# Patient Record
Sex: FEMALE | Race: BLACK OR AFRICAN AMERICAN | NOT HISPANIC OR LATINO | Employment: PART TIME | URBAN - METROPOLITAN AREA
[De-identification: names, ages, dates, MRNs, and addresses within clinical notes are randomized per-mention and may not be internally consistent; named-entity substitution may affect disease eponyms.]

---

## 2017-05-24 ENCOUNTER — LAB VISIT (OUTPATIENT)
Dept: LAB | Facility: HOSPITAL | Age: 22
End: 2017-05-24
Attending: INTERNAL MEDICINE
Payer: COMMERCIAL

## 2017-05-24 ENCOUNTER — OFFICE VISIT (OUTPATIENT)
Dept: HEMATOLOGY/ONCOLOGY | Facility: CLINIC | Age: 22
End: 2017-05-24
Payer: COMMERCIAL

## 2017-05-24 VITALS
DIASTOLIC BLOOD PRESSURE: 55 MMHG | HEART RATE: 93 BPM | TEMPERATURE: 99 F | WEIGHT: 108.94 LBS | SYSTOLIC BLOOD PRESSURE: 114 MMHG | RESPIRATION RATE: 16 BRPM | HEIGHT: 62 IN | BODY MASS INDEX: 20.05 KG/M2 | OXYGEN SATURATION: 98 %

## 2017-05-24 DIAGNOSIS — D57.1 HB-SS DISEASE WITHOUT CRISIS: ICD-10-CM

## 2017-05-24 DIAGNOSIS — D57.1 HB-SS DISEASE WITHOUT CRISIS: Primary | ICD-10-CM

## 2017-05-24 LAB
ALBUMIN SERPL BCP-MCNC: 3.9 G/DL
ALP SERPL-CCNC: 131 U/L
ALT SERPL W/O P-5'-P-CCNC: 32 U/L
ANION GAP SERPL CALC-SCNC: 6 MMOL/L
ANISOCYTOSIS BLD QL SMEAR: SLIGHT
AST SERPL-CCNC: 32 U/L
BASOPHILS # BLD AUTO: 0.1 K/UL
BASOPHILS NFR BLD: 1 %
BILIRUB SERPL-MCNC: 3.3 MG/DL
BUN SERPL-MCNC: 10 MG/DL
CALCIUM SERPL-MCNC: 8.6 MG/DL
CHLORIDE SERPL-SCNC: 109 MMOL/L
CO2 SERPL-SCNC: 24 MMOL/L
CREAT SERPL-MCNC: 0.7 MG/DL
DIFFERENTIAL METHOD: ABNORMAL
EOSINOPHIL # BLD AUTO: 0.4 K/UL
EOSINOPHIL NFR BLD: 4 %
ERYTHROCYTE [DISTWIDTH] IN BLOOD BY AUTOMATED COUNT: 18.5 %
EST. GFR  (AFRICAN AMERICAN): >60 ML/MIN/1.73 M^2
EST. GFR  (NON AFRICAN AMERICAN): >60 ML/MIN/1.73 M^2
GIANT PLATELETS BLD QL SMEAR: PRESENT
GLUCOSE SERPL-MCNC: 87 MG/DL
HCT VFR BLD AUTO: 22.8 %
HGB BLD-MCNC: 8 G/DL
HOWELL-JOLLY BOD BLD QL SMEAR: ABNORMAL
HYPOCHROMIA BLD QL SMEAR: ABNORMAL
LYMPHOCYTES # BLD AUTO: 3.4 K/UL
LYMPHOCYTES NFR BLD: 33.5 %
MCH RBC QN AUTO: 30.4 PG
MCHC RBC AUTO-ENTMCNC: 35.1 %
MCV RBC AUTO: 87 FL
MONOCYTES # BLD AUTO: 1 K/UL
MONOCYTES NFR BLD: 9.6 %
NEUTROPHILS # BLD AUTO: 5.2 K/UL
NEUTROPHILS NFR BLD: 51.9 %
PLATELET # BLD AUTO: 423 K/UL
PLATELET BLD QL SMEAR: ABNORMAL
PMV BLD AUTO: 9.9 FL
POIKILOCYTOSIS BLD QL SMEAR: SLIGHT
POLYCHROMASIA BLD QL SMEAR: ABNORMAL
POTASSIUM SERPL-SCNC: 4 MMOL/L
PROT SERPL-MCNC: 7.7 G/DL
RBC # BLD AUTO: 2.63 M/UL
RETICS/RBC NFR AUTO: 20.7 %
SICKLE CELLS BLD QL SMEAR: ABNORMAL
SODIUM SERPL-SCNC: 139 MMOL/L
TARGETS BLD QL SMEAR: ABNORMAL
WBC # BLD AUTO: 9.99 K/UL

## 2017-05-24 PROCEDURE — 83020 HEMOGLOBIN ELECTROPHORESIS: CPT | Mod: 91

## 2017-05-24 PROCEDURE — 99215 OFFICE O/P EST HI 40 MIN: CPT | Mod: S$GLB,,, | Performed by: INTERNAL MEDICINE

## 2017-05-24 PROCEDURE — 85045 AUTOMATED RETICULOCYTE COUNT: CPT

## 2017-05-24 PROCEDURE — 83020 HEMOGLOBIN ELECTROPHORESIS: CPT

## 2017-05-24 PROCEDURE — 80053 COMPREHEN METABOLIC PANEL: CPT

## 2017-05-24 PROCEDURE — 99999 PR PBB SHADOW E&M-EST. PATIENT-LVL III: CPT | Mod: PBBFAC,,, | Performed by: INTERNAL MEDICINE

## 2017-05-24 PROCEDURE — 36415 COLL VENOUS BLD VENIPUNCTURE: CPT

## 2017-05-24 PROCEDURE — 85025 COMPLETE CBC W/AUTO DIFF WBC: CPT

## 2017-05-24 NOTE — Clinical Note
ECHO and referral to optometry on same day in next 2 weeks Return visit  in 6 months with CBC, CMP, hemoglobin S and ferritin

## 2017-05-24 NOTE — LETTER
May 26, 2017    Elizabeth Farnco  [address]  [address]  [place] [address]             [facility]  [facility]  [facility] [address]  Phone: [phone] To Whom it May Concern:    Elizabeth Franco is a patient in the Hematology Department of [facility]. She is seen and treated for Sickle Cell Anemia.  She is applying for paratransit services. Elizabeth cannot utilize regular bus services because she cannot  heat or extreme cold weather for long periods of time. This causes severe pain and weakness due to her blood condition.     If you have any questions or concerns, please don't hesitate to call.    Sincerely,        Elvira Nuñez MD

## 2017-05-26 NOTE — PROGRESS NOTES
Subjective:       Patient ID: Elizabeth Franco is a 21 y.o. female.    Chief Complaint: Follow-up and Sickle Cell Anemia    The patient is a very pleasant 20 year old woman transferring care from [facility] for sickle cell anemia. Her parents have sickle cell trait. She reports a history of very well managed sickle cell disease. 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 She has a horizontal left upper quadrant scar from this surgery. She was treated with transfusion therapy for about 10 years. This was facilitated by a Port-A-Cath that was removed at the completion of therapy. The therapy was limited by iron overload and the patient was not able to tolerate oral chelation therapy. By description she was treated at least once with phlebotomy.    She has rare pain crises that lead to ER evaluation and treatment. These are often pain events of the left upper extremity or lower extremities. They occur about 1-2x yearly. Most often in the winter during finals week at school. She attends [employer] as a [employer] major. She has just started her second semester. Her last blood transfusion was 2 years ago. She has never required an exchange transfusion.     The patient notes that she has had nocturnal enuresis since her splenectomy at 4 years of age. This has been treated with pelvic exercises and oxybutynin. The incidence of enuresis has greatly decreased to resolved over the past year. She has not had increased UTI occurrence. Chaffing and skin irritation was a prior issue.    Elizabeth also has a recent diagnosis of hidradenitis supprativa of the right axilla. Flares have been treated with oral Augmentin and mupirocin 2% cream.     In addition she reports severe episodes of GERD that impact her quality of life. Events are described as immobilizing.incapacitating epigastric pain that radiates to the entire chest. Events last at least 24 hours. This has not been evaluated by  Gastroenterology to date.    TODAY  Elizabeth presents for follow-up evaluation. She appears well today.   She requests medication refills and completion of travel assistance application.  Maintenance ECHO and eye exam need to be updated.        HPI  Review of Systems   Constitutional: Negative for activity change, appetite change, fever and unexpected weight change.   HENT: Negative.    Eyes: Negative.    Respiratory: Negative for cough and shortness of breath.    Cardiovascular: Negative for chest pain and leg swelling.   Gastrointestinal:        Epigastric pain   Endocrine: Negative.    Genitourinary: Positive for enuresis.   Musculoskeletal: Negative.    Skin: Negative for pallor and rash.   Allergic/Immunologic: Negative for environmental allergies, food allergies and immunocompromised state.   Neurological: Negative.    Hematological: Negative for adenopathy. Does not bruise/bleed easily.   Psychiatric/Behavioral: Negative.        Objective:      Physical Exam   Constitutional: She is oriented to person, place, and time. She appears well-developed and well-nourished.   HENT:   Head: Normocephalic and atraumatic.   Right Ear: External ear normal.   Left Ear: External ear normal.   Nose: Nose normal.   Mouth/Throat: Oropharynx is clear and moist.   Eyes: Conjunctivae and EOM are normal. Pupils are equal, round, and reactive to light.   Neck: Normal range of motion. Neck supple. No thyromegaly present.   Cardiovascular: Normal rate, regular rhythm and intact distal pulses.    Murmur heard.  Pulmonary/Chest: Effort normal and breath sounds normal. No respiratory distress.   Abdominal: Soft. Bowel sounds are normal. She exhibits no distension. There is no hepatosplenomegaly.   Musculoskeletal: She exhibits no edema or tenderness.   Lymphadenopathy:     She has no cervical adenopathy.     She has no axillary adenopathy.        Right: No inguinal adenopathy present.        Left: No inguinal adenopathy present.    Neurological: She is alert and oriented to person, place, and time. No cranial nerve deficit.   Skin: Skin is warm and dry. No rash noted. No cyanosis. Nails show no clubbing.   Psychiatric: She has a normal mood and affect. Her behavior is normal.   Nursing note and vitals reviewed.      Assessment:       1. Hb-SS disease without crisis        Plan:       1. Continue Hydrea 1500mg daily and folic acid. Patient a great historian and compliant with maintenance therapy.    2. Continue Jadenu for iron overload    3. Medroxyprogesterone syringe refilled at last appointment- patient holding at present    4. Rx for Percocet 10mg/325mg for severe pain relief    5. Echocardiogram for screening pulmonary HTN and update eye exam    6. Recommend IVF every 2-3 months for improved pain control. Request at [facility] for patient convenience.    Return visit in 6 months with CBC, CMP, Retic count and ferritin

## 2017-05-26 NOTE — ADDENDUM NOTE
Encounter addended by: Elvira Nuñez MD on: 5/26/2017 10:16 AM<BR>    Actions taken: Letter status changed

## 2017-05-30 LAB
HGB FRACT BLD ELPH PH6.0-IMP: NORMAL
HGB FRACT BLD ELPH-IMP: NORMAL
HGB S MFR BLD ELPH: 65 %

## 2018-01-09 DIAGNOSIS — D57.1 HB-SS DISEASE WITHOUT CRISIS: ICD-10-CM

## 2018-01-09 RX ORDER — HYDROXYUREA 500 MG/1
500 CAPSULE ORAL 3 TIMES DAILY
Qty: 90 CAPSULE | Refills: 5 | Status: SHIPPED | OUTPATIENT
Start: 2018-01-09 | End: 2018-05-21 | Stop reason: SDUPTHER

## 2018-01-09 RX ORDER — FOLIC ACID 1 MG/1
1 TABLET ORAL DAILY
Qty: 30 TABLET | Refills: 5 | Status: SHIPPED | OUTPATIENT
Start: 2018-01-09 | End: 2018-05-21 | Stop reason: SDUPTHER

## 2018-05-21 ENCOUNTER — PATIENT MESSAGE (OUTPATIENT)
Dept: HEMATOLOGY/ONCOLOGY | Facility: CLINIC | Age: 23
End: 2018-05-21

## 2018-05-21 DIAGNOSIS — D57.00 SICKLE CELL ANEMIA WITH PAIN: Primary | ICD-10-CM

## 2018-05-21 DIAGNOSIS — D57.00 SICKLE CELL ANEMIA WITH PAIN: ICD-10-CM

## 2018-05-21 DIAGNOSIS — D57.1 HB-SS DISEASE WITHOUT CRISIS: ICD-10-CM

## 2018-05-21 RX ORDER — HYDROXYUREA 500 MG/1
500 CAPSULE ORAL 3 TIMES DAILY
Qty: 90 CAPSULE | Refills: 5 | Status: SHIPPED | OUTPATIENT
Start: 2018-05-21 | End: 2018-06-05 | Stop reason: SDUPTHER

## 2018-05-21 RX ORDER — OXYCODONE AND ACETAMINOPHEN 10; 325 MG/1; MG/1
1 TABLET ORAL 4 TIMES DAILY PRN
Qty: 30 TABLET | Refills: 0 | Status: SHIPPED | OUTPATIENT
Start: 2018-05-21 | End: 2018-06-05 | Stop reason: SDUPTHER

## 2018-05-21 RX ORDER — FOLIC ACID 1 MG/1
1 TABLET ORAL DAILY
Qty: 30 TABLET | Refills: 5 | Status: SHIPPED | OUTPATIENT
Start: 2018-05-21 | End: 2018-06-05 | Stop reason: SDUPTHER

## 2018-06-05 ENCOUNTER — LAB VISIT (OUTPATIENT)
Dept: LAB | Facility: HOSPITAL | Age: 23
End: 2018-06-05
Attending: INTERNAL MEDICINE
Payer: COMMERCIAL

## 2018-06-05 ENCOUNTER — OFFICE VISIT (OUTPATIENT)
Dept: HEMATOLOGY/ONCOLOGY | Facility: CLINIC | Age: 23
End: 2018-06-05
Payer: COMMERCIAL

## 2018-06-05 VITALS
HEART RATE: 70 BPM | HEIGHT: 62 IN | OXYGEN SATURATION: 99 % | DIASTOLIC BLOOD PRESSURE: 57 MMHG | WEIGHT: 108.44 LBS | BODY MASS INDEX: 19.96 KG/M2 | TEMPERATURE: 98 F | SYSTOLIC BLOOD PRESSURE: 103 MMHG

## 2018-06-05 DIAGNOSIS — D57.1 HB-SS DISEASE WITHOUT CRISIS: ICD-10-CM

## 2018-06-05 DIAGNOSIS — D57.00 SICKLE CELL ANEMIA WITH PAIN: ICD-10-CM

## 2018-06-05 LAB
ALBUMIN SERPL BCP-MCNC: 4.3 G/DL
ALP SERPL-CCNC: 107 U/L
ALT SERPL W/O P-5'-P-CCNC: 20 U/L
ANION GAP SERPL CALC-SCNC: 9 MMOL/L
AST SERPL-CCNC: 30 U/L
BASOPHILS # BLD AUTO: 0.13 K/UL
BASOPHILS NFR BLD: 1.9 %
BILIRUB SERPL-MCNC: 3.2 MG/DL
BUN SERPL-MCNC: 8 MG/DL
CALCIUM SERPL-MCNC: 9.3 MG/DL
CHLORIDE SERPL-SCNC: 109 MMOL/L
CO2 SERPL-SCNC: 22 MMOL/L
CREAT SERPL-MCNC: 0.7 MG/DL
DIFFERENTIAL METHOD: ABNORMAL
EOSINOPHIL # BLD AUTO: 0.4 K/UL
EOSINOPHIL NFR BLD: 5.9 %
ERYTHROCYTE [DISTWIDTH] IN BLOOD BY AUTOMATED COUNT: 18.9 %
EST. GFR  (AFRICAN AMERICAN): >60 ML/MIN/1.73 M^2
EST. GFR  (NON AFRICAN AMERICAN): >60 ML/MIN/1.73 M^2
GLUCOSE SERPL-MCNC: 86 MG/DL
HCT VFR BLD AUTO: 24 %
HGB BLD-MCNC: 8.9 G/DL
IMM GRANULOCYTES # BLD AUTO: 0.06 K/UL
IMM GRANULOCYTES NFR BLD AUTO: 0.9 %
LYMPHOCYTES # BLD AUTO: 1.9 K/UL
LYMPHOCYTES NFR BLD: 27.9 %
MCH RBC QN AUTO: 33 PG
MCHC RBC AUTO-ENTMCNC: 37.1 G/DL
MCV RBC AUTO: 89 FL
MONOCYTES # BLD AUTO: 0.5 K/UL
MONOCYTES NFR BLD: 7 %
NEUTROPHILS # BLD AUTO: 3.9 K/UL
NEUTROPHILS NFR BLD: 56.4 %
NRBC BLD-RTO: 3 /100 WBC
PLATELET # BLD AUTO: 409 K/UL
PMV BLD AUTO: 11.2 FL
POTASSIUM SERPL-SCNC: 4.3 MMOL/L
PROT SERPL-MCNC: 8.2 G/DL
RBC # BLD AUTO: 2.7 M/UL
RETICS/RBC NFR AUTO: 20 %
SODIUM SERPL-SCNC: 140 MMOL/L
WBC # BLD AUTO: 6.96 K/UL

## 2018-06-05 PROCEDURE — 99999 PR PBB SHADOW E&M-EST. PATIENT-LVL III: CPT | Mod: PBBFAC,,, | Performed by: INTERNAL MEDICINE

## 2018-06-05 PROCEDURE — 80053 COMPREHEN METABOLIC PANEL: CPT

## 2018-06-05 PROCEDURE — 85025 COMPLETE CBC W/AUTO DIFF WBC: CPT

## 2018-06-05 PROCEDURE — 36415 COLL VENOUS BLD VENIPUNCTURE: CPT

## 2018-06-05 PROCEDURE — 99215 OFFICE O/P EST HI 40 MIN: CPT | Mod: S$GLB,,, | Performed by: INTERNAL MEDICINE

## 2018-06-05 PROCEDURE — 85045 AUTOMATED RETICULOCYTE COUNT: CPT

## 2018-06-05 PROCEDURE — 83020 HEMOGLOBIN ELECTROPHORESIS: CPT

## 2018-06-05 PROCEDURE — 3008F BODY MASS INDEX DOCD: CPT | Mod: CPTII,S$GLB,, | Performed by: INTERNAL MEDICINE

## 2018-06-05 RX ORDER — OXYCODONE AND ACETAMINOPHEN 10; 325 MG/1; MG/1
1 TABLET ORAL 4 TIMES DAILY PRN
Qty: 30 TABLET | Refills: 0 | Status: SHIPPED | OUTPATIENT
Start: 2018-06-05 | End: 2019-04-16

## 2018-06-05 RX ORDER — HYDROXYUREA 500 MG/1
500 CAPSULE ORAL 3 TIMES DAILY
Qty: 90 CAPSULE | Refills: 5 | Status: SHIPPED | OUTPATIENT
Start: 2018-06-05 | End: 2019-04-16

## 2018-06-05 RX ORDER — FOLIC ACID 1 MG/1
1 TABLET ORAL DAILY
Qty: 30 TABLET | Refills: 5 | Status: SHIPPED | OUTPATIENT
Start: 2018-06-05 | End: 2019-04-16

## 2018-06-05 NOTE — PROGRESS NOTES
Subjective:       Patient ID: Elizabeth Franco is a 22 y.o. female.    Chief Complaint: Hb-SS disease without crisis and Sickle cell anemia with pain    The patient is a very pleasant 20 year old woman [facility]. Her parents have sickle cell trait. She reports a history of very well managed sickle cell disease. 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 She has a horizontal left upper quadrant scar from this surgery. She was treated with transfusion therapy for about 10 years. This was facilitated by a Port-A-Cath that was removed at the completion of therapy. The therapy was limited by iron overload and the patient was not able to tolerate oral chelation therapy. By description she was treated at least once with phlebotomy.    She has rare pain crises that lead to ER evaluation and treatment. These are often pain events of the left upper extremity or lower extremities. They occur about 1-2x yearly. Most often in the winter during finals week at school. She attends [employer] as a [employer] major. She has just started her second semester. Her last blood transfusion was 2 years ago. She has never required an exchange transfusion.     The patient notes that she has had nocturnal enuresis since her splenectomy at 4 years of age. This has been treated with pelvic exercises and oxybutynin. The incidence of enuresis has greatly decreased to resolved over the past year. She has not had increased UTI occurrence. Chaffing and skin irritation was a prior issue.    Elizabeth also has a recent diagnosis of hidradenitis supprativa of the right axilla. Flares have been treated with oral Augmentin and mupirocin 2% cream.     In addition she reports severe episodes of GERD that impact her quality of life. Events are described as immobilizing.incapacitating epigastric pain that radiates to the entire chest. Events last at least 24 hours. This has not  been evaluated by Gastroenterology to date.    TODAY  Elizabeth presents for follow-up evaluation. She appears well today. Reports single hospital admission for pain crisis during December 2017 during hard freeze  She requests medication refills in paper form to have during trip to [place] in July- will be out of country for about 1 month  Maintenance ECHO and eye exam need to be updated. Patient also reports she needs a lipid panel.        HPI  Review of Systems   Constitutional: Negative for activity change, appetite change, fever and unexpected weight change.   HENT: Negative.    Eyes: Negative.    Respiratory: Negative for cough and shortness of breath.    Cardiovascular: Negative for chest pain and leg swelling.   Gastrointestinal:        Epigastric pain   Endocrine: Negative.    Genitourinary: Positive for enuresis.   Musculoskeletal: Negative.    Skin: Negative for pallor and rash.   Allergic/Immunologic: Negative for environmental allergies, food allergies and immunocompromised state.   Neurological: Negative.    Hematological: Negative for adenopathy. Does not bruise/bleed easily.   Psychiatric/Behavioral: Negative.        Objective:      Physical Exam   Constitutional: She is oriented to person, place, and time. She appears well-developed and well-nourished.   HENT:   Head: Normocephalic and atraumatic.   Right Ear: External ear normal.   Left Ear: External ear normal.   Nose: Nose normal.   Mouth/Throat: Oropharynx is clear and moist.   Eyes: Conjunctivae and EOM are normal. Pupils are equal, round, and reactive to light.   Neck: Normal range of motion. Neck supple. No thyromegaly present.   Cardiovascular: Normal rate, regular rhythm and intact distal pulses.    Murmur heard.  Pulmonary/Chest: Effort normal and breath sounds normal. No respiratory distress.   Abdominal: Soft. Bowel sounds are normal. She exhibits no distension. There is no hepatosplenomegaly.   Musculoskeletal: She exhibits no edema or  tenderness.   Lymphadenopathy:     She has no cervical adenopathy.     She has no axillary adenopathy.        Right: No inguinal adenopathy present.        Left: No inguinal adenopathy present.   Neurological: She is alert and oriented to person, place, and time. No cranial nerve deficit.   Skin: Skin is warm and dry. No rash noted. No cyanosis. Nails show no clubbing.   Psychiatric: She has a normal mood and affect. Her behavior is normal.   Nursing note and vitals reviewed.      Assessment:       1. Hb-SS disease without crisis    2. Sickle cell anemia with pain        Plan:       1. Continue Hydrea 1500mg daily and folic acid. Patient a great historian and compliant with maintenance therapy.    2. Medroxyprogesterone- restarted by OBGYN, patient now having prolonged period and will discuss with OBGYN    3. Rx for Percocet 10mg/325mg for severe pain relief    4. Echocardiogram for screening pulmonary HTN and update eye exam- ECHO scheduled  And lipid panel 6/6/18    Return visit in 6 months with CBC, CMP, Retic count and ferritin

## 2018-06-06 ENCOUNTER — PATIENT MESSAGE (OUTPATIENT)
Dept: HEMATOLOGY/ONCOLOGY | Facility: CLINIC | Age: 23
End: 2018-06-06

## 2018-06-06 ENCOUNTER — HOSPITAL ENCOUNTER (OUTPATIENT)
Dept: CARDIOLOGY | Facility: HOSPITAL | Age: 23
Discharge: HOME | End: 2018-06-06
Attending: INTERNAL MEDICINE
Payer: COMMERCIAL

## 2018-06-06 ENCOUNTER — LAB VISIT (OUTPATIENT)
Dept: LAB | Facility: HOSPITAL | Age: 23
End: 2018-06-06
Attending: INTERNAL MEDICINE
Payer: COMMERCIAL

## 2018-06-06 DIAGNOSIS — D57.1 HB-SS DISEASE WITHOUT CRISIS: ICD-10-CM

## 2018-06-06 DIAGNOSIS — D57.00 SICKLE CELL ANEMIA WITH PAIN: ICD-10-CM

## 2018-06-06 LAB
CHOLEST SERPL-MCNC: 121 MG/DL
CHOLEST/HDLC SERPL: 4.2 {RATIO}
HDLC SERPL-MCNC: 29 MG/DL
HDLC SERPL: 24 %
HGB FRACT BLD ELPH-IMP: NORMAL
HGB S MFR BLD ELPH: 86 %
LDLC SERPL CALC-MCNC: 76 MG/DL
NONHDLC SERPL-MCNC: 92 MG/DL
TRIGL SERPL-MCNC: 80 MG/DL

## 2018-06-06 PROCEDURE — 93306 TTE W/DOPPLER COMPLETE: CPT

## 2018-06-06 PROCEDURE — 93306 TTE W/DOPPLER COMPLETE: CPT | Mod: 26,,, | Performed by: INTERNAL MEDICINE

## 2018-06-06 PROCEDURE — 80061 LIPID PANEL: CPT

## 2018-06-06 PROCEDURE — 36415 COLL VENOUS BLD VENIPUNCTURE: CPT

## 2018-06-07 LAB
DIASTOLIC DYSFUNCTION: NO
ESTIMATED PA SYSTOLIC PRESSURE: 22.01
MITRAL VALVE MOBILITY: NORMAL
RETIRED EF AND QEF - SEE NOTES: 60 (ref 55–65)
TRICUSPID VALVE REGURGITATION: NORMAL

## 2018-12-03 ENCOUNTER — PATIENT MESSAGE (OUTPATIENT)
Dept: HEMATOLOGY/ONCOLOGY | Facility: CLINIC | Age: 23
End: 2018-12-03

## 2018-12-10 ENCOUNTER — LAB VISIT (OUTPATIENT)
Dept: LAB | Facility: HOSPITAL | Age: 23
End: 2018-12-10
Attending: INTERNAL MEDICINE
Payer: COMMERCIAL

## 2018-12-10 ENCOUNTER — OFFICE VISIT (OUTPATIENT)
Dept: HEMATOLOGY/ONCOLOGY | Facility: CLINIC | Age: 23
End: 2018-12-10
Payer: COMMERCIAL

## 2018-12-10 VITALS
RESPIRATION RATE: 18 BRPM | DIASTOLIC BLOOD PRESSURE: 73 MMHG | HEART RATE: 77 BPM | OXYGEN SATURATION: 100 % | SYSTOLIC BLOOD PRESSURE: 117 MMHG | HEIGHT: 62 IN | BODY MASS INDEX: 21.86 KG/M2 | TEMPERATURE: 97 F | WEIGHT: 118.81 LBS

## 2018-12-10 DIAGNOSIS — D57.1 HB-SS DISEASE WITHOUT CRISIS: ICD-10-CM

## 2018-12-10 DIAGNOSIS — D57.1 HB-SS DISEASE WITHOUT CRISIS: Primary | ICD-10-CM

## 2018-12-10 DIAGNOSIS — D57.00 SICKLE CELL ANEMIA WITH PAIN: ICD-10-CM

## 2018-12-10 LAB
ALBUMIN SERPL BCP-MCNC: 4.4 G/DL
ALP SERPL-CCNC: 100 U/L
ALT SERPL W/O P-5'-P-CCNC: 27 U/L
ANION GAP SERPL CALC-SCNC: 9 MMOL/L
ANISOCYTOSIS BLD QL SMEAR: SLIGHT
AST SERPL-CCNC: 29 U/L
BASOPHILS # BLD AUTO: 0.1 K/UL
BASOPHILS NFR BLD: 1 %
BILIRUB SERPL-MCNC: 3 MG/DL
BUN SERPL-MCNC: 9 MG/DL
CALCIUM SERPL-MCNC: 9.6 MG/DL
CHLORIDE SERPL-SCNC: 109 MMOL/L
CO2 SERPL-SCNC: 21 MMOL/L
CREAT SERPL-MCNC: 0.6 MG/DL
DIFFERENTIAL METHOD: ABNORMAL
EOSINOPHIL # BLD AUTO: 0.3 K/UL
EOSINOPHIL NFR BLD: 2.5 %
ERYTHROCYTE [DISTWIDTH] IN BLOOD BY AUTOMATED COUNT: 16.4 %
EST. GFR  (AFRICAN AMERICAN): >60 ML/MIN/1.73 M^2
EST. GFR  (NON AFRICAN AMERICAN): >60 ML/MIN/1.73 M^2
FERRITIN SERPL-MCNC: 2699 NG/ML
GLUCOSE SERPL-MCNC: 84 MG/DL
HCT VFR BLD AUTO: 27.5 %
HGB BLD-MCNC: 9.5 G/DL
HYPOCHROMIA BLD QL SMEAR: ABNORMAL
IMM GRANULOCYTES # BLD AUTO: 0.03 K/UL
IMM GRANULOCYTES NFR BLD AUTO: 0.3 %
LYMPHOCYTES # BLD AUTO: 2.3 K/UL
LYMPHOCYTES NFR BLD: 21.7 %
MCH RBC QN AUTO: 31.1 PG
MCHC RBC AUTO-ENTMCNC: 34.5 G/DL
MCV RBC AUTO: 90 FL
MONOCYTES # BLD AUTO: 0.6 K/UL
MONOCYTES NFR BLD: 5.7 %
NEUTROPHILS # BLD AUTO: 7.2 K/UL
NEUTROPHILS NFR BLD: 68.8 %
NRBC BLD-RTO: 1 /100 WBC
OVALOCYTES BLD QL SMEAR: ABNORMAL
PLATELET # BLD AUTO: 399 K/UL
PMV BLD AUTO: 10.9 FL
POIKILOCYTOSIS BLD QL SMEAR: ABNORMAL
POLYCHROMASIA BLD QL SMEAR: ABNORMAL
POTASSIUM SERPL-SCNC: 4.1 MMOL/L
PROT SERPL-MCNC: 8.4 G/DL
RBC # BLD AUTO: 3.05 M/UL
RETICS/RBC NFR AUTO: 16.4 %
SICKLE CELLS BLD QL SMEAR: ABNORMAL
SODIUM SERPL-SCNC: 139 MMOL/L
TARGETS BLD QL SMEAR: ABNORMAL
WBC # BLD AUTO: 10.38 K/UL

## 2018-12-10 PROCEDURE — 99999 PR PBB SHADOW E&M-EST. PATIENT-LVL IV: CPT | Mod: PBBFAC,,, | Performed by: INTERNAL MEDICINE

## 2018-12-10 PROCEDURE — 3008F BODY MASS INDEX DOCD: CPT | Mod: CPTII,S$GLB,, | Performed by: INTERNAL MEDICINE

## 2018-12-10 PROCEDURE — 83020 HEMOGLOBIN ELECTROPHORESIS: CPT

## 2018-12-10 PROCEDURE — 36415 COLL VENOUS BLD VENIPUNCTURE: CPT

## 2018-12-10 PROCEDURE — 85025 COMPLETE CBC W/AUTO DIFF WBC: CPT

## 2018-12-10 PROCEDURE — 80053 COMPREHEN METABOLIC PANEL: CPT

## 2018-12-10 PROCEDURE — 85045 AUTOMATED RETICULOCYTE COUNT: CPT

## 2018-12-10 PROCEDURE — 99215 OFFICE O/P EST HI 40 MIN: CPT | Mod: S$GLB,,, | Performed by: INTERNAL MEDICINE

## 2018-12-10 PROCEDURE — 82728 ASSAY OF FERRITIN: CPT

## 2018-12-12 LAB
HGB FRACT BLD ELPH-IMP: NORMAL
HGB S MFR BLD ELPH: 64 %

## 2018-12-14 NOTE — PROGRESS NOTES
Subjective:       Patient ID: Elizabeth Franco is a 23 y.o. female.    Chief Complaint: Hb-SS disease without crisis and Results    The patient is a very pleasant 20 year old woman transferring care from [facility]. Her parents have sickle cell trait. She reports a history of very well managed sickle cell disease. 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 She has a horizontal left upper quadrant scar from this surgery. She was treated with transfusion therapy for about 10 years. This was facilitated by a Port-A-Cath that was removed at the completion of therapy. The therapy was limited by iron overload and the patient was not able to tolerate oral chelation therapy. By description she was treated at least once with phlebotomy.    She has rare pain crises that lead to ER evaluation and treatment. These are often pain events of the left upper extremity or lower extremities. They occur about 1-2x yearly. Most often in the winter during finals week at school. She attends [employer] as a [employer] major. She has just started her second semester. Her last blood transfusion was 2 years ago. She has never required an exchange transfusion.     The patient notes that she has had nocturnal enuresis since her splenectomy at 4 years of age. This has been treated with pelvic exercises and oxybutynin. The incidence of enuresis has greatly decreased to resolved over the past year. She has not had increased UTI occurrence. Chaffing and skin irritation was a prior issue.    Elizabeth also has a recent diagnosis of hidradenitis supprativa of the right axilla. Flares have been treated with oral Augmentin and mupirocin 2% cream.     In addition she reports severe episodes of GERD that impact her quality of life. Events are described as immobilizing.incapacitating epigastric pain that radiates to the entire chest. Events last at least 24 hours. This has not been evaluated by  Gastroenterology to date.    TODAY  Elizabeth presents for follow-up evaluation.   She requests visits with Dermatology and Psychology  Medications were reviewed.   Plan to keep current plan for hydrea and pain regimen.  Recent admission to outside hospital for chest syndrome.        HPI  Review of Systems   Constitutional: Negative for activity change, appetite change, fever and unexpected weight change.   HENT: Negative.    Eyes: Negative.    Respiratory: Negative for cough and shortness of breath.    Cardiovascular: Negative for chest pain and leg swelling.   Gastrointestinal:        Epigastric pain   Endocrine: Negative.    Genitourinary: Positive for enuresis.   Musculoskeletal: Negative.    Skin: Negative for pallor and rash.   Allergic/Immunologic: Negative for environmental allergies, food allergies and immunocompromised state.   Neurological: Negative.    Hematological: Negative for adenopathy. Does not bruise/bleed easily.   Psychiatric/Behavioral: Negative.        Objective:      Physical Exam   Constitutional: She is oriented to person, place, and time. She appears well-developed and well-nourished.   HENT:   Head: Normocephalic and atraumatic.   Right Ear: External ear normal.   Left Ear: External ear normal.   Nose: Nose normal.   Mouth/Throat: Oropharynx is clear and moist.   Eyes: Conjunctivae and EOM are normal. Pupils are equal, round, and reactive to light.   Neck: Normal range of motion. Neck supple. No thyromegaly present.   Cardiovascular: Normal rate, regular rhythm and intact distal pulses.   Murmur heard.  Pulmonary/Chest: Effort normal and breath sounds normal. No respiratory distress.   Abdominal: Soft. Bowel sounds are normal. She exhibits no distension. There is no hepatosplenomegaly.   Musculoskeletal: She exhibits no edema or tenderness.   Lymphadenopathy:     She has no cervical adenopathy.     She has no axillary adenopathy.        Right: No inguinal adenopathy present.        Left: No  inguinal adenopathy present.   Neurological: She is alert and oriented to person, place, and time. No cranial nerve deficit.   Skin: Skin is warm and dry. No rash noted. No cyanosis. Nails show no clubbing.   Psychiatric: She has a normal mood and affect. Her behavior is normal.   Nursing note and vitals reviewed.      Assessment:       No diagnosis found.    Plan:       1. Continue Hydrea 1500mg daily and folic acid. Patient a great historian and compliant with maintenance therapy.    2. Medroxyprogesterone- restarted by OBGYN, patient now having prolonged period and will discuss with OBGYN    3. Percocet 10mg/325mg for severe pain relief    4. Echocardiogram for screening pulmonary HTN and update eye exam- ECHO scheduled; need repeat in 2020      Return visit in 6 months with CBC, CMP, Retic count and ferritin  Refer to Dermatology and Dr. Nolan

## 2018-12-18 ENCOUNTER — TELEPHONE (OUTPATIENT)
Dept: INFUSION THERAPY | Facility: HOSPITAL | Age: 23
End: 2018-12-18

## 2018-12-20 ENCOUNTER — TELEPHONE (OUTPATIENT)
Dept: INFUSION THERAPY | Facility: HOSPITAL | Age: 23
End: 2018-12-20

## 2019-04-16 ENCOUNTER — OFFICE VISIT (OUTPATIENT)
Dept: URGENT CARE | Facility: CLINIC | Age: 24
End: 2019-04-16
Payer: COMMERCIAL

## 2019-04-16 VITALS
BODY MASS INDEX: 20.24 KG/M2 | SYSTOLIC BLOOD PRESSURE: 112 MMHG | RESPIRATION RATE: 18 BRPM | HEART RATE: 82 BPM | TEMPERATURE: 99 F | WEIGHT: 110 LBS | DIASTOLIC BLOOD PRESSURE: 68 MMHG | HEIGHT: 62 IN | OXYGEN SATURATION: 97 %

## 2019-04-16 DIAGNOSIS — L03.116 CELLULITIS OF LEFT LOWER EXTREMITY: Primary | ICD-10-CM

## 2019-04-16 DIAGNOSIS — D57.1 HB-SS DISEASE WITHOUT CRISIS: ICD-10-CM

## 2019-04-16 PROCEDURE — 3008F PR BODY MASS INDEX (BMI) DOCUMENTED: ICD-10-PCS | Mod: CPTII,S$GLB,, | Performed by: INTERNAL MEDICINE

## 2019-04-16 PROCEDURE — 3008F BODY MASS INDEX DOCD: CPT | Mod: CPTII,S$GLB,, | Performed by: INTERNAL MEDICINE

## 2019-04-16 PROCEDURE — 99213 PR OFFICE/OUTPT VISIT, EST, LEVL III, 20-29 MIN: ICD-10-PCS | Mod: S$GLB,,, | Performed by: INTERNAL MEDICINE

## 2019-04-16 PROCEDURE — 99213 OFFICE O/P EST LOW 20 MIN: CPT | Mod: S$GLB,,, | Performed by: INTERNAL MEDICINE

## 2019-04-16 RX ORDER — AMOXICILLIN AND CLAVULANATE POTASSIUM 875; 125 MG/1; MG/1
1 TABLET, FILM COATED ORAL 2 TIMES DAILY
Qty: 10 TABLET | Refills: 0 | Status: SHIPPED | OUTPATIENT
Start: 2019-04-16 | End: 2019-04-21

## 2019-04-16 RX ORDER — SULFAMETHOXAZOLE AND TRIMETHOPRIM 800; 160 MG/1; MG/1
TABLET ORAL
Refills: 0 | Status: ON HOLD | COMMUNITY
Start: 2019-04-15 | End: 2020-04-05 | Stop reason: HOSPADM

## 2019-04-16 RX ORDER — MUPIROCIN 20 MG/G
OINTMENT TOPICAL
Qty: 22 G | Refills: 1 | Status: ON HOLD | OUTPATIENT
Start: 2019-04-16 | End: 2021-03-03

## 2019-04-16 NOTE — PROGRESS NOTES
"Subjective:       Patient ID: Elizabeth Franco is a 23 y.o. female.    Vitals:  height is 5' 2" (1.575 m) and weight is 49.9 kg (110 lb). Her tympanic temperature is 99.2 °F (37.3 °C). Her blood pressure is 112/68 and her pulse is 82. Her respiration is 18 and oxygen saturation is 97%.     Chief Complaint: Leg Pain (left lower leg)    Patient states she fell 1 week ago.    Leg Pain    Incident onset: 1 week ago. The incident occurred at work. The injury mechanism was a fall. The pain is present in the left ankle. The quality of the pain is described as burning. The pain is at a severity of 0/10. The patient is experiencing no pain. The pain has been intermittent since onset. Associated symptoms include tingling. Pertinent negatives include no muscle weakness or numbness. She reports no foreign bodies present. The symptoms are aggravated by palpation and weight bearing. She has tried acetaminophen, heat and ice (Bactrim DS) for the symptoms. The treatment provided mild relief.       Constitution: Negative for fatigue.   HENT: Negative for facial swelling and facial trauma.    Neck: Negative for neck stiffness.   Cardiovascular: Negative for chest trauma.   Eyes: Negative for eye trauma, double vision and blurred vision.   Gastrointestinal: Negative for abdominal trauma, abdominal pain and rectal bleeding.   Genitourinary: Negative for hematuria, missed menses, genital trauma and pelvic pain.   Musculoskeletal: Positive for pain (left lower leg) and trauma (fall). Negative for joint swelling and abnormal ROM of joint.   Skin: Positive for abrasion and erythema. Negative for color change, wound, laceration and bruising.   Neurological: Negative for dizziness, history of vertigo, light-headedness, coordination disturbances, altered mental status, loss of consciousness and numbness.   Hematologic/Lymphatic: Negative for history of bleeding disorder.   Psychiatric/Behavioral: Negative for altered mental status.     "   Objective:      Physical Exam   Constitutional: She is oriented to person, place, and time. She appears well-developed and well-nourished. She is cooperative.  Non-toxic appearance. She does not appear ill. No distress.   HENT:   Head: Normocephalic and atraumatic.   Right Ear: Hearing, external ear and ear canal normal. Tympanic membrane is not injected and not erythematous.   Left Ear: Hearing, tympanic membrane, external ear and ear canal normal. Tympanic membrane is not injected and not erythematous.   Nose: Nose normal. No mucosal edema, rhinorrhea or nasal deformity. No epistaxis. Right sinus exhibits no maxillary sinus tenderness and no frontal sinus tenderness. Left sinus exhibits no maxillary sinus tenderness and no frontal sinus tenderness.   Mouth/Throat: Uvula is midline, oropharynx is clear and moist and mucous membranes are normal. No trismus in the jaw. Normal dentition. No uvula swelling. No oropharyngeal exudate or posterior oropharyngeal erythema.   Eyes: Conjunctivae and lids are normal. No scleral icterus.   Sclera clear bilat   Neck: Trachea normal, full passive range of motion without pain and phonation normal. Neck supple.   Cardiovascular: Normal rate, regular rhythm, normal heart sounds, intact distal pulses and normal pulses.   Pulmonary/Chest: Effort normal and breath sounds normal. No respiratory distress.   Abdominal: Soft. Normal appearance and bowel sounds are normal. She exhibits no distension. There is no tenderness.   Musculoskeletal: Normal range of motion. She exhibits no edema or deformity.   Neurological: She is alert and oriented to person, place, and time. She exhibits normal muscle tone. Coordination normal.   Skin: Skin is warm and dry. Capillary refill takes less than 2 seconds. Abrasion noted. No bruising, no burn, no ecchymosis, no laceration, no lesion, no petechiae, no purpura and no rash noted. Rash is not macular, not papular, not maculopapular, not nodular, not  pustular and not vesicular. She is not diaphoretic. There is erythema. No cyanosis. No pallor. Nails show no clubbing.        Psychiatric: She has a normal mood and affect. Her speech is normal and behavior is normal. Judgment and thought content normal. Cognition and memory are normal.   Nursing note and vitals reviewed.      Assessment:       1. Cellulitis of left lower extremity    2. Hb-SS disease without crisis        Plan:         Cellulitis of left lower extremity  -     amoxicillin-clavulanate 875-125mg (AUGMENTIN) 875-125 mg per tablet; Take 1 tablet by mouth 2 (two) times daily. for 5 days  Dispense: 10 tablet; Refill: 0  -     mupirocin (BACTROBAN) 2 % ointment; Apply to affected area 3 times daily  Dispense: 22 g; Refill: 1    Hb-SS disease without crisis       take meds fu with regular md  Worry about encapsulated bacteria H. Flu N.Mening and strep

## 2019-04-16 NOTE — PATIENT INSTRUCTIONS
Discharge Instructions for Cellulitis  You have been diagnosed with cellulitis. This is an infection in the deepest layer of the skin. In some cases, the infection also affects the muscle. Cellulitis is caused by bacteria. The bacteria can enter the body through broken skin. This can happen with a cut, scratch, animal bite, or an insect bite that has been scratched. You may have been treated in the hospital with antibiotics and fluids. You will likely be given a prescription for antibiotics to take at home. This sheet will help you take care of yourself at home.  Home care  When you are home:  · Take the prescribed antibiotic medicine you are given as directed until it is gone. Take it even if you feel better. It treats the infection and stops it from returning. Not taking all the medicine can make future infections hard to treat.  · Keep the infected area clean.  · When possible, raise the infected area above the level of your heart. This helps keep swelling down.  · Talk with your healthcare provider if you are in pain. Ask what kind of over-the-counter medicine you can take for pain.  · Apply clean bandages as advised.  · Take your temperature once a day for a week.  · Wash your hands often to prevent spreading the infection.  In the future, wash your hands before and after you touch cuts, scratches, or bandages. This will help prevent infection.   When to call your healthcare provider  Call your healthcare provider immediately if you have any of the following:  · Difficulty or pain when moving the joints above or below the infected area  · Discharge or pus draining from the area  · Fever of 100.4°F (38°C) or higher, or as directed by your healthcare provider  · Pain that gets worse in or around the infected   · Redness that gets worse in or around the infected area, particularly if the area of redness expands to a wider area  · Shaking chills  · Swelling of the infected area  · Vomiting   Date Last Reviewed:  8/1/2016 © 2000-2017 [employer]. [address] [place] [address], [place] [address]. All rights reserved. This information is not intended as a substitute for professional medical care. Always follow your healthcare professional's instructions.    Please return here or go to the Emergency Department for any concerns or worsening of condition.  If you were prescribed antibiotics, please take them to completion.  If you were prescribed a narcotic medication, do not drive or operate heavy equipment or machinery while taking these medications.  Please follow up with your primary care doctor or specialist as needed.    If you  smoke, please stop smoking.      [place] over the counter(pulls infection out of wound)  Mix two packets to a quart of boiled water.  Make sure bowl you mixed it in has a lid and is micro-waveable .  Take 3 clean wash cloths and put one into solution that you mixed and place on affected area(very warm solution but NOT BOILING  HOT) for 10 minutes then discard to dirty laundry repeat with second clean wash cloth (dip into solution) and third wash cloth each for 10 minutes(total soaks on affected area are for 30 minutes).  Then clean area with soap and water put either Neosporin or other antibiotic creams to affected area and bandage with sterile/clean dressing.  Place lid on solution and put in refrigerator then when ready to repeat process take bowl out and place in microwave until solution is steamming .  REPEAT Put warm compresses on affected area 4 to 5 times a day for the first 5 to 6 days.

## 2019-04-22 ENCOUNTER — PATIENT MESSAGE (OUTPATIENT)
Dept: HEMATOLOGY/ONCOLOGY | Facility: CLINIC | Age: 24
End: 2019-04-22

## 2019-04-22 ENCOUNTER — TELEPHONE (OUTPATIENT)
Dept: URGENT CARE | Facility: CLINIC | Age: 24
End: 2019-04-22

## 2019-04-25 ENCOUNTER — LAB VISIT (OUTPATIENT)
Dept: LAB | Facility: HOSPITAL | Age: 24
End: 2019-04-25
Attending: INTERNAL MEDICINE
Payer: COMMERCIAL

## 2019-04-25 DIAGNOSIS — D57.1 HB-SS DISEASE WITHOUT CRISIS: ICD-10-CM

## 2019-04-25 LAB
ALBUMIN SERPL BCP-MCNC: 4.3 G/DL (ref 3.5–5.2)
ALP SERPL-CCNC: 117 U/L (ref 55–135)
ALT SERPL W/O P-5'-P-CCNC: 14 U/L (ref 10–44)
ANION GAP SERPL CALC-SCNC: 11 MMOL/L (ref 8–16)
ANISOCYTOSIS BLD QL SMEAR: ABNORMAL
AST SERPL-CCNC: 19 U/L (ref 10–40)
BASOPHILS # BLD AUTO: ABNORMAL K/UL (ref 0–0.2)
BASOPHILS NFR BLD: 0 % (ref 0–1.9)
BILIRUB SERPL-MCNC: 3.3 MG/DL (ref 0.1–1)
BUN SERPL-MCNC: 8 MG/DL (ref 6–20)
CALCIUM SERPL-MCNC: 8.9 MG/DL (ref 8.7–10.5)
CHLORIDE SERPL-SCNC: 111 MMOL/L (ref 95–110)
CO2 SERPL-SCNC: 20 MMOL/L (ref 23–29)
CREAT SERPL-MCNC: 0.7 MG/DL (ref 0.5–1.4)
DIFFERENTIAL METHOD: ABNORMAL
EOSINOPHIL # BLD AUTO: ABNORMAL K/UL (ref 0–0.5)
EOSINOPHIL NFR BLD: 5 % (ref 0–8)
ERYTHROCYTE [DISTWIDTH] IN BLOOD BY AUTOMATED COUNT: 21 % (ref 11.5–14.5)
EST. GFR  (AFRICAN AMERICAN): >60 ML/MIN/1.73 M^2
EST. GFR  (NON AFRICAN AMERICAN): >60 ML/MIN/1.73 M^2
FERRITIN SERPL-MCNC: 2184 NG/ML (ref 20–300)
GLUCOSE SERPL-MCNC: 87 MG/DL (ref 70–110)
HCT VFR BLD AUTO: 24.4 % (ref 37–48.5)
HGB BLD-MCNC: 8.4 G/DL (ref 12–16)
LYMPHOCYTES # BLD AUTO: ABNORMAL K/UL (ref 1–4.8)
LYMPHOCYTES NFR BLD: 23 % (ref 18–48)
MCH RBC QN AUTO: 28.9 PG (ref 27–31)
MCHC RBC AUTO-ENTMCNC: 34.4 G/DL (ref 32–36)
MCV RBC AUTO: 84 FL (ref 82–98)
MONOCYTES # BLD AUTO: ABNORMAL K/UL (ref 0.3–1)
MONOCYTES NFR BLD: 7 % (ref 4–15)
NEUTROPHILS NFR BLD: 65 % (ref 38–73)
PLATELET # BLD AUTO: 443 K/UL (ref 150–350)
PLATELET BLD QL SMEAR: ABNORMAL
PMV BLD AUTO: 10.6 FL (ref 9.2–12.9)
POIKILOCYTOSIS BLD QL SMEAR: ABNORMAL
POTASSIUM SERPL-SCNC: 4 MMOL/L (ref 3.5–5.1)
PROT SERPL-MCNC: 7.9 G/DL (ref 6–8.4)
RBC # BLD AUTO: 2.91 M/UL (ref 4–5.4)
RETICS/RBC NFR AUTO: 13.5 % (ref 0.5–2.5)
SICKLE CELLS BLD QL SMEAR: ABNORMAL
SODIUM SERPL-SCNC: 142 MMOL/L (ref 136–145)
TARGETS BLD QL SMEAR: ABNORMAL
WBC # BLD AUTO: 8.69 K/UL (ref 3.9–12.7)

## 2019-04-25 PROCEDURE — 85027 COMPLETE CBC AUTOMATED: CPT

## 2019-04-25 PROCEDURE — 36415 COLL VENOUS BLD VENIPUNCTURE: CPT

## 2019-04-25 PROCEDURE — 82728 ASSAY OF FERRITIN: CPT

## 2019-04-25 PROCEDURE — 80053 COMPREHEN METABOLIC PANEL: CPT

## 2019-04-25 PROCEDURE — 85045 AUTOMATED RETICULOCYTE COUNT: CPT

## 2019-04-25 PROCEDURE — 85007 BL SMEAR W/DIFF WBC COUNT: CPT

## 2019-04-26 ENCOUNTER — OFFICE VISIT (OUTPATIENT)
Dept: HEMATOLOGY/ONCOLOGY | Facility: CLINIC | Age: 24
End: 2019-04-26
Payer: COMMERCIAL

## 2019-04-26 VITALS
RESPIRATION RATE: 20 BRPM | HEIGHT: 62 IN | HEART RATE: 76 BPM | SYSTOLIC BLOOD PRESSURE: 121 MMHG | BODY MASS INDEX: 21.38 KG/M2 | OXYGEN SATURATION: 100 % | DIASTOLIC BLOOD PRESSURE: 57 MMHG | TEMPERATURE: 98 F | WEIGHT: 116.19 LBS

## 2019-04-26 DIAGNOSIS — D57.00 SICKLE CELL ANEMIA WITH PAIN: Primary | ICD-10-CM

## 2019-04-26 PROCEDURE — 99215 PR OFFICE/OUTPT VISIT, EST, LEVL V, 40-54 MIN: ICD-10-PCS | Mod: S$GLB,,, | Performed by: INTERNAL MEDICINE

## 2019-04-26 PROCEDURE — 99215 OFFICE O/P EST HI 40 MIN: CPT | Mod: S$GLB,,, | Performed by: INTERNAL MEDICINE

## 2019-04-26 PROCEDURE — 3008F BODY MASS INDEX DOCD: CPT | Mod: CPTII,S$GLB,, | Performed by: INTERNAL MEDICINE

## 2019-04-26 PROCEDURE — 99999 PR PBB SHADOW E&M-EST. PATIENT-LVL III: CPT | Mod: PBBFAC,,, | Performed by: INTERNAL MEDICINE

## 2019-04-26 PROCEDURE — 3008F PR BODY MASS INDEX (BMI) DOCUMENTED: ICD-10-PCS | Mod: CPTII,S$GLB,, | Performed by: INTERNAL MEDICINE

## 2019-04-26 PROCEDURE — 99999 PR PBB SHADOW E&M-EST. PATIENT-LVL III: ICD-10-PCS | Mod: PBBFAC,,, | Performed by: INTERNAL MEDICINE

## 2019-04-26 RX ORDER — TRAMADOL HYDROCHLORIDE 50 MG/1
50 TABLET ORAL EVERY 6 HOURS PRN
Qty: 90 TABLET | Refills: 1 | Status: ON HOLD | OUTPATIENT
Start: 2019-04-26 | End: 2020-04-05 | Stop reason: HOSPADM

## 2019-04-26 NOTE — PROGRESS NOTES
Subjective:       Patient ID: Elizabeth Franco is a 23 y.o. female.    Chief Complaint: No chief complaint on file.    The patient is a very pleasant 20 year old woman transferring care from [facility] for sickle cell anemia. Her parents have sickle cell trait. She reports a history of very well managed sickle cell disease. 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 She has a horizontal left upper quadrant scar from this surgery. She was treated with transfusion therapy for about 10 years. This was facilitated by a Port-A-Cath that was removed at the completion of therapy. The therapy was limited by iron overload and the patient was not able to tolerate oral chelation therapy. By description she was treated at least once with phlebotomy.    She has rare pain crises that lead to ER evaluation and treatment. These are often pain events of the left upper extremity or lower extremities. They occur about 1-2x yearly. Most often in the winter during finals week at school. She attends [employer] as a [employer] major. She has just started her second semester. Her last blood transfusion was 2 years ago. She has never required an exchange transfusion.     The patient notes that she has had nocturnal enuresis since her splenectomy at 4 years of age. This has been treated with pelvic exercises and oxybutynin. The incidence of enuresis has greatly decreased to resolved over the past year. She has not had increased UTI occurrence. Chaffing and skin irritation was a prior issue.    Elizabeth also has a recent diagnosis of hidradenitis supprativa of the right axilla. Flares have been treated with oral Augmentin and mupirocin 2% cream.     In addition she reports severe episodes of GERD that impact her quality of life. Events are described as immobilizing.incapacitating epigastric pain that radiates to the entire chest. Events last at least 24 hours. This has not been evaluated by Gastroenterology  to date.    TODAY  Elizabeth presents for follow-up evaluation to request addition of Tramadol to pain regimen.  Rarely needs narcotic therapy.  Recent tramadol in hospital greatly helped with sickle pain.  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   HENT: Negative.    Eyes: Negative.    Respiratory: Negative for cough and shortness of breath.    Cardiovascular: Negative for chest pain and leg swelling.   Endocrine: Negative.    Genitourinary: Positive for enuresis.   Musculoskeletal: Negative.    Skin: Negative for pallor and rash.   Allergic/Immunologic: Negative for environmental allergies, food allergies and immunocompromised state.   Neurological: Negative.    Hematological: Negative for adenopathy. Does not bruise/bleed easily.   Psychiatric/Behavioral: Negative.        Objective:      Physical Exam   Constitutional: She is oriented to person, place, and time. She appears well-developed and well-nourished.   HENT:   Head: Normocephalic and atraumatic.   Right Ear: External ear normal.   Left Ear: External ear normal.   Nose: Nose normal.   Mouth/Throat: Oropharynx is clear and moist.   Eyes: Pupils are equal, round, and reactive to light. Conjunctivae and EOM are normal.   Neck: Normal range of motion. Neck supple. No thyromegaly present.   Cardiovascular: Normal rate, regular rhythm and intact distal pulses.   Murmur heard.  Pulmonary/Chest: Effort normal and breath sounds normal. No respiratory distress.   Abdominal: Soft. Bowel sounds are normal. She exhibits no distension. There is no hepatosplenomegaly.   Musculoskeletal: She exhibits no edema or tenderness.   Lymphadenopathy:     She has no cervical adenopathy.     She has no axillary adenopathy.        Right: No inguinal adenopathy present.        Left: No inguinal adenopathy present.   Neurological: She is  alert and oriented to person, place, and time. No cranial nerve deficit.   Skin: Skin is warm and dry. No rash noted. No cyanosis. Nails show no clubbing.   Psychiatric: She has a normal mood and affect. Her behavior is normal.   Nursing note and vitals reviewed.      Assessment:       1. Sickle cell anemia with pain        Plan:       1. Continue Hydrea 1500mg daily and folic acid. Patient a great historian and compliant with maintenance therapy.    2. Medroxyprogesterone- restarted by OBGYN, patient now having prolonged period and will discuss with OBGYN    3. Percocet 10mg/325mg for severe pain relief; tramadol for beginning of pain crisis    4. Echocardiogram for screening pulmonary HTN and update eye exam- ECHO scheduled; need repeat in 2020      Return visit in 6 months with CBC, CMP, Retic count and ferritin

## 2019-09-19 ENCOUNTER — PATIENT MESSAGE (OUTPATIENT)
Dept: HEMATOLOGY/ONCOLOGY | Facility: CLINIC | Age: 24
End: 2019-09-19

## 2019-09-19 DIAGNOSIS — D57.00 SICKLE CELL ANEMIA WITH PAIN: Primary | ICD-10-CM

## 2019-09-19 RX ORDER — HYDROXYUREA 500 MG/1
500 CAPSULE ORAL 3 TIMES DAILY
Qty: 90 CAPSULE | Refills: 3 | Status: SHIPPED | OUTPATIENT
Start: 2019-09-19 | End: 2019-10-19

## 2019-11-19 DIAGNOSIS — D57.00 SICKLE CELL ANEMIA WITH PAIN: Primary | ICD-10-CM

## 2019-11-26 ENCOUNTER — OFFICE VISIT (OUTPATIENT)
Dept: URGENT CARE | Facility: CLINIC | Age: 24
End: 2019-11-26
Payer: COMMERCIAL

## 2019-11-26 VITALS
BODY MASS INDEX: 21.35 KG/M2 | HEART RATE: 103 BPM | HEIGHT: 62 IN | DIASTOLIC BLOOD PRESSURE: 63 MMHG | TEMPERATURE: 102 F | SYSTOLIC BLOOD PRESSURE: 113 MMHG | OXYGEN SATURATION: 99 % | WEIGHT: 116 LBS

## 2019-11-26 DIAGNOSIS — N91.2 AMENORRHEA: ICD-10-CM

## 2019-11-26 DIAGNOSIS — J02.8 ACUTE BACTERIAL PHARYNGITIS: Primary | ICD-10-CM

## 2019-11-26 DIAGNOSIS — R50.9 FEVER, UNSPECIFIED FEVER CAUSE: ICD-10-CM

## 2019-11-26 DIAGNOSIS — B96.89 ACUTE BACTERIAL PHARYNGITIS: Primary | ICD-10-CM

## 2019-11-26 LAB
B-HCG UR QL: NEGATIVE
CTP QC/QA: YES
FLUAV AG NPH QL: NEGATIVE
FLUBV AG NPH QL: NEGATIVE
S PYO RRNA THROAT QL PROBE: NEGATIVE

## 2019-11-26 PROCEDURE — 3008F BODY MASS INDEX DOCD: CPT | Mod: CPTII,S$GLB,, | Performed by: NURSE PRACTITIONER

## 2019-11-26 PROCEDURE — 3008F PR BODY MASS INDEX (BMI) DOCUMENTED: ICD-10-PCS | Mod: CPTII,S$GLB,, | Performed by: NURSE PRACTITIONER

## 2019-11-26 PROCEDURE — 87880 POCT RAPID STREP A: ICD-10-PCS | Mod: QW,S$GLB,, | Performed by: NURSE PRACTITIONER

## 2019-11-26 PROCEDURE — 81025 POCT URINE PREGNANCY: ICD-10-PCS | Mod: S$GLB,,, | Performed by: NURSE PRACTITIONER

## 2019-11-26 PROCEDURE — 81025 URINE PREGNANCY TEST: CPT | Mod: S$GLB,,, | Performed by: NURSE PRACTITIONER

## 2019-11-26 PROCEDURE — 87804 INFLUENZA ASSAY W/OPTIC: CPT | Mod: 59,QW,S$GLB, | Performed by: NURSE PRACTITIONER

## 2019-11-26 PROCEDURE — 87880 STREP A ASSAY W/OPTIC: CPT | Mod: QW,S$GLB,, | Performed by: NURSE PRACTITIONER

## 2019-11-26 PROCEDURE — 87804 POCT INFLUENZA A/B: ICD-10-PCS | Mod: QW,S$GLB,, | Performed by: NURSE PRACTITIONER

## 2019-11-26 PROCEDURE — 99214 OFFICE O/P EST MOD 30 MIN: CPT | Mod: S$GLB,,, | Performed by: NURSE PRACTITIONER

## 2019-11-26 PROCEDURE — 99214 PR OFFICE/OUTPT VISIT, EST, LEVL IV, 30-39 MIN: ICD-10-PCS | Mod: S$GLB,,, | Performed by: NURSE PRACTITIONER

## 2019-11-26 RX ORDER — ACETAMINOPHEN 325 MG/1
650 TABLET ORAL
Status: COMPLETED | OUTPATIENT
Start: 2019-11-26 | End: 2019-11-26

## 2019-11-26 RX ORDER — HYDROXYUREA 500 MG/1
500 CAPSULE ORAL DAILY
COMMUNITY
End: 2021-03-16

## 2019-11-26 RX ORDER — AMOXICILLIN AND CLAVULANATE POTASSIUM 875; 125 MG/1; MG/1
1 TABLET, FILM COATED ORAL 2 TIMES DAILY
Qty: 20 TABLET | Refills: 0 | Status: SHIPPED | OUTPATIENT
Start: 2019-11-26 | End: 2019-12-06

## 2019-11-26 RX ADMIN — ACETAMINOPHEN 650 MG: 325 TABLET ORAL at 11:11

## 2019-11-26 NOTE — PATIENT INSTRUCTIONS
When You Have a Sore Throat    A sore throat can be painful. There are many reasons why you may have a sore throat. Your healthcare provider will work with you to find the cause of your sore throat. He or she will also find the best treatment for you.  What causes a sore throat?  Sore throats can be caused or worsened by:  · Cold or flu viruses  · Bacteria  · Irritants such as tobacco smoke or air pollution  · Acid reflux  A healthy throat  The tonsils are on the sides of the throat near the base of the tongue. They collect viruses and bacteria and help fight infection. The throat (pharynx) is the passage for air. Mucus from the nasal cavity also moves down the passage.  An inflamed throat  The tonsils and pharynx can become inflamed due to a cold or flu virus. Postnasal drip (excess mucus draining from the nasal cavity) can irritate the throat. It can also make the throat or tonsils more likely to be infected by bacteria. Severe, untreated tonsillitis in children or adults can cause a pocket of pus (abscess) to form near the tonsil.  Your evaluation  A medical evaluation can help find the cause of your sore throat. It can also help your healthcare provider choose the best treatment for you. The evaluation may include a health history, physical exam, and diagnostic tests.  Health history  Your healthcare provider may ask you the following:  · How long has the sore throat lasted and how have you been treating it?  · Do you have any other symptoms, such as body aches, fever, or cough?  · Does your sore throat recur? If so, how often? How many days of school or work have you missed because of a sore throat?  · Do you have trouble eating or swallowing?  · Have you been told that you snore or have other sleep problems?  · Do you have bad breath?  · Do you cough up bad-tasting mucus?  Physical exam  During the exam, your healthcare provider checks your ears, nose, and throat for problems. He or she also checks for  "swelling in the neck, and may listen to your chest.  Possible tests  Other tests your healthcare provider may perform include:  · A throat swab to check for bacteria such as streptococcus (the bacteria that causes strep throat)  · A blood test to check for mononucleosis (a viral infection)  · A chest X-ray to rule out pneumonia, especially if you have a cough  Treating a sore throat  Treatment depends on many factors. What is the likely cause? Is the problem recent? Does it keep coming back? In many cases, the best thing to do is to treat the symptoms, rest, and let the problem heal itself. Antibiotics may help clear up some bacterial infections. For cases of severe or recurring tonsillitis, the tonsils may need to be removed.  Relieving your symptoms  · Dont smoke, and avoid secondhand smoke.  · For children, try throat sprays or Popsicles. Adults and older children may try lozenges.  · Drink warm liquids to soothe the throat and help thin mucus. Avoid alcohol, spicy foods, and acidic drinks such as orange juice. These can irritate the throat.  · Gargle with warm saltwater (1 teaspoon of salt to 8 ounces of warm water).  · Use a humidifier to keep air moist and relieve throat dryness.  · Try over-the-counter pain relievers such as acetaminophen or ibuprofen. Use as directed, and dont exceed the recommended dose. Dont give aspirin to children.   Are antibiotics needed?  If your sore throat is due to a bacterial infection, antibiotics may speed healing and prevent complications. Although group A streptococcus ("strep throat" or GAS) is the major treatable infection for a sore throat, GAS causes only 5% to 15% of sore throats in adults who seek medical care. Most sore throats are caused by cold or flu viruses. And antibiotics dont treat viral illness. In fact, using antibiotics when theyre not needed may produce bacteria that are harder to kill. Your healthcare provider will prescribe antibiotics only if he or " she thinks they are likely to help.  If antibiotics are prescribed  Take the medicine exactly as directed. Be sure to finish your prescription even if youre feeling better. And be sure to ask your healthcare provider or pharmacist what side effects are common and what to do about them.  Is surgery needed?  In some cases, tonsils need to be removed. This is often done as outpatient (same-day) surgery. Your healthcare provider may advise removing the tonsils in cases of:  · Several severe bouts of tonsillitis in a year. Severe episodes include those that lead to missed days of school or work, or that need to be treated with antibiotics.  · Tonsillitis that causes breathing problems during sleep  · Tonsillitis caused by food particles collecting in pouches in the tonsils (cryptic tonsillitis)  Call your healthcare provider if any of the following occur:  · Symptoms worsen, or new symptoms develop.  · Swollen tonsils make breathing difficult.  · The pain is severe enough to keep you from drinking liquids.  · A skin rash, hives, or wheezing develops. Any of these could signal an allergic reaction to antibiotics.  · Symptoms dont improve within a week.  · Symptoms dont improve within 2 to 3 days of starting antibiotics.   Date Last Reviewed: 10/1/2016  © 1582-6037 [employer]. [address] [place] [address], [place] [address]. All rights reserved. This information is not intended as a substitute for professional medical care. Always follow your healthcare professional's instructions.        Pharyngitis: Strep (Presumed)    You have pharyngitis (sore throat). The cause is thought to be the streptococcus, or strep, bacterium. Strep throat infection can cause throat pain that is worse when swallowing, aching all over, headache, and fever. The infection may be spread by coughing, kissing, or touching others after touching your mouth or nose. Antibiotic medications are given to treat the infection.  Home  care  · Rest at home. Drink plenty of fluids to avoid dehydration.  · No work or school for the first 2 days of taking the antibiotics. After this time, you will not be contagious. You can then return to work or school if you are feeling better.   · The antibiotic medication must be taken for the full 10 days, even if you feel better. This is very important to ensure the infection is treated. It is also important to prevent drug-resistant organisms from developing. If you were given an antibiotic shot, no more antibiotics are needed.  · You may use acetaminophen or ibuprofen to control pain or fever, unless another medicine was prescribed for this. If you have chronic liver or kidney disease or ever had a stomach ulcer or GI bleeding, talk with your doctor before using these medicines.  · Throat lozenges or a throat-numbing sprays can help reduce throat pain. Gargling with warm salt water can also help. Dissolve 1/2 teaspoon of salt in 1 8 ounce glass of warm water.   · Avoid salty or spicy foods, which can irritate the throat.  Follow-up care  Follow up with your healthcare provider or our staff if you are not improving over the next week.  When to seek medical advice  Call your healthcare provider right away if any of these occur:  · Fever as directed by your doctor.   · New or worsening ear pain, sinus pain, or headache  · Painful lumps in the back of neck  · Stiff neck  · Lymph nodes are getting larger  · Inability to swallow liquids, excessive drooling, or inability to open mouth wide due to throat pain  · Signs of dehydration (very dark urine or no urine, sunken eyes, dizziness)  · Trouble breathing or noisy breathing  · Muffled voice  · New rash  Date Last Reviewed: 4/13/2015  © 5080-5626 [employer]. [address] [place] [address], [place] [address]. All rights reserved. This information is not intended as a substitute for professional medical care. Always follow your healthcare professional's  instructions.      1.  Take all medications as directed. If you have been prescribed antibiotics, make sure to complete them.   2.  Make sure to get plenty of rest. (This is the best way to help your immune system fight illness.)   3.  Keep yourself/patient well hydrated. For adults, it is recommended to drink at least 8-10 glasses of water daily.   4.  Perform warm, salt water gargles to help reduce inflammation and throat discomfort. Chloraseptic sprays and throat lozenges will also help with your throat pain.  5. Change your toothbrush 24 hours after starting antibiotics to prevent reinfection.  6.  For patients above 6 months of age who are not allergic to and are not on anticoagulants, you can alternate Tylenol and Motrin every 4-6 hours for fever above 100.4F and/or pain.  For patients less than 6 months of age, allergic to or intolerant to NSAIDS, have gastritis, gastric ulcers, or history of GI bleeds, are pregnant, or are on anticoagulant therapy, you can take Tylenol every 4 hours as needed for fever above 100.4F and/or pain.   7. You should schedule a follow-up appointment with your Primary Care Provider/Pediatrician for recheck in 2-3 days or as directed at this visit.   8.  If your condition fails to improve in a timely manner, you should receive another evaluation by your Primary Care Provider/Pediatrician to discuss your concerns or return to urgent care for a recheck.  If your condition worsens at any time, you should report immediately to your nearest Emergency Department for further evaluation. **You must understand that you have received Urgent Care treatment only and that you may be released before all of your medical problems are known or treated. You, the patient, are responsible to arrange for follow-up care as instructed.

## 2019-11-26 NOTE — PROGRESS NOTES
"Subjective:       Patient ID: Elizabeth Franco is a 24 y.o. female.    Vitals:  height is 5' 2" (1.575 m) and weight is 52.6 kg (116 lb). Her tympanic temperature is 102.2 °F (39 °C) (abnormal). Her blood pressure is 113/63 and her pulse is 103. Her oxygen saturation is 99%.     Chief Complaint: Sinus Problem    24-year-old female presents with acute onset of sore throat and fever.     has a past medical history of Sickle cell anemia.    History reviewed. No pertinent surgical history.    Sinus Problem   This is a new problem. The current episode started in the past 7 days. The problem has been gradually worsening since onset. The maximum temperature recorded prior to her arrival was 102 - 102.9 F. The fever has been present for 1 to 2 days. Associated symptoms include chills, headaches and a sore throat (and pain with swallowing). Pertinent negatives include no congestion, coughing, diaphoresis, ear pain, shortness of breath or sinus pressure. (Body aches) Past treatments include nothing. The treatment provided no relief.       Constitution: Positive for appetite change, chills and fatigue. Negative for sweating and fever.   HENT: Positive for sore throat (and pain with swallowing). Negative for ear pain, congestion, sinus pain, sinus pressure, trouble swallowing and voice change.    Neck: Negative for painful lymph nodes.   Cardiovascular: Negative for chest pain.   Eyes: Negative for eye redness.   Respiratory: Negative for chest tightness, cough, sputum production, bloody sputum, COPD, shortness of breath, stridor, wheezing and asthma.    Gastrointestinal: Negative for nausea and vomiting.   Genitourinary: Negative for dysuria, frequency, urgency and urine decreased.   Musculoskeletal: Positive for muscle ache.   Skin: Negative for rash.   Allergic/Immunologic: Positive for immunocompromised state. Negative for seasonal allergies and asthma.   Neurological: Positive for headaches. Negative for dizziness, " light-headedness and altered mental status.   Hematologic/Lymphatic: Negative for swollen lymph nodes.   Psychiatric/Behavioral: Negative for altered mental status and confusion.       Objective:      Physical Exam   Constitutional: She is oriented to person, place, and time. She appears well-developed and well-nourished. She is cooperative.  Non-toxic appearance. She appears ill. No distress.   HENT:   Head: Normocephalic and atraumatic.   Right Ear: Hearing, external ear and ear canal normal. Tympanic membrane is erythematous.   Left Ear: Hearing, tympanic membrane, external ear and ear canal normal.   Nose: Nose normal. No mucosal edema, rhinorrhea or nasal deformity. No epistaxis. Right sinus exhibits no maxillary sinus tenderness and no frontal sinus tenderness. Left sinus exhibits no maxillary sinus tenderness and no frontal sinus tenderness.   Mouth/Throat: Uvula is midline and mucous membranes are normal. No trismus in the jaw. Normal dentition. No uvula swelling. Oropharyngeal exudate, posterior oropharyngeal edema and posterior oropharyngeal erythema present. Tonsils are 3+ on the right. Tonsils are 3+ on the left. Tonsillar exudate.   Eyes: Conjunctivae and lids are normal. No scleral icterus.   Neck: Trachea normal, full passive range of motion without pain and phonation normal. Neck supple. No neck rigidity. No edema and no erythema present.   Cardiovascular: Normal rate, regular rhythm, normal heart sounds, intact distal pulses and normal pulses.   Pulmonary/Chest: Effort normal and breath sounds normal. No respiratory distress. She has no decreased breath sounds. She has no rhonchi.   Abdominal: Normal appearance.   Musculoskeletal: Normal range of motion. She exhibits no edema or deformity.   Neurological: She is alert and oriented to person, place, and time. She exhibits normal muscle tone. Coordination normal.   Skin: Skin is warm, dry, intact, not diaphoretic and not pale.   Psychiatric: She has a  normal mood and affect. Her speech is normal and behavior is normal. Judgment and thought content normal. Cognition and memory are normal.   Nursing note and vitals reviewed.        Assessment:       1. Acute bacterial pharyngitis    2. Fever, unspecified fever cause    3. Amenorrhea        Plan:         Acute bacterial pharyngitis  -     amoxicillin-clavulanate 875-125mg (AUGMENTIN) 875-125 mg per tablet; Take 1 tablet by mouth 2 (two) times daily. for 10 days  Dispense: 20 tablet; Refill: 0    Fever, unspecified fever cause  -     POCT Influenza A/B  -     POCT rapid strep A  -     acetaminophen tablet 650 mg    Amenorrhea  -     POCT urine pregnancy      Patient Instructions       When You Have a Sore Throat    A sore throat can be painful. There are many reasons why you may have a sore throat. Your healthcare provider will work with you to find the cause of your sore throat. He or she will also find the best treatment for you.  What causes a sore throat?  Sore throats can be caused or worsened by:  · Cold or flu viruses  · Bacteria  · Irritants such as tobacco smoke or air pollution  · Acid reflux  A healthy throat  The tonsils are on the sides of the throat near the base of the tongue. They collect viruses and bacteria and help fight infection. The throat (pharynx) is the passage for air. Mucus from the nasal cavity also moves down the passage.  An inflamed throat  The tonsils and pharynx can become inflamed due to a cold or flu virus. Postnasal drip (excess mucus draining from the nasal cavity) can irritate the throat. It can also make the throat or tonsils more likely to be infected by bacteria. Severe, untreated tonsillitis in children or adults can cause a pocket of pus (abscess) to form near the tonsil.  Your evaluation  A medical evaluation can help find the cause of your sore throat. It can also help your healthcare provider choose the best treatment for you. The evaluation may include a health history,  physical exam, and diagnostic tests.  Health history  Your healthcare provider may ask you the following:  · How long has the sore throat lasted and how have you been treating it?  · Do you have any other symptoms, such as body aches, fever, or cough?  · Does your sore throat recur? If so, how often? How many days of school or work have you missed because of a sore throat?  · Do you have trouble eating or swallowing?  · Have you been told that you snore or have other sleep problems?  · Do you have bad breath?  · Do you cough up bad-tasting mucus?  Physical exam  During the exam, your healthcare provider checks your ears, nose, and throat for problems. He or she also checks for swelling in the neck, and may listen to your chest.  Possible tests  Other tests your healthcare provider may perform include:  · A throat swab to check for bacteria such as streptococcus (the bacteria that causes strep throat)  · A blood test to check for mononucleosis (a viral infection)  · A chest X-ray to rule out pneumonia, especially if you have a cough  Treating a sore throat  Treatment depends on many factors. What is the likely cause? Is the problem recent? Does it keep coming back? In many cases, the best thing to do is to treat the symptoms, rest, and let the problem heal itself. Antibiotics may help clear up some bacterial infections. For cases of severe or recurring tonsillitis, the tonsils may need to be removed.  Relieving your symptoms  · Dont smoke, and avoid secondhand smoke.  · For children, try throat sprays or Popsicles. Adults and older children may try lozenges.  · Drink warm liquids to soothe the throat and help thin mucus. Avoid alcohol, spicy foods, and acidic drinks such as orange juice. These can irritate the throat.  · Gargle with warm saltwater (1 teaspoon of salt to 8 ounces of warm water).  · Use a humidifier to keep air moist and relieve throat dryness.  · Try over-the-counter pain relievers such as  "acetaminophen or ibuprofen. Use as directed, and dont exceed the recommended dose. Dont give aspirin to children.   Are antibiotics needed?  If your sore throat is due to a bacterial infection, antibiotics may speed healing and prevent complications. Although group A streptococcus ("strep throat" or GAS) is the major treatable infection for a sore throat, GAS causes only 5% to 15% of sore throats in adults who seek medical care. Most sore throats are caused by cold or flu viruses. And antibiotics dont treat viral illness. In fact, using antibiotics when theyre not needed may produce bacteria that are harder to kill. Your healthcare provider will prescribe antibiotics only if he or she thinks they are likely to help.  If antibiotics are prescribed  Take the medicine exactly as directed. Be sure to finish your prescription even if youre feeling better. And be sure to ask your healthcare provider or pharmacist what side effects are common and what to do about them.  Is surgery needed?  In some cases, tonsils need to be removed. This is often done as outpatient (same-day) surgery. Your healthcare provider may advise removing the tonsils in cases of:  · Several severe bouts of tonsillitis in a year. Severe episodes include those that lead to missed days of school or work, or that need to be treated with antibiotics.  · Tonsillitis that causes breathing problems during sleep  · Tonsillitis caused by food particles collecting in pouches in the tonsils (cryptic tonsillitis)  Call your healthcare provider if any of the following occur:  · Symptoms worsen, or new symptoms develop.  · Swollen tonsils make breathing difficult.  · The pain is severe enough to keep you from drinking liquids.  · A skin rash, hives, or wheezing develops. Any of these could signal an allergic reaction to antibiotics.  · Symptoms dont improve within a week.  · Symptoms dont improve within 2 to 3 days of starting antibiotics.   Date Last " Reviewed: 10/1/2016  © 5651-8734 [employer]. [address] [place] [address], [place] [address]. All rights reserved. This information is not intended as a substitute for professional medical care. Always follow your healthcare professional's instructions.        Pharyngitis: Strep (Presumed)    You have pharyngitis (sore throat). The cause is thought to be the streptococcus, or strep, bacterium. Strep throat infection can cause throat pain that is worse when swallowing, aching all over, headache, and fever. The infection may be spread by coughing, kissing, or touching others after touching your mouth or nose. Antibiotic medications are given to treat the infection.  Home care  · Rest at home. Drink plenty of fluids to avoid dehydration.  · No work or school for the first 2 days of taking the antibiotics. After this time, you will not be contagious. You can then return to work or school if you are feeling better.   · The antibiotic medication must be taken for the full 10 days, even if you feel better. This is very important to ensure the infection is treated. It is also important to prevent drug-resistant organisms from developing. If you were given an antibiotic shot, no more antibiotics are needed.  · You may use acetaminophen or ibuprofen to control pain or fever, unless another medicine was prescribed for this. If you have chronic liver or kidney disease or ever had a stomach ulcer or GI bleeding, talk with your doctor before using these medicines.  · Throat lozenges or a throat-numbing sprays can help reduce throat pain. Gargling with warm salt water can also help. Dissolve 1/2 teaspoon of salt in 1 8 ounce glass of warm water.   · Avoid salty or spicy foods, which can irritate the throat.  Follow-up care  Follow up with your healthcare provider or our staff if you are not improving over the next week.  When to seek medical advice  Call your healthcare provider right away if any of these  occur:  · Fever as directed by your doctor.   · New or worsening ear pain, sinus pain, or headache  · Painful lumps in the back of neck  · Stiff neck  · Lymph nodes are getting larger  · Inability to swallow liquids, excessive drooling, or inability to open mouth wide due to throat pain  · Signs of dehydration (very dark urine or no urine, sunken eyes, dizziness)  · Trouble breathing or noisy breathing  · Muffled voice  · New rash  Date Last Reviewed: 4/13/2015 © 2000-2017 [employer]. [address] [place] [address]. All rights reserved. This information is not intended as a substitute for professional medical care. Always follow your healthcare professional's instructions.      1.  Take all medications as directed. If you have been prescribed antibiotics, make sure to complete them.   2.  Make sure to get plenty of rest. (This is the best way to help your immune system fight illness.)   3.  Keep yourself/patient well hydrated. For adults, it is recommended to drink at least 8-10 glasses of water daily.   4.  Perform warm, salt water gargles to help reduce inflammation and throat discomfort. Chloraseptic sprays and throat lozenges will also help with your throat pain.  5. Change your toothbrush 24 hours after starting antibiotics to prevent reinfection.  6.  For patients above 6 months of age who are not allergic to and are not on anticoagulants, you can alternate Tylenol and Motrin every 4-6 hours for fever above 100.4F and/or pain.  For patients less than 6 months of age, allergic to or intolerant to NSAIDS, have gastritis, gastric ulcers, or history of GI bleeds, are pregnant, or are on anticoagulant therapy, you can take Tylenol every 4 hours as needed for fever above 100.4F and/or pain.   7. You should schedule a follow-up appointment with your Primary Care Provider/Pediatrician for recheck in 2-3 days or as directed at this visit.   8.  If your condition fails to improve in a timely  manner, you should receive another evaluation by your Primary Care Provider/Pediatrician to discuss your concerns or return to urgent care for a recheck.  If your condition worsens at any time, you should report immediately to your nearest Emergency Department for further evaluation. **You must understand that you have received Urgent Care treatment only and that you may be released before all of your medical problems are known or treated. You, the patient, are responsible to arrange for follow-up care as instructed.

## 2020-03-31 ENCOUNTER — TELEPHONE (OUTPATIENT)
Dept: HEMATOLOGY/ONCOLOGY | Facility: CLINIC | Age: 25
End: 2020-03-31

## 2020-03-31 NOTE — TELEPHONE ENCOUNTER
----- Message from Maritza Zaidi sent at 3/31/2020 11:18 AM CDT -----  Contact: Pt  Patient Advice:    Pt called to speak to the nurse regarding her care due to being in extreme pain. Pt stated that she is in crisis.    Pt can be reached at [phone]

## 2020-03-31 NOTE — TELEPHONE ENCOUNTER
Pt states that she is experiencing chest pain, leg pain, and arm pain. Pt states that she went to the er last night in [place], but was sent home after fluids and pain meds because all labs looked normal. Patient is requesting a direct admission. Informed patient that per dr blum, direct admit is not possible at this time because of the coronavirus outbreak. The patient would need to be assessed by a physician here and have recommendations based on that assessment. Informed the patient that to be admitted, she will need to come through the ED here. Informed her that nurse can also set her up for fluid and pain med infusion in the infusion center. Pt declined. Informed patient that she could also call the [place] ED back and let them know that she is still experiencing severe symptoms depsite treatment measures yesterday. Pt verbalized understanding and will call back with concerns

## 2020-04-01 ENCOUNTER — TELEPHONE (OUTPATIENT)
Dept: HEMATOLOGY/ONCOLOGY | Facility: CLINIC | Age: 25
End: 2020-04-01

## 2020-04-01 ENCOUNTER — NURSE TRIAGE (OUTPATIENT)
Dept: ADMINISTRATIVE | Facility: CLINIC | Age: 25
End: 2020-04-01

## 2020-04-01 ENCOUNTER — CLINICAL SUPPORT (OUTPATIENT)
Dept: INTERNAL MEDICINE | Facility: CLINIC | Age: 25
End: 2020-04-01
Payer: COMMERCIAL

## 2020-04-01 ENCOUNTER — OFFICE VISIT (OUTPATIENT)
Dept: HEMATOLOGY/ONCOLOGY | Facility: CLINIC | Age: 25
End: 2020-04-01
Payer: COMMERCIAL

## 2020-04-01 DIAGNOSIS — R68.89 FLU-LIKE SYMPTOMS: Primary | ICD-10-CM

## 2020-04-01 DIAGNOSIS — Z20.822 SUSPECTED COVID-19 VIRUS INFECTION: ICD-10-CM

## 2020-04-01 DIAGNOSIS — Z20.822 SUSPECTED COVID-19 VIRUS INFECTION: Primary | ICD-10-CM

## 2020-04-01 LAB
CTP QC/QA: YES
POC MOLECULAR INFLUENZA A AGN: NEGATIVE
POC MOLECULAR INFLUENZA B AGN: NEGATIVE

## 2020-04-01 PROCEDURE — 87502 POCT INFLUENZA A/B MOLECULAR: ICD-10-PCS | Mod: QW,S$GLB,, | Performed by: FAMILY MEDICINE

## 2020-04-01 PROCEDURE — 99214 OFFICE O/P EST MOD 30 MIN: CPT | Mod: 95,,, | Performed by: INTERNAL MEDICINE

## 2020-04-01 PROCEDURE — 87502 INFLUENZA DNA AMP PROBE: CPT | Mod: QW,S$GLB,, | Performed by: FAMILY MEDICINE

## 2020-04-01 PROCEDURE — 99214 PR OFFICE/OUTPT VISIT, EST, LEVL IV, 30-39 MIN: ICD-10-PCS | Mod: 95,,, | Performed by: INTERNAL MEDICINE

## 2020-04-01 NOTE — TELEPHONE ENCOUNTER
----- Message from Maritza Zaidi sent at 4/1/2020  9:27 AM CDT -----  Contact: Pt  Appointment Access:    Pt called to speak to the nurse to discuss her care and to schedule an appt and would like a call back today.    Pt can be reached at [phone]

## 2020-04-01 NOTE — TELEPHONE ENCOUNTER
"Pt stated she is "getting over a crisis and only pain remaining is in [her]  chest and arms." Rating pain 9/10. Scheduled pt for virtual visit for today with Dr. Nuñez. Confirmed apt times with pt.  "

## 2020-04-01 NOTE — TELEPHONE ENCOUNTER
----- Message from Walker Smith sent at 4/1/2020  3:54 PM CDT -----  Contact: Patient  Patient called in and wanted to speak with to someone at the office regarding CONVID-19 testing. She was told to give the office a call once she found a testing site. She would like a call back from the office and can be reached at    [phone]

## 2020-04-02 ENCOUNTER — TELEPHONE (OUTPATIENT)
Dept: HEMATOLOGY/ONCOLOGY | Facility: CLINIC | Age: 25
End: 2020-04-02

## 2020-04-02 DIAGNOSIS — Z20.822 SUSPECTED COVID-19 VIRUS INFECTION: Primary | ICD-10-CM

## 2020-04-02 NOTE — TELEPHONE ENCOUNTER
Sickle cell pt--had covid order in however when pt got to the [facility] testing site she states the MD reported she did not need criteria.  Her temp then was 100.2.  Her temp NOW is 101.1.  Suggest she returns tomorrow as her order is still valid.      Reason for Disposition   [1] Adult has symptoms of COVID-19 (fever, cough or SOB) AND [2] major community spread where patient lives AND [3] testing not being done for mild symptoms   [1] COVID-19 exposure AND [2] no symptoms   [1] Fever (or feeling feverish) OR cough occurs AND [2] within 14 days of travel from another country (international travel)    Additional Information   Negative: SEVERE difficulty breathing (e.g., struggling for each breath, speak in single words, bluish lips)   Negative: Sounds like a life-threatening emergency to the triager   Negative: [1] Adult has symptoms of COVID-19 (fever, cough, or SOB) AND [2] lab test positive   Negative: SEVERE difficulty breathing (e.g., struggling for each breath, speaks in single words)   Negative: Difficult to awaken or acting confused (e.g., disoriented, slurred speech)   Negative: Bluish (or gray) lips or face now   Negative: Shock suspected (e.g., cold/pale/clammy skin, too weak to stand, low BP, rapid pulse)   Negative: Sounds like a life-threatening emergency to the triager   Negative: [1] COVID-19 suspected (e.g., cough, fever, shortness of breath) AND [2] public health department recommends testing   Negative: [1] Difficulty breathing (shortness of breath) occurs AND [2] onset > 14 days after COVID-19 EXPOSURE (Close Contact) AND [3] no major community spread   Negative: [1] Dry cough occurs AND [2] onset > 14 days after COVID-19 EXPOSURE AND [3] no major community spread   Negative: [1] Wet cough (i.e., white-yellow, yellow, green, or alex colored sputum) AND [2] onset > 14 days after COVID-19 EXPOSURE AND [3] no major community spread   Negative: [1] Common cold symptoms AND [2]  onset > 14 days after COVID-19 EXPOSURE AND [3] no major community spread   Negative: [1] Difficulty breathing occurs AND [2] within 14 days of COVID-19 EXPOSURE (Close Contact)   Negative: Patient sounds very sick or weak to the triager   Negative: [1] Fever (or feeling feverish) OR cough occurs AND [2] within 14 days of travel from a city or area with major community spread   Negative: [1] Fever (or feeling feverish) OR cough occurs AND [2] living in area with major community spread AND [3] testing being done for symptoms   Negative: [1] Mild body aches, chills, diarrhea, headache, runny nose, or sore throat AND [2] within 14 days of COVID-Exposure   Negative: [1] COVID-19 EXPOSURE within last 14 days AND [2] NO cough, fever, or breathing difficulty AND [3] exposed person is a healthcare worker who was NOT using all recommended personal protective equipment (i.e., a respirator-N95 mask, eye protection, gloves, and gown)   Negative: [1] COVID-19 EXPOSURE (Close Contact) within last 14 days AND [2] NO cough, fever, or breathing difficulty   Negative: [1] Living in area with major community spread within last 14 days AND [2] NO cough or fever or breathing difficulty   Negative: [1] Travel from city or country with major community spread within last 14 days AND [2] NO cough or fever or breathing difficulty   Negative: [1] COVID-19 EXPOSURE AND [2] 15 or more days ago AND [3] NO cough or fever or breathing difficulty   Negative: [1] No COVID-19 EXPOSURE BUT [2] living with someone who was exposed and who has no fever or cough   Negative: [1] Caller concerned that exposure to COVID-19 occurred BUT [2] does not meet COVID-19 EXPOSURE criteria from CDC   Negative: COVID -19, questions about   Negative: COVID-19 Prevention and Healthy Living, questions about   Negative: COVID-19 Testing, questions about   Commented on: [1] Fever (or feeling feverish) OR cough AND [2] within 14 Days of COVID-19 EXPOSURE  (Close Contact)     Possible--was on college campus until about 2 or so weeks ago    Protocols used: CORONAVIRUS (COVID-19) EXPOSURE-A-AH, CORONAVIRUS (COVID-19) DIAGNOSED OR [id number]  Pt wishes to be tested still.  She was on college campus until about 2 weeks ago and may have had exposure.

## 2020-04-02 NOTE — TELEPHONE ENCOUNTER
Pt stating local primary care physician in [place] did not test pt for COVID-19. Pt was tested for flu and resulted negative. Scheduled pt for COVID testing here at [facility] per Dr. Nuñez. Pt reports cough, chest pain, and fever. Confirmed apt with pt.

## 2020-04-02 NOTE — TELEPHONE ENCOUNTER
----- Message from Eloina Myrick sent at 4/2/2020  2:08 PM CDT -----  Contact: Patient  Reschedule existing appt      Appt date rescheduling:: 04/02    Is appt today or next day appt:: Yes    Type of appt :: Lab    Provider:: Joaquin    Do you feel you need to be seen today:: No    Communication preference:: [phone]    Addition info::

## 2020-04-02 NOTE — TELEPHONE ENCOUNTER
----- Message from Jennifer Calvin sent at 4/2/2020  9:45 AM CDT -----  Contact: PT   Doctor sent in an order for the PT to get tested for the virus but the primary doctor in [place] overruled the order and wouldn't test her. She called the hotline who advised her to call the doctor for further instructions. Please call back     Callback: [phone]

## 2020-04-02 NOTE — TELEPHONE ENCOUNTER
----- Message from Eloina Myrick sent at 4/2/2020  2:08 PM CDT -----  Contact: Patient  Reschedule existing appt      Appt date rescheduling:: 04/02    Is appt today or next day appt:: Yes    Type of appt :: Lab    Provider:: Joaquin    Do you feel you need to be seen today:: No    Communication preference:: [phone]    Addition info::

## 2020-04-03 ENCOUNTER — VITALS (OUTPATIENT)
Dept: INTERNAL MEDICINE | Facility: CLINIC | Age: 25
End: 2020-04-03
Payer: COMMERCIAL

## 2020-04-03 ENCOUNTER — CLINICAL SUPPORT (OUTPATIENT)
Dept: INTERNAL MEDICINE | Facility: CLINIC | Age: 25
DRG: 811 | End: 2020-04-03
Payer: COMMERCIAL

## 2020-04-03 ENCOUNTER — HOSPITAL ENCOUNTER (INPATIENT)
Facility: HOSPITAL | Age: 25
LOS: 2 days | Discharge: HOME | DRG: 811 | End: 2020-04-05
Attending: EMERGENCY MEDICINE | Admitting: EMERGENCY MEDICINE
Payer: COMMERCIAL

## 2020-04-03 VITALS — OXYGEN SATURATION: 96 % | HEART RATE: 138 BPM | RESPIRATION RATE: 28 BRPM

## 2020-04-03 DIAGNOSIS — R06.02 SOB (SHORTNESS OF BREATH): Primary | ICD-10-CM

## 2020-04-03 DIAGNOSIS — R50.9 FEVER, UNSPECIFIED FEVER CAUSE: ICD-10-CM

## 2020-04-03 DIAGNOSIS — Z20.822 SUSPECTED COVID-19 VIRUS INFECTION: ICD-10-CM

## 2020-04-03 DIAGNOSIS — D57.00 SICKLE CELL PAIN CRISIS: ICD-10-CM

## 2020-04-03 DIAGNOSIS — Z20.822 SUSPECTED COVID-19 VIRUS INFECTION: Primary | ICD-10-CM

## 2020-04-03 LAB
ALBUMIN SERPL BCP-MCNC: 4.5 G/DL (ref 3.5–5.2)
ALP SERPL-CCNC: 113 U/L (ref 55–135)
ALT SERPL W/O P-5'-P-CCNC: 30 U/L (ref 10–44)
ANION GAP SERPL CALC-SCNC: 9 MMOL/L (ref 8–16)
ANISOCYTOSIS BLD QL SMEAR: ABNORMAL
AST SERPL-CCNC: 28 U/L (ref 10–40)
B-HCG UR QL: NEGATIVE
BASO STIPL BLD QL SMEAR: ABNORMAL
BASOPHILS # BLD AUTO: 0.05 K/UL (ref 0–0.2)
BASOPHILS NFR BLD: 0.4 % (ref 0–1.9)
BILIRUB SERPL-MCNC: 5.6 MG/DL (ref 0.1–1)
BNP SERPL-MCNC: <10 PG/ML (ref 0–99)
BUN SERPL-MCNC: 6 MG/DL (ref 6–20)
CALCIUM SERPL-MCNC: 9.3 MG/DL (ref 8.7–10.5)
CHLORIDE SERPL-SCNC: 101 MMOL/L (ref 95–110)
CK SERPL-CCNC: 23 U/L (ref 20–180)
CO2 SERPL-SCNC: 25 MMOL/L (ref 23–29)
CREAT SERPL-MCNC: 0.7 MG/DL (ref 0.5–1.4)
CRP SERPL-MCNC: 36 MG/L (ref 0–8.2)
CTP QC/QA: YES
D DIMER PPP IA.FEU-MCNC: 1.96 MG/L FEU
DACRYOCYTES BLD QL SMEAR: ABNORMAL
DIFFERENTIAL METHOD: ABNORMAL
EOSINOPHIL # BLD AUTO: 0.2 K/UL (ref 0–0.5)
EOSINOPHIL NFR BLD: 1.7 % (ref 0–8)
ERYTHROCYTE [DISTWIDTH] IN BLOOD BY AUTOMATED COUNT: 18.4 % (ref 11.5–14.5)
EST. GFR  (AFRICAN AMERICAN): >60 ML/MIN/1.73 M^2
EST. GFR  (NON AFRICAN AMERICAN): >60 ML/MIN/1.73 M^2
FERRITIN SERPL-MCNC: 2067 NG/ML (ref 20–300)
GIANT PLATELETS BLD QL SMEAR: PRESENT
GLUCOSE SERPL-MCNC: 86 MG/DL (ref 70–110)
HCT VFR BLD AUTO: 21.3 % (ref 37–48.5)
HGB BLD-MCNC: 7.3 G/DL (ref 12–16)
HYPOCHROMIA BLD QL SMEAR: ABNORMAL
IMM GRANULOCYTES # BLD AUTO: 0.11 K/UL (ref 0–0.04)
IMM GRANULOCYTES NFR BLD AUTO: 0.8 % (ref 0–0.5)
INFLUENZA A, MOLECULAR: NEGATIVE
INFLUENZA B, MOLECULAR: NEGATIVE
LACTATE SERPL-SCNC: 0.9 MMOL/L (ref 0.5–2.2)
LDH SERPL L TO P-CCNC: 402 U/L (ref 110–260)
LYMPHOCYTES # BLD AUTO: 1.5 K/UL (ref 1–4.8)
LYMPHOCYTES NFR BLD: 10.4 % (ref 18–48)
MCH RBC QN AUTO: 32.4 PG (ref 27–31)
MCHC RBC AUTO-ENTMCNC: 34.3 G/DL (ref 32–36)
MCV RBC AUTO: 95 FL (ref 82–98)
MONOCYTES # BLD AUTO: 1.3 K/UL (ref 0.3–1)
MONOCYTES NFR BLD: 8.9 % (ref 4–15)
NEUTROPHILS # BLD AUTO: 11.1 K/UL (ref 1.8–7.7)
NEUTROPHILS NFR BLD: 77.8 % (ref 38–73)
NRBC BLD-RTO: 4 /100 WBC
OVALOCYTES BLD QL SMEAR: ABNORMAL
PLATELET # BLD AUTO: 367 K/UL (ref 150–350)
PLATELET BLD QL SMEAR: ABNORMAL
PMV BLD AUTO: 10.9 FL (ref 9.2–12.9)
POIKILOCYTOSIS BLD QL SMEAR: ABNORMAL
POLYCHROMASIA BLD QL SMEAR: ABNORMAL
POTASSIUM SERPL-SCNC: 4.1 MMOL/L (ref 3.5–5.1)
PROCALCITONIN SERPL IA-MCNC: 0.1 NG/ML
PROT SERPL-MCNC: 8.7 G/DL (ref 6–8.4)
RBC # BLD AUTO: 2.25 M/UL (ref 4–5.4)
SICKLE CELLS BLD QL SMEAR: ABNORMAL
SODIUM SERPL-SCNC: 135 MMOL/L (ref 136–145)
SPECIMEN SOURCE: NORMAL
TARGETS BLD QL SMEAR: ABNORMAL
TROPONIN I SERPL DL<=0.01 NG/ML-MCNC: <0.006 NG/ML (ref 0–0.03)
WBC # BLD AUTO: 14.24 K/UL (ref 3.9–12.7)

## 2020-04-03 PROCEDURE — 25000242 PHARM REV CODE 250 ALT 637 W/ HCPCS: Performed by: EMERGENCY MEDICINE

## 2020-04-03 PROCEDURE — 83605 ASSAY OF LACTIC ACID: CPT

## 2020-04-03 PROCEDURE — 63700000 PHARM REV CODE 250 ALT 637 W/O HCPCS: Performed by: EMERGENCY MEDICINE

## 2020-04-03 PROCEDURE — 82550 ASSAY OF CK (CPK): CPT

## 2020-04-03 PROCEDURE — 63600175 PHARM REV CODE 636 W HCPCS: Performed by: INTERNAL MEDICINE

## 2020-04-03 PROCEDURE — 94761 N-INVAS EAR/PLS OXIMETRY MLT: CPT

## 2020-04-03 PROCEDURE — 87040 BLOOD CULTURE FOR BACTERIA: CPT

## 2020-04-03 PROCEDURE — 99285 PR EMERGENCY DEPT VISIT,LEVEL V: ICD-10-PCS | Mod: ,,, | Performed by: EMERGENCY MEDICINE

## 2020-04-03 PROCEDURE — 94640 AIRWAY INHALATION TREATMENT: CPT

## 2020-04-03 PROCEDURE — 86140 C-REACTIVE PROTEIN: CPT

## 2020-04-03 PROCEDURE — 83615 LACTATE (LD) (LDH) ENZYME: CPT

## 2020-04-03 PROCEDURE — 25000003 PHARM REV CODE 250: Performed by: EMERGENCY MEDICINE

## 2020-04-03 PROCEDURE — 99285 EMERGENCY DEPT VISIT HI MDM: CPT | Mod: ,,, | Performed by: EMERGENCY MEDICINE

## 2020-04-03 PROCEDURE — 82728 ASSAY OF FERRITIN: CPT

## 2020-04-03 PROCEDURE — 96374 THER/PROPH/DIAG INJ IV PUSH: CPT

## 2020-04-03 PROCEDURE — 27100098 HC SPACER

## 2020-04-03 PROCEDURE — 84145 PROCALCITONIN (PCT): CPT

## 2020-04-03 PROCEDURE — 99999 PR PBB SHADOW E&M-EST. PATIENT-LVL II: CPT | Mod: PBBFAC,,,

## 2020-04-03 PROCEDURE — 85025 COMPLETE CBC W/AUTO DIFF WBC: CPT

## 2020-04-03 PROCEDURE — U0002 COVID-19 LAB TEST NON-CDC: HCPCS

## 2020-04-03 PROCEDURE — 84484 ASSAY OF TROPONIN QUANT: CPT

## 2020-04-03 PROCEDURE — 81025 URINE PREGNANCY TEST: CPT | Performed by: EMERGENCY MEDICINE

## 2020-04-03 PROCEDURE — 99999 PR PBB SHADOW E&M-EST. PATIENT-LVL II: ICD-10-PCS | Mod: PBBFAC,,,

## 2020-04-03 PROCEDURE — 25500020 PHARM REV CODE 255: Performed by: EMERGENCY MEDICINE

## 2020-04-03 PROCEDURE — 83880 ASSAY OF NATRIURETIC PEPTIDE: CPT

## 2020-04-03 PROCEDURE — 87502 INFLUENZA DNA AMP PROBE: CPT

## 2020-04-03 PROCEDURE — 11000001 HC ACUTE MED/SURG PRIVATE ROOM

## 2020-04-03 PROCEDURE — 99285 EMERGENCY DEPT VISIT HI MDM: CPT | Mod: 25

## 2020-04-03 PROCEDURE — 87449 NOS EACH ORGANISM AG IA: CPT

## 2020-04-03 PROCEDURE — 63700000 PHARM REV CODE 250 ALT 637 W/O HCPCS: Performed by: INTERNAL MEDICINE

## 2020-04-03 PROCEDURE — 93005 ELECTROCARDIOGRAM TRACING: CPT

## 2020-04-03 PROCEDURE — 85379 FIBRIN DEGRADATION QUANT: CPT

## 2020-04-03 PROCEDURE — 63600175 PHARM REV CODE 636 W HCPCS: Performed by: EMERGENCY MEDICINE

## 2020-04-03 PROCEDURE — 27000221 HC OXYGEN, UP TO 24 HOURS

## 2020-04-03 PROCEDURE — 96375 TX/PRO/DX INJ NEW DRUG ADDON: CPT

## 2020-04-03 PROCEDURE — 80053 COMPREHEN METABOLIC PANEL: CPT

## 2020-04-03 RX ORDER — CEFTRIAXONE 1 G/1
1 INJECTION, POWDER, FOR SOLUTION INTRAMUSCULAR; INTRAVENOUS
Status: DISCONTINUED | OUTPATIENT
Start: 2020-04-03 | End: 2020-04-03

## 2020-04-03 RX ORDER — MORPHINE SULFATE 2 MG/ML
6 INJECTION, SOLUTION INTRAMUSCULAR; INTRAVENOUS
Status: COMPLETED | OUTPATIENT
Start: 2020-04-03 | End: 2020-04-03

## 2020-04-03 RX ORDER — IBUPROFEN 200 MG
16 TABLET ORAL
Status: DISCONTINUED | OUTPATIENT
Start: 2020-04-03 | End: 2020-04-05 | Stop reason: HOSPADM

## 2020-04-03 RX ORDER — TALC
6 POWDER (GRAM) TOPICAL NIGHTLY PRN
Status: DISCONTINUED | OUTPATIENT
Start: 2020-04-03 | End: 2020-04-05 | Stop reason: HOSPADM

## 2020-04-03 RX ORDER — HYDROMORPHONE HYDROCHLORIDE 1 MG/ML
0.5 INJECTION, SOLUTION INTRAMUSCULAR; INTRAVENOUS; SUBCUTANEOUS EVERY 6 HOURS PRN
Status: DISCONTINUED | OUTPATIENT
Start: 2020-04-03 | End: 2020-04-04

## 2020-04-03 RX ORDER — ALBUTEROL SULFATE 90 UG/1
2 AEROSOL, METERED RESPIRATORY (INHALATION) EVERY 8 HOURS
Status: DISCONTINUED | OUTPATIENT
Start: 2020-04-04 | End: 2020-04-03

## 2020-04-03 RX ORDER — ENOXAPARIN SODIUM 100 MG/ML
40 INJECTION SUBCUTANEOUS EVERY 24 HOURS
Status: DISCONTINUED | OUTPATIENT
Start: 2020-04-03 | End: 2020-04-05 | Stop reason: HOSPADM

## 2020-04-03 RX ORDER — ACETAMINOPHEN 325 MG/1
650 TABLET ORAL EVERY 8 HOURS PRN
Status: DISCONTINUED | OUTPATIENT
Start: 2020-04-03 | End: 2020-04-05 | Stop reason: HOSPADM

## 2020-04-03 RX ORDER — AZITHROMYCIN 250 MG/1
500 TABLET, FILM COATED ORAL
Status: DISCONTINUED | OUTPATIENT
Start: 2020-04-03 | End: 2020-04-05 | Stop reason: HOSPADM

## 2020-04-03 RX ORDER — IBUPROFEN 200 MG
24 TABLET ORAL
Status: DISCONTINUED | OUTPATIENT
Start: 2020-04-03 | End: 2020-04-05 | Stop reason: HOSPADM

## 2020-04-03 RX ORDER — HYDROXYUREA 500 MG/1
500 CAPSULE ORAL DAILY
Status: DISCONTINUED | OUTPATIENT
Start: 2020-04-04 | End: 2020-04-05 | Stop reason: HOSPADM

## 2020-04-03 RX ORDER — ONDANSETRON 2 MG/ML
4 INJECTION INTRAMUSCULAR; INTRAVENOUS EVERY 8 HOURS PRN
Status: DISCONTINUED | OUTPATIENT
Start: 2020-04-03 | End: 2020-04-05 | Stop reason: HOSPADM

## 2020-04-03 RX ORDER — OXYCODONE HYDROCHLORIDE 5 MG/1
5 TABLET ORAL EVERY 6 HOURS PRN
Status: DISCONTINUED | OUTPATIENT
Start: 2020-04-03 | End: 2020-04-05 | Stop reason: HOSPADM

## 2020-04-03 RX ORDER — ALBUTEROL SULFATE 90 UG/1
2 AEROSOL, METERED RESPIRATORY (INHALATION) EVERY 6 HOURS
Status: DISCONTINUED | OUTPATIENT
Start: 2020-04-03 | End: 2020-04-03

## 2020-04-03 RX ORDER — ONDANSETRON 2 MG/ML
4 INJECTION INTRAMUSCULAR; INTRAVENOUS
Status: COMPLETED | OUTPATIENT
Start: 2020-04-03 | End: 2020-04-03

## 2020-04-03 RX ORDER — GLUCAGON 1 MG
1 KIT INJECTION
Status: DISCONTINUED | OUTPATIENT
Start: 2020-04-03 | End: 2020-04-05 | Stop reason: HOSPADM

## 2020-04-03 RX ORDER — ALBUTEROL SULFATE 90 UG/1
2 AEROSOL, METERED RESPIRATORY (INHALATION)
Status: DISCONTINUED | OUTPATIENT
Start: 2020-04-03 | End: 2020-04-04

## 2020-04-03 RX ORDER — AMOXICILLIN 250 MG
1 CAPSULE ORAL 2 TIMES DAILY
Status: DISCONTINUED | OUTPATIENT
Start: 2020-04-03 | End: 2020-04-05 | Stop reason: HOSPADM

## 2020-04-03 RX ORDER — CEFTRIAXONE 1 G/1
1 INJECTION, POWDER, FOR SOLUTION INTRAMUSCULAR; INTRAVENOUS
Status: COMPLETED | OUTPATIENT
Start: 2020-04-03 | End: 2020-04-03

## 2020-04-03 RX ORDER — OXYCODONE HYDROCHLORIDE 5 MG/1
5 TABLET ORAL EVERY 6 HOURS PRN
Status: DISCONTINUED | OUTPATIENT
Start: 2020-04-03 | End: 2020-04-03

## 2020-04-03 RX ORDER — LOPERAMIDE HYDROCHLORIDE 2 MG/1
2 CAPSULE ORAL EVERY 6 HOURS PRN
Status: DISCONTINUED | OUTPATIENT
Start: 2020-04-03 | End: 2020-04-05 | Stop reason: HOSPADM

## 2020-04-03 RX ORDER — SODIUM CHLORIDE 0.9 % (FLUSH) 0.9 %
10 SYRINGE (ML) INJECTION
Status: DISCONTINUED | OUTPATIENT
Start: 2020-04-03 | End: 2020-04-05 | Stop reason: HOSPADM

## 2020-04-03 RX ORDER — HYDROMORPHONE HYDROCHLORIDE 1 MG/ML
1 INJECTION, SOLUTION INTRAMUSCULAR; INTRAVENOUS; SUBCUTANEOUS
Status: DISPENSED | OUTPATIENT
Start: 2020-04-03 | End: 2020-04-03

## 2020-04-03 RX ORDER — ACETAMINOPHEN 500 MG
1000 TABLET ORAL
Status: COMPLETED | OUTPATIENT
Start: 2020-04-03 | End: 2020-04-03

## 2020-04-03 RX ORDER — AZITHROMYCIN 250 MG/1
500 TABLET, FILM COATED ORAL
Status: COMPLETED | OUTPATIENT
Start: 2020-04-03 | End: 2020-04-03

## 2020-04-03 RX ORDER — ACETAMINOPHEN 325 MG/1
650 TABLET ORAL EVERY 4 HOURS PRN
Status: DISCONTINUED | OUTPATIENT
Start: 2020-04-03 | End: 2020-04-05 | Stop reason: HOSPADM

## 2020-04-03 RX ORDER — SODIUM CHLORIDE 9 MG/ML
INJECTION, SOLUTION INTRAVENOUS CONTINUOUS
Status: ACTIVE | OUTPATIENT
Start: 2020-04-03 | End: 2020-04-04

## 2020-04-03 RX ORDER — BISACODYL 10 MG
10 SUPPOSITORY, RECTAL RECTAL DAILY PRN
Status: DISCONTINUED | OUTPATIENT
Start: 2020-04-03 | End: 2020-04-05 | Stop reason: HOSPADM

## 2020-04-03 RX ADMIN — SODIUM CHLORIDE: 0.9 INJECTION, SOLUTION INTRAVENOUS at 10:04

## 2020-04-03 RX ADMIN — MORPHINE SULFATE 6 MG: 2 INJECTION, SOLUTION INTRAMUSCULAR; INTRAVENOUS at 10:04

## 2020-04-03 RX ADMIN — ACETAMINOPHEN 1000 MG: 500 TABLET ORAL at 10:04

## 2020-04-03 RX ADMIN — CEFTRIAXONE SODIUM 1 G: 1 INJECTION, POWDER, FOR SOLUTION INTRAMUSCULAR; INTRAVENOUS at 10:04

## 2020-04-03 RX ADMIN — MORPHINE SULFATE 6 MG: 2 INJECTION, SOLUTION INTRAMUSCULAR; INTRAVENOUS at 04:04

## 2020-04-03 RX ADMIN — CEFTRIAXONE 2 G: 2 INJECTION, SOLUTION INTRAVENOUS at 06:04

## 2020-04-03 RX ADMIN — ONDANSETRON 4 MG: 2 INJECTION, SOLUTION INTRAMUSCULAR; INTRAVENOUS at 10:04

## 2020-04-03 RX ADMIN — HYDROMORPHONE HYDROCHLORIDE 0.5 MG: 1 INJECTION, SOLUTION INTRAMUSCULAR; INTRAVENOUS; SUBCUTANEOUS at 09:04

## 2020-04-03 RX ADMIN — AZITHROMYCIN MONOHYDRATE 500 MG: 250 TABLET ORAL at 10:04

## 2020-04-03 RX ADMIN — IOHEXOL 75 ML: 350 INJECTION, SOLUTION INTRAVENOUS at 03:04

## 2020-04-03 RX ADMIN — ALBUTEROL SULFATE 2 PUFF: 90 AEROSOL, METERED RESPIRATORY (INHALATION) at 08:04

## 2020-04-03 RX ADMIN — AZITHROMYCIN MONOHYDRATE 500 MG: 250 TABLET ORAL at 06:04

## 2020-04-03 NOTE — ED PROVIDER NOTES
Encounter Date: 4/3/2020    SCRIBE #1 NOTE: I, Janene Andrae, am scribing for, and in the presence of,  Timothy Rice MD. I have scribed the following portions of the note - the EKG reading. Other sections scribed: MDM.       History     Chief Complaint   Patient presents with    Shortness of Breath      x four days. Pt also reports intermittent fever. Denies cough. Patient reports dyspnea. Accessory muscle use noted in triage.     24-year-old female with history of sickle cell disease presents with 3 days of fever cough and chest pain.  She denies hemoptysis.  Fever and pain is getting gradually worse.  She states that she has has had pulmonary embolus as well as chest syndrome in the past.  She has required transfusions.  She is no longer anticoagulated.  She is being seen during a corona virus outbreak.  She has been to several hospitals and been discharged.  She is in college here but is from [place].  She received a corona virus test across the street at [facility] this morning.        Review of patient's allergies indicates:  No Known Allergies  Past Medical History:   Diagnosis Date    Sickle cell anemia      History reviewed. No pertinent surgical history.  Family History   Problem Relation Age of Onset    No Known Problems Mother     No Known Problems Father      Social History     Tobacco Use    Smoking status: Never Smoker    Smokeless tobacco: Never Used   Substance Use Topics    Alcohol use: No     Alcohol/week: 0.0 standard drinks    Drug use: Never     Review of Systems   Constitutional: Positive for chills and fever.   HENT: Positive for congestion.    Respiratory: Positive for shortness of breath.    Cardiovascular: Positive for chest pain. Negative for palpitations and leg swelling.   Gastrointestinal: Negative for abdominal pain.   Genitourinary: Negative for difficulty urinating.   Musculoskeletal: Positive for arthralgias and myalgias. Negative for neck stiffness.   Neurological: Negative  for syncope.   Hematological: Negative for adenopathy.   Psychiatric/Behavioral: Negative for agitation.       Physical Exam     Initial Vitals   BP Pulse Resp Temp SpO2   04/03/20 0920 04/03/20 0910 04/03/20 0910 04/03/20 0910 04/03/20 0910   (!) 119/57 100 (!) 22 99.4 °F (37.4 °C) 98 %      MAP       --                Physical Exam    Constitutional: She appears well-developed and well-nourished.   HENT:   Head: Normocephalic.   Eyes: Pupils are equal, round, and reactive to light.   Neck: Normal range of motion. Neck supple. No JVD present.   Cardiovascular:   tachycardic   Pulmonary/Chest: No respiratory distress. She has no wheezes. She exhibits no tenderness.   Abdominal: Soft. Bowel sounds are normal. She exhibits no distension. There is no tenderness. There is no rebound.   Musculoskeletal: Normal range of motion. She exhibits no tenderness.   Neurological: She is alert. She has normal strength.   Skin: Skin is warm. Capillary refill takes less than 2 seconds. No rash noted.   Psychiatric: She has a normal mood and affect.         ED Course   Procedures  Labs Reviewed   CBC W/ AUTO DIFFERENTIAL - Abnormal; Notable for the following components:       Result Value    WBC 14.24 (*)     RBC 2.25 (*)     Hemoglobin 7.3 (*)     Hematocrit 21.3 (*)     Mean Corpuscular Hemoglobin 32.4 (*)     RDW 18.4 (*)     Platelets 367 (*)     Immature Granulocytes 0.8 (*)     Gran # (ANC) 11.1 (*)     Immature Grans (Abs) 0.11 (*)     Mono # 1.3 (*)     nRBC 4 (*)     Gran% 77.8 (*)     Lymph% 10.4 (*)     Platelet Estimate Increased (*)     Sickle Cells Moderate (*)     All other components within normal limits   COMPREHENSIVE METABOLIC PANEL - Abnormal; Notable for the following components:    Sodium 135 (*)     Total Protein 8.7 (*)     Total Bilirubin 5.6 (*)     All other components within normal limits   C-REACTIVE PROTEIN - Abnormal; Notable for the following components:    CRP 36.0 (*)     All other components within  normal limits   FERRITIN - Abnormal; Notable for the following components:    Ferritin 2,067 (*)     All other components within normal limits   LACTATE DEHYDROGENASE - Abnormal; Notable for the following components:     (*)     All other components within normal limits   D DIMER, QUANTITATIVE - Abnormal; Notable for the following components:    D-Dimer 1.96 (*)     All other components within normal limits   CULTURE, BLOOD   CULTURE, BLOOD   CK   LACTIC ACID, PLASMA   TROPONIN I   PROCALCITONIN   B-TYPE NATRIURETIC PEPTIDE   POCT URINE PREGNANCY     EKG Readings: (Independently Interpreted)   Normal sinus rhythm, rate 75, non specific T wave changes       Imaging Results          X-Ray Chest AP Portable (Final result)  Result time 04/03/20 10:17:22    Final result by Hailey Cavanaugh MD (04/03/20 10:17:22)                 Impression:      Poor inspiratory effort limiting this exam however no evidence for typical or atypical pneumonia at this time.  Cardiomegaly in keeping with patient's history of sickle cell anemia.      Electronically signed by: Hailey Cavanaugh MD  Date:    04/03/2020  Time:    10:17             Narrative:    EXAMINATION:  XR CHEST AP PORTABLE    CLINICAL HISTORY:  Suspected Covid-19 Virus Infection;    TECHNIQUE:  Single frontal view of the chest was performed.    COMPARISON:  07/27/2016    FINDINGS:  The lungs are symmetrically hypoinflated with crowding of the bronchovascular bundles.  This limits evaluation of the lung parenchyma.  No definitive airspace opacification identified.  No pleural effusion, pneumothorax, mass or nodule.  Persistent cardiomegaly noted.  The mediastinum, osseous and soft tissue structures are normal.                                 Medical Decision Making:   History:   Old Medical Records: I decided to obtain old medical records.  Initial Assessment:   Patient with sickle cell, history of PE and chest crisis presents with a febrile illness and chest  symptoms during a coronavirus outbreak. She is maintaining her pulse ox. Will check labs, which will include troponin and d-dimer, chest xray and EKG. Will attempt to control her pain with narcotics. With her fever, will give rocephin and zithromax. She was already tested for COVID earlier this morning.   Independently Interpreted Test(s):   I have ordered and independently interpreted EKG Reading(s) - see prior notes  Clinical Tests:   Lab Tests: Ordered and Reviewed  Radiological Study: Ordered and Reviewed  Medical Tests: Ordered and Reviewed  ED Management:  I reviewed studies.  Will admit for sickle cell pain crisis, fever, eval for corona virus infection.  Will obtain CTA of the chest with her high risk even though infectious etiology is more likely.            Scribe Attestation:   Scribe #1: I performed the above scribed service and the documentation accurately describes the services I performed. I attest to the accuracy of the note.                          Clinical Impression:     Patient with sickle cell with high fever, cough, chest pain, shortness of breath.  Will admit for pain control, IV antibiotics.  Will evaluate for corona virus.  She has already had testing.  Will also obtain a CTA of the chest to rule out pulmonary embolus.  I believe PE is less than likely because of her fever and cough but is is still in the differential diagnosis.  Case was discussed with internal medicine          ED Disposition Condition    Admit                           Timothy Rice MD  04/03/20 2089

## 2020-04-03 NOTE — PROGRESS NOTES
Patient drive through note:  Date: 4/3/2020  Referring Provider:staff drive through  COVID Status: unknown/tested    Narrative of symptoms: Patient states the symptoms started 4 days ago.  Hx of sickle cell anemia.  She complains of severe chest pain, shortness of breath that is severe with breathing and walking.  She complains of a fever.     Vitals:  Pulse:104  Pulse ox room air:97  Respirations: 26    Walk test done: yes, O2: 96%, HR: 138, R: 28.  Patient holding chest and crying in pain    Patient Assessment: worsening.  Patient sent to the ER has significant risk of decompensation.

## 2020-04-03 NOTE — ED NOTES
[facility] medicine I and also informed ED provider of patient's report of worsening pain.

## 2020-04-03 NOTE — ED TRIAGE NOTES
Shortness of breath x four days with intermittent fever. Patient is reports dyspnea and is noticed to be tachypneic in triage at 32 breaths per minute. Patient is A&Ox4 and following commands.

## 2020-04-03 NOTE — PROGRESS NOTES
The patient location is: home  The chief complaint leading to consultation is: acute illness  Visit type: Virtual visit with synchronous audio and video  Total time spent with patient: 15  Each patient to whom he or she provides medical services by telemedicine is:  (1) informed of the relationship between the physician and patient and the respective role of any other health care provider with respect to management of the patient; and (2) notified that he or she may decline to receive medical services by telemedicine and may withdraw from such care at any time.    Notes: called to report symptoms concerning for COVID19- fevers, body aches, nausea, vomting and chest pressure    Will send to [facility] up test center for COVID testing    Was in ER past 2 days thinking this was [facility] crisis - but lab evidence did not support

## 2020-04-04 PROBLEM — D57.1 SICKLE CELL ANEMIA: Status: ACTIVE | Noted: 2020-04-04

## 2020-04-04 LAB
ABO + RH BLD: NORMAL
ALBUMIN SERPL BCP-MCNC: 3.5 G/DL (ref 3.5–5.2)
ALP SERPL-CCNC: 89 U/L (ref 55–135)
ALT SERPL W/O P-5'-P-CCNC: 22 U/L (ref 10–44)
ANION GAP SERPL CALC-SCNC: 10 MMOL/L (ref 8–16)
ANISOCYTOSIS BLD QL SMEAR: SLIGHT
AST SERPL-CCNC: 21 U/L (ref 10–40)
BASOPHILS # BLD AUTO: 0.04 K/UL (ref 0–0.2)
BASOPHILS NFR BLD: 0.4 % (ref 0–1.9)
BILIRUB SERPL-MCNC: 3.5 MG/DL (ref 0.1–1)
BLD GP AB SCN CELLS X3 SERPL QL: NORMAL
BLD PROD TYP BPU: NORMAL
BLOOD UNIT EXPIRATION DATE: NORMAL
BLOOD UNIT TYPE CODE: 7300
BLOOD UNIT TYPE: NORMAL
BUN SERPL-MCNC: 7 MG/DL (ref 6–20)
CALCIUM SERPL-MCNC: 8.7 MG/DL (ref 8.7–10.5)
CHLORIDE SERPL-SCNC: 102 MMOL/L (ref 95–110)
CO2 SERPL-SCNC: 23 MMOL/L (ref 23–29)
CODING SYSTEM: NORMAL
CREAT SERPL-MCNC: 0.7 MG/DL (ref 0.5–1.4)
DIFFERENTIAL METHOD: ABNORMAL
DISPENSE STATUS: NORMAL
EOSINOPHIL # BLD AUTO: 0.4 K/UL (ref 0–0.5)
EOSINOPHIL NFR BLD: 3.7 % (ref 0–8)
ERYTHROCYTE [DISTWIDTH] IN BLOOD BY AUTOMATED COUNT: 18.4 % (ref 11.5–14.5)
EST. GFR  (AFRICAN AMERICAN): >60 ML/MIN/1.73 M^2
EST. GFR  (NON AFRICAN AMERICAN): >60 ML/MIN/1.73 M^2
GLUCOSE SERPL-MCNC: 108 MG/DL (ref 70–110)
HCT VFR BLD AUTO: 17.7 % (ref 37–48.5)
HGB BLD-MCNC: 5.9 G/DL (ref 12–16)
HOWELL-JOLLY BOD BLD QL SMEAR: ABNORMAL
HYPOCHROMIA BLD QL SMEAR: ABNORMAL
IMM GRANULOCYTES # BLD AUTO: 0.05 K/UL (ref 0–0.04)
IMM GRANULOCYTES NFR BLD AUTO: 0.5 % (ref 0–0.5)
LDH SERPL L TO P-CCNC: 362 U/L (ref 110–260)
LYMPHOCYTES # BLD AUTO: 2.3 K/UL (ref 1–4.8)
LYMPHOCYTES NFR BLD: 22 % (ref 18–48)
MAGNESIUM SERPL-MCNC: 2 MG/DL (ref 1.6–2.6)
MCH RBC QN AUTO: 32.1 PG (ref 27–31)
MCHC RBC AUTO-ENTMCNC: 33.3 G/DL (ref 32–36)
MCV RBC AUTO: 96 FL (ref 82–98)
MONOCYTES # BLD AUTO: 1.3 K/UL (ref 0.3–1)
MONOCYTES NFR BLD: 12.6 % (ref 4–15)
NEUTROPHILS # BLD AUTO: 6.5 K/UL (ref 1.8–7.7)
NEUTROPHILS NFR BLD: 60.8 % (ref 38–73)
NRBC BLD-RTO: 2 /100 WBC
OVALOCYTES BLD QL SMEAR: ABNORMAL
PHOSPHATE SERPL-MCNC: 4.4 MG/DL (ref 2.7–4.5)
PLATELET # BLD AUTO: 279 K/UL (ref 150–350)
PLATELET BLD QL SMEAR: ABNORMAL
PMV BLD AUTO: 10.8 FL (ref 9.2–12.9)
POIKILOCYTOSIS BLD QL SMEAR: SLIGHT
POLYCHROMASIA BLD QL SMEAR: ABNORMAL
POTASSIUM SERPL-SCNC: 3.9 MMOL/L (ref 3.5–5.1)
PROT SERPL-MCNC: 7.3 G/DL (ref 6–8.4)
RBC # BLD AUTO: 1.84 M/UL (ref 4–5.4)
RETICS/RBC NFR AUTO: 18.1 % (ref 0.5–2.5)
SARS-COV-2 RNA RESP QL NAA+PROBE: NOT DETECTED
SICKLE CELLS BLD QL SMEAR: ABNORMAL
SODIUM SERPL-SCNC: 135 MMOL/L (ref 136–145)
TARGETS BLD QL SMEAR: ABNORMAL
TRANS ERYTHROCYTES VOL PATIENT: NORMAL ML
WBC # BLD AUTO: 10.59 K/UL (ref 3.9–12.7)

## 2020-04-04 PROCEDURE — 80053 COMPREHEN METABOLIC PANEL: CPT

## 2020-04-04 PROCEDURE — 86902 BLOOD TYPE ANTIGEN DONOR EA: CPT

## 2020-04-04 PROCEDURE — 86850 RBC ANTIBODY SCREEN: CPT

## 2020-04-04 PROCEDURE — 25000003 PHARM REV CODE 250: Performed by: INTERNAL MEDICINE

## 2020-04-04 PROCEDURE — 85045 AUTOMATED RETICULOCYTE COUNT: CPT

## 2020-04-04 PROCEDURE — 63600175 PHARM REV CODE 636 W HCPCS: Performed by: INTERNAL MEDICINE

## 2020-04-04 PROCEDURE — P9021 RED BLOOD CELLS UNIT: HCPCS

## 2020-04-04 PROCEDURE — 83735 ASSAY OF MAGNESIUM: CPT

## 2020-04-04 PROCEDURE — 84100 ASSAY OF PHOSPHORUS: CPT

## 2020-04-04 PROCEDURE — 86905 BLOOD TYPING RBC ANTIGENS: CPT

## 2020-04-04 PROCEDURE — 11000001 HC ACUTE MED/SURG PRIVATE ROOM

## 2020-04-04 PROCEDURE — 83615 LACTATE (LD) (LDH) ENZYME: CPT

## 2020-04-04 PROCEDURE — 36415 COLL VENOUS BLD VENIPUNCTURE: CPT

## 2020-04-04 PROCEDURE — 36430 TRANSFUSION BLD/BLD COMPNT: CPT

## 2020-04-04 PROCEDURE — 63700000 PHARM REV CODE 250 ALT 637 W/O HCPCS: Performed by: INTERNAL MEDICINE

## 2020-04-04 PROCEDURE — 86920 COMPATIBILITY TEST SPIN: CPT

## 2020-04-04 PROCEDURE — 27201040 HC RC 50 FILTER

## 2020-04-04 PROCEDURE — 85025 COMPLETE CBC W/AUTO DIFF WBC: CPT

## 2020-04-04 RX ORDER — HYDROCODONE BITARTRATE AND ACETAMINOPHEN 500; 5 MG/1; MG/1
TABLET ORAL
Status: DISCONTINUED | OUTPATIENT
Start: 2020-04-04 | End: 2020-04-05 | Stop reason: HOSPADM

## 2020-04-04 RX ORDER — ALBUTEROL SULFATE 90 UG/1
2 AEROSOL, METERED RESPIRATORY (INHALATION) EVERY 4 HOURS PRN
Status: DISCONTINUED | OUTPATIENT
Start: 2020-04-04 | End: 2020-04-05 | Stop reason: HOSPADM

## 2020-04-04 RX ADMIN — ACETAMINOPHEN 650 MG: 325 TABLET ORAL at 06:04

## 2020-04-04 RX ADMIN — OXYCODONE HYDROCHLORIDE 5 MG: 5 TABLET ORAL at 08:04

## 2020-04-04 RX ADMIN — SENNOSIDES AND DOCUSATE SODIUM 1 TABLET: 8.6; 5 TABLET ORAL at 08:04

## 2020-04-04 RX ADMIN — ENOXAPARIN SODIUM 40 MG: 100 INJECTION SUBCUTANEOUS at 04:04

## 2020-04-04 RX ADMIN — Medication 6 MG: at 08:04

## 2020-04-04 RX ADMIN — AZITHROMYCIN MONOHYDRATE 500 MG: 250 TABLET ORAL at 08:04

## 2020-04-04 RX ADMIN — HYDROXYUREA 500 MG: 500 CAPSULE ORAL at 08:04

## 2020-04-04 RX ADMIN — CEFTRIAXONE 2 G: 2 INJECTION, SOLUTION INTRAVENOUS at 08:04

## 2020-04-04 RX ADMIN — OXYCODONE HYDROCHLORIDE 5 MG: 5 TABLET ORAL at 02:04

## 2020-04-04 NOTE — PROGRESS NOTES
PIV leaking and painful to the patient. PIV removed. Attempted to restart IV without success. PICC team consulted. MD notified patient received less than half of 1st unit of blood.

## 2020-04-04 NOTE — PLAN OF CARE
04/04/20 0955   Discharge Assessment   Assessment Type Discharge Planning Assessment   Confirmed/corrected address and phone number on facesheet? Yes   Assessment information obtained from? Patient   Expected Length of Stay (days) 4   Communicated expected length of stay with patient/caregiver yes   Prior to hospitilization cognitive status: Alert/Oriented   Prior to hospitalization functional status: Independent   Current cognitive status: Alert/Oriented   Current Functional Status: Independent   Lives With alone   Able to Return to Prior Arrangements yes   Is patient able to care for self after discharge? Yes   Patient's perception of discharge disposition home or selfcare   Readmission Within the Last 30 Days no previous admission in last 30 days   Patient currently being followed by outpatient case management? No   Patient currently receives any other outside agency services? No   Equipment Currently Used at Home none   Do you have any problems affording any of your prescribed medications? No   Is the patient taking medications as prescribed? yes   Does the patient have transportation home? Yes   Transportation Anticipated family or friend will provide  (grandmother, Sadaf Tavarez ([phone]))   Does the patient receive services at the [facility]? No   Discharge Plan A Home   Discharge Plan B Home Health   DME Needed Upon Discharge  none   Patient/Family in Agreement with Plan yes     Dx: suspected Covid (4/3/2020)  Pharm: [facility]  Hosp f/u appt: Message sent to Dr. Elvira Nuñez's (PCP/hem onc) scheduled requesting a virtual hospital follow up appointment in 2 weeks.  to call the patient with appointment date & time.

## 2020-04-04 NOTE — PLAN OF CARE
Plan of care reviewed with patient. PRBC infusing. No complaints from patient. Adequate o2 on RA.

## 2020-04-04 NOTE — PLAN OF CARE
Pt resting quietly at this time. Pt has H/H of 5.9/17.7, MD notified and verbal consent for blood taken over the phone with this nurse present. Pt verbalized understanding and signed consent. Pt has not complained of pain since 0.5 mg Hydromorphone given IV. Will continue to monitor.

## 2020-04-04 NOTE — H&P
Hospital Medicine  History and Physical  [facility]      Patient Name: Elizabeth Franco  MRN:  [id number]  Hospital Medicine Team: [facility] MED I Mora Schmitt DO  Date of Admission:  4/3/2020     Length of Stay:  LOS: 0 days     Principal Problem: Suspected Covid-19 Virus Infection    Chief complaint    SOB, fever, chest pain    HPI    24F PMH sickle cell disease and PE (no longer on anticoagulation) who presented to the ER 4/3 for evaluation of worsening SOB, chest pain (tightness) and fever x 3 days. Patient is from [place], but in college here. She states her symptoms have been worsening for the past few days. She does not have any known positive COVID contacts. Otherwise has been in good state of health until now. COVID testing sent this AM, awaiting results    Patient seen on arrival to hospital floor. Her main complaint is ongoing chest pain. She is currently on RA, does not fee SOB. She is hungry.    CTA done in ER negative for PE, no evidence of infiltrates    Review of Systems    Constitutional: Positive for fever, chills, fatigue, poor appetite   HENT: negatve for sore throat, negative for trouble swallowing.    Eyes: Negative for photophobia, visual disturbance.   Respiratory: Positive for cough, shortness of breath  Cardiovascular: Negative for chest pain, palpitations, leg swelling.   Gastrointestinal: negative for diarrhea. Negative for abdominal pain, constipation, nausea, vomiting.   Endocrine: Negative for cold intolerance, heat intolerance.   Genitourinary: Negative for dysuria, frequency.   Musculoskeletal: Negative for arthralgias, myalgias.   Skin: Negative for rash  Neurological: Negative for dizziness, syncope, light-headedness.   Psychiatric/Behavioral: Negative for confusion, hallucinations, anxiety    Past Medical History:   Diagnosis Date    Sickle cell anemia      History reviewed. No pertinent surgical history.  Family History   Problem Relation Age of Onset     No Known Problems Mother     No Known Problems Father      Social History     Socioeconomic History    Marital status: Single     Spouse name: Not on file    Number of children: Not on file    Years of education: Not on file    Highest education level: Not on file   Occupational History    Not on file   Social Needs    Financial resource strain: Not on file    Food insecurity:     Worry: Not on file     Inability: Not on file    Transportation needs:     Medical: Not on file     Non-medical: Not on file   Tobacco Use    Smoking status: Never Smoker    Smokeless tobacco: Never Used   Substance and Sexual Activity    Alcohol use: No     Alcohol/week: 0.0 standard drinks    Drug use: Never    Sexual activity: Yes   Lifestyle    Physical activity:     Days per week: Not on file     Minutes per session: Not on file    Stress: Not on file   Relationships    Social connections:     Talks on phone: Not on file     Gets together: Not on file     Attends Alevism service: Not on file     Active member of club or organization: Not on file     Attends meetings of clubs or organizations: Not on file     Relationship status: Not on file   Other Topics Concern    Not on file   Social History Narrative    Not on file       Medications  No current facility-administered medications on file prior to encounter.      Current Outpatient Medications on File Prior to Encounter   Medication Sig Dispense Refill    hydroxyurea (HYDREA) 500 mg Cap Take 500 mg by mouth once daily.      medroxyPROGESTERone (DEPO-PROVERA) 150 mg/mL injection Inject 150 mg into the muscle every 3 (three) months.      mupirocin (BACTROBAN) 2 % ointment Apply to affected area 3 times daily (Patient not taking: Reported on 11/26/2019) 22 g 1    sulfamethoxazole-trimethoprim 800-160mg (BACTRIM DS) 800-160 mg Tab TK 1 T PO  BID  0    traMADol (ULTRAM) 50 mg tablet Take 1 tablet (50 mg total) by mouth every 6 (six) hours as needed for Pain.  (Patient not taking: Reported on 11/26/2019) 90 tablet 1       Allergies  Patient has no known allergies.    Physical Examination  Temp:  [98.3 °F (36.8 °C)-101.3 °F (38.5 °C)]   Pulse:  []   Resp:  [16-32]   BP: (107-119)/(57-64)   SpO2:  [96 %-100 %]     Gen: NAD, conversant  Head: NC, AT  Eyes: PERRLA, EOMI  Throat: MMM, OP clear  CV: RRR, no M/R/G, no peripheral edema, no JVD  Resp: clear bilaterally, no increased work of breathing on room air  GI: Soft, NT, ND, +BS  Ext: MAEW, no c/c/e  Neuro: AAOx3, CN grossly intact, no focal neurologic deficits  Psychiatry: Normal mood, normal affect    Laboratory:  Recent Labs   Lab 04/03/20  1021   WBC 14.24*   LYMPH 10.4*  1.5   HGB 7.3*   HCT 21.3*   *     Recent Labs   Lab 04/03/20  1021   *   K 4.1      CO2 25   BUN 6   CREATININE 0.7   GLU 86   CALCIUM 9.3     Recent Labs   Lab 04/03/20  1021   ALKPHOS 113   ALT 30   AST 28   ALBUMIN 4.5   PROT 8.7*   BILITOT 5.6*      Recent Labs     04/03/20  1021   DDIMER 1.96*   FERRITIN 2,067*   CRP 36.0*   *   BNP <10   TROPONINI <0.006   LACTATE 0.9       All labs within the last 24 hours were reviewed.     Microbiology:  No results found for: [id number]    Microbiology Results (last 7 days)     Procedure Component Value Units Date/Time    Blood culture x two cultures. Draw prior to antibiotics. [[id number]] Collected:  04/03/20 1037    Order Status:  Completed Specimen:  Blood from Peripheral, Hand, Left Updated:  04/03/20 1915     Blood Culture, Routine No Growth to date    Narrative:       Aerobic and anaerobic    Blood culture x two cultures. Draw prior to antibiotics. [[id number]] Collected:  04/03/20 1021    Order Status:  Completed Specimen:  Blood from Peripheral, Antecubital, Right Updated:  04/03/20 1915     Blood Culture, Routine No Growth to date    Narrative:       Aerobic and anaerobic    Influenza A & B by Molecular [[id number]] Collected:  04/03/20 1802    Order Status:  Completed  Specimen:  Nasopharyngeal Swab Updated:  04/03/20 1832     Influenza A, Molecular Negative     Influenza B, Molecular Negative     Flu A & B Source Nasal swab    Culture, Respiratory with Gram Stain [[id number]]     Order Status:  No result Specimen:  Sputum, Expectorated             Imaging  ECG Results          EKG 12-lead (In process)  Result time 04/03/20 16:04:39    In process by Interface, Lab In [facility] (04/03/20 16:04:39)                 Narrative:    Test Reason : R68.89,    Vent. Rate : 075 BPM     Atrial Rate : 075 BPM     P-R Int : 178 ms          QRS Dur : 086 ms      QT Int : 378 ms       P-R-T Axes : 012 054 -32 degrees     QTc Int : 422 ms    Normal sinus rhythm  Nonspecific T wave abnormality  Abnormal ECG  When compared with ECG of 27-JUL-2016 11:47,  Nonspecific T wave abnormality now evident in Inferior leads  Nonspecific T wave abnormality now evident in Anterior-lateral leads    Referred By: [id number]   SELF           Confirmed By:                               No results found for this or any previous visit.    CTA Chest Non-Coronary (PE Study)  Narrative: EXAMINATION:  CTA CHEST NON CORONARY    CLINICAL HISTORY:  Chest pain, acute, PE suspected, high pretest prob;    TECHNIQUE:  Low dose axial images, sagittal and coronal reformations were obtained from the thoracic inlet to the lung bases following the IV administration of 75 mL of Omnipaque 350.  Contrast timing was optimized to evaluate the pulmonary arteries.    COMPARISON:  Portable chest radiograph from earlier today.  Chest PA and lateral radiograph from 07/27/2016.    FINDINGS:  Base of Neck: No significant abnormality.    Aorta: Left-sided aortic arch with 3 arterial branches maintains normal caliber, contour and course. There is no calcification of the thoracic aorta.  There is no coronary artery calcification.    Heart: Mild cardiomegaly.  No effusion.    Pulmonary vasculature: Pulmonary arteries distribute normally.  No evidence of  lobar pulmonary arterial filling defect noted exam is limited due to poor contrast bolus timing and respiratory motion artifact limiting contrast and spatial resolution. There are four pulmonary veins.    Ara/Mediastinum: Increased soft tissue attenuation within the anterior mediastinum consistent with residual thymic tissue. No pathologic kavon enlargement.    Esophagus: Normal.    Upper Abdomen: No abnormality of the partially imaged upper abdomen.    Thoracic soft tissues: Normal.    Bones: No acute fracture. No suspicious lytic or sclerotic lesions.    Airways: Patent.    Lungs: Bibasilar small volume dependent pleural fluid with mild adjacent consolidative changes, left greater than right.  Findings are most consistent with atelectasis.  No evidence of pulmonary infarct.  No discrete mass. No pneumothorax.  Impression: No evidence of lobar pulmonary thromboembolus or pulmonary infarct noting limitations due to poor contrast bolus timing and respiratory motion.    Small volume bilateral dependent pleural fluid with adjacent atelectatic changes, left greater than right.    Electronically signed by resident: Brenton Persaud  Date:    04/03/2020  Time:    15:49    Electronically signed by: Fauzia Hirsch MD  Date:    04/03/2020  Time:    16:15  X-Ray Chest AP Portable  Narrative: EXAMINATION:  XR CHEST AP PORTABLE    CLINICAL HISTORY:  Suspected Covid-19 Virus Infection;    TECHNIQUE:  Single frontal view of the chest was performed.    COMPARISON:  07/27/2016    FINDINGS:  The lungs are symmetrically hypoinflated with crowding of the bronchovascular bundles.  This limits evaluation of the lung parenchyma.  No definitive airspace opacification identified.  No pleural effusion, pneumothorax, mass or nodule.  Persistent cardiomegaly noted.  The mediastinum, osseous and soft tissue structures are normal.  Impression: Poor inspiratory effort limiting this exam however no evidence for typical or atypical pneumonia at  this time.  Cardiomegaly in keeping with patient's history of sickle cell anemia.    Electronically signed by: Hailey Cavanaugh MD  Date:    04/03/2020  Time:    10:17      All imaging within the last 24 hours was reviewed.       Assessment and Plan:    Active Hospital Problems    Diagnosis  POA    Suspected Covid-19 Virus Infection [R68.89]  Yes      Resolved Hospital Problems   No resolved problems to display.       Suspected Covid-19 Virus Infection  Person Under Investigation (PUI) for COVID-19  - COVID-19 testing: Collection Date: 4/3/2020 Collection Time:   8:49 AM  - Infection Control notified  - await test results  - inhalers PRN  - tylenol PRN  - trend labs Q48    Sickle cell disease  - continue home hydrea  - NS @ 125 overnight  - Hgb 7, no indication for transfusion  - no evidence of ACS at this time  - oxy/dilaudid PRN for pain    Nutrition  - regular diet    Advance Care Planning  Goals of care, counseling/discussion   Advance Care Planning     [facility]  I engaged the patient in a conversation about advance care planning and we specifically addressed what the goals of care would be moving forward, in light of the patient's change in clinical status, specifically regarding aggressive resuscitation measures.  We did specifically address the patient's likely prognosis, which is good .  We explored the patient's values and preferences for future care.  The patient endorses that what is most important right now is to focus on extending life as long as possible, even it it means sacrificing quality    Accordingly, we have decided that the best plan to meet the patient's goals includes continuing with treatment    I did not explain the role for hospice care at this stage of the patient's illness, including its ability to help the patient live with the best quality of life possible.  We will not be making a hospice referral.    I spent a total of 15 minutes engaging the patient in this advance care planning  discussion.          VTE High Risk Prophylaxis: enoxaparin 40mg sq QHS @ 2100 (bundled care) if GFR >30    Level 2 83182 Total visit time was 50 minutes or greater with greater than 50% of time spent in counseling and coordination of care.       [facility]   #[id number]  Cell: [phone]  Pager: [phone]

## 2020-04-04 NOTE — PROGRESS NOTES
Hospital Medicine  Progress Note  [facility]      Patient Name: Elizabeth Franco  MRN:  [id number]  Hospital Medicine Team: [facility] Mora Schmitt DO  Date of Admission:  4/3/2020     Length of Stay:  LOS: 1 day   Principal Problem:  Sickle cell anemia          HPI:   24F PMH sickle cell disease and PE (no longer on anticoagulation) who presented to the ER 4/3 for evaluation of worsening SOB, chest pain (tightness) and fever x 3 days. Patient is from [place], but in college here. She states her symptoms have been worsening for the past few days. She does not have any known positive COVID contacts. Otherwise has been in good state of health until now. COVID testing sent this AM, awaiting results     Patient seen on arrival to hospital floor. Her main complaint is ongoing chest pain. She is currently on RA, does not fee SOB. She is hungry.     CTA done in ER negative for PE, no evidence of infiltrates    HOSPITAL COURSE:  As HPI    INTERVAL HISTORY:   Patient feeling better today, on RA this morning, states chest pain has improved, received 1 dose of dilaudid 0.5 overnight, and oxycodone 5mg this AM. Discussed negative COVID result, moderate clinical suspicion remains. Awaiting blood transfusion for low Hgb today. Denies any extremity pain that is typical of her crisis pain.    REVIEW OF SYSTEMS:  Respiratory: denies SOB, mild cough  Cardiovascular: chest pain, no papitations  GI: no nausea or vomiting    PHYSICIAL EXAM:    Intake/Output Summary (Last 24 hours) at 4/4/2020 1358  Last data filed at 4/4/2020 1215  Gross per 24 hour   Intake 1683.75 ml   Output --   Net 1683.75 ml     Wt Readings from Last 3 Encounters:   04/03/20 52.6 kg (115 lb 15.4 oz)   11/26/19 52.6 kg (116 lb)   04/26/19 52.7 kg (116 lb 2.9 oz)       /71   Pulse 96   Temp 99.2 °F (37.3 °C) (Oral)   Resp (!) 22   Wt 52.6 kg (115 lb 15.4 oz)   SpO2 96%   Breastfeeding? No   BMI 21.21 kg/m²     GEN: NAD,  conversant  Resp: respirations nonlabored, on room air  CV: regular rate, no peripheral edema  GI: soft, NTND  Skin: no rash      INPATIENT MEDS:    Current Facility-Administered Medications:     0.9%  NaCl infusion (for blood administration), , Intravenous, Q24H PRN, Taz Gama PA-C    0.9%  NaCl infusion (for blood administration), , Intravenous, Q24H PRN, Mora Schmitt, DO    0.9%  NaCl infusion, , Intravenous, Continuous, Mora Schmitt, DO, Last Rate: 125 mL/hr at 04/03/20 2200    acetaminophen tablet 650 mg, 650 mg, Oral, Q4H PRN, Mora Riverao, DO    acetaminophen tablet 650 mg, 650 mg, Oral, Q8H PRN, Mora Riverao, DO    albuterol inhaler 2 puff, 2 puff, Inhalation, Q4H PRN, Latrice Foreman MD    azithromycin tablet 500 mg, 500 mg, Oral, Q24H, Mroa Schmitt, DO, 500 mg at 04/03/20 1820    bisacodyL suppository 10 mg, 10 mg, Rectal, Daily PRN, Mora Riverao, DO    cefTRIAXone (ROCEPHIN) 2 g in dextrose 5 % 50 mL IVPB, 2 g, Intravenous, Q24H, Mora Schmitt, DO, Stopped at 04/03/20 1935    dextrose 10% (D10W) Bolus, 12.5 g, Intravenous, PRN, Mora Riverao, DO    dextrose 10% (D10W) Bolus, 25 g, Intravenous, PRN, Mora Riverao, DO    enoxaparin injection 40 mg, 40 mg, Subcutaneous, Daily, Mora Riverao, DO    glucagon (human recombinant) injection 1 mg, 1 mg, Intramuscular, PRN, Mora Riverao, DO    glucose chewable tablet 16 g, 16 g, Oral, PRN, Mora Riverao, DO    glucose chewable tablet 24 g, 24 g, Oral, PRN, Mora Riverao, DO    hydroxyurea capsule 500 mg, 500 mg, Oral, Daily, Mora Schmitt, DO, 500 mg at 04/04/20 0804    ipratropium inhaler 2 puff, 2 puff, Inhalation, Q4H PRN **AND** MDI Q6H WAKE, , , Q6H WAKE, Latrice Foreman MD    loperamide capsule 2 mg, 2 mg, Oral, Q6H PRN, Mora Tiffanie Schmitt DO    melatonin tablet 6 mg, 6 mg, Oral, Nightly PRN, Mora  Tiffanie Schmitt DO    ondansetron injection 4 mg, 4 mg, Intravenous, Q8H PRN, Mora Tiffanie Schmitt DO    oxyCODONE immediate release tablet 5 mg, 5 mg, Oral, Q6H PRN, Mora Schmitt DO, 5 mg at 04/04/20 0804    senna-docusate 8.6-50 mg per tablet 1 tablet, 1 tablet, Oral, BID, Mora Schmitt DO, 1 tablet at 04/04/20 0804    sodium chloride 0.9% flush 10 mL, 10 mL, Intravenous, PRN, Mora Schmitt DO      LABS AND MICRO:  Lab Results   Component Value Date    [id number] Not Detected 04/03/2020       Recent Labs   Lab 04/03/20  1021 04/04/20  0235   WBC 14.24* 10.59   LYMPH 10.4*  1.5 22.0  2.3   HGB 7.3* 5.9*   HCT 21.3* 17.7*   * 279     Recent Labs   Lab 04/03/20  1021 04/04/20  0021   * 135*   K 4.1 3.9    102   CO2 25 23   BUN 6 7   CREATININE 0.7 0.7   GLU 86 108   CALCIUM 9.3 8.7   MG  --  2.0   PHOS  --  4.4     Recent Labs   Lab 04/03/20  1021 04/04/20  0021   ALKPHOS 113 89   ALT 30 22   AST 28 21   ALBUMIN 4.5 3.5   PROT 8.7* 7.3   BILITOT 5.6* 3.5*        Recent Labs     04/03/20  1021 04/04/20  0949   DDIMER 1.96*  --    FERRITIN 2,067*  --    CRP 36.0*  --    * 362*   BNP <10  --    TROPONINI <0.006  --    CPK 23  --        Microbiology Results (last 7 days)     Procedure Component Value Units Date/Time    Blood culture x two cultures. Draw prior to antibiotics. [[id number]] Collected:  04/03/20 1037    Order Status:  Completed Specimen:  Blood from Peripheral, Hand, Left Updated:  04/03/20 1915     Blood Culture, Routine No Growth to date    Narrative:       Aerobic and anaerobic    Blood culture x two cultures. Draw prior to antibiotics. [[id number]] Collected:  04/03/20 1021    Order Status:  Completed Specimen:  Blood from Peripheral, Antecubital, Right Updated:  04/03/20 1915     Blood Culture, Routine No Growth to date    Narrative:       Aerobic and anaerobic    Influenza A & B by Molecular [[id number]] Collected:  04/03/20 1802    Order  Status:  Completed Specimen:  Nasopharyngeal Swab Updated:  04/03/20 1832     Influenza A, Molecular Negative     Influenza B, Molecular Negative     Flu A & B Source Nasal swab    Culture, Respiratory with Gram Stain [[id number]]     Order Status:  No result Specimen:  Sputum, Expectorated         Rapid influenza: Negative  RIP: not done  Legionella Urinary Ag: not done  All labs within the last 24 hours were reviewed.         IMAGING/EKG:    ECG Results          EKG 12-lead (In process)  Result time 04/03/20 16:04:39    In process by Interface, Lab In [facility] (04/03/20 16:04:39)                 Narrative:    Test Reason : R68.89,    Vent. Rate : 075 BPM     Atrial Rate : 075 BPM     P-R Int : 178 ms          QRS Dur : 086 ms      QT Int : 378 ms       P-R-T Axes : 012 054 -32 degrees     QTc Int : 422 ms    Normal sinus rhythm  Nonspecific T wave abnormality  Abnormal ECG  When compared with ECG of 27-JUL-2016 11:47,  Nonspecific T wave abnormality now evident in Inferior leads  Nonspecific T wave abnormality now evident in Anterior-lateral leads    Referred By: [email or web address]   SELF           Confirmed By:                               No results found for this or any previous visit.    CTA Chest Non-Coronary (PE Study)  Narrative: EXAMINATION:  CTA CHEST NON CORONARY    CLINICAL HISTORY:  Chest pain, acute, PE suspected, high pretest prob;    TECHNIQUE:  Low dose axial images, sagittal and coronal reformations were obtained from the thoracic inlet to the lung bases following the IV administration of 75 mL of Omnipaque 350.  Contrast timing was optimized to evaluate the pulmonary arteries.    COMPARISON:  Portable chest radiograph from earlier today.  Chest PA and lateral radiograph from 07/27/2016.    FINDINGS:  Base of Neck: No significant abnormality.    Aorta: Left-sided aortic arch with 3 arterial branches maintains normal caliber, contour and course. There is no calcification of the thoracic aorta.  There is no  coronary artery calcification.    Heart: Mild cardiomegaly.  No effusion.    Pulmonary vasculature: Pulmonary arteries distribute normally.  No evidence of lobar pulmonary arterial filling defect noted exam is limited due to poor contrast bolus timing and respiratory motion artifact limiting contrast and spatial resolution. There are four pulmonary veins.    Ara/Mediastinum: Increased soft tissue attenuation within the anterior mediastinum consistent with residual thymic tissue. No pathologic kavon enlargement.    Esophagus: Normal.    Upper Abdomen: No abnormality of the partially imaged upper abdomen.    Thoracic soft tissues: Normal.    Bones: No acute fracture. No suspicious lytic or sclerotic lesions.    Airways: Patent.    Lungs: Bibasilar small volume dependent pleural fluid with mild adjacent consolidative changes, left greater than right.  Findings are most consistent with atelectasis.  No evidence of pulmonary infarct.  No discrete mass. No pneumothorax.  Impression: No evidence of lobar pulmonary thromboembolus or pulmonary infarct noting limitations due to poor contrast bolus timing and respiratory motion.    Small volume bilateral dependent pleural fluid with adjacent atelectatic changes, left greater than right.    Electronically signed by resident: Brenton Persaud  Date:    04/03/2020  Time:    15:49    Electronically signed by: Fauzia Hirsch MD  Date:    04/03/2020  Time:    16:15  X-Ray Chest AP Portable  Narrative: EXAMINATION:  XR CHEST AP PORTABLE    CLINICAL HISTORY:  Suspected Covid-19 Virus Infection;    TECHNIQUE:  Single frontal view of the chest was performed.    COMPARISON:  07/27/2016    FINDINGS:  The lungs are symmetrically hypoinflated with crowding of the bronchovascular bundles.  This limits evaluation of the lung parenchyma.  No definitive airspace opacification identified.  No pleural effusion, pneumothorax, mass or nodule.  Persistent cardiomegaly noted.  The mediastinum,  osseous and soft tissue structures are normal.  Impression: Poor inspiratory effort limiting this exam however no evidence for typical or atypical pneumonia at this time.  Cardiomegaly in keeping with patient's history of sickle cell anemia.    Electronically signed by: Haliey Cavanaugh MD  Date:    04/03/2020  Time:    10:17      All imaging within the last 24 hours was reviewed.     _________________________________________________________________________________       ASSESSMENT AND PLAN:     Suspected Covid-19 Virus Infection  Person Under Investigation (PUI) for COVID-19  - COVID-19 testing: Collection Date: 4/3/2020 Collection Time:   8:49 AM  - Infection Control notified  - COVID negative on 4/3  - inhalers PRN  - tylenol PRN  - trend labs Q48  - moderate clinical suspicion remains  - will continue treatment for CAP w/ azithro and ceftriaxone - on discharge, change to levofloxacin to complete 7 days     Sickle cell disease  - continue home hydrea  - NS @ 125   - Hgb 5.8 today, transfusing today  - repeat , retic 18  - discussed w/ heme (follows w/ Dr. Nuñez) - they will arrange telehealth visit for patient w/in the next week for follow up  - no evidence of ACS at this time  - continue oxy PRN for pain - patient may require on hospital discharge  - suspect that pain is residual from recent sickle cell crisis     Nutrition  - regular diet    ADVANCE CARE PLANNING: Goals of care, counseling/discussion  - Code status: Full Code  - Family update: Patient has updated her family herself today    Advance Care Planning     [facility]  I engaged the patient in a conversation about advance care planning and we specifically addressed what the goals of care would be moving forward, in light of the patient's change in clinical status, specifically risk of clinical deterioration.  We did specifically address the patient's likely prognosis, which is good .  We explored the patient's values and preferences for future care.   The patient endorses that what is most important right now is to focus on extending life as long as possible, even it it means sacrificing quality    Accordingly, we have decided that the best plan to meet the patient's goals includes continuing with treatment    I did not explain the role for hospice care at this stage of the patient's illness, including its ability to help the patient live with the best quality of life possible.  We will not be making a hospice referral.    I spent a total of 15 minutes engaging the patient in this advance care planning discussion.           Advance Care Planning     VTE PROPHYLAXIS: pharmacologic prophylaxis (with any of the following: lovenox)    DISPO: transfer to hospital med telemedicine today - if pain remains well controlled, and pt remains afebrile, plan for d/c home tomorrow on oral abx. Heme arranging telehealth follow up w/ Dr. Nuñez w/in 1 week from d/c.      Level 2 94053 Total visit time was 25 minutes or greater with greater than 50% of time spent in counseling and coordination of care.     Kalie Schmitt [facility]   #[id number]  Cell: [phone]  Pager: [phone]  Email: [email or web address]

## 2020-04-05 VITALS
OXYGEN SATURATION: 97 % | RESPIRATION RATE: 16 BRPM | WEIGHT: 115.94 LBS | DIASTOLIC BLOOD PRESSURE: 59 MMHG | HEART RATE: 83 BPM | BODY MASS INDEX: 21.21 KG/M2 | TEMPERATURE: 99 F | SYSTOLIC BLOOD PRESSURE: 109 MMHG

## 2020-04-05 LAB
BASOPHILS # BLD AUTO: 0.09 K/UL (ref 0–0.2)
BASOPHILS NFR BLD: 1.3 % (ref 0–1.9)
CRP SERPL-MCNC: 34.1 MG/L (ref 0–8.2)
DIFFERENTIAL METHOD: ABNORMAL
EOSINOPHIL # BLD AUTO: 0.6 K/UL (ref 0–0.5)
EOSINOPHIL NFR BLD: 8.6 % (ref 0–8)
ERYTHROCYTE [DISTWIDTH] IN BLOOD BY AUTOMATED COUNT: 17.4 % (ref 11.5–14.5)
FERRITIN SERPL-MCNC: 1978 NG/ML (ref 20–300)
HCT VFR BLD AUTO: 26.9 % (ref 37–48.5)
HGB BLD-MCNC: 8.8 G/DL (ref 12–16)
IMM GRANULOCYTES # BLD AUTO: 0.04 K/UL (ref 0–0.04)
IMM GRANULOCYTES NFR BLD AUTO: 0.6 % (ref 0–0.5)
LYMPHOCYTES # BLD AUTO: 2 K/UL (ref 1–4.8)
LYMPHOCYTES NFR BLD: 29.1 % (ref 18–48)
MCH RBC QN AUTO: 31.1 PG (ref 27–31)
MCHC RBC AUTO-ENTMCNC: 32.7 G/DL (ref 32–36)
MCV RBC AUTO: 95 FL (ref 82–98)
MONOCYTES # BLD AUTO: 0.6 K/UL (ref 0.3–1)
MONOCYTES NFR BLD: 8.8 % (ref 4–15)
NEUTROPHILS # BLD AUTO: 3.5 K/UL (ref 1.8–7.7)
NEUTROPHILS NFR BLD: 51.6 % (ref 38–73)
NRBC BLD-RTO: 8 /100 WBC
PLATELET # BLD AUTO: 342 K/UL (ref 150–350)
PMV BLD AUTO: 10.8 FL (ref 9.2–12.9)
RBC # BLD AUTO: 2.83 M/UL (ref 4–5.4)
WBC # BLD AUTO: 6.74 K/UL (ref 3.9–12.7)

## 2020-04-05 PROCEDURE — 25000003 PHARM REV CODE 250: Performed by: INTERNAL MEDICINE

## 2020-04-05 PROCEDURE — 36415 COLL VENOUS BLD VENIPUNCTURE: CPT

## 2020-04-05 PROCEDURE — 99239 HOSP IP/OBS DSCHRG MGMT >30: CPT | Mod: ,,, | Performed by: INTERNAL MEDICINE

## 2020-04-05 PROCEDURE — 85025 COMPLETE CBC W/AUTO DIFF WBC: CPT

## 2020-04-05 PROCEDURE — 82728 ASSAY OF FERRITIN: CPT

## 2020-04-05 PROCEDURE — 99239 PR HOSPITAL DISCHARGE DAY,>30 MIN: ICD-10-PCS | Mod: ,,, | Performed by: INTERNAL MEDICINE

## 2020-04-05 PROCEDURE — 86140 C-REACTIVE PROTEIN: CPT

## 2020-04-05 RX ORDER — HYDROCODONE BITARTRATE AND ACETAMINOPHEN 10; 325 MG/1; MG/1
1 TABLET ORAL EVERY 6 HOURS PRN
Qty: 15 TABLET | Refills: 0 | Status: SHIPPED | OUTPATIENT
Start: 2020-04-05 | End: 2020-04-05 | Stop reason: SDUPTHER

## 2020-04-05 RX ORDER — HYDROCODONE BITARTRATE AND ACETAMINOPHEN 10; 325 MG/1; MG/1
1 TABLET ORAL EVERY 6 HOURS PRN
Qty: 15 TABLET | Refills: 0 | Status: ON HOLD | OUTPATIENT
Start: 2020-04-05 | End: 2021-03-05 | Stop reason: HOSPADM

## 2020-04-05 RX ORDER — LEVOFLOXACIN 750 MG/1
750 TABLET ORAL DAILY
Qty: 5 TABLET | Refills: 0 | Status: SHIPPED | OUTPATIENT
Start: 2020-04-05 | End: 2020-04-10

## 2020-04-05 RX ORDER — ALBUTEROL SULFATE 90 UG/1
2 AEROSOL, METERED RESPIRATORY (INHALATION) EVERY 4 HOURS PRN
Qty: 18 G | Refills: 0 | Status: SHIPPED | OUTPATIENT
Start: 2020-04-05 | End: 2022-04-02

## 2020-04-05 RX ADMIN — HYDROXYUREA 500 MG: 500 CAPSULE ORAL at 08:04

## 2020-04-05 RX ADMIN — SENNOSIDES AND DOCUSATE SODIUM 1 TABLET: 8.6; 5 TABLET ORAL at 08:04

## 2020-04-05 RX ADMIN — OXYCODONE HYDROCHLORIDE 5 MG: 5 TABLET ORAL at 05:04

## 2020-04-05 NOTE — DISCHARGE SUMMARY
"[facility]  Discharge Summary  Hospital Medicine    Patient Name: Elizabeth Franco  YOB: 1995    Admit Date: 4/3/2020    Discharge Date and Time: 4/5/2020  3:57 PM    Discharge Attending Physician: Rudi Fernando MD     Team: [facility] ER MEDICINE TEAM 8    Reason for Admission:   Chief Complaint   Patient presents with    Shortness of Breath      x four days. Pt also reports intermittent fever. Denies cough. Patient reports dyspnea. Accessory muscle use noted in triage.       Active Hospital Problems    Diagnosis  POA    *Sickle cell anemia [D57.1]  Yes    Suspected Covid-19 Virus Infection [R68.89]  Yes      Resolved Hospital Problems   No resolved problems to display.       HPI:   As per Dr Schmitt "24F PMH sickle cell disease and PE (no longer on anticoagulation) who presented to the ER 4/3 for evaluation of worsening SOB, chest pain (tightness) and fever x 3 days. Patient is from [place], but in college here. She states her symptoms have been worsening for the past few days. She does not have any known positive COVID contacts. Otherwise has been in good state of health until now. COVID testing sent this AM, awaiting results     Patient seen on arrival to hospital floor. Her main complaint is ongoing chest pain. She is currently on RA, does not fee SOB. She is hungry.     CTA done in ER negative for PE, no evidence of infiltrates"    Hospital Course:   Patient was admitted for SCC pain crisis and Covid 19 rule out under hospital medicine. Started on supportive tx for COVID and SCC with abx, oxygen, fluids and pain meds. COVID negative on 4/3. Continued supportive tx for CAP. Not hypoxic. Given one unit of pRBC and tolerated well with good results. She is back to baseline and stable for discharge. Vitals and labs stable. Will dc home with pain meds. Transitioned IV to oral Levaquin to complete 5 days abx. Discussed w/ heme (follows w/ Dr. Nuñez) - they will arrange telehealth visit " for patient w/in the next week for follow up.      Principal Problem: Sickle cell anemia    Other Problems Addressed:  R/o covid 19  SSC     Procedures Performed: * No surgery found *    Special Care, Treatment, and Services Provided: none    Consults: none    Significant Diagnostic Studies: none    Final Diagnoses: Same as principal problem.    Discharged Condition: stable  Face to face services were provided on 4/5/2020   Time Spent:  I spent > 30 minutes on the discharge, which included reviewing hospital course with patient/family, reviewing discharge medications, and arranging follow-up care.      Disposition: Home or Self Care    Follow Up Instructions:   Follow-up Information     Elvira Nuñez MD.    Specialty:  Hematology and Oncology  Why:  Message sent to Dr. Nuñez requesting a [facility] hospital follow up appointment in 2 weeks.  to call the patient with appointment date & time.   Contact information:  [address]  [facility] [id number] [phone]                 No future appointments.    Medications:  Reconciled Home Medications:      Medication List      START taking these medications    albuterol 90 mcg/actuation inhaler  Commonly known as:  PROVENTIL/VENTOLIN HFA  Inhale 2 puffs into the lungs every 4 (four) hours as needed for Wheezing. Rescue     HYDROcodone-acetaminophen  mg per tablet  Commonly known as:  NORCO  Take 1 tablet by mouth every 6 (six) hours as needed for Pain.     levoFLOXacin 750 MG tablet  Commonly known as:  LEVAQUIN  Take 1 tablet (750 mg total) by mouth once daily. for 5 days        CONTINUE taking these medications    hydroxyurea 500 mg Cap  Commonly known as:  HYDREA  Take 500 mg by mouth once daily.     medroxyPROGESTERone 150 mg/mL injection  Commonly known as:  DEPO-PROVERA  Inject 150 mg into the muscle every 3 (three) months.     mupirocin 2 % ointment  Commonly known as:  BACTROBAN  Apply to affected area 3 times daily        STOP taking these  medications    sulfamethoxazole-trimethoprim 800-160mg 800-160 mg Tab  Commonly known as:  BACTRIM DS     traMADoL 50 mg tablet  Commonly known as:  ULTRAM             Discharge Instructions:  Discussed in length with patient. If his presenting symptoms were to worsen, or if febrile, she should return to the ED. Patient voiced understanding. She needs to fu with pcp and hem with appropriate labs.      Rudi Fernando MD  Department of Hospital Medicine

## 2020-04-05 NOTE — PROGRESS NOTES
Patient doing well. No complaints. Vss. Patient discharged per order. Prescriptions given to patient. Discharge instructions discussed with patient including follow-up appointments and any restrictions. Patient verbalized understanding. Patient left unit via wheelchair with transport. Patient wearing a mask at discharge. Family waiting for patient in front of the hospital.

## 2020-04-05 NOTE — CONSULTS
Elizabeth Franco  has been accepted for transfer to [facility] and will be followed through telemedicine services beginning 04/05/20  at 7am.    Nataliia Traore MD

## 2020-04-06 LAB
BLD PROD TYP BPU: NORMAL
BLD PROD TYP BPU: NORMAL
BLOOD UNIT EXPIRATION DATE: NORMAL
BLOOD UNIT EXPIRATION DATE: NORMAL
BLOOD UNIT TYPE CODE: 1700
BLOOD UNIT TYPE CODE: 7300
BLOOD UNIT TYPE: NORMAL
BLOOD UNIT TYPE: NORMAL
CODING SYSTEM: NORMAL
CODING SYSTEM: NORMAL
DISPENSE STATUS: NORMAL
DISPENSE STATUS: NORMAL
L PNEUMO AG UR QL IA: NOT DETECTED
TRANS ERYTHROCYTES VOL PATIENT: NORMAL ML
TRANS ERYTHROCYTES VOL PATIENT: NORMAL ML

## 2020-04-07 LAB
BACTERIA BLD CULT: NORMAL
BACTERIA BLD CULT: NORMAL

## 2020-04-08 ENCOUNTER — PATIENT OUTREACH (OUTPATIENT)
Dept: ADMINISTRATIVE | Facility: CLINIC | Age: 25
End: 2020-04-08

## 2020-04-08 NOTE — PATIENT INSTRUCTIONS
"  Sickle Cell Anemia  You have sickle cell anemia, which is also called sickle cell disease. That means your red blood cells may lose their normal round shape and become shaped like a half-moon. These cells can't carry oxygen as well as normal, round blood cells. Sickle cell anemia runs in families, and commonly affects -Americans and certain other ethnic groups. Sickle cell anemia is a genetic disease you get from a mutated (changed) gene passed down from each parent. Sickle cell "trait" happens when you get one mutated gene from one of your parents. Sickle cell trait usually does not have symptoms and is not serious. Neither the disease nor the trait can be passed from person to person by coughing or touching. Sickle cell anemia can be controlled, but not cured. Most newborns are now tested for sickle cell disease at birth.  A sickle cell crisis happens when many sickle cells stick together and pile up in the blood vessels. During a sickle cell crisis, you may have severe pain in the chest, stomach, arms, and legs. The crisis can last for hours, or even days, and can happen several times a year.  Home care  Tips for taking care of yourself at home include:  · Watch for sores (ulcers) on your legs. These are caused by poor blood flow and are a sign that the sickle cell anemia is not under control.  · If you snore or sometimes stop breathing during sleep, be sure to tell your healthcare provider.  · Get treatment for any other medical condition, such as diabetes. This is important to avoid complications of sickle cell anemia.  · Get early prenatal care if you are pregnant or plan to get pregnant.  · If you plan to travel by air, go in pressurized aircraft only. Check with your healthcare provider about any needed safety steps if you must travel in a nonpressurized aircraft.  · Talk to your healthcare provider about what kind of pain medicine you should use.  · Drink plenty of water, especially during warm " weather.  · Get treated for any infection (cold, flu, skin infection) as soon as it happens.  · Wear warm clothes in cold weather or in air-conditioned rooms.  Lifestyle changes  Suggestions for changes include:  · Limit alcohol intake to no more than one drink per day.  · Stop smoking. Go to a stop-smoking program to improve your chances of quitting.  · Exercise regularly but not to the point that you become extremely tired. Drink plenty of fluids when you exercise.  · Avoid very strenuous activities, such as rough contact sports.  · Don't swim in cold water.   Follow-up care  Make a follow-up appointment as directed by our staff. Regular follow-up visits are very important.  When to call your healthcare provider  Call your healthcare provider right away if you have any of the following:  · Swollen hands or feet  · Sudden paleness in the skin or nail beds  · Yellow color of the skin or eyes (jaundice)  · Fever or signs of infection  · Swelling in the belly  · Sudden tiredness with no interest in what is going on  · Erection that won't go away  · Trouble hearing or seeing  · Weakness on one side of the body  · Sudden change in speech  · Headache  · Trouble breathing  · Joint, stomach, chest, or muscle pain  · Limping   Date Last Reviewed: 2/21/2016  © 3639-0640 [employer]. [address] [place] [address], [place] [address]. All rights reserved. This information is not intended as a substitute for professional medical care. Always follow your healthcare professional's instructions.

## 2020-04-09 ENCOUNTER — OFFICE VISIT (OUTPATIENT)
Dept: HEMATOLOGY/ONCOLOGY | Facility: CLINIC | Age: 25
End: 2020-04-09
Payer: COMMERCIAL

## 2020-04-09 DIAGNOSIS — Z20.822 SUSPECTED COVID-19 VIRUS INFECTION: Primary | ICD-10-CM

## 2020-04-09 DIAGNOSIS — D57.00 SICKLE CELL ANEMIA WITH PAIN: ICD-10-CM

## 2020-04-09 PROCEDURE — 99499 UNLISTED E&M SERVICE: CPT | Mod: 95,,, | Performed by: INTERNAL MEDICINE

## 2020-04-09 PROCEDURE — 99499 NO LOS: ICD-10-PCS | Mod: 95,,, | Performed by: INTERNAL MEDICINE

## 2020-04-20 ENCOUNTER — TELEPHONE (OUTPATIENT)
Dept: HEMATOLOGY/ONCOLOGY | Facility: CLINIC | Age: 25
End: 2020-04-20

## 2020-04-21 ENCOUNTER — OFFICE VISIT (OUTPATIENT)
Dept: HEMATOLOGY/ONCOLOGY | Facility: CLINIC | Age: 25
End: 2020-04-21
Payer: COMMERCIAL

## 2020-04-21 DIAGNOSIS — D57.00 SICKLE CELL ANEMIA WITH PAIN: Primary | ICD-10-CM

## 2020-04-21 PROCEDURE — 99214 OFFICE O/P EST MOD 30 MIN: CPT | Mod: 95,,, | Performed by: INTERNAL MEDICINE

## 2020-04-21 PROCEDURE — 99214 PR OFFICE/OUTPT VISIT, EST, LEVL IV, 30-39 MIN: ICD-10-PCS | Mod: 95,,, | Performed by: INTERNAL MEDICINE

## 2020-04-21 RX ORDER — OXYCODONE HYDROCHLORIDE 5 MG/1
5 TABLET ORAL EVERY 4 HOURS PRN
Qty: 20 TABLET | Refills: 0 | Status: ON HOLD | OUTPATIENT
Start: 2020-04-21 | End: 2021-03-05 | Stop reason: HOSPADM

## 2020-04-21 RX ORDER — TRAMADOL HYDROCHLORIDE 50 MG/1
50 TABLET ORAL EVERY 6 HOURS PRN
Qty: 45 TABLET | Refills: 0 | Status: ON HOLD | OUTPATIENT
Start: 2020-04-21 | End: 2021-03-05 | Stop reason: HOSPADM

## 2020-04-21 NOTE — PROGRESS NOTES
The patient location is: [place]; [place]  The chief complaint leading to consultation is: Sickle Cell; hospital discharge for URI  Visit type: audiovisual; converted to audio for better sound  Total time spent with patient: 22 minutes  Each patient to whom he or she provides medical services by telemedicine is:  (1) informed of the relationship between the physician and patient and the respective role of any other health care provider with respect to management of the patient; and (2) notified that he or she may decline to receive medical services by telemedicine and may withdraw from such care at any time.      Subjective:       Patient ID: Elizabeth Franco is a 24 y.o. female.    Chief Complaint: No chief complaint on file.    The patient is a very pleasant 20 year old woman transferring care from [facility] for sickle cell anemia. Her parents have sickle cell trait. She reports a history of very well managed sickle cell disease. 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 She has a horizontal left upper quadrant scar from this surgery. She was treated with transfusion therapy for about 10 years. This was facilitated by a Port-A-Cath that was removed at the completion of therapy. The therapy was limited by iron overload and the patient was not able to tolerate oral chelation therapy. By description she was treated at least once with phlebotomy.    She has rare pain crises that lead to ER evaluation and treatment. These are often pain events of the left upper extremity or lower extremities. They occur about 1-2x yearly. Most often in the winter during finals week at school. 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 She has just started her second semester. Her last blood transfusion was 2 years ago. She has never required an exchange transfusion.     The patient notes that she has had nocturnal enuresis since her splenectomy at 4 years of age. This has been treated with pelvic  exercises and oxybutynin. The incidence of enuresis has greatly decreased to resolved over the past year. She has not had increased UTI occurrence. Chaffing and skin irritation was a prior issue.    Elizabeth also has a recent diagnosis of hidradenitis supprativa of the right axilla. Flares have been treated with oral Augmentin and mupirocin 2% cream.     In addition she reports severe episodes of GERD that impact her quality of life. Events are described as immobilizing.incapacitating epigastric pain that radiates to the entire chest. Events last at least 24 hours. This has not been evaluated by Gastroenterology to date.    TODAY  Hospital discharge follow-up for upper respiratory viral illness    First hospital presentation- treated in ER only for pain and sent home after few hours, returned hours later with same symptoms (non-[facility] facility)    Admitted upon repeat presentation; did not received standard of care therapy for symptoms, felt like symptoms were ignored due to COVID19 and past medical history on record at that hospital were not reviewed    Treated primarily with dilaudid and hydrocodone for pain crisis during URI; has excessive side effects with these agents; does better with morphine for acute pain treatment    Seen at [facility] 4/3-4/5 for shortness of breath, fever, and cough. COVID19 negative. Hemoglobin 5.9 upon admission and improved with blood transfusion per discharge report. Influenza testing negative.  Discharged with hydrocodone/apap, hydrea 500 mg daily    Interval event- found to have bed bugs in room; currently being treated for cellulitis due to bites; planning to treat home      HPI  Review of Systems   Constitutional: Negative for activity change, appetite change, fever and unexpected weight change.   HENT: Negative.    Eyes: Negative.    Respiratory: Negative for cough and shortness of breath.    Cardiovascular: Negative for chest pain and leg swelling.   Endocrine: Negative.     Genitourinary: Positive for enuresis.   Musculoskeletal: Negative.    Skin: Negative for pallor and rash.   Allergic/Immunologic: Negative for environmental allergies, food allergies and immunocompromised state.   Neurological: Negative.    Hematological: Negative for adenopathy. Does not bruise/bleed easily.   Psychiatric/Behavioral: Negative.        Objective:    No exam due to telehealth visit  Physical Exam   Constitutional: She is oriented to person, place, and time. She appears well-developed and well-nourished.   HENT:   Head: Normocephalic and atraumatic.   Right Ear: External ear normal.   Left Ear: External ear normal.   Nose: Nose normal.   Mouth/Throat: Oropharynx is clear and moist.   Eyes: Pupils are equal, round, and reactive to light. Conjunctivae and EOM are normal.   Neck: Normal range of motion. Neck supple. No thyromegaly present.   Cardiovascular: Normal rate, regular rhythm and intact distal pulses.   Murmur heard.  Pulmonary/Chest: Effort normal and breath sounds normal. No respiratory distress.   Abdominal: Soft. Bowel sounds are normal. She exhibits no distension. There is no hepatosplenomegaly.   Musculoskeletal: She exhibits no edema or tenderness.   Lymphadenopathy:     She has no cervical adenopathy.     She has no axillary adenopathy.        Right: No inguinal adenopathy present.        Left: No inguinal adenopathy present.   Neurological: She is alert and oriented to person, place, and time. No cranial nerve deficit.   Skin: Skin is warm and dry. No rash noted. No cyanosis. Nails show no clubbing.   Psychiatric: She has a normal mood and affect. Her behavior is normal.   Nursing note and vitals reviewed.      Assessment:       1. Sickle cell anemia with pain        Plan:       1 Restart Hydrea 1500mg daily and folic acid.     2. Oxycodone 5mg for severe pain relief; tramadol for beginning of pain crisis    3. Should received morphine for inpatient pain crisis; has excessive side  effects with dilaudid, hydrocodone    4. Echocardiogram for screening pulmonary HTN and update eye exam- ECHO scheduled; need repeat in 2020      Return visit in 6 months with CBC, CMP, Retic count and ferritin

## 2021-03-03 ENCOUNTER — HOSPITAL ENCOUNTER (INPATIENT)
Facility: HOSPITAL | Age: 26
LOS: 2 days | Discharge: HOME | DRG: 812 | End: 2021-03-05
Attending: EMERGENCY MEDICINE | Admitting: EMERGENCY MEDICINE
Payer: COMMERCIAL

## 2021-03-03 DIAGNOSIS — D57.1 SICKLE CELL ANEMIA: ICD-10-CM

## 2021-03-03 DIAGNOSIS — R07.9 ACUTE CHEST PAIN: ICD-10-CM

## 2021-03-03 DIAGNOSIS — R07.9 CHEST PAIN: ICD-10-CM

## 2021-03-03 DIAGNOSIS — D57.01 ACUTE CHEST SYNDROME DUE TO HEMOGLOBIN S DISEASE: Primary | ICD-10-CM

## 2021-03-03 DIAGNOSIS — D57.00 SICKLE CELL ANEMIA WITH PAIN: ICD-10-CM

## 2021-03-03 DIAGNOSIS — J96.01 ACUTE HYPOXEMIC RESPIRATORY FAILURE: ICD-10-CM

## 2021-03-03 PROBLEM — R17 ELEVATED BILIRUBIN: Status: ACTIVE | Noted: 2021-03-03

## 2021-03-03 LAB
ABO + RH BLD: NORMAL
ALBUMIN SERPL BCP-MCNC: 4.3 G/DL (ref 3.5–5.2)
ALP SERPL-CCNC: 91 U/L (ref 55–135)
ALT SERPL W/O P-5'-P-CCNC: 26 U/L (ref 10–44)
ANION GAP SERPL CALC-SCNC: 9 MMOL/L (ref 8–16)
ANISOCYTOSIS BLD QL SMEAR: SLIGHT
APTT BLDCRRT: 27.1 SEC (ref 21–32)
AST SERPL-CCNC: 46 U/L (ref 10–40)
B-HCG UR QL: NEGATIVE
BASO STIPL BLD QL SMEAR: ABNORMAL
BASOPHILS # BLD AUTO: 0.07 K/UL (ref 0–0.2)
BASOPHILS NFR BLD: 0.6 % (ref 0–1.9)
BILIRUB SERPL-MCNC: 4.1 MG/DL (ref 0.1–1)
BILIRUB UR QL STRIP: NEGATIVE
BLD GP AB SCN CELLS X3 SERPL QL: NORMAL
BUN SERPL-MCNC: 9 MG/DL (ref 6–20)
BURR CELLS BLD QL SMEAR: ABNORMAL
CALCIUM SERPL-MCNC: 9 MG/DL (ref 8.7–10.5)
CHLORIDE SERPL-SCNC: 110 MMOL/L (ref 95–110)
CLARITY UR REFRACT.AUTO: ABNORMAL
CO2 SERPL-SCNC: 21 MMOL/L (ref 23–29)
COLOR UR AUTO: YELLOW
CREAT SERPL-MCNC: 0.7 MG/DL (ref 0.5–1.4)
CTP QC/QA: YES
CTP QC/QA: YES
DACRYOCYTES BLD QL SMEAR: ABNORMAL
DIFFERENTIAL METHOD: ABNORMAL
EOSINOPHIL # BLD AUTO: 0.7 K/UL (ref 0–0.5)
EOSINOPHIL NFR BLD: 6.1 % (ref 0–8)
ERYTHROCYTE [DISTWIDTH] IN BLOOD BY AUTOMATED COUNT: 22.1 % (ref 11.5–14.5)
EST. GFR  (AFRICAN AMERICAN): >60 ML/MIN/1.73 M^2
EST. GFR  (NON AFRICAN AMERICAN): >60 ML/MIN/1.73 M^2
FERRITIN SERPL-MCNC: 1280 NG/ML (ref 20–300)
GLUCOSE SERPL-MCNC: 70 MG/DL (ref 70–110)
GLUCOSE UR QL STRIP: NEGATIVE
HCT VFR BLD AUTO: 21.2 % (ref 37–48.5)
HGB BLD-MCNC: 7.5 G/DL (ref 12–16)
HGB UR QL STRIP: NEGATIVE
HYPOCHROMIA BLD QL SMEAR: ABNORMAL
IMM GRANULOCYTES # BLD AUTO: 0.08 K/UL (ref 0–0.04)
IMM GRANULOCYTES NFR BLD AUTO: 0.7 % (ref 0–0.5)
INR PPP: 1.4 (ref 0.8–1.2)
IRON SERPL-MCNC: 79 UG/DL (ref 30–160)
KETONES UR QL STRIP: NEGATIVE
LDH SERPL L TO P-CCNC: 408 U/L (ref 110–260)
LEUKOCYTE ESTERASE UR QL STRIP: NEGATIVE
LYMPHOCYTES # BLD AUTO: 2.8 K/UL (ref 1–4.8)
LYMPHOCYTES NFR BLD: 25.4 % (ref 18–48)
MCH RBC QN AUTO: 32.2 PG (ref 27–31)
MCHC RBC AUTO-ENTMCNC: 35.4 G/DL (ref 32–36)
MCV RBC AUTO: 91 FL (ref 82–98)
MONOCYTES # BLD AUTO: 1 K/UL (ref 0.3–1)
MONOCYTES NFR BLD: 9.5 % (ref 4–15)
NEUTROPHILS # BLD AUTO: 6.3 K/UL (ref 1.8–7.7)
NEUTROPHILS NFR BLD: 57.7 % (ref 38–73)
NITRITE UR QL STRIP: NEGATIVE
NRBC BLD-RTO: 6 /100 WBC
OVALOCYTES BLD QL SMEAR: ABNORMAL
PH UR STRIP: 7 [PH] (ref 5–8)
PLATELET # BLD AUTO: 315 K/UL (ref 150–350)
PLATELET BLD QL SMEAR: ABNORMAL
PMV BLD AUTO: 11.9 FL (ref 9.2–12.9)
POIKILOCYTOSIS BLD QL SMEAR: SLIGHT
POLYCHROMASIA BLD QL SMEAR: ABNORMAL
POTASSIUM SERPL-SCNC: 4.2 MMOL/L (ref 3.5–5.1)
PROT SERPL-MCNC: 8 G/DL (ref 6–8.4)
PROT UR QL STRIP: NEGATIVE
PROTHROMBIN TIME: 14.6 SEC (ref 9–12.5)
RBC # BLD AUTO: 2.33 M/UL (ref 4–5.4)
RETICS/RBC NFR AUTO: 9.8 % (ref 0.5–2.5)
SARS-COV-2 RDRP RESP QL NAA+PROBE: NEGATIVE
SATURATED IRON: 31 % (ref 20–50)
SCHISTOCYTES BLD QL SMEAR: ABNORMAL
SCHISTOCYTES BLD QL SMEAR: PRESENT
SICKLE CELLS BLD QL SMEAR: ABNORMAL
SODIUM SERPL-SCNC: 140 MMOL/L (ref 136–145)
SP GR UR STRIP: 1.01 (ref 1–1.03)
TARGETS BLD QL SMEAR: ABNORMAL
TOTAL IRON BINDING CAPACITY: 256 UG/DL (ref 250–450)
TOXIC GRANULES BLD QL SMEAR: PRESENT
TRANSFERRIN SERPL-MCNC: 173 MG/DL (ref 200–375)
URN SPEC COLLECT METH UR: ABNORMAL
WBC # BLD AUTO: 10.97 K/UL (ref 3.9–12.7)

## 2021-03-03 PROCEDURE — 96374 THER/PROPH/DIAG INJ IV PUSH: CPT

## 2021-03-03 PROCEDURE — 82728 ASSAY OF FERRITIN: CPT | Performed by: EMERGENCY MEDICINE

## 2021-03-03 PROCEDURE — 85730 THROMBOPLASTIN TIME PARTIAL: CPT | Performed by: STUDENT IN AN ORGANIZED HEALTH CARE EDUCATION/TRAINING PROGRAM

## 2021-03-03 PROCEDURE — 63600175 PHARM REV CODE 636 W HCPCS: Performed by: EMERGENCY MEDICINE

## 2021-03-03 PROCEDURE — 25000003 PHARM REV CODE 250: Performed by: EMERGENCY MEDICINE

## 2021-03-03 PROCEDURE — 11000001 HC ACUTE MED/SURG PRIVATE ROOM

## 2021-03-03 PROCEDURE — 81025 URINE PREGNANCY TEST: CPT | Performed by: EMERGENCY MEDICINE

## 2021-03-03 PROCEDURE — 80053 COMPREHEN METABOLIC PANEL: CPT | Performed by: EMERGENCY MEDICINE

## 2021-03-03 PROCEDURE — 85025 COMPLETE CBC W/AUTO DIFF WBC: CPT | Performed by: EMERGENCY MEDICINE

## 2021-03-03 PROCEDURE — 93010 ELECTROCARDIOGRAM REPORT: CPT | Mod: ,,, | Performed by: INTERNAL MEDICINE

## 2021-03-03 PROCEDURE — 80307 DRUG TEST PRSMV CHEM ANLYZR: CPT | Performed by: EMERGENCY MEDICINE

## 2021-03-03 PROCEDURE — 96375 TX/PRO/DX INJ NEW DRUG ADDON: CPT

## 2021-03-03 PROCEDURE — 83615 LACTATE (LD) (LDH) ENZYME: CPT | Performed by: EMERGENCY MEDICINE

## 2021-03-03 PROCEDURE — 85045 AUTOMATED RETICULOCYTE COUNT: CPT | Performed by: EMERGENCY MEDICINE

## 2021-03-03 PROCEDURE — 86920 COMPATIBILITY TEST SPIN: CPT | Performed by: EMERGENCY MEDICINE

## 2021-03-03 PROCEDURE — 86900 BLOOD TYPING SEROLOGIC ABO: CPT | Performed by: EMERGENCY MEDICINE

## 2021-03-03 PROCEDURE — 99291 PR CRITICAL CARE, E/M 30-74 MINUTES: ICD-10-PCS | Mod: ,,, | Performed by: EMERGENCY MEDICINE

## 2021-03-03 PROCEDURE — 96376 TX/PRO/DX INJ SAME DRUG ADON: CPT

## 2021-03-03 PROCEDURE — 99291 CRITICAL CARE FIRST HOUR: CPT | Mod: 25

## 2021-03-03 PROCEDURE — 93005 ELECTROCARDIOGRAM TRACING: CPT

## 2021-03-03 PROCEDURE — 81003 URINALYSIS AUTO W/O SCOPE: CPT | Performed by: EMERGENCY MEDICINE

## 2021-03-03 PROCEDURE — 93010 EKG 12-LEAD: ICD-10-PCS | Mod: ,,, | Performed by: INTERNAL MEDICINE

## 2021-03-03 PROCEDURE — 83540 ASSAY OF IRON: CPT | Performed by: EMERGENCY MEDICINE

## 2021-03-03 PROCEDURE — 85610 PROTHROMBIN TIME: CPT | Performed by: STUDENT IN AN ORGANIZED HEALTH CARE EDUCATION/TRAINING PROGRAM

## 2021-03-03 PROCEDURE — 99291 CRITICAL CARE FIRST HOUR: CPT | Mod: ,,, | Performed by: EMERGENCY MEDICINE

## 2021-03-03 PROCEDURE — 27201040 HC RC 50 FILTER: Performed by: EMERGENCY MEDICINE

## 2021-03-03 PROCEDURE — U0002 COVID-19 LAB TEST NON-CDC: HCPCS | Performed by: EMERGENCY MEDICINE

## 2021-03-03 RX ORDER — SODIUM CHLORIDE 0.9 % (FLUSH) 0.9 %
10 SYRINGE (ML) INJECTION
Status: DISCONTINUED | OUTPATIENT
Start: 2021-03-03 | End: 2021-03-05 | Stop reason: HOSPADM

## 2021-03-03 RX ORDER — MORPHINE SULFATE 4 MG/ML
4 INJECTION, SOLUTION INTRAMUSCULAR; INTRAVENOUS
Status: COMPLETED | OUTPATIENT
Start: 2021-03-03 | End: 2021-03-03

## 2021-03-03 RX ORDER — DEXTROSE MONOHYDRATE AND SODIUM CHLORIDE 5; .45 G/100ML; G/100ML
INJECTION, SOLUTION INTRAVENOUS CONTINUOUS
Status: DISCONTINUED | OUTPATIENT
Start: 2021-03-03 | End: 2021-03-05 | Stop reason: HOSPADM

## 2021-03-03 RX ORDER — HYDROMORPHONE HYDROCHLORIDE 1 MG/ML
1 INJECTION, SOLUTION INTRAMUSCULAR; INTRAVENOUS; SUBCUTANEOUS
Status: DISCONTINUED | OUTPATIENT
Start: 2021-03-03 | End: 2021-03-03

## 2021-03-03 RX ORDER — CEFTRIAXONE 1 G/1
1 INJECTION, POWDER, FOR SOLUTION INTRAMUSCULAR; INTRAVENOUS
Status: COMPLETED | OUTPATIENT
Start: 2021-03-03 | End: 2021-03-03

## 2021-03-03 RX ORDER — NALOXONE HCL 0.4 MG/ML
0.02 VIAL (ML) INJECTION
Status: DISCONTINUED | OUTPATIENT
Start: 2021-03-03 | End: 2021-03-04

## 2021-03-03 RX ORDER — AMOXICILLIN 250 MG
1 CAPSULE ORAL 2 TIMES DAILY
Status: DISCONTINUED | OUTPATIENT
Start: 2021-03-03 | End: 2021-03-05 | Stop reason: HOSPADM

## 2021-03-03 RX ORDER — ONDANSETRON 2 MG/ML
4 INJECTION INTRAMUSCULAR; INTRAVENOUS
Status: COMPLETED | OUTPATIENT
Start: 2021-03-03 | End: 2021-03-03

## 2021-03-03 RX ORDER — ENOXAPARIN SODIUM 100 MG/ML
40 INJECTION SUBCUTANEOUS EVERY 24 HOURS
Status: DISCONTINUED | OUTPATIENT
Start: 2021-03-03 | End: 2021-03-05 | Stop reason: HOSPADM

## 2021-03-03 RX ORDER — HYDROCODONE BITARTRATE AND ACETAMINOPHEN 500; 5 MG/1; MG/1
TABLET ORAL
Status: DISCONTINUED | OUTPATIENT
Start: 2021-03-03 | End: 2021-03-05 | Stop reason: HOSPADM

## 2021-03-03 RX ADMIN — SODIUM CHLORIDE 500 ML: 0.9 INJECTION, SOLUTION INTRAVENOUS at 09:03

## 2021-03-03 RX ADMIN — MORPHINE SULFATE 4 MG: 4 INJECTION INTRAVENOUS at 07:03

## 2021-03-03 RX ADMIN — CEFTRIAXONE 1 G: 1 INJECTION, POWDER, FOR SOLUTION INTRAMUSCULAR; INTRAVENOUS at 09:03

## 2021-03-03 RX ADMIN — MORPHINE SULFATE 4 MG: 4 INJECTION INTRAVENOUS at 09:03

## 2021-03-03 RX ADMIN — AZITHROMYCIN MONOHYDRATE 500 MG: 500 INJECTION, POWDER, LYOPHILIZED, FOR SOLUTION INTRAVENOUS at 09:03

## 2021-03-03 RX ADMIN — ONDANSETRON 4 MG: 2 INJECTION INTRAMUSCULAR; INTRAVENOUS at 07:03

## 2021-03-04 ENCOUNTER — TELEPHONE (OUTPATIENT)
Dept: HEMATOLOGY/ONCOLOGY | Facility: CLINIC | Age: 26
End: 2021-03-04

## 2021-03-04 PROBLEM — Z20.822 SUSPECTED COVID-19 VIRUS INFECTION: Status: RESOLVED | Noted: 2020-04-03 | Resolved: 2021-03-04

## 2021-03-04 LAB
ALBUMIN SERPL BCP-MCNC: 3.7 G/DL (ref 3.5–5.2)
ALP SERPL-CCNC: 77 U/L (ref 55–135)
ALT SERPL W/O P-5'-P-CCNC: 19 U/L (ref 10–44)
AMPHET+METHAMPHET UR QL: NEGATIVE
ANION GAP SERPL CALC-SCNC: 7 MMOL/L (ref 8–16)
AST SERPL-CCNC: 29 U/L (ref 10–40)
BARBITURATES UR QL SCN>200 NG/ML: NEGATIVE
BASOPHILS # BLD AUTO: 0.04 K/UL (ref 0–0.2)
BASOPHILS NFR BLD: 0.3 % (ref 0–1.9)
BENZODIAZ UR QL SCN>200 NG/ML: NEGATIVE
BILIRUB SERPL-MCNC: 2.8 MG/DL (ref 0.1–1)
BLD PROD TYP BPU: NORMAL
BLOOD UNIT EXPIRATION DATE: NORMAL
BLOOD UNIT TYPE CODE: 5100
BLOOD UNIT TYPE: NORMAL
BUN SERPL-MCNC: 6 MG/DL (ref 6–20)
BZE UR QL SCN: NEGATIVE
CALCIUM SERPL-MCNC: 8.2 MG/DL (ref 8.7–10.5)
CANNABINOIDS UR QL SCN: NEGATIVE
CHLORIDE SERPL-SCNC: 107 MMOL/L (ref 95–110)
CK SERPL-CCNC: 39 U/L (ref 20–180)
CO2 SERPL-SCNC: 22 MMOL/L (ref 23–29)
CODING SYSTEM: NORMAL
CREAT SERPL-MCNC: 0.6 MG/DL (ref 0.5–1.4)
CREAT UR-MCNC: 34 MG/DL (ref 15–325)
DIFFERENTIAL METHOD: ABNORMAL
DISPENSE STATUS: NORMAL
EOSINOPHIL # BLD AUTO: 0.6 K/UL (ref 0–0.5)
EOSINOPHIL NFR BLD: 4.6 % (ref 0–8)
ERYTHROCYTE [DISTWIDTH] IN BLOOD BY AUTOMATED COUNT: 18.6 % (ref 11.5–14.5)
EST. GFR  (AFRICAN AMERICAN): >60 ML/MIN/1.73 M^2
EST. GFR  (NON AFRICAN AMERICAN): >60 ML/MIN/1.73 M^2
ETHANOL UR-MCNC: <10 MG/DL
GLUCOSE SERPL-MCNC: 93 MG/DL (ref 70–110)
HCT VFR BLD AUTO: 22.5 % (ref 37–48.5)
HGB BLD-MCNC: 8 G/DL (ref 12–16)
IMM GRANULOCYTES # BLD AUTO: 0.08 K/UL (ref 0–0.04)
IMM GRANULOCYTES NFR BLD AUTO: 0.6 % (ref 0–0.5)
LYMPHOCYTES # BLD AUTO: 3 K/UL (ref 1–4.8)
LYMPHOCYTES NFR BLD: 24.1 % (ref 18–48)
MCH RBC QN AUTO: 32.7 PG (ref 27–31)
MCHC RBC AUTO-ENTMCNC: 35.6 G/DL (ref 32–36)
MCV RBC AUTO: 92 FL (ref 82–98)
METHADONE UR QL SCN>300 NG/ML: NEGATIVE
MONOCYTES # BLD AUTO: 1 K/UL (ref 0.3–1)
MONOCYTES NFR BLD: 7.8 % (ref 4–15)
NEUTROPHILS # BLD AUTO: 7.9 K/UL (ref 1.8–7.7)
NEUTROPHILS NFR BLD: 62.6 % (ref 38–73)
NRBC BLD-RTO: 4 /100 WBC
NUM UNITS TRANS PACKED RBC: NORMAL
OPIATES UR QL SCN: NEGATIVE
PCP UR QL SCN>25 NG/ML: NEGATIVE
PHOSPHATE SERPL-MCNC: 5 MG/DL (ref 2.7–4.5)
PLATELET # BLD AUTO: 269 K/UL (ref 150–350)
PMV BLD AUTO: 11.8 FL (ref 9.2–12.9)
POTASSIUM SERPL-SCNC: 4 MMOL/L (ref 3.5–5.1)
PROT SERPL-MCNC: 6.8 G/DL (ref 6–8.4)
RBC # BLD AUTO: 2.45 M/UL (ref 4–5.4)
RETICS/RBC NFR AUTO: >23 % (ref 0.5–2.5)
SODIUM SERPL-SCNC: 136 MMOL/L (ref 136–145)
TOXICOLOGY INFORMATION: NORMAL
WBC # BLD AUTO: 12.64 K/UL (ref 3.9–12.7)

## 2021-03-04 PROCEDURE — 80053 COMPREHEN METABOLIC PANEL: CPT | Performed by: STUDENT IN AN ORGANIZED HEALTH CARE EDUCATION/TRAINING PROGRAM

## 2021-03-04 PROCEDURE — P9038 RBC IRRADIATED: HCPCS | Performed by: EMERGENCY MEDICINE

## 2021-03-04 PROCEDURE — 11000001 HC ACUTE MED/SURG PRIVATE ROOM

## 2021-03-04 PROCEDURE — 25000003 PHARM REV CODE 250: Performed by: STUDENT IN AN ORGANIZED HEALTH CARE EDUCATION/TRAINING PROGRAM

## 2021-03-04 PROCEDURE — 63600175 PHARM REV CODE 636 W HCPCS: Performed by: STUDENT IN AN ORGANIZED HEALTH CARE EDUCATION/TRAINING PROGRAM

## 2021-03-04 PROCEDURE — 82550 ASSAY OF CK (CPK): CPT | Performed by: STUDENT IN AN ORGANIZED HEALTH CARE EDUCATION/TRAINING PROGRAM

## 2021-03-04 PROCEDURE — 85025 COMPLETE CBC W/AUTO DIFF WBC: CPT | Performed by: STUDENT IN AN ORGANIZED HEALTH CARE EDUCATION/TRAINING PROGRAM

## 2021-03-04 PROCEDURE — 85045 AUTOMATED RETICULOCYTE COUNT: CPT | Performed by: STUDENT IN AN ORGANIZED HEALTH CARE EDUCATION/TRAINING PROGRAM

## 2021-03-04 PROCEDURE — 99223 1ST HOSP IP/OBS HIGH 75: CPT | Mod: ,,, | Performed by: HOSPITALIST

## 2021-03-04 PROCEDURE — 63700000 PHARM REV CODE 250 ALT 637 W/O HCPCS: Performed by: STUDENT IN AN ORGANIZED HEALTH CARE EDUCATION/TRAINING PROGRAM

## 2021-03-04 PROCEDURE — 36415 COLL VENOUS BLD VENIPUNCTURE: CPT | Performed by: STUDENT IN AN ORGANIZED HEALTH CARE EDUCATION/TRAINING PROGRAM

## 2021-03-04 PROCEDURE — 84100 ASSAY OF PHOSPHORUS: CPT | Performed by: STUDENT IN AN ORGANIZED HEALTH CARE EDUCATION/TRAINING PROGRAM

## 2021-03-04 PROCEDURE — 36430 TRANSFUSION BLD/BLD COMPNT: CPT

## 2021-03-04 PROCEDURE — 99223 PR INITIAL HOSPITAL CARE,LEVL III: ICD-10-PCS | Mod: ,,, | Performed by: HOSPITALIST

## 2021-03-04 PROCEDURE — S5010 5% DEXTROSE AND 0.45% SALINE: HCPCS | Performed by: STUDENT IN AN ORGANIZED HEALTH CARE EDUCATION/TRAINING PROGRAM

## 2021-03-04 PROCEDURE — 99223 1ST HOSP IP/OBS HIGH 75: CPT | Mod: ,,, | Performed by: INTERNAL MEDICINE

## 2021-03-04 PROCEDURE — 99223 PR INITIAL HOSPITAL CARE,LEVL III: ICD-10-PCS | Mod: ,,, | Performed by: INTERNAL MEDICINE

## 2021-03-04 RX ORDER — NALOXONE HCL 0.4 MG/ML
0.02 VIAL (ML) INJECTION
Status: DISCONTINUED | OUTPATIENT
Start: 2021-03-04 | End: 2021-03-04

## 2021-03-04 RX ORDER — MORPHINE SULFATE 4 MG/ML
4 INJECTION, SOLUTION INTRAMUSCULAR; INTRAVENOUS
Status: DISCONTINUED | OUTPATIENT
Start: 2021-03-04 | End: 2021-03-04

## 2021-03-04 RX ORDER — AZITHROMYCIN 250 MG/1
250 TABLET, FILM COATED ORAL DAILY
Status: DISCONTINUED | OUTPATIENT
Start: 2021-03-04 | End: 2021-03-05

## 2021-03-04 RX ORDER — FOLIC ACID 1 MG/1
1 TABLET ORAL DAILY
Status: DISCONTINUED | OUTPATIENT
Start: 2021-03-04 | End: 2021-03-05 | Stop reason: HOSPADM

## 2021-03-04 RX ORDER — ACETAMINOPHEN 325 MG/1
650 TABLET ORAL EVERY 6 HOURS PRN
Status: DISCONTINUED | OUTPATIENT
Start: 2021-03-04 | End: 2021-03-05

## 2021-03-04 RX ORDER — HYDROCODONE BITARTRATE AND ACETAMINOPHEN 5; 325 MG/1; MG/1
1 TABLET ORAL EVERY 6 HOURS PRN
Status: DISCONTINUED | OUTPATIENT
Start: 2021-03-04 | End: 2021-03-04

## 2021-03-04 RX ORDER — MORPHINE SULFATE 2 MG/ML
2 INJECTION, SOLUTION INTRAMUSCULAR; INTRAVENOUS
Status: DISCONTINUED | OUTPATIENT
Start: 2021-03-04 | End: 2021-03-05 | Stop reason: HOSPADM

## 2021-03-04 RX ORDER — ACETAMINOPHEN 325 MG/1
650 TABLET ORAL EVERY 6 HOURS PRN
Status: DISCONTINUED | OUTPATIENT
Start: 2021-03-04 | End: 2021-03-04

## 2021-03-04 RX ORDER — MORPHINE SULFATE 1 MG/ML
INJECTION INTRAVENOUS CONTINUOUS
Status: DISCONTINUED | OUTPATIENT
Start: 2021-03-04 | End: 2021-03-04

## 2021-03-04 RX ORDER — CEFTRIAXONE 1 G/1
1 INJECTION, POWDER, FOR SOLUTION INTRAMUSCULAR; INTRAVENOUS
Status: DISCONTINUED | OUTPATIENT
Start: 2021-03-04 | End: 2021-03-05

## 2021-03-04 RX ORDER — IBUPROFEN 200 MG
200 TABLET ORAL 3 TIMES DAILY
Status: DISCONTINUED | OUTPATIENT
Start: 2021-03-04 | End: 2021-03-05 | Stop reason: HOSPADM

## 2021-03-04 RX ORDER — HYDROCODONE BITARTRATE AND ACETAMINOPHEN 10; 325 MG/1; MG/1
1 TABLET ORAL EVERY 6 HOURS PRN
Status: DISCONTINUED | OUTPATIENT
Start: 2021-03-04 | End: 2021-03-04

## 2021-03-04 RX ORDER — ACETAMINOPHEN 500 MG
1000 TABLET ORAL 3 TIMES DAILY
Status: DISCONTINUED | OUTPATIENT
Start: 2021-03-04 | End: 2021-03-05 | Stop reason: HOSPADM

## 2021-03-04 RX ORDER — IBUPROFEN 200 MG
600 TABLET ORAL 3 TIMES DAILY
Status: DISCONTINUED | OUTPATIENT
Start: 2021-03-04 | End: 2021-03-04

## 2021-03-04 RX ADMIN — ENOXAPARIN SODIUM 40 MG: 40 INJECTION SUBCUTANEOUS at 12:03

## 2021-03-04 RX ADMIN — AZITHROMYCIN MONOHYDRATE 250 MG: 250 TABLET ORAL at 03:03

## 2021-03-04 RX ADMIN — ACETAMINOPHEN 650 MG: 325 TABLET ORAL at 05:03

## 2021-03-04 RX ADMIN — IBUPROFEN 200 MG: 200 TABLET, FILM COATED ORAL at 03:03

## 2021-03-04 RX ADMIN — ACETAMINOPHEN 1000 MG: 500 TABLET ORAL at 09:03

## 2021-03-04 RX ADMIN — ENOXAPARIN SODIUM 40 MG: 40 INJECTION SUBCUTANEOUS at 05:03

## 2021-03-04 RX ADMIN — ACETAMINOPHEN 650 MG: 325 TABLET ORAL at 03:03

## 2021-03-04 RX ADMIN — IBUPROFEN 200 MG: 200 TABLET, FILM COATED ORAL at 09:03

## 2021-03-04 RX ADMIN — DOCUSATE SODIUM 50MG AND SENNOSIDES 8.6MG 1 TABLET: 8.6; 5 TABLET, FILM COATED ORAL at 09:03

## 2021-03-04 RX ADMIN — DOCUSATE SODIUM 50MG AND SENNOSIDES 8.6MG 1 TABLET: 8.6; 5 TABLET, FILM COATED ORAL at 12:03

## 2021-03-04 RX ADMIN — ACETAMINOPHEN 1000 MG: 500 TABLET ORAL at 03:03

## 2021-03-04 RX ADMIN — DEXTROSE MONOHYDRATE AND SODIUM CHLORIDE: 5; .45 INJECTION, SOLUTION INTRAVENOUS at 10:03

## 2021-03-04 RX ADMIN — DEXTROSE MONOHYDRATE AND SODIUM CHLORIDE: 5; .45 INJECTION, SOLUTION INTRAVENOUS at 12:03

## 2021-03-04 RX ADMIN — FOLIC ACID 1 MG: 1 TABLET ORAL at 03:03

## 2021-03-04 RX ADMIN — CEFTRIAXONE 1 G: 1 INJECTION, POWDER, FOR SOLUTION INTRAMUSCULAR; INTRAVENOUS at 03:03

## 2021-03-04 RX ADMIN — MORPHINE SULFATE 4 MG: 4 INJECTION INTRAVENOUS at 03:03

## 2021-03-05 ENCOUNTER — TELEPHONE (OUTPATIENT)
Dept: HEMATOLOGY/ONCOLOGY | Facility: CLINIC | Age: 26
End: 2021-03-05

## 2021-03-05 VITALS
WEIGHT: 110.69 LBS | DIASTOLIC BLOOD PRESSURE: 52 MMHG | SYSTOLIC BLOOD PRESSURE: 101 MMHG | TEMPERATURE: 98 F | OXYGEN SATURATION: 99 % | BODY MASS INDEX: 20.37 KG/M2 | HEIGHT: 62 IN | HEART RATE: 95 BPM | RESPIRATION RATE: 16 BRPM

## 2021-03-05 LAB
ALBUMIN SERPL BCP-MCNC: 3.9 G/DL (ref 3.5–5.2)
ALP SERPL-CCNC: 78 U/L (ref 55–135)
ALT SERPL W/O P-5'-P-CCNC: 46 U/L (ref 10–44)
ANION GAP SERPL CALC-SCNC: 7 MMOL/L (ref 8–16)
AST SERPL-CCNC: 61 U/L (ref 10–40)
BASOPHILS # BLD AUTO: 0.07 K/UL (ref 0–0.2)
BASOPHILS NFR BLD: 1 % (ref 0–1.9)
BILIRUB SERPL-MCNC: 3.6 MG/DL (ref 0.1–1)
BUN SERPL-MCNC: 5 MG/DL (ref 6–20)
CALCIUM SERPL-MCNC: 8.5 MG/DL (ref 8.7–10.5)
CHLORIDE SERPL-SCNC: 111 MMOL/L (ref 95–110)
CO2 SERPL-SCNC: 22 MMOL/L (ref 23–29)
CREAT SERPL-MCNC: 0.6 MG/DL (ref 0.5–1.4)
DIFFERENTIAL METHOD: ABNORMAL
EOSINOPHIL # BLD AUTO: 0.8 K/UL (ref 0–0.5)
EOSINOPHIL NFR BLD: 10.8 % (ref 0–8)
ERYTHROCYTE [DISTWIDTH] IN BLOOD BY AUTOMATED COUNT: 19.6 % (ref 11.5–14.5)
EST. GFR  (AFRICAN AMERICAN): >60 ML/MIN/1.73 M^2
EST. GFR  (NON AFRICAN AMERICAN): >60 ML/MIN/1.73 M^2
GLUCOSE SERPL-MCNC: 97 MG/DL (ref 70–110)
HCT VFR BLD AUTO: 25.7 % (ref 37–48.5)
HGB BLD-MCNC: 9.5 G/DL (ref 12–16)
IMM GRANULOCYTES # BLD AUTO: 0.06 K/UL (ref 0–0.04)
IMM GRANULOCYTES NFR BLD AUTO: 0.8 % (ref 0–0.5)
LYMPHOCYTES # BLD AUTO: 2 K/UL (ref 1–4.8)
LYMPHOCYTES NFR BLD: 28 % (ref 18–48)
MCH RBC QN AUTO: 32.3 PG (ref 27–31)
MCHC RBC AUTO-ENTMCNC: 37 G/DL (ref 32–36)
MCV RBC AUTO: 87 FL (ref 82–98)
MONOCYTES # BLD AUTO: 0.8 K/UL (ref 0.3–1)
MONOCYTES NFR BLD: 11.4 % (ref 4–15)
NEUTROPHILS # BLD AUTO: 3.5 K/UL (ref 1.8–7.7)
NEUTROPHILS NFR BLD: 48 % (ref 38–73)
NRBC BLD-RTO: 6 /100 WBC
PHOSPHATE SERPL-MCNC: 3.6 MG/DL (ref 2.7–4.5)
PLATELET # BLD AUTO: 336 K/UL (ref 150–350)
PMV BLD AUTO: 12.2 FL (ref 9.2–12.9)
POTASSIUM SERPL-SCNC: 5 MMOL/L (ref 3.5–5.1)
PROT SERPL-MCNC: 7 G/DL (ref 6–8.4)
RBC # BLD AUTO: 2.94 M/UL (ref 4–5.4)
RETICS/RBC NFR AUTO: >23 % (ref 0.5–2.5)
SODIUM SERPL-SCNC: 140 MMOL/L (ref 136–145)
WBC # BLD AUTO: 7.22 K/UL (ref 3.9–12.7)

## 2021-03-05 PROCEDURE — 99239 PR HOSPITAL DISCHARGE DAY,>30 MIN: ICD-10-PCS | Mod: ,,, | Performed by: HOSPITALIST

## 2021-03-05 PROCEDURE — 80053 COMPREHEN METABOLIC PANEL: CPT | Performed by: STUDENT IN AN ORGANIZED HEALTH CARE EDUCATION/TRAINING PROGRAM

## 2021-03-05 PROCEDURE — 85025 COMPLETE CBC W/AUTO DIFF WBC: CPT | Performed by: STUDENT IN AN ORGANIZED HEALTH CARE EDUCATION/TRAINING PROGRAM

## 2021-03-05 PROCEDURE — 36415 COLL VENOUS BLD VENIPUNCTURE: CPT | Performed by: STUDENT IN AN ORGANIZED HEALTH CARE EDUCATION/TRAINING PROGRAM

## 2021-03-05 PROCEDURE — S5010 5% DEXTROSE AND 0.45% SALINE: HCPCS | Performed by: STUDENT IN AN ORGANIZED HEALTH CARE EDUCATION/TRAINING PROGRAM

## 2021-03-05 PROCEDURE — 25000003 PHARM REV CODE 250: Performed by: STUDENT IN AN ORGANIZED HEALTH CARE EDUCATION/TRAINING PROGRAM

## 2021-03-05 PROCEDURE — 99239 HOSP IP/OBS DSCHRG MGMT >30: CPT | Mod: ,,, | Performed by: HOSPITALIST

## 2021-03-05 PROCEDURE — 85045 AUTOMATED RETICULOCYTE COUNT: CPT | Performed by: STUDENT IN AN ORGANIZED HEALTH CARE EDUCATION/TRAINING PROGRAM

## 2021-03-05 PROCEDURE — 84100 ASSAY OF PHOSPHORUS: CPT | Performed by: STUDENT IN AN ORGANIZED HEALTH CARE EDUCATION/TRAINING PROGRAM

## 2021-03-05 RX ORDER — LEVOFLOXACIN 750 MG/1
750 TABLET ORAL DAILY
Qty: 3 TABLET | Refills: 0 | Status: SHIPPED | OUTPATIENT
Start: 2021-03-05 | End: 2021-03-08

## 2021-03-05 RX ORDER — TRAMADOL HYDROCHLORIDE 50 MG/1
50 TABLET ORAL EVERY 6 HOURS PRN
Qty: 45 TABLET | Refills: 0 | Status: CANCELLED | OUTPATIENT
Start: 2021-03-05 | End: 2021-03-19

## 2021-03-05 RX ORDER — IBUPROFEN 200 MG
200 TABLET ORAL 3 TIMES DAILY
Qty: 9 TABLET | Refills: 0 | Status: SHIPPED | OUTPATIENT
Start: 2021-03-05 | End: 2021-03-08

## 2021-03-05 RX ORDER — OXYCODONE AND ACETAMINOPHEN 5; 325 MG/1; MG/1
1 TABLET ORAL EVERY 6 HOURS PRN
Qty: 28 TABLET | Refills: 0 | Status: CANCELLED | OUTPATIENT
Start: 2021-03-05 | End: 2021-03-12

## 2021-03-05 RX ORDER — OXYCODONE HYDROCHLORIDE 5 MG/1
5 TABLET ORAL EVERY 6 HOURS PRN
Status: DISCONTINUED | OUTPATIENT
Start: 2021-03-05 | End: 2021-03-05 | Stop reason: HOSPADM

## 2021-03-05 RX ORDER — OXYCODONE HYDROCHLORIDE 5 MG/1
5 TABLET ORAL EVERY 6 HOURS PRN
Status: DISCONTINUED | OUTPATIENT
Start: 2021-03-05 | End: 2021-03-05

## 2021-03-05 RX ORDER — OXYCODONE HYDROCHLORIDE 10 MG/1
10 TABLET ORAL EVERY 6 HOURS PRN
Status: DISCONTINUED | OUTPATIENT
Start: 2021-03-05 | End: 2021-03-05 | Stop reason: HOSPADM

## 2021-03-05 RX ORDER — LEVOFLOXACIN 750 MG/1
750 TABLET ORAL DAILY
Status: DISCONTINUED | OUTPATIENT
Start: 2021-03-05 | End: 2021-03-05 | Stop reason: HOSPADM

## 2021-03-05 RX ORDER — OXYCODONE HYDROCHLORIDE 10 MG/1
10 TABLET ORAL EVERY 6 HOURS PRN
Status: DISCONTINUED | OUTPATIENT
Start: 2021-03-05 | End: 2021-03-05

## 2021-03-05 RX ORDER — HYDROXYUREA 500 MG/1
500 CAPSULE ORAL DAILY
Status: CANCELLED | OUTPATIENT
Start: 2021-03-05

## 2021-03-05 RX ADMIN — LEVOFLOXACIN 750 MG: 750 TABLET, FILM COATED ORAL at 09:03

## 2021-03-05 RX ADMIN — IBUPROFEN 200 MG: 200 TABLET, FILM COATED ORAL at 09:03

## 2021-03-05 RX ADMIN — DOCUSATE SODIUM 50MG AND SENNOSIDES 8.6MG 1 TABLET: 8.6; 5 TABLET, FILM COATED ORAL at 09:03

## 2021-03-05 RX ADMIN — FOLIC ACID 1 MG: 1 TABLET ORAL at 09:03

## 2021-03-05 RX ADMIN — DEXTROSE MONOHYDRATE AND SODIUM CHLORIDE: 5; .45 INJECTION, SOLUTION INTRAVENOUS at 09:03

## 2021-03-15 ENCOUNTER — LAB VISIT (OUTPATIENT)
Dept: LAB | Facility: HOSPITAL | Age: 26
End: 2021-03-15
Attending: INTERNAL MEDICINE
Payer: COMMERCIAL

## 2021-03-15 DIAGNOSIS — D57.00 SICKLE CELL ANEMIA WITH PAIN: ICD-10-CM

## 2021-03-15 LAB
ALBUMIN SERPL BCP-MCNC: 4.3 G/DL (ref 3.5–5.2)
ALP SERPL-CCNC: 92 U/L (ref 55–135)
ALT SERPL W/O P-5'-P-CCNC: 29 U/L (ref 10–44)
ANION GAP SERPL CALC-SCNC: 9 MMOL/L (ref 8–16)
AST SERPL-CCNC: 24 U/L (ref 10–40)
BASOPHILS # BLD AUTO: 0.09 K/UL (ref 0–0.2)
BASOPHILS NFR BLD: 1.3 % (ref 0–1.9)
BILIRUB SERPL-MCNC: 2.8 MG/DL (ref 0.1–1)
BUN SERPL-MCNC: 6 MG/DL (ref 6–20)
CALCIUM SERPL-MCNC: 8.9 MG/DL (ref 8.7–10.5)
CHLORIDE SERPL-SCNC: 110 MMOL/L (ref 95–110)
CO2 SERPL-SCNC: 22 MMOL/L (ref 23–29)
CREAT SERPL-MCNC: 0.6 MG/DL (ref 0.5–1.4)
DIFFERENTIAL METHOD: ABNORMAL
EOSINOPHIL # BLD AUTO: 0.5 K/UL (ref 0–0.5)
EOSINOPHIL NFR BLD: 6.5 % (ref 0–8)
ERYTHROCYTE [DISTWIDTH] IN BLOOD BY AUTOMATED COUNT: 18.1 % (ref 11.5–14.5)
EST. GFR  (AFRICAN AMERICAN): >60 ML/MIN/1.73 M^2
EST. GFR  (NON AFRICAN AMERICAN): >60 ML/MIN/1.73 M^2
GLUCOSE SERPL-MCNC: 83 MG/DL (ref 70–110)
HCT VFR BLD AUTO: 24 % (ref 37–48.5)
HGB BLD-MCNC: 8.4 G/DL (ref 12–16)
IMM GRANULOCYTES # BLD AUTO: 0.02 K/UL (ref 0–0.04)
IMM GRANULOCYTES NFR BLD AUTO: 0.3 % (ref 0–0.5)
LYMPHOCYTES # BLD AUTO: 2.6 K/UL (ref 1–4.8)
LYMPHOCYTES NFR BLD: 38.4 % (ref 18–48)
MCH RBC QN AUTO: 31.1 PG (ref 27–31)
MCHC RBC AUTO-ENTMCNC: 35 G/DL (ref 32–36)
MCV RBC AUTO: 89 FL (ref 82–98)
MONOCYTES # BLD AUTO: 0.7 K/UL (ref 0.3–1)
MONOCYTES NFR BLD: 10.3 % (ref 4–15)
NEUTROPHILS # BLD AUTO: 3 K/UL (ref 1.8–7.7)
NEUTROPHILS NFR BLD: 43.2 % (ref 38–73)
NRBC BLD-RTO: 4 /100 WBC
PLATELET # BLD AUTO: 496 K/UL (ref 150–350)
PLATELET BLD QL SMEAR: ABNORMAL
PMV BLD AUTO: 10.8 FL (ref 9.2–12.9)
POTASSIUM SERPL-SCNC: 4.2 MMOL/L (ref 3.5–5.1)
PROT SERPL-MCNC: 7.9 G/DL (ref 6–8.4)
RBC # BLD AUTO: 2.7 M/UL (ref 4–5.4)
RETICS/RBC NFR AUTO: 19.6 % (ref 0.5–2.5)
SICKLE CELLS BLD QL SMEAR: ABNORMAL
SODIUM SERPL-SCNC: 141 MMOL/L (ref 136–145)
WBC # BLD AUTO: 6.88 K/UL (ref 3.9–12.7)

## 2021-03-15 PROCEDURE — 36415 COLL VENOUS BLD VENIPUNCTURE: CPT | Performed by: INTERNAL MEDICINE

## 2021-03-15 PROCEDURE — 85045 AUTOMATED RETICULOCYTE COUNT: CPT | Performed by: INTERNAL MEDICINE

## 2021-03-15 PROCEDURE — 85025 COMPLETE CBC W/AUTO DIFF WBC: CPT | Performed by: INTERNAL MEDICINE

## 2021-03-15 PROCEDURE — 82728 ASSAY OF FERRITIN: CPT | Performed by: INTERNAL MEDICINE

## 2021-03-15 PROCEDURE — 80053 COMPREHEN METABOLIC PANEL: CPT | Performed by: INTERNAL MEDICINE

## 2021-03-16 ENCOUNTER — OFFICE VISIT (OUTPATIENT)
Dept: HEMATOLOGY/ONCOLOGY | Facility: CLINIC | Age: 26
End: 2021-03-16
Payer: COMMERCIAL

## 2021-03-16 DIAGNOSIS — D57.00 SICKLE CELL ANEMIA WITH PAIN: Primary | ICD-10-CM

## 2021-03-16 DIAGNOSIS — D57.1 SICKLE CELL ANEMIA: ICD-10-CM

## 2021-03-16 LAB — FERRITIN SERPL-MCNC: 1281 NG/ML (ref 20–300)

## 2021-03-16 PROCEDURE — 99214 OFFICE O/P EST MOD 30 MIN: CPT | Mod: 95,,, | Performed by: INTERNAL MEDICINE

## 2021-03-16 PROCEDURE — 99214 PR OFFICE/OUTPT VISIT, EST, LEVL IV, 30-39 MIN: ICD-10-PCS | Mod: 95,,, | Performed by: INTERNAL MEDICINE

## 2021-03-16 RX ORDER — GLUTAMINE 5 G/1
10 POWDER, FOR SOLUTION ORAL 2 TIMES DAILY
Qty: 120 PACKET | Refills: 6 | Status: SHIPPED | OUTPATIENT
Start: 2021-03-16 | End: 2021-10-05

## 2021-03-23 ENCOUNTER — PATIENT MESSAGE (OUTPATIENT)
Dept: HEMATOLOGY/ONCOLOGY | Facility: CLINIC | Age: 26
End: 2021-03-23

## 2021-03-23 DIAGNOSIS — D57.00 SICKLE CELL ANEMIA WITH PAIN: ICD-10-CM

## 2021-03-23 RX ORDER — OXYCODONE HYDROCHLORIDE 5 MG/1
5 CAPSULE ORAL EVERY 4 HOURS PRN
Qty: 60 CAPSULE | Refills: 0 | Status: SHIPPED | OUTPATIENT
Start: 2021-03-23 | End: 2021-10-05

## 2021-03-23 RX ORDER — TRAMADOL HYDROCHLORIDE 50 MG/1
TABLET ORAL
Qty: 45 TABLET | Refills: 1 | Status: SHIPPED | OUTPATIENT
Start: 2021-03-23 | End: 2021-10-05

## 2021-04-06 ENCOUNTER — PATIENT MESSAGE (OUTPATIENT)
Dept: HEMATOLOGY/ONCOLOGY | Facility: CLINIC | Age: 26
End: 2021-04-06

## 2021-04-29 ENCOUNTER — PATIENT MESSAGE (OUTPATIENT)
Dept: RESEARCH | Facility: HOSPITAL | Age: 26
End: 2021-04-29

## 2021-10-01 ENCOUNTER — TELEPHONE (OUTPATIENT)
Dept: HEMATOLOGY/ONCOLOGY | Facility: CLINIC | Age: 26
End: 2021-10-01

## 2021-10-05 ENCOUNTER — OFFICE VISIT (OUTPATIENT)
Dept: INTERNAL MEDICINE | Facility: CLINIC | Age: 26
End: 2021-10-05
Payer: COMMERCIAL

## 2021-10-05 VITALS
OXYGEN SATURATION: 98 % | BODY MASS INDEX: 20.61 KG/M2 | HEIGHT: 62 IN | SYSTOLIC BLOOD PRESSURE: 106 MMHG | TEMPERATURE: 99 F | WEIGHT: 112 LBS | DIASTOLIC BLOOD PRESSURE: 60 MMHG | HEART RATE: 81 BPM

## 2021-10-05 DIAGNOSIS — Z09 FOLLOW UP: Primary | ICD-10-CM

## 2021-10-05 DIAGNOSIS — J18.9 PNEUMONIA OF BOTH LUNGS DUE TO INFECTIOUS ORGANISM, UNSPECIFIED PART OF LUNG: ICD-10-CM

## 2021-10-05 PROCEDURE — 3074F SYST BP LT 130 MM HG: CPT | Mod: CPTII,S$GLB,, | Performed by: NURSE PRACTITIONER

## 2021-10-05 PROCEDURE — 1160F RVW MEDS BY RX/DR IN RCRD: CPT | Mod: CPTII,S$GLB,, | Performed by: NURSE PRACTITIONER

## 2021-10-05 PROCEDURE — 3078F PR MOST RECENT DIASTOLIC BLOOD PRESSURE < 80 MM HG: ICD-10-PCS | Mod: CPTII,S$GLB,, | Performed by: NURSE PRACTITIONER

## 2021-10-05 PROCEDURE — 3074F PR MOST RECENT SYSTOLIC BLOOD PRESSURE < 130 MM HG: ICD-10-PCS | Mod: CPTII,S$GLB,, | Performed by: NURSE PRACTITIONER

## 2021-10-05 PROCEDURE — 3008F PR BODY MASS INDEX (BMI) DOCUMENTED: ICD-10-PCS | Mod: CPTII,S$GLB,, | Performed by: NURSE PRACTITIONER

## 2021-10-05 PROCEDURE — 1159F MED LIST DOCD IN RCRD: CPT | Mod: CPTII,S$GLB,, | Performed by: NURSE PRACTITIONER

## 2021-10-05 PROCEDURE — 99213 OFFICE O/P EST LOW 20 MIN: CPT | Mod: S$GLB,,, | Performed by: NURSE PRACTITIONER

## 2021-10-05 PROCEDURE — 99213 PR OFFICE/OUTPT VISIT, EST, LEVL III, 20-29 MIN: ICD-10-PCS | Mod: S$GLB,,, | Performed by: NURSE PRACTITIONER

## 2021-10-05 PROCEDURE — 1160F PR REVIEW ALL MEDS BY PRESCRIBER/CLIN PHARMACIST DOCUMENTED: ICD-10-PCS | Mod: CPTII,S$GLB,, | Performed by: NURSE PRACTITIONER

## 2021-10-05 PROCEDURE — 99999 PR PBB SHADOW E&M-EST. PATIENT-LVL IV: ICD-10-PCS | Mod: PBBFAC,,, | Performed by: NURSE PRACTITIONER

## 2021-10-05 PROCEDURE — 99999 PR PBB SHADOW E&M-EST. PATIENT-LVL IV: CPT | Mod: PBBFAC,,, | Performed by: NURSE PRACTITIONER

## 2021-10-05 PROCEDURE — 1159F PR MEDICATION LIST DOCUMENTED IN MEDICAL RECORD: ICD-10-PCS | Mod: CPTII,S$GLB,, | Performed by: NURSE PRACTITIONER

## 2021-10-05 PROCEDURE — 3008F BODY MASS INDEX DOCD: CPT | Mod: CPTII,S$GLB,, | Performed by: NURSE PRACTITIONER

## 2021-10-05 PROCEDURE — 3078F DIAST BP <80 MM HG: CPT | Mod: CPTII,S$GLB,, | Performed by: NURSE PRACTITIONER

## 2021-10-05 RX ORDER — AMOXICILLIN AND CLAVULANATE POTASSIUM 875; 125 MG/1; MG/1
1 TABLET, FILM COATED ORAL 2 TIMES DAILY
COMMUNITY
Start: 2021-10-01 | End: 2022-04-02

## 2021-11-22 ENCOUNTER — PATIENT MESSAGE (OUTPATIENT)
Dept: HEMATOLOGY/ONCOLOGY | Facility: CLINIC | Age: 26
End: 2021-11-22
Payer: COMMERCIAL

## 2022-03-21 ENCOUNTER — TELEPHONE (OUTPATIENT)
Dept: HEMATOLOGY/ONCOLOGY | Facility: CLINIC | Age: 27
End: 2022-03-21
Payer: COMMERCIAL

## 2022-03-21 NOTE — TELEPHONE ENCOUNTER
"----- Message from Kary Alfredo sent at 3/21/2022 10:03 AM CDT -----         Consult/Advisory:      Name Of Caller:Britt Figueroa with [employer]   Contact Preference?:[phone]      Provider Name:   Does patient feel the need to be seen today?      What is the nature of the call?:She needs something to provide to [employer] that patient has sickle cell       Additional Notes:  "Thank you for all that you do for our patients'"        22                   "

## 2022-03-21 NOTE — TELEPHONE ENCOUNTER
Spoke with Britt Figueroa in regards to the sickle cell association information. She requests a clinic note and lab proving pt has sickle cell to place in file per pt request so pt can receive the help that they offer with their association    [email or web address]

## 2022-03-24 ENCOUNTER — TELEPHONE (OUTPATIENT)
Dept: INTERNAL MEDICINE | Facility: CLINIC | Age: 27
End: 2022-03-24
Payer: COMMERCIAL

## 2022-03-24 ENCOUNTER — TELEPHONE (OUTPATIENT)
Dept: HEMATOLOGY/ONCOLOGY | Facility: CLINIC | Age: 27
End: 2022-03-24
Payer: COMMERCIAL

## 2022-03-24 NOTE — TELEPHONE ENCOUNTER
"----- Message from Shannon Toussaint sent at 3/24/2022  1:25 PM CDT -----  Regarding: difficulty breathing  Contact: Elizabeth  Returned patient call scheduled labs per last cc    Patient stated she is experiencing difficulty breathing for the last 3months, requesting a nurse give her a return call.      Napoleon Borges  ----- Message -----  From: Tameka Puckett  Sent: 3/24/2022   1:10 PM CDT  To: [facility]  Pool  Subject: Speak with Eladio                               Consult/Advisory:           Name Of Caller: Elizabeth    Contact Preference?:    [phone]           Provider Name:   Does patient feel the need to be seen today? No           What is the nature of the call?:     Pt is returning call to Eladio BENITES In regards to appt scheduled on 4/14/22.          Additional Notes:  "Thank you for all that you do for our patients'"       "

## 2022-03-24 NOTE — TELEPHONE ENCOUNTER
I spoke to the pt and she has already been scheduled for labs and her eye appt  . She says she's waiting to here back from her PCP . //adalid

## 2022-03-24 NOTE — TELEPHONE ENCOUNTER
"----- Message from Tameka Puckett sent at 3/24/2022  1:08 PM CDT -----  Regarding: Speak with Eladio  Contact: Elizabeth  Consult/Advisory:           Name Of Caller: Elizabeth    Contact Preference?:    [phone]           Provider Name:   Does patient feel the need to be seen today? No           What is the nature of the call?:     Pt is returning call to Eladio BENITES In regards to appt scheduled on 4/14/22.          Additional Notes:  "Thank you for all that you do for our patients'"     "

## 2022-04-02 ENCOUNTER — HOSPITAL ENCOUNTER (INPATIENT)
Facility: HOSPITAL | Age: 27
LOS: 6 days | Discharge: HOME | DRG: 812 | End: 2022-04-08
Attending: EMERGENCY MEDICINE | Admitting: FAMILY MEDICINE
Payer: COMMERCIAL

## 2022-04-02 DIAGNOSIS — D57.00 SICKLE CELL PAIN CRISIS: Primary | ICD-10-CM

## 2022-04-02 DIAGNOSIS — D57.00 SICKLE CELL ANEMIA WITH PAIN: ICD-10-CM

## 2022-04-02 PROBLEM — D72.829 LEUKOCYTOSIS: Status: ACTIVE | Noted: 2022-04-02

## 2022-04-02 PROBLEM — K21.9 GERD (GASTROESOPHAGEAL REFLUX DISEASE): Status: ACTIVE | Noted: 2022-04-02

## 2022-04-02 LAB
ALBUMIN SERPL BCP-MCNC: 4.8 G/DL (ref 3.5–5.2)
ALP SERPL-CCNC: 103 U/L (ref 55–135)
ALT SERPL W/O P-5'-P-CCNC: 35 U/L (ref 10–44)
ANION GAP SERPL CALC-SCNC: 14 MMOL/L (ref 8–16)
ANISOCYTOSIS BLD QL SMEAR: SLIGHT
AST SERPL-CCNC: 71 U/L (ref 10–40)
B-HCG UR QL: NEGATIVE
BASOPHILS # BLD AUTO: 0.09 K/UL (ref 0–0.2)
BASOPHILS NFR BLD: 0.5 % (ref 0–1.9)
BILIRUB SERPL-MCNC: 4.9 MG/DL (ref 0.1–1)
BILIRUB UR QL STRIP: NEGATIVE
BUN SERPL-MCNC: 7 MG/DL (ref 6–20)
CALCIUM SERPL-MCNC: 9.4 MG/DL (ref 8.7–10.5)
CHLORIDE SERPL-SCNC: 105 MMOL/L (ref 95–110)
CLARITY UR: CLEAR
CO2 SERPL-SCNC: 18 MMOL/L (ref 23–29)
COLOR UR: YELLOW
CREAT SERPL-MCNC: 0.6 MG/DL (ref 0.5–1.4)
CTP QC/QA: YES
DIFFERENTIAL METHOD: ABNORMAL
EOSINOPHIL # BLD AUTO: 0 K/UL (ref 0–0.5)
EOSINOPHIL NFR BLD: 0.2 % (ref 0–8)
ERYTHROCYTE [DISTWIDTH] IN BLOOD BY AUTOMATED COUNT: 20.7 % (ref 11.5–14.5)
EST. GFR  (AFRICAN AMERICAN): >60 ML/MIN/1.73 M^2
EST. GFR  (NON AFRICAN AMERICAN): >60 ML/MIN/1.73 M^2
GLUCOSE SERPL-MCNC: 119 MG/DL (ref 70–110)
GLUCOSE UR QL STRIP: NEGATIVE
HCT VFR BLD AUTO: 23.4 % (ref 37–48.5)
HGB BLD-MCNC: 8.3 G/DL (ref 12–16)
HGB UR QL STRIP: NEGATIVE
HYPOCHROMIA BLD QL SMEAR: ABNORMAL
IMM GRANULOCYTES # BLD AUTO: 0.28 K/UL (ref 0–0.04)
IMM GRANULOCYTES NFR BLD AUTO: 1.6 % (ref 0–0.5)
KETONES UR QL STRIP: NEGATIVE
LDH SERPL L TO P-CCNC: 467 U/L (ref 110–260)
LEUKOCYTE ESTERASE UR QL STRIP: NEGATIVE
LYMPHOCYTES # BLD AUTO: 1.8 K/UL (ref 1–4.8)
LYMPHOCYTES NFR BLD: 10 % (ref 18–48)
MCH RBC QN AUTO: 31.4 PG (ref 27–31)
MCHC RBC AUTO-ENTMCNC: 35.5 G/DL (ref 32–36)
MCV RBC AUTO: 89 FL (ref 82–98)
MONOCYTES # BLD AUTO: 1.3 K/UL (ref 0.3–1)
MONOCYTES NFR BLD: 7.3 % (ref 4–15)
NEUTROPHILS # BLD AUTO: 14.2 K/UL (ref 1.8–7.7)
NEUTROPHILS NFR BLD: 80.4 % (ref 38–73)
NITRITE UR QL STRIP: NEGATIVE
NRBC BLD-RTO: 4 /100 WBC
OVALOCYTES BLD QL SMEAR: ABNORMAL
PH UR STRIP: 7 [PH] (ref 5–8)
PLATELET # BLD AUTO: 326 K/UL (ref 150–450)
PLATELET BLD QL SMEAR: ABNORMAL
PMV BLD AUTO: 11.9 FL (ref 9.2–12.9)
POIKILOCYTOSIS BLD QL SMEAR: ABNORMAL
POLYCHROMASIA BLD QL SMEAR: ABNORMAL
POTASSIUM SERPL-SCNC: 4.3 MMOL/L (ref 3.5–5.1)
PROT SERPL-MCNC: 9.1 G/DL (ref 6–8.4)
PROT UR QL STRIP: NEGATIVE
RBC # BLD AUTO: 2.64 M/UL (ref 4–5.4)
RETICS/RBC NFR AUTO: 22.8 % (ref 0.5–2.5)
SARS-COV-2 RDRP RESP QL NAA+PROBE: NEGATIVE
SICKLE CELLS BLD QL SMEAR: ABNORMAL
SODIUM SERPL-SCNC: 137 MMOL/L (ref 136–145)
SP GR UR STRIP: 1.01 (ref 1–1.03)
TARGETS BLD QL SMEAR: ABNORMAL
URN SPEC COLLECT METH UR: NORMAL
UROBILINOGEN UR STRIP-ACNC: NEGATIVE EU/DL
WBC # BLD AUTO: 17.64 K/UL (ref 3.9–12.7)

## 2022-04-02 PROCEDURE — 63600175 PHARM REV CODE 636 W HCPCS: Performed by: EMERGENCY MEDICINE

## 2022-04-02 PROCEDURE — 81025 URINE PREGNANCY TEST: CPT | Performed by: EMERGENCY MEDICINE

## 2022-04-02 PROCEDURE — 21400001 HC TELEMETRY ROOM

## 2022-04-02 PROCEDURE — 81003 URINALYSIS AUTO W/O SCOPE: CPT | Performed by: EMERGENCY MEDICINE

## 2022-04-02 PROCEDURE — 83615 LACTATE (LD) (LDH) ENZYME: CPT | Performed by: EMERGENCY MEDICINE

## 2022-04-02 PROCEDURE — 96374 THER/PROPH/DIAG INJ IV PUSH: CPT

## 2022-04-02 PROCEDURE — 85045 AUTOMATED RETICULOCYTE COUNT: CPT | Performed by: EMERGENCY MEDICINE

## 2022-04-02 PROCEDURE — 80053 COMPREHEN METABOLIC PANEL: CPT | Performed by: EMERGENCY MEDICINE

## 2022-04-02 PROCEDURE — 96361 HYDRATE IV INFUSION ADD-ON: CPT

## 2022-04-02 PROCEDURE — 63600175 PHARM REV CODE 636 W HCPCS: Performed by: INTERNAL MEDICINE

## 2022-04-02 PROCEDURE — S5010 5% DEXTROSE AND 0.45% SALINE: HCPCS | Performed by: INTERNAL MEDICINE

## 2022-04-02 PROCEDURE — 99285 EMERGENCY DEPT VISIT HI MDM: CPT | Mod: 25

## 2022-04-02 PROCEDURE — U0002 COVID-19 LAB TEST NON-CDC: HCPCS | Performed by: EMERGENCY MEDICINE

## 2022-04-02 PROCEDURE — 99900035 HC TECH TIME PER 15 MIN (STAT)

## 2022-04-02 PROCEDURE — 25000003 PHARM REV CODE 250: Performed by: INTERNAL MEDICINE

## 2022-04-02 PROCEDURE — 85025 COMPLETE CBC W/AUTO DIFF WBC: CPT | Performed by: EMERGENCY MEDICINE

## 2022-04-02 PROCEDURE — 25000003 PHARM REV CODE 250: Performed by: EMERGENCY MEDICINE

## 2022-04-02 PROCEDURE — 96376 TX/PRO/DX INJ SAME DRUG ADON: CPT

## 2022-04-02 RX ORDER — ENOXAPARIN SODIUM 100 MG/ML
40 INJECTION SUBCUTANEOUS EVERY 24 HOURS
Status: DISCONTINUED | OUTPATIENT
Start: 2022-04-02 | End: 2022-04-08 | Stop reason: HOSPADM

## 2022-04-02 RX ORDER — TRAMADOL HYDROCHLORIDE 50 MG/1
50 TABLET ORAL
Status: COMPLETED | OUTPATIENT
Start: 2022-04-02 | End: 2022-04-02

## 2022-04-02 RX ORDER — DIPHENHYDRAMINE HCL 25 MG
25 CAPSULE ORAL EVERY 4 HOURS PRN
Status: DISCONTINUED | OUTPATIENT
Start: 2022-04-02 | End: 2022-04-08 | Stop reason: HOSPADM

## 2022-04-02 RX ORDER — ACETAMINOPHEN 325 MG/1
650 TABLET ORAL EVERY 6 HOURS PRN
Status: DISCONTINUED | OUTPATIENT
Start: 2022-04-02 | End: 2022-04-08 | Stop reason: HOSPADM

## 2022-04-02 RX ORDER — SODIUM CHLORIDE 0.9 % (FLUSH) 0.9 %
10 SYRINGE (ML) INJECTION
Status: DISCONTINUED | OUTPATIENT
Start: 2022-04-02 | End: 2022-04-08 | Stop reason: HOSPADM

## 2022-04-02 RX ORDER — MORPHINE SULFATE 4 MG/ML
6 INJECTION, SOLUTION INTRAMUSCULAR; INTRAVENOUS
Status: COMPLETED | OUTPATIENT
Start: 2022-04-02 | End: 2022-04-02

## 2022-04-02 RX ORDER — KETOROLAC TROMETHAMINE 30 MG/ML
15 INJECTION, SOLUTION INTRAMUSCULAR; INTRAVENOUS EVERY 12 HOURS PRN
Status: DISCONTINUED | OUTPATIENT
Start: 2022-04-02 | End: 2022-04-03

## 2022-04-02 RX ORDER — DEXTROSE MONOHYDRATE AND SODIUM CHLORIDE 5; .45 G/100ML; G/100ML
INJECTION, SOLUTION INTRAVENOUS CONTINUOUS
Status: DISCONTINUED | OUTPATIENT
Start: 2022-04-02 | End: 2022-04-08 | Stop reason: HOSPADM

## 2022-04-02 RX ORDER — FOLIC ACID 1 MG/1
1 TABLET ORAL DAILY
Status: DISCONTINUED | OUTPATIENT
Start: 2022-04-02 | End: 2022-04-08 | Stop reason: HOSPADM

## 2022-04-02 RX ORDER — NALOXONE HCL 0.4 MG/ML
0.02 VIAL (ML) INJECTION
Status: DISCONTINUED | OUTPATIENT
Start: 2022-04-02 | End: 2022-04-04

## 2022-04-02 RX ORDER — ONDANSETRON 2 MG/ML
4 INJECTION INTRAMUSCULAR; INTRAVENOUS EVERY 6 HOURS PRN
Status: DISCONTINUED | OUTPATIENT
Start: 2022-04-02 | End: 2022-04-08 | Stop reason: HOSPADM

## 2022-04-02 RX ORDER — NALOXONE HCL 0.4 MG/ML
0.02 VIAL (ML) INJECTION
Status: DISCONTINUED | OUTPATIENT
Start: 2022-04-02 | End: 2022-04-04 | Stop reason: SDUPTHER

## 2022-04-02 RX ORDER — MORPHINE SULFATE 10 MG/ML
10 INJECTION INTRAMUSCULAR; INTRAVENOUS; SUBCUTANEOUS
Status: COMPLETED | OUTPATIENT
Start: 2022-04-02 | End: 2022-04-02

## 2022-04-02 RX ORDER — HYDROMORPHONE HCL IN 0.9% NACL 6 MG/30 ML
PATIENT CONTROLLED ANALGESIA SYRINGE INTRAVENOUS CONTINUOUS
Status: DISCONTINUED | OUTPATIENT
Start: 2022-04-02 | End: 2022-04-03

## 2022-04-02 RX ADMIN — ACETAMINOPHEN 650 MG: 325 TABLET ORAL at 05:04

## 2022-04-02 RX ADMIN — FOLIC ACID 1 MG: 1 TABLET ORAL at 01:04

## 2022-04-02 RX ADMIN — Medication: at 02:04

## 2022-04-02 RX ADMIN — SODIUM CHLORIDE 1000 ML: 0.9 INJECTION, SOLUTION INTRAVENOUS at 08:04

## 2022-04-02 RX ADMIN — TRAMADOL HYDROCHLORIDE 50 MG: 50 TABLET ORAL at 10:04

## 2022-04-02 RX ADMIN — KETOROLAC TROMETHAMINE 15 MG: 30 INJECTION, SOLUTION INTRAMUSCULAR at 08:04

## 2022-04-02 RX ADMIN — DEXTROSE MONOHYDRATE AND SODIUM CHLORIDE: 5; .45 INJECTION, SOLUTION INTRAVENOUS at 02:04

## 2022-04-02 RX ADMIN — MORPHINE SULFATE 6 MG: 4 INJECTION INTRAVENOUS at 12:04

## 2022-04-02 RX ADMIN — ENOXAPARIN SODIUM 40 MG: 100 INJECTION SUBCUTANEOUS at 05:04

## 2022-04-02 RX ADMIN — MORPHINE SULFATE 10 MG: 10 INJECTION, SOLUTION INTRAMUSCULAR; INTRAVENOUS at 08:04

## 2022-04-02 NOTE — ASSESSMENT & PLAN NOTE
Cont PCA pump  Cont IVF D5NS  F/U daily cmp and cbc  F/U q48 hrs LDH and Reticular count   CXR and UA did not show any acute finding

## 2022-04-02 NOTE — PHARMACY MED REC
"Admission Medication History     The home medication history was taken by Aime Figueroa.    You may go to "Admission" then "Reconcile Home Medications" tabs to review and/or act upon these items.      The home medication list has been updated by the Pharmacy department.    Please read ALL comments highlighted in yellow.    Please address this information as you see fit.     Feel free to contact us if you have any questions or require assistance.      Medications listed below were obtained from: Patient/family  (Not in a hospital admission)      Aime Figueroa  [id number]    Current Outpatient Medications on File Prior to Encounter   Medication Sig Dispense Refill Last Dose    [DISCONTINUED] albuterol (PROVENTIL/VENTOLIN HFA) 90 mcg/actuation inhaler Inhale 2 puffs into the lungs every 4 (four) hours as needed for Wheezing. Rescue 18 g 0     [DISCONTINUED] amoxicillin-clavulanate 875-125mg (AUGMENTIN) 875-125 mg per tablet Take 1 tablet by mouth 2 (two) times daily.                                .          "

## 2022-04-02 NOTE — H&P
"[facility] - Emergency Dept.  [facility]  History & Physical    Patient Name: Elizabeth Franco  MRN: [id number]  Patient Class: IP- Inpatient  Admission Date: 4/2/2022  Attending Physician: Conrad Anthony MD  Primary Care Provider: Elvira Nuñez MD         Patient information was obtained from patient and ER records.     Subjective:     Principal Problem:generalized pain   Chief Complaint:   Chief Complaint   Patient presents with    Sickle Cell Pain Crisis     Sickle cell crisis. Pain to legs mostly. Has been a year since last crisis. 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 Presents to the ED with C/O of  generalized pain over the last days which has gotten worse.  Is associated with  chest pain and joint paint . She denies any SOB , palpitation , fever , chills , GI or  sx .    Her sickle cell has been well-controlled for most of her life. Per chart review, she followed with Dr. Elvira Nuñez hematologist [place] [facility] [place] has not followed up recently  . 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 She takes oxycodone 5mg and tramadol 50mg at home when she has pain, but does not use them regularly.  ER Course:WBC 17 K , H/H 8.3/23 , Ret 23 ,  . CXR and did not show any  acute finding     Pt will be admitted with a Dx of sickle cell crisis   Code status : Full code   SDM:  Shar Franco Father   [phone]           Past Medical History:   Diagnosis Date    Hidradenitis suppurativa     Nocturnal enuresis     Sickle cell anemia        No past surgical history on file.    Review of patient's allergies indicates:  No Known Allergies    No current facility-administered medications on file prior to encounter. "     Current Outpatient Medications on File Prior to Encounter   Medication Sig    [DISCONTINUED] albuterol (PROVENTIL/VENTOLIN HFA) 90 mcg/actuation inhaler Inhale 2 puffs into the lungs every 4 (four) hours as needed for Wheezing. Rescue    [DISCONTINUED] amoxicillin-clavulanate 875-125mg (AUGMENTIN) 875-125 mg per tablet Take 1 tablet by mouth 2 (two) times daily.     Family History       Problem Relation (Age of Onset)    No Known Problems Mother, Father          Tobacco Use    Smoking status: Never Smoker    Smokeless tobacco: Never Used   Substance and Sexual Activity    Alcohol use: No     Alcohol/week: 0.0 standard drinks    Drug use: Never    Sexual activity: Yes     Review of Systems   Constitutional:  Positive for activity change and fatigue.   HENT: Negative.     Eyes: Negative.    Respiratory: Negative.     Cardiovascular:  Positive for chest pain.   Gastrointestinal: Negative.    Endocrine: Negative.    Musculoskeletal:  Positive for arthralgias and myalgias.   Allergic/Immunologic: Negative.    Neurological:  Positive for weakness.   Hematological: Negative.    Psychiatric/Behavioral: Negative.     Objective:     Vital Signs (Most Recent):  Temp: 98.9 °F (37.2 °C) (04/02/22 0740)  Pulse: 83 (04/02/22 1503)  Resp: 17 (04/02/22 1503)  BP: (!) 121/56 (04/02/22 1503)  SpO2: 100 % (04/02/22 1503)   Vital Signs (24h Range):  Temp:  [98.9 °F (37.2 °C)] 98.9 °F (37.2 °C)  Pulse:  [] 83  Resp:  [17-26] 17  SpO2:  [95 %-100 %] 100 %  BP: (110-131)/(56-67) 121/56     Weight: 49.9 kg (110 lb)  Body mass index is 20.12 kg/m².    Physical Exam  Vitals reviewed.   Constitutional:       General: She is not in acute distress.     Appearance: Normal appearance. She is ill-appearing.   HENT:      Head: Normocephalic and atraumatic.      Nose: Nose normal.      Mouth/Throat:      Mouth: Mucous membranes are moist.      Pharynx: Oropharynx is clear.   Eyes:      Extraocular Movements: Extraocular movements  intact.      Conjunctiva/sclera: Conjunctivae normal.      Pupils: Pupils are equal, round, and reactive to light.   Cardiovascular:      Rate and Rhythm: Normal rate and regular rhythm.      Pulses: Normal pulses.      Heart sounds: Normal heart sounds.   Pulmonary:      Effort: Pulmonary effort is normal. No respiratory distress.      Breath sounds: Normal breath sounds. No stridor. No wheezing or rhonchi.   Abdominal:      General: Abdomen is flat. Bowel sounds are normal. There is no distension.      Palpations: Abdomen is soft. There is no mass.      Tenderness: There is no abdominal tenderness.      Hernia: No hernia is present.   Musculoskeletal:         General: No swelling, tenderness, deformity or signs of injury. Normal range of motion.      Cervical back: Normal range of motion and neck supple.   Skin:     General: Skin is warm.      Coloration: Skin is not pale.      Findings: No bruising or erythema.   Neurological:      General: No focal deficit present.      Mental Status: She is alert and oriented to person, place, and time. Mental status is at baseline.      Cranial Nerves: No cranial nerve deficit.      Sensory: No sensory deficit.      Motor: No weakness.      Coordination: Coordination normal.         CRANIAL NERVES     CN III, IV, VI   Pupils are equal, round, and reactive to light.     Significant Labs: All pertinent labs within the past 24 hours have been reviewed.      Significant Imaging: I have reviewed all pertinent imaging results/findings within the past 24 hours.      Assessment/Plan:     Leukocytosis  No sign of acute infection  Most likely reactive   CR and UA did not show any acute finding   Monitor       GERD (gastroesophageal reflux disease)  Cont PPI      Elevated bilirubin  Monitor   Most Likely  due to SS exacerbation       Sickle cell anemia  Monitor H/H  Transfuse if < than 7       Sickle cell anemia with pain  Cont PCA pump  Cont IVF D5NS  F/U daily cmp and cbc  F/U q48 hrs  LDH and Reticular count   CXR and UA did not show any acute finding           VTE Risk Mitigation (From admission, onward)         Ordered     enoxaparin injection 40 mg  Daily         04/02/22 1313     Place RISSA hose  Until discontinued         04/02/22 1313     IP VTE HIGH RISK PATIENT  Once         04/02/22 1313                   Conrad Anthony MD  Department of Hospital Medicine   [facility] - Emergency Dept.

## 2022-04-02 NOTE — ED PROVIDER NOTES
`SCRIBE #1 NOTE: I, Yousef Sridevi, am scribing for, and in the presence of, Beck Craft MD. I have scribed the entire note.       History     Chief Complaint   Patient presents with    Sickle Cell Pain Crisis     Sickle cell crisis. Pain to legs mostly. Has been a year since last crisis. Compliant with meds      Review of patient's allergies indicates:  No Known Allergies      History of Present Illness     HPI    4/2/2022, 8:14 AM  History obtained from the patient      History of Present Illness: Elizabeth Franco is a 26 y.o. female patient with a PMHx of sickle cell anemia who presents to the Emergency Department for evaluation of sickle cell pain crisis which onset gradually pta. Pt states pain is generalized to legs. Pt reports last crisis being a year ago. Pt states she ran out of pain medication at home,. Symptoms are constant and moderate in severity. No mitigating or exacerbating factors reported. No associated sxs reported. Patient denies any dysuria, hematuria, N/V, fever, SOB, and all other sxs at this time. No prior Tx reported. No further complaints or concerns at this time.       Arrival mode: Personal vehicle    PCP: Elvira Nuñez MD        Past Medical History:  Past Medical History:   Diagnosis Date    Hidradenitis suppurativa     Nocturnal enuresis     Sickle cell anemia        Past Surgical History:  History reviewed. No pertinent surgical history.      Family History:  Family History   Problem Relation Age of Onset    No Known Problems Mother     No Known Problems Father        Social History:  Social History     Tobacco Use    Smoking status: Never Smoker    Smokeless tobacco: Never Used   Substance and Sexual Activity    Alcohol use: No     Alcohol/week: 0.0 standard drinks    Drug use: Never    Sexual activity: Not Currently        Review of Systems     Review of Systems   Constitutional: Negative for fever.   HENT: Negative for sore throat.    Respiratory: Negative for shortness of  breath.    Cardiovascular: Negative for chest pain.   Gastrointestinal: Negative for nausea and vomiting.   Genitourinary: Negative for dysuria and hematuria.   Musculoskeletal: Negative for back pain.        (+) Leg pain   Skin: Negative for rash.   Neurological: Negative for weakness.   Hematological: Does not bruise/bleed easily.   All other systems reviewed and are negative.       Physical Exam     Initial Vitals [04/02/22 0740]   BP Pulse Resp Temp SpO2   131/63 (!) 113 18 98.9 °F (37.2 °C) 95 %      MAP       --          Physical Exam  Nursing Notes and Vital Signs Reviewed.  Constitutional: Patient is in severe distress. Well-developed and well-nourished.  Head: Atraumatic. Normocephalic.  Eyes: PERRL. EOM intact. Conjunctivae are not pale. No scleral icterus.  ENT: Mucous membranes are moist. Oropharynx is clear and symmetric.    Neck: Supple. Full ROM. No lymphadenopathy.  Cardiovascular: Regular rate. Regular rhythm. No murmurs, rubs, or gallops. Distal pulses are 2+ and symmetric.  Pulmonary/Chest: No respiratory distress. Clear to auscultation bilaterally. No wheezing or rales.  Abdominal: Soft and non-distended.  There is no tenderness.  No rebound, guarding, or rigidity. Good bowel sounds.  Genitourinary: No CVA tenderness  Musculoskeletal: Moves all extremities. No obvious deformities. No edema. No calf tenderness.  Skin: Warm and dry.  Neurological:  Alert, awake, and appropriate.  Normal speech.  No acute focal neurological deficits are appreciated.  Psychiatric: Normal affect. Good eye contact. Appropriate in content.     ED Course   Procedures  ED Vital Signs:  Vitals:    04/04/22 1932 04/04/22 1934 04/04/22 1948 04/04/22 2300   BP: 138/73      Pulse: 100 100     Resp: 18 18     Temp: (!) 102.4 °F (39.1 °C)  (!) 101.4 °F (38.6 °C) 99.6 °F (37.6 °C)   TempSrc: Oral Oral Oral Oral   SpO2: 100% 99%     Weight:       Height:        04/05/22 0017 04/05/22 0353 04/05/22 0500 04/05/22 0728   BP: 110/67  (!) 116/58  (!) 97/47   Pulse: 87 82  85   Resp: 16 16  17   Temp: 99.5 °F (37.5 °C) (!) 100.4 °F (38 °C) 98.3 °F (36.8 °C) 98.5 °F (36.9 °C)   TempSrc: Oral Oral Oral Axillary   SpO2: 99% 98%  99%   Weight: 68.9 kg (151 lb 14.4 oz)      Height:        04/05/22 0729 04/05/22 1627 04/05/22 1911 04/05/22 2009   BP: (!) 97/49 (!) 102/56 118/60    Pulse: 88 98 100 98   Resp:  18 18 20   Temp:  98.9 °F (37.2 °C) 100.1 °F (37.8 °C)    TempSrc:   Oral    SpO2: 99% 96% 97% 96%   Weight:       Height:        04/05/22 2041 04/06/22 0020 04/06/22 0411   BP:  (!) 104/55 116/64   Pulse:  79 82   Resp: 16 18 18   Temp:  98.6 °F (37 °C) 99 °F (37.2 °C)   TempSrc:  Oral Oral   SpO2:  97% 100%   Weight:  60.5 kg (133 lb 6.1 oz)    Height:          Abnormal Lab Results:  Labs Reviewed   CBC W/ AUTO DIFFERENTIAL - Abnormal; Notable for the following components:       Result Value    WBC 17.64 (*)     RBC 2.64 (*)     Hemoglobin 8.3 (*)     Hematocrit 23.4 (*)     MCH 31.4 (*)     RDW 20.7 (*)     Immature Granulocytes 1.6 (*)     Gran # (ANC) 14.2 (*)     Immature Grans (Abs) 0.28 (*)     Mono # 1.3 (*)     nRBC 4 (*)     Gran % 80.4 (*)     Lymph % 10.0 (*)     Sickle Cells Occasional (*)     All other components within normal limits   COMPREHENSIVE METABOLIC PANEL - Abnormal; Notable for the following components:    CO2 18 (*)     Glucose 119 (*)     Total Protein 9.1 (*)     Total Bilirubin 4.9 (*)     AST 71 (*)     All other components within normal limits   LACTATE DEHYDROGENASE - Abnormal; Notable for the following components:     (*)     All other components within normal limits   RETICULOCYTES - Abnormal; Notable for the following components:    Retic 22.8 (*)     All other components within normal limits   URINALYSIS, REFLEX TO URINE CULTURE    Narrative:     Specimen Source->Urine   PREGNANCY TEST, URINE RAPID    Narrative:     Specimen Source->Urine   SARS-COV-2 RDRP GENE    Narrative:     This test utilizes  isothermal nucleic acid amplification   technology to detect the SARS-CoV-2 RdRp nucleic acid segment.   The analytical sensitivity (limit of detection) is 125 genome   equivalents/mL.   A POSITIVE result implies infection with the SARS-CoV-2 virus;   the patient is presumed to be contagious.     A NEGATIVE result means that SARS-CoV-2 nucleic acids are not   present above the limit of detection. A NEGATIVE result should be   treated as presumptive. It does not rule out the possibility of   COVID-19 and should not be the sole basis for treatment decisions.   If COVID-19 is strongly suspected based on clinical and exposure   history, re-testing using an alternate molecular assay should be   considered.   This test is only for use under the Food and Drug   Administration s Emergency Use Authorization (EUA).   Commercial kits are provided by [employer].   Performance characteristics of the EUA have been independently   verified by [facility] Department of   Pathology and Laboratory Medicine.   _________________________________________________________________   The authorized Fact Sheet for Healthcare Providers and the authorized Fact   Sheet for Patients of the ID NOW COVID-19 are available on the FDA   website:     https://www.fda.gov/media/119944/download  https://www.fda.gov/media/899456/download               All Lab Results:  Results for orders placed or performed during the hospital encounter of 04/02/22   Blood culture    Specimen: Peripheral, Right Hand; Blood   Result Value Ref Range    Blood Culture, Routine No Growth to date    Blood culture    Specimen: Peripheral, Left Arm; Blood   Result Value Ref Range    Blood Culture, Routine No Growth to date    CBC auto differential   Result Value Ref Range    WBC 17.64 (H) 3.90 - 12.70 K/uL    RBC 2.64 (L) 4.00 - 5.40 M/uL    Hemoglobin 8.3 (L) 12.0 - 16.0 g/dL    Hematocrit 23.4 (L) 37.0 - 48.5 %    MCV 89 82 - 98 fL    MCH 31.4 (H) 27.0 - 31.0  pg    MCHC 35.5 32.0 - 36.0 g/dL    RDW 20.7 (H) 11.5 - 14.5 %    Platelets 326 150 - 450 K/uL    MPV 11.9 9.2 - 12.9 fL    Immature Granulocytes 1.6 (H) 0.0 - 0.5 %    Gran # (ANC) 14.2 (H) 1.8 - 7.7 K/uL    Immature Grans (Abs) 0.28 (H) 0.00 - 0.04 K/uL    Lymph # 1.8 1.0 - 4.8 K/uL    Mono # 1.3 (H) 0.3 - 1.0 K/uL    Eos # 0.0 0.0 - 0.5 K/uL    Baso # 0.09 0.00 - 0.20 K/uL    nRBC 4 (A) 0 /100 WBC    Gran % 80.4 (H) 38.0 - 73.0 %    Lymph % 10.0 (L) 18.0 - 48.0 %    Mono % 7.3 4.0 - 15.0 %    Eosinophil % 0.2 0.0 - 8.0 %    Basophil % 0.5 0.0 - 1.9 %    Platelet Estimate Appears normal     Aniso Slight     Poik Moderate     Poly Occasional     Hypo Occasional     Ovalocytes Moderate     Target Cells Occasional     Sickle Cells Occasional (A)     Differential Method Automated    Comprehensive metabolic panel   Result Value Ref Range    Sodium 137 136 - 145 mmol/L    Potassium 4.3 3.5 - 5.1 mmol/L    Chloride 105 95 - 110 mmol/L    CO2 18 (L) 23 - 29 mmol/L    Glucose 119 (H) 70 - 110 mg/dL    BUN 7 6 - 20 mg/dL    Creatinine 0.6 0.5 - 1.4 mg/dL    Calcium 9.4 8.7 - 10.5 mg/dL    Total Protein 9.1 (H) 6.0 - 8.4 g/dL    Albumin 4.8 3.5 - 5.2 g/dL    Total Bilirubin 4.9 (H) 0.1 - 1.0 mg/dL    Alkaline Phosphatase 103 55 - 135 U/L    AST 71 (H) 10 - 40 U/L    ALT 35 10 - 44 U/L    Anion Gap 14 8 - 16 mmol/L    eGFR if African American >60 >60 mL/min/1.73 m^2    eGFR if non African American >60 >60 mL/min/1.73 m^2   Urinalysis, Reflex to Urine Culture Urine, Clean Catch    Specimen: Urine   Result Value Ref Range    Specimen UA Urine, Clean Catch     Color, UA Yellow Yellow, Straw, Rachel    Appearance, UA Clear Clear    pH, UA 7.0 5.0 - 8.0    Specific Gravity, UA 1.010 1.005 - 1.030    Protein, UA Negative Negative    Glucose, UA Negative Negative    Ketones, UA Negative Negative    Bilirubin (UA) Negative Negative    Occult Blood UA Negative Negative    Nitrite, UA Negative Negative    Urobilinogen, UA Negative <2.0  EU/dL    Leukocytes, UA Negative Negative   Rapid Pregnancy, Urine   Result Value Ref Range    Preg Test, Ur Negative    Lactate dehydrogenase   Result Value Ref Range     (H) 110 - 260 U/L   Reticulocytes   Result Value Ref Range    Retic 22.8 (H) 0.5 - 2.5 %   Comprehensive Metabolic Panel (CMP)   Result Value Ref Range    Sodium 136 136 - 145 mmol/L    Potassium 4.2 3.5 - 5.1 mmol/L    Chloride 107 95 - 110 mmol/L    CO2 20 (L) 23 - 29 mmol/L    Glucose 105 70 - 110 mg/dL    BUN 7 6 - 20 mg/dL    Creatinine 0.6 0.5 - 1.4 mg/dL    Calcium 8.5 (L) 8.7 - 10.5 mg/dL    Total Protein 7.2 6.0 - 8.4 g/dL    Albumin 3.7 3.5 - 5.2 g/dL    Total Bilirubin 4.3 (H) 0.1 - 1.0 mg/dL    Alkaline Phosphatase 78 55 - 135 U/L    AST 41 (H) 10 - 40 U/L    ALT 26 10 - 44 U/L    Anion Gap 9 8 - 16 mmol/L    eGFR if African American >60 >60 mL/min/1.73 m^2    eGFR if non African American >60 >60 mL/min/1.73 m^2   Phosphorus   Result Value Ref Range    Phosphorus 4.6 (H) 2.7 - 4.5 mg/dL   CBC with Auto Differential   Result Value Ref Range    WBC 8.48 3.90 - 12.70 K/uL    RBC 2.21 (L) 4.00 - 5.40 M/uL    Hemoglobin 7.0 (L) 12.0 - 16.0 g/dL    Hematocrit 19.7 (LL) 37.0 - 48.5 %    MCV 89 82 - 98 fL    MCH 31.7 (H) 27.0 - 31.0 pg    MCHC 35.5 32.0 - 36.0 g/dL    RDW 18.5 (H) 11.5 - 14.5 %    Platelets 276 150 - 450 K/uL    MPV 11.9 9.2 - 12.9 fL    Immature Granulocytes 1.2 (H) 0.0 - 0.5 %    Gran # (ANC) 3.9 1.8 - 7.7 K/uL    Immature Grans (Abs) 0.10 (H) 0.00 - 0.04 K/uL    Lymph # 2.9 1.0 - 4.8 K/uL    Mono # 1.1 (H) 0.3 - 1.0 K/uL    Eos # 0.4 0.0 - 0.5 K/uL    Baso # 0.05 0.00 - 0.20 K/uL    nRBC 8 (A) 0 /100 WBC    Gran % 46.3 38.0 - 73.0 %    Lymph % 33.7 18.0 - 48.0 %    Mono % 13.0 4.0 - 15.0 %    Eosinophil % 5.2 0.0 - 8.0 %    Basophil % 0.6 0.0 - 1.9 %    Differential Method Automated    Reticulocytes   Result Value Ref Range    Retic 15.9 (H) 0.5 - 2.5 %   Comprehensive Metabolic Panel (CMP)   Result Value Ref Range     Sodium 137 136 - 145 mmol/L    Potassium 4.0 3.5 - 5.1 mmol/L    Chloride 105 95 - 110 mmol/L    CO2 24 23 - 29 mmol/L    Glucose 124 (H) 70 - 110 mg/dL    BUN 4 (L) 6 - 20 mg/dL    Creatinine 0.6 0.5 - 1.4 mg/dL    Calcium 8.5 (L) 8.7 - 10.5 mg/dL    Total Protein 7.0 6.0 - 8.4 g/dL    Albumin 3.5 3.5 - 5.2 g/dL    Total Bilirubin 4.4 (H) 0.1 - 1.0 mg/dL    Alkaline Phosphatase 83 55 - 135 U/L    AST 37 10 - 40 U/L    ALT 29 10 - 44 U/L    Anion Gap 8 8 - 16 mmol/L    eGFR if African American >60 >60 mL/min/1.73 m^2    eGFR if non African American >60 >60 mL/min/1.73 m^2   Phosphorus   Result Value Ref Range    Phosphorus 3.6 2.7 - 4.5 mg/dL   CBC with Auto Differential   Result Value Ref Range    WBC 11.44 3.90 - 12.70 K/uL    RBC 2.66 (L) 4.00 - 5.40 M/uL    Hemoglobin 8.0 (L) 12.0 - 16.0 g/dL    Hematocrit 23.0 (L) 37.0 - 48.5 %    MCV 87 82 - 98 fL    MCH 30.1 27.0 - 31.0 pg    MCHC 34.8 32.0 - 36.0 g/dL    RDW 17.1 (H) 11.5 - 14.5 %    Platelets 286 150 - 450 K/uL    MPV 11.2 9.2 - 12.9 fL    Immature Granulocytes 0.4 0.0 - 0.5 %    Gran # (ANC) 7.8 (H) 1.8 - 7.7 K/uL    Immature Grans (Abs) 0.05 (H) 0.00 - 0.04 K/uL    Lymph # 2.1 1.0 - 4.8 K/uL    Mono # 1.2 (H) 0.3 - 1.0 K/uL    Eos # 0.3 0.0 - 0.5 K/uL    Baso # 0.03 0.00 - 0.20 K/uL    nRBC 5 (A) 0 /100 WBC    Gran % 68.3 38.0 - 73.0 %    Lymph % 18.4 18.0 - 48.0 %    Mono % 10.2 4.0 - 15.0 %    Eosinophil % 2.4 0.0 - 8.0 %    Basophil % 0.3 0.0 - 1.9 %    Differential Method Automated    Lactate dehydrogenase   Result Value Ref Range     (H) 110 - 260 U/L   Comprehensive Metabolic Panel (CMP)   Result Value Ref Range    Sodium 137 136 - 145 mmol/L    Potassium 3.6 3.5 - 5.1 mmol/L    Chloride 103 95 - 110 mmol/L    CO2 23 23 - 29 mmol/L    Glucose 114 (H) 70 - 110 mg/dL    BUN 4 (L) 6 - 20 mg/dL    Creatinine 0.5 0.5 - 1.4 mg/dL    Calcium 8.5 (L) 8.7 - 10.5 mg/dL    Total Protein 6.8 6.0 - 8.4 g/dL    Albumin 3.4 (L) 3.5 - 5.2 g/dL    Total  Bilirubin 3.5 (H) 0.1 - 1.0 mg/dL    Alkaline Phosphatase 80 55 - 135 U/L    AST 26 10 - 40 U/L    ALT 26 10 - 44 U/L    Anion Gap 11 8 - 16 mmol/L    eGFR if African American >60 >60 mL/min/1.73 m^2    eGFR if non African American >60 >60 mL/min/1.73 m^2   Phosphorus   Result Value Ref Range    Phosphorus 3.1 2.7 - 4.5 mg/dL   CBC with Auto Differential   Result Value Ref Range    WBC 12.41 3.90 - 12.70 K/uL    RBC 2.56 (L) 4.00 - 5.40 M/uL    Hemoglobin 7.8 (L) 12.0 - 16.0 g/dL    Hematocrit 21.8 (L) 37.0 - 48.5 %    MCV 85 82 - 98 fL    MCH 30.5 27.0 - 31.0 pg    MCHC 35.8 32.0 - 36.0 g/dL    RDW 16.3 (H) 11.5 - 14.5 %    Platelets 297 150 - 450 K/uL    MPV 11.9 9.2 - 12.9 fL    Immature Granulocytes 0.6 (H) 0.0 - 0.5 %    Gran # (ANC) 8.7 (H) 1.8 - 7.7 K/uL    Immature Grans (Abs) 0.07 (H) 0.00 - 0.04 K/uL    Lymph # 1.5 1.0 - 4.8 K/uL    Mono # 1.8 (H) 0.3 - 1.0 K/uL    Eos # 0.4 0.0 - 0.5 K/uL    Baso # 0.05 0.00 - 0.20 K/uL    nRBC 2 (A) 0 /100 WBC    Gran % 69.7 38.0 - 73.0 %    Lymph % 12.2 (L) 18.0 - 48.0 %    Mono % 14.3 4.0 - 15.0 %    Eosinophil % 2.8 0.0 - 8.0 %    Basophil % 0.4 0.0 - 1.9 %    Differential Method Automated    Lactic acid, plasma   Result Value Ref Range    Lactate (Lactic Acid) 1.8 0.5 - 2.2 mmol/L   Procalcitonin   Result Value Ref Range    Procalcitonin 0.07 <0.25 ng/mL   Reticulocytes   Result Value Ref Range    Retic 15.4 (H) 0.5 - 2.5 %   Comprehensive Metabolic Panel (CMP)   Result Value Ref Range    Sodium 137 136 - 145 mmol/L    Potassium 3.8 3.5 - 5.1 mmol/L    Chloride 105 95 - 110 mmol/L    CO2 27 23 - 29 mmol/L    Glucose 103 70 - 110 mg/dL    BUN 6 6 - 20 mg/dL    Creatinine 0.5 0.5 - 1.4 mg/dL    Calcium 8.3 (L) 8.7 - 10.5 mg/dL    Total Protein 6.2 6.0 - 8.4 g/dL    Albumin 2.9 (L) 3.5 - 5.2 g/dL    Total Bilirubin 3.0 (H) 0.1 - 1.0 mg/dL    Alkaline Phosphatase 89 55 - 135 U/L    AST 30 10 - 40 U/L    ALT 28 10 - 44 U/L    Anion Gap 5 (L) 8 - 16 mmol/L    eGFR if  African American >60 >60 mL/min/1.73 m^2    eGFR if non African American >60 >60 mL/min/1.73 m^2   Phosphorus   Result Value Ref Range    Phosphorus 3.3 2.7 - 4.5 mg/dL   CBC with Auto Differential   Result Value Ref Range    WBC 10.12 3.90 - 12.70 K/uL    RBC 2.27 (L) 4.00 - 5.40 M/uL    Hemoglobin 6.7 (L) 12.0 - 16.0 g/dL    Hematocrit 19.9 (LL) 37.0 - 48.5 %    MCV 88 82 - 98 fL    MCH 29.5 27.0 - 31.0 pg    MCHC 33.7 32.0 - 36.0 g/dL    RDW 16.8 (H) 11.5 - 14.5 %    Platelets 296 150 - 450 K/uL    MPV 11.6 9.2 - 12.9 fL    Immature Granulocytes 0.6 (H) 0.0 - 0.5 %    Gran # (ANC) 5.4 1.8 - 7.7 K/uL    Immature Grans (Abs) 0.06 (H) 0.00 - 0.04 K/uL    Lymph # 2.9 1.0 - 4.8 K/uL    Mono # 1.3 (H) 0.3 - 1.0 K/uL    Eos # 0.4 0.0 - 0.5 K/uL    Baso # 0.05 0.00 - 0.20 K/uL    nRBC 1 (A) 0 /100 WBC    Gran % 53.3 38.0 - 73.0 %    Lymph % 28.2 18.0 - 48.0 %    Mono % 13.1 4.0 - 15.0 %    Eosinophil % 4.3 0.0 - 8.0 %    Basophil % 0.5 0.0 - 1.9 %    Differential Method Automated    POCT COVID-19 Rapid Screening   Result Value Ref Range    POC Rapid COVID Negative Negative     Acceptable Yes    Type & Screen   Result Value Ref Range    Group & Rh B POS     Indirect Migel NEG    Prepare RBC 1 Unit   Result Value Ref Range    UNIT NUMBER [id number]     Product Code H0836P33     DISPENSE STATUS TRANSFUSED     CODING SYSTEM PDOF064     Unit Blood Type Code 5100     Unit Blood Type O POS     Unit Expiration [id number]          Imaging Results:  Imaging Results          X-Ray Chest AP Portable (Final result)  Result time 04/02/22 08:47:00    Final result by CARRI Muller Sr., MD (04/02/22 08:47:00)                 Impression:      There is no evidence of an acute pulmonary process. .      Electronically signed by: Agusto Muller MD  Date:    04/02/2022  Time:    08:47             Narrative:    EXAMINATION:  XR CHEST AP PORTABLE    CLINICAL HISTORY:  Hb-SS disease with crisis,  unspecified    COMPARISON:  Comparison was made to chest x-rays dating back to 04/03/2020.    FINDINGS:  The size of the heart is normal.  There is no evidence of an acute pulmonary process.  There is no pneumothorax.  The costophrenic angles are sharp.                                            The Emergency Provider reviewed the vital signs and test results, which are outlined above.     ED Discussion     10:47 AM: Pt feels better but pain is still 6/10. Pt requests tramadol.    12:24 PM: Discussed case with Tiffanie Adams NP ([facility]). Dr. Kong agrees with current care and management of pt and accepts admission.   Admitting Service: [facility]  Admitting Physician: Dr. Kong  Admit to: Inpatient med-surg    12:25 PM: Re-evaluated pt. I have discussed test results, shared treatment plan, and the need for admission with patient and family at bedside. Pt and family express understanding at this time and agree with all information. All questions answered. Pt and family have no further questions or concerns at this time. Pt is ready for admit.             Medical Decision Making:   Clinical Tests:   Lab Tests: Reviewed and Ordered  Radiological Study: Ordered and Reviewed           ED Medication(s):  Medications   sodium chloride 0.9% flush 10 mL (has no administration in time range)   dextrose 5 % and 0.45 % NaCl infusion ( Intravenous New Bag 4/5/22 1869)   enoxaparin injection 40 mg ( Subcutaneous Canceled Entry 4/5/22 1700)   folic acid tablet 1 mg (1 mg Oral Given 4/5/22 0828)   diphenhydrAMINE capsule 25 mg (25 mg Oral Given 4/3/22 2015)   ondansetron injection 4 mg (has no administration in time range)   acetaminophen tablet 650 mg (650 mg Oral Given 4/4/22 0051)   0.9%  NaCl infusion (for blood administration) (has no administration in time range)   pantoprazole EC tablet 40 mg (40 mg Oral Given 4/5/22 0828)   ketorolac injection 15 mg (15 mg Intravenous Given 4/6/22 9762)   naloxone  0.4 mg/mL injection 0.02 mg (has no administration in time range)   morphine PCA syringe 30 mg/30 mL (1 mg/mL) NS ( Intravenous New Syringe/Bag 4/5/22 2041)   cefTRIAXone (ROCEPHIN) 1 g/50 mL D5W IVPB (0 g Intravenous Stopped 4/5/22 1449)   doxycycline tablet 100 mg (100 mg Oral Given 4/5/22 2139)   aluminum-magnesium hydroxide-simethicone 200-200-20 mg/5 mL suspension 30 mL (30 mLs Oral Given 4/5/22 1929)   0.9%  NaCl infusion (for blood administration) (has no administration in time range)   sodium chloride 0.9% bolus 1,000 mL (0 mLs Intravenous Stopped 4/2/22 0958)   morphine injection 10 mg (10 mg Intravenous Given 4/2/22 0832)   traMADoL tablet 50 mg (50 mg Oral Given 4/2/22 1053)   morphine injection 6 mg (6 mg Intravenous Given 4/2/22 1223)   ketorolac injection 15 mg (15 mg Intravenous Given 4/3/22 1033)   morphine injection 4 mg (4 mg Intravenous Given 4/3/22 2033)   cyclobenzaprine tablet 10 mg (10 mg Oral Given 4/4/22 1340)   iohexoL (OMNIPAQUE 350) injection 100 mL (100 mLs Intravenous Given 4/5/22 1133)       There are no discharge medications for this patient.              Scribe Attestation:   Scribe #1: I performed the above scribed service and the documentation accurately describes the services I performed. I attest to the accuracy of the note.     Attending:   Physician Attestation Statement for Scribe #1: I, Beck Craft MD, personally performed the services described in this documentation, as scribed by Aron Laureano, in my presence, and it is both accurate and complete.           Clinical Impression       ICD-10-CM ICD-9-CM   1. Sickle cell pain crisis  D57.00 282.62   2. Sickle cell anemia with pain  D57.00 282.62       Disposition:   Disposition: Admitted  Condition: Serious         Beck Craft MD  04/06/22 2328

## 2022-04-02 NOTE — ASSESSMENT & PLAN NOTE
No sign of acute infection  Most likely reactive   CR and UA did not show any acute finding   Monitor

## 2022-04-02 NOTE — Clinical Note
Shar Franco accompanied their child to the emergency department on 4/2/2022. They may return to work on 04/03/2022.      If you have any questions or concerns, please don't hesitate to call.       RN

## 2022-04-02 NOTE — HPI
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 Presents to the ED with C/O of  generalized pain over the last days which has gotten worse.  Is associated with  chest pain and joint paint . She denies any SOB , palpitation , fever , chills , GI or  sx .    Her sickle cell has been well-controlled for most of her life. Per chart review, she followed with Dr. Elvira Nuñez hematologist here at [facility] but has not followed up recently  . 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 She takes oxycodone 5mg and tramadol 50mg at home when she has pain, but does not use them regularly.  ER Course:WBC 17 K , H/H 8.3/23 , Ret 23 ,  . CXR and did not show any  acute finding     Pt will be admitted with a Dx of sickle cell crisis   Code status : Full code   SDM:  Shar Franco Father   [phone]       "

## 2022-04-03 LAB
ABO + RH BLD: NORMAL
ALBUMIN SERPL BCP-MCNC: 3.7 G/DL (ref 3.5–5.2)
ALP SERPL-CCNC: 78 U/L (ref 55–135)
ALT SERPL W/O P-5'-P-CCNC: 26 U/L (ref 10–44)
ANION GAP SERPL CALC-SCNC: 9 MMOL/L (ref 8–16)
AST SERPL-CCNC: 41 U/L (ref 10–40)
BASOPHILS # BLD AUTO: 0.05 K/UL (ref 0–0.2)
BASOPHILS NFR BLD: 0.6 % (ref 0–1.9)
BILIRUB SERPL-MCNC: 4.3 MG/DL (ref 0.1–1)
BLD GP AB SCN CELLS X3 SERPL QL: NORMAL
BLD PROD TYP BPU: NORMAL
BLOOD UNIT EXPIRATION DATE: NORMAL
BLOOD UNIT TYPE CODE: 5100
BLOOD UNIT TYPE: NORMAL
BUN SERPL-MCNC: 7 MG/DL (ref 6–20)
CALCIUM SERPL-MCNC: 8.5 MG/DL (ref 8.7–10.5)
CHLORIDE SERPL-SCNC: 107 MMOL/L (ref 95–110)
CO2 SERPL-SCNC: 20 MMOL/L (ref 23–29)
CODING SYSTEM: NORMAL
CREAT SERPL-MCNC: 0.6 MG/DL (ref 0.5–1.4)
DIFFERENTIAL METHOD: ABNORMAL
DISPENSE STATUS: NORMAL
EOSINOPHIL # BLD AUTO: 0.4 K/UL (ref 0–0.5)
EOSINOPHIL NFR BLD: 5.2 % (ref 0–8)
ERYTHROCYTE [DISTWIDTH] IN BLOOD BY AUTOMATED COUNT: 18.5 % (ref 11.5–14.5)
EST. GFR  (AFRICAN AMERICAN): >60 ML/MIN/1.73 M^2
EST. GFR  (NON AFRICAN AMERICAN): >60 ML/MIN/1.73 M^2
GLUCOSE SERPL-MCNC: 105 MG/DL (ref 70–110)
HCT VFR BLD AUTO: 19.7 % (ref 37–48.5)
HGB BLD-MCNC: 7 G/DL (ref 12–16)
IMM GRANULOCYTES # BLD AUTO: 0.1 K/UL (ref 0–0.04)
IMM GRANULOCYTES NFR BLD AUTO: 1.2 % (ref 0–0.5)
LYMPHOCYTES # BLD AUTO: 2.9 K/UL (ref 1–4.8)
LYMPHOCYTES NFR BLD: 33.7 % (ref 18–48)
MCH RBC QN AUTO: 31.7 PG (ref 27–31)
MCHC RBC AUTO-ENTMCNC: 35.5 G/DL (ref 32–36)
MCV RBC AUTO: 89 FL (ref 82–98)
MONOCYTES # BLD AUTO: 1.1 K/UL (ref 0.3–1)
MONOCYTES NFR BLD: 13 % (ref 4–15)
NEUTROPHILS # BLD AUTO: 3.9 K/UL (ref 1.8–7.7)
NEUTROPHILS NFR BLD: 46.3 % (ref 38–73)
NRBC BLD-RTO: 8 /100 WBC
NUM UNITS TRANS PACKED RBC: NORMAL
PHOSPHATE SERPL-MCNC: 4.6 MG/DL (ref 2.7–4.5)
PLATELET # BLD AUTO: 276 K/UL (ref 150–450)
PMV BLD AUTO: 11.9 FL (ref 9.2–12.9)
POTASSIUM SERPL-SCNC: 4.2 MMOL/L (ref 3.5–5.1)
PROT SERPL-MCNC: 7.2 G/DL (ref 6–8.4)
RBC # BLD AUTO: 2.21 M/UL (ref 4–5.4)
SODIUM SERPL-SCNC: 136 MMOL/L (ref 136–145)
WBC # BLD AUTO: 8.48 K/UL (ref 3.9–12.7)

## 2022-04-03 PROCEDURE — 63600175 PHARM REV CODE 636 W HCPCS: Performed by: INTERNAL MEDICINE

## 2022-04-03 PROCEDURE — 84100 ASSAY OF PHOSPHORUS: CPT | Performed by: INTERNAL MEDICINE

## 2022-04-03 PROCEDURE — 99900035 HC TECH TIME PER 15 MIN (STAT)

## 2022-04-03 PROCEDURE — 94761 N-INVAS EAR/PLS OXIMETRY MLT: CPT

## 2022-04-03 PROCEDURE — 25000003 PHARM REV CODE 250: Performed by: INTERNAL MEDICINE

## 2022-04-03 PROCEDURE — 85025 COMPLETE CBC W/AUTO DIFF WBC: CPT | Performed by: INTERNAL MEDICINE

## 2022-04-03 PROCEDURE — 80053 COMPREHEN METABOLIC PANEL: CPT | Performed by: INTERNAL MEDICINE

## 2022-04-03 PROCEDURE — 86920 COMPATIBILITY TEST SPIN: CPT | Performed by: INTERNAL MEDICINE

## 2022-04-03 PROCEDURE — 86900 BLOOD TYPING SEROLOGIC ABO: CPT | Performed by: INTERNAL MEDICINE

## 2022-04-03 PROCEDURE — 63600175 PHARM REV CODE 636 W HCPCS: Performed by: NURSE PRACTITIONER

## 2022-04-03 PROCEDURE — P9016 RBC LEUKOCYTES REDUCED: HCPCS | Performed by: INTERNAL MEDICINE

## 2022-04-03 PROCEDURE — 86850 RBC ANTIBODY SCREEN: CPT | Performed by: INTERNAL MEDICINE

## 2022-04-03 PROCEDURE — 36415 COLL VENOUS BLD VENIPUNCTURE: CPT | Performed by: INTERNAL MEDICINE

## 2022-04-03 PROCEDURE — 86920 COMPATIBILITY TEST SPIN: CPT | Performed by: NURSE PRACTITIONER

## 2022-04-03 PROCEDURE — 21400001 HC TELEMETRY ROOM

## 2022-04-03 PROCEDURE — 27000221 HC OXYGEN, UP TO 24 HOURS

## 2022-04-03 PROCEDURE — 36430 TRANSFUSION BLD/BLD COMPNT: CPT

## 2022-04-03 RX ORDER — KETOROLAC TROMETHAMINE 30 MG/ML
15 INJECTION, SOLUTION INTRAMUSCULAR; INTRAVENOUS EVERY 6 HOURS PRN
Status: COMPLETED | OUTPATIENT
Start: 2022-04-03 | End: 2022-04-03

## 2022-04-03 RX ORDER — MORPHINE SULFATE 2 MG/ML
2 INJECTION, SOLUTION INTRAMUSCULAR; INTRAVENOUS
Status: DISCONTINUED | OUTPATIENT
Start: 2022-04-03 | End: 2022-04-04

## 2022-04-03 RX ORDER — HYDROCODONE BITARTRATE AND ACETAMINOPHEN 500; 5 MG/1; MG/1
TABLET ORAL
Status: DISCONTINUED | OUTPATIENT
Start: 2022-04-03 | End: 2022-04-08 | Stop reason: HOSPADM

## 2022-04-03 RX ORDER — PANTOPRAZOLE SODIUM 40 MG/1
40 TABLET, DELAYED RELEASE ORAL DAILY
Status: DISCONTINUED | OUTPATIENT
Start: 2022-04-04 | End: 2022-04-08 | Stop reason: HOSPADM

## 2022-04-03 RX ORDER — MORPHINE SULFATE 4 MG/ML
4 INJECTION, SOLUTION INTRAMUSCULAR; INTRAVENOUS
Status: DISCONTINUED | OUTPATIENT
Start: 2022-04-03 | End: 2022-04-05

## 2022-04-03 RX ORDER — KETOROLAC TROMETHAMINE 30 MG/ML
15 INJECTION, SOLUTION INTRAMUSCULAR; INTRAVENOUS EVERY 8 HOURS PRN
Status: DISPENSED | OUTPATIENT
Start: 2022-04-03 | End: 2022-04-06

## 2022-04-03 RX ORDER — MORPHINE SULFATE 4 MG/ML
4 INJECTION, SOLUTION INTRAMUSCULAR; INTRAVENOUS ONCE
Status: COMPLETED | OUTPATIENT
Start: 2022-04-03 | End: 2022-04-03

## 2022-04-03 RX ADMIN — ACETAMINOPHEN 650 MG: 325 TABLET ORAL at 04:04

## 2022-04-03 RX ADMIN — DIPHENHYDRAMINE HYDROCHLORIDE 25 MG: 25 CAPSULE ORAL at 08:04

## 2022-04-03 RX ADMIN — MORPHINE SULFATE 4 MG: 4 INJECTION INTRAVENOUS at 08:04

## 2022-04-03 RX ADMIN — KETOROLAC TROMETHAMINE 15 MG: 30 INJECTION, SOLUTION INTRAMUSCULAR at 02:04

## 2022-04-03 RX ADMIN — ENOXAPARIN SODIUM 40 MG: 100 INJECTION SUBCUTANEOUS at 04:04

## 2022-04-03 RX ADMIN — KETOROLAC TROMETHAMINE 15 MG: 30 INJECTION, SOLUTION INTRAMUSCULAR; INTRAVENOUS at 04:04

## 2022-04-03 RX ADMIN — FOLIC ACID 1 MG: 1 TABLET ORAL at 10:04

## 2022-04-03 RX ADMIN — MORPHINE SULFATE 4 MG: 4 INJECTION INTRAVENOUS at 11:04

## 2022-04-03 RX ADMIN — KETOROLAC TROMETHAMINE 15 MG: 30 INJECTION, SOLUTION INTRAMUSCULAR at 10:04

## 2022-04-03 NOTE — PLAN OF CARE
SW met with pt at bedside to complete discharge assessment. Pt lives at home with her father who is her help at home. Pt is able to perform ADLs. No needs identified at this time. SW provided a transitional care folder, information on advanced directives, information on pharmacy bedside delivery, and discharge planning begins on admission with contact information for any needs/questions.   [facility] - Tele[facility] (Hospital)  Initial Discharge Assessment       Primary Care Provider: Elvira Nueñz MD    Admission Diagnosis: Sickle cell anemia with pain [D57.00]  Sickle cell pain crisis [D57.00]    Admission Date: 4/2/2022  Expected Discharge Date: 4/4/2022    Discharge Barriers Identified: None    Payor: BLUE CROSS BLUE SHIELD / Plan: BCBS ALL OUT OF STATE / Product Type: PPO /     Extended Emergency Contact Information  Primary Emergency Contact: Shar Franco  Address: [address] [place] [address]            [place] [address]  Mobile Phone: [phone]  Relation: Father  Secondary Emergency Contact: Sadaf Tavarez   [place]  Mobile Phone: [phone]  Relation: Grandparent    Discharge Plan A: Home with family  Discharge Plan B: Home with family      [facility] [place] [address] [facility]  [address] [facility] [address]  Phone: [phone] Fax: [phone]    [id number] [address] [facility] [address]  [address] [facility] [address]  Phone: [phone] Fax: [phone]    [id number] [facility] [address] [facility] [address] [address]  Phone: [phone] Fax: [phone]      Initial Assessment (most recent)     Adult Discharge Assessment - 04/03/22 1039        Discharge Assessment    Assessment Type Discharge Planning Assessment     Confirmed/corrected address, phone number and insurance Yes     Confirmed Demographics Correct on Facesheet      Source of Information patient     Communicated FARIHA with patient/caregiver Yes     Lives With parent(s)     Facility Arrived From: Home     Do you expect to return to your current living situation? Yes     Do you have help at home or someone to help you manage your care at home? Yes     Who are your caregiver(s) and their phone number(s)? Shar Franco (father) [phone]     Prior to hospitilization cognitive status: Alert/Oriented     Current cognitive status: Alert/Oriented     Dressing/Bathing Difficulty none     Home Accessibility wheelchair accessible     Equipment Currently Used at Home none     Readmission within 30 days? No     Patient currently being followed by outpatient case management? No     Do you currently have service(s) that help you manage your care at home? No     Do you take prescription medications? Yes     Do you have prescription coverage? No     Do you have any problems affording any of your prescribed medications? Yes     Is the patient taking medications as prescribed? yes     Who is going to help you get home at discharge? Shar Franco (father) [phone]     How do you get to doctors appointments? family or friend will provide     Are you on dialysis? No     Do you take coumadin? No     Discharge Plan A Home with family     Discharge Plan B Home with family     DME Needed Upon Discharge  none     Discharge Plan discussed with: Patient     Discharge Barriers Identified None        Relationship/Environment    Name(s) of Who Lives With Patient Shar Franco (father) [phone]

## 2022-04-03 NOTE — PLAN OF CARE
Patient AAOX 4 VSS..  Patient remained afebrile throughout shift.  Patient remained free of falls this shift  Patient 0/10 of pain this shift  Plan of care reviewed.  Patient verbalized understanding.  Patient moving/turning independently  Frequent weight shifting encouraged.  Patient NSR on monitor  Bed low, side rails up x2, wheels locked, call light in reach.  Patient instructed to call for assistance.  Hourly rounding completed.  Will continue to monitor.

## 2022-04-03 NOTE — PROGRESS NOTES
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 Presents to the ED with C/O of  generalized pain over the last days which has gotten worse.  Is associated with  chest pain and joint paint . She denies any SOB , palpitation , fever , chills , GI or  sx .    Her sickle cell has been well-controlled for most of her life. Per chart review, she followed with Dr. Elvira Nuñez hematologist here at [facility] but has not followed up recently  . 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 She takes oxycodone 5mg and tramadol 50mg at home when she has pain, but does not use them regularly.  ER Course:WBC 17 K , H/H 8.3/23 , Ret 23 ,  . CXR and did not show any  acute finding     Pt will be admitted with a Dx of sickle cell crisis   Code status : Full code   SDM:  Shar Franco Father   [phone]           Overview/Hospital Course:  25 y/o aaf admitted with a Dx of Sickle cell exacerbation . Started on PCA natalia dn IVF . She cont complaining of generalized pain . The H/H is 7 today and plan to transfuse 1 PRBC       Interval History:     Review of Systems   Constitutional:  Positive for activity change and fatigue.   HENT: Negative.     Eyes: Negative.    Respiratory: Negative.     Cardiovascular:  Negative for chest pain.   Gastrointestinal: Negative.    Endocrine: Negative.    Musculoskeletal:  Positive for " arthralgias and myalgias.   Allergic/Immunologic: Negative.    Neurological:  Positive for weakness.   Hematological: Negative.    Psychiatric/Behavioral: Negative.     Objective:     Vital Signs (Most Recent):  Temp: 98.4 °F (36.9 °C) (04/03/22 1105)  Pulse: 70 (04/03/22 1105)  Resp: 18 (04/03/22 1105)  BP: 110/64 (04/03/22 1105)  SpO2: 100 % (04/03/22 1105) Vital Signs (24h Range):  Temp:  [97.9 °F (36.6 °C)-99.9 °F (37.7 °C)] 98.4 °F (36.9 °C)  Pulse:  [56-88] 70  Resp:  [12-20] 18  SpO2:  [98 %-100 %] 100 %  BP: (108-131)/(58-71) 110/64     Weight: 53 kg (116 lb 13.5 oz)  Body mass index is 21.37 kg/m².    Intake/Output Summary (Last 24 hours) at 4/3/2022 1604  Last data filed at 4/3/2022 1000  Gross per 24 hour   Intake 145.6 ml   Output --   Net 145.6 ml      Physical Exam  Vitals reviewed.   Constitutional:       General: She is not in acute distress.     Appearance: Normal appearance. She is ill-appearing.   HENT:      Head: Normocephalic and atraumatic.      Nose: Nose normal.      Mouth/Throat:      Mouth: Mucous membranes are moist.      Pharynx: Oropharynx is clear.   Eyes:      Extraocular Movements: Extraocular movements intact.      Conjunctiva/sclera: Conjunctivae normal.      Pupils: Pupils are equal, round, and reactive to light.   Cardiovascular:      Rate and Rhythm: Normal rate and regular rhythm.      Pulses: Normal pulses.      Heart sounds: Normal heart sounds.   Pulmonary:      Effort: Pulmonary effort is normal. No respiratory distress.      Breath sounds: Normal breath sounds. No stridor. No wheezing or rhonchi.   Abdominal:      General: Abdomen is flat. Bowel sounds are normal. There is no distension.      Palpations: Abdomen is soft. There is no mass.      Tenderness: There is no abdominal tenderness.      Hernia: No hernia is present.   Musculoskeletal:         General: No swelling, tenderness, deformity or signs of injury. Normal range of motion.      Cervical back: Normal range of  motion and neck supple.   Skin:     General: Skin is warm.      Coloration: Skin is not pale.      Findings: No bruising or erythema.   Neurological:      General: No focal deficit present.      Mental Status: She is alert and oriented to person, place, and time. Mental status is at baseline.      Cranial Nerves: No cranial nerve deficit.      Sensory: No sensory deficit.      Motor: No weakness.      Coordination: Coordination normal.       Significant Labs: All pertinent labs within the past 24 hours have been reviewed.      Significant Imaging: I have reviewed all pertinent imaging results/findings within the past 24 hours.        Assessment/Plan:      * Sickle cell anemia with pain  Cont PCA pump  Cont IVF D5NS  F/U daily cmp and cbc  F/U q48 hrs LDH and Reticular count   CXR and UA did not show any acute finding         Leukocytosis  No sign of acute infection  Most likely reactive   CR and UA did not show any acute finding   Monitor   RESOLVED      GERD (gastroesophageal reflux disease)  Cont PPI      Elevated bilirubin  Monitor   Most Likely  due to SS exacerbation       Sickle cell anemia  Monitor H/H  Transfuse if < than 7   Transfuse 1 PRBC       VTE Risk Mitigation (From admission, onward)         Ordered     enoxaparin injection 40 mg  Daily         04/02/22 1313     Place RISSA hose  Until discontinued         04/02/22 1313     IP VTE HIGH RISK PATIENT  Once         04/02/22 1313                Discharge Planning   FARIHA: 4/4/2022     Code Status: Full Code   Is the patient medically ready for discharge?:     Reason for patient still in hospital (select all that apply): Treatment  Discharge Plan A: Home with family                  Conrad Anthony MD  Department of Hospital Medicine   [facility] - Tele[facility] ([facility])

## 2022-04-03 NOTE — HOSPITAL COURSE
"27 y/o aaf admitted with a Dx of Sickle cell exacerbation . Started on PCA natalia dn IVF . She cont complaining of generalized pain . The H/H is 7 today and plan to transfuse 1 PRBC. On 4/4/22 changed  to  IV push morphine due to  uncontrolled pain .  We will start a morphine PCA pump  . She cont  complaining of severe b/l knee pain . B/L LE US negative for dvt . She is s/p 1 PRBC. As of 4/5/22 pt c/o right shin pain and swelling. Imaging showed Mild pretibial edema or cellulitis; No discrete abscess seen; Trace knee joint effusion. IV rocephin and oral doxycycline started. H/H stable. Continue current medical management plan. As of 4/6/22 pain improving slowly. H/H 6.7/19.9, will transfuse 1 unit of PRBCs. RLE swelling improving. Continue IV abx. As of 4/7/22 pt spike fever overnight. Blood cx remain negative. CXR negative. Repeat RLE US negative for DVT. UA pending. Care plan discussed with Dr. Kong. On 4/8/22, pt verbalized symptom improvement and agreed with discharge.  Pt later changed her mind and became anxious and tearful and stated her "mental health was triggered" when talking about discharge.  Pt denies suicidal or homicidal ideation and refused Psych. Social work consulted for further assistance. Heme/Onc following. Pt had lengthy discussion with  and stated she felt safe going and was ready for discharge.  Pt seen and examined and deemed stable for discharge to home.  Medications reconciled for discharge. Pt instructed to follow up with Heme/Onc upon discharge for further evaluation.   "

## 2022-04-03 NOTE — ASSESSMENT & PLAN NOTE
No sign of acute infection  Most likely reactive   CR and UA did not show any acute finding   Monitor   RESOLVED

## 2022-04-03 NOTE — SUBJECTIVE & OBJECTIVE
Interval History:     Review of Systems   Constitutional:  Positive for activity change and fatigue.   HENT: Negative.     Eyes: Negative.    Respiratory: Negative.     Cardiovascular:  Negative for chest pain.   Gastrointestinal: Negative.    Endocrine: Negative.    Musculoskeletal:  Positive for arthralgias and myalgias.   Allergic/Immunologic: Negative.    Neurological:  Positive for weakness.   Hematological: Negative.    Psychiatric/Behavioral: Negative.     Objective:     Vital Signs (Most Recent):  Temp: 98.4 °F (36.9 °C) (04/03/22 1105)  Pulse: 70 (04/03/22 1105)  Resp: 18 (04/03/22 1105)  BP: 110/64 (04/03/22 1105)  SpO2: 100 % (04/03/22 1105) Vital Signs (24h Range):  Temp:  [97.9 °F (36.6 °C)-99.9 °F (37.7 °C)] 98.4 °F (36.9 °C)  Pulse:  [56-88] 70  Resp:  [12-20] 18  SpO2:  [98 %-100 %] 100 %  BP: (108-131)/(58-71) 110/64     Weight: 53 kg (116 lb 13.5 oz)  Body mass index is 21.37 kg/m².    Intake/Output Summary (Last 24 hours) at 4/3/2022 1604  Last data filed at 4/3/2022 1000  Gross per 24 hour   Intake 145.6 ml   Output --   Net 145.6 ml      Physical Exam  Vitals reviewed.   Constitutional:       General: She is not in acute distress.     Appearance: Normal appearance. She is ill-appearing.   HENT:      Head: Normocephalic and atraumatic.      Nose: Nose normal.      Mouth/Throat:      Mouth: Mucous membranes are moist.      Pharynx: Oropharynx is clear.   Eyes:      Extraocular Movements: Extraocular movements intact.      Conjunctiva/sclera: Conjunctivae normal.      Pupils: Pupils are equal, round, and reactive to light.   Cardiovascular:      Rate and Rhythm: Normal rate and regular rhythm.      Pulses: Normal pulses.      Heart sounds: Normal heart sounds.   Pulmonary:      Effort: Pulmonary effort is normal. No respiratory distress.      Breath sounds: Normal breath sounds. No stridor. No wheezing or rhonchi.   Abdominal:      General: Abdomen is flat. Bowel sounds are normal. There is no  distension.      Palpations: Abdomen is soft. There is no mass.      Tenderness: There is no abdominal tenderness.      Hernia: No hernia is present.   Musculoskeletal:         General: No swelling, tenderness, deformity or signs of injury. Normal range of motion.      Cervical back: Normal range of motion and neck supple.   Skin:     General: Skin is warm.      Coloration: Skin is not pale.      Findings: No bruising or erythema.   Neurological:      General: No focal deficit present.      Mental Status: She is alert and oriented to person, place, and time. Mental status is at baseline.      Cranial Nerves: No cranial nerve deficit.      Sensory: No sensory deficit.      Motor: No weakness.      Coordination: Coordination normal.       Significant Labs: All pertinent labs within the past 24 hours have been reviewed.      Significant Imaging: I have reviewed all pertinent imaging results/findings within the past 24 hours.

## 2022-04-04 ENCOUNTER — TELEPHONE (OUTPATIENT)
Dept: HEMATOLOGY/ONCOLOGY | Facility: CLINIC | Age: 27
End: 2022-04-04
Payer: COMMERCIAL

## 2022-04-04 LAB
ALBUMIN SERPL BCP-MCNC: 3.5 G/DL (ref 3.5–5.2)
ALP SERPL-CCNC: 83 U/L (ref 55–135)
ALT SERPL W/O P-5'-P-CCNC: 29 U/L (ref 10–44)
ANION GAP SERPL CALC-SCNC: 8 MMOL/L (ref 8–16)
AST SERPL-CCNC: 37 U/L (ref 10–40)
BASOPHILS # BLD AUTO: 0.03 K/UL (ref 0–0.2)
BASOPHILS NFR BLD: 0.3 % (ref 0–1.9)
BILIRUB SERPL-MCNC: 4.4 MG/DL (ref 0.1–1)
BUN SERPL-MCNC: 4 MG/DL (ref 6–20)
CALCIUM SERPL-MCNC: 8.5 MG/DL (ref 8.7–10.5)
CHLORIDE SERPL-SCNC: 105 MMOL/L (ref 95–110)
CO2 SERPL-SCNC: 24 MMOL/L (ref 23–29)
CREAT SERPL-MCNC: 0.6 MG/DL (ref 0.5–1.4)
DIFFERENTIAL METHOD: ABNORMAL
EOSINOPHIL # BLD AUTO: 0.3 K/UL (ref 0–0.5)
EOSINOPHIL NFR BLD: 2.4 % (ref 0–8)
ERYTHROCYTE [DISTWIDTH] IN BLOOD BY AUTOMATED COUNT: 17.1 % (ref 11.5–14.5)
EST. GFR  (AFRICAN AMERICAN): >60 ML/MIN/1.73 M^2
EST. GFR  (NON AFRICAN AMERICAN): >60 ML/MIN/1.73 M^2
GLUCOSE SERPL-MCNC: 124 MG/DL (ref 70–110)
HCT VFR BLD AUTO: 23 % (ref 37–48.5)
HGB BLD-MCNC: 8 G/DL (ref 12–16)
IMM GRANULOCYTES # BLD AUTO: 0.05 K/UL (ref 0–0.04)
IMM GRANULOCYTES NFR BLD AUTO: 0.4 % (ref 0–0.5)
LDH SERPL L TO P-CCNC: 455 U/L (ref 110–260)
LYMPHOCYTES # BLD AUTO: 2.1 K/UL (ref 1–4.8)
LYMPHOCYTES NFR BLD: 18.4 % (ref 18–48)
MCH RBC QN AUTO: 30.1 PG (ref 27–31)
MCHC RBC AUTO-ENTMCNC: 34.8 G/DL (ref 32–36)
MCV RBC AUTO: 87 FL (ref 82–98)
MONOCYTES # BLD AUTO: 1.2 K/UL (ref 0.3–1)
MONOCYTES NFR BLD: 10.2 % (ref 4–15)
NEUTROPHILS # BLD AUTO: 7.8 K/UL (ref 1.8–7.7)
NEUTROPHILS NFR BLD: 68.3 % (ref 38–73)
NRBC BLD-RTO: 5 /100 WBC
PHOSPHATE SERPL-MCNC: 3.6 MG/DL (ref 2.7–4.5)
PLATELET # BLD AUTO: 286 K/UL (ref 150–450)
PMV BLD AUTO: 11.2 FL (ref 9.2–12.9)
POTASSIUM SERPL-SCNC: 4 MMOL/L (ref 3.5–5.1)
PROT SERPL-MCNC: 7 G/DL (ref 6–8.4)
RBC # BLD AUTO: 2.66 M/UL (ref 4–5.4)
RETICS/RBC NFR AUTO: 15.9 % (ref 0.5–2.5)
SODIUM SERPL-SCNC: 137 MMOL/L (ref 136–145)
WBC # BLD AUTO: 11.44 K/UL (ref 3.9–12.7)

## 2022-04-04 PROCEDURE — 36415 COLL VENOUS BLD VENIPUNCTURE: CPT | Performed by: INTERNAL MEDICINE

## 2022-04-04 PROCEDURE — 80053 COMPREHEN METABOLIC PANEL: CPT | Performed by: INTERNAL MEDICINE

## 2022-04-04 PROCEDURE — 83615 LACTATE (LD) (LDH) ENZYME: CPT | Performed by: INTERNAL MEDICINE

## 2022-04-04 PROCEDURE — 63600175 PHARM REV CODE 636 W HCPCS: Performed by: INTERNAL MEDICINE

## 2022-04-04 PROCEDURE — 84100 ASSAY OF PHOSPHORUS: CPT | Performed by: INTERNAL MEDICINE

## 2022-04-04 PROCEDURE — 94761 N-INVAS EAR/PLS OXIMETRY MLT: CPT

## 2022-04-04 PROCEDURE — 85045 AUTOMATED RETICULOCYTE COUNT: CPT | Performed by: INTERNAL MEDICINE

## 2022-04-04 PROCEDURE — 99900035 HC TECH TIME PER 15 MIN (STAT)

## 2022-04-04 PROCEDURE — 25000003 PHARM REV CODE 250: Performed by: INTERNAL MEDICINE

## 2022-04-04 PROCEDURE — 85025 COMPLETE CBC W/AUTO DIFF WBC: CPT | Performed by: INTERNAL MEDICINE

## 2022-04-04 PROCEDURE — 21400001 HC TELEMETRY ROOM

## 2022-04-04 PROCEDURE — S5010 5% DEXTROSE AND 0.45% SALINE: HCPCS | Performed by: INTERNAL MEDICINE

## 2022-04-04 PROCEDURE — 27000221 HC OXYGEN, UP TO 24 HOURS

## 2022-04-04 RX ORDER — MORPHINE SULFATE 1 MG/ML
INJECTION INTRAVENOUS CONTINUOUS
Status: DISCONTINUED | OUTPATIENT
Start: 2022-04-04 | End: 2022-04-07

## 2022-04-04 RX ORDER — CYCLOBENZAPRINE HCL 10 MG
10 TABLET ORAL ONCE
Status: COMPLETED | OUTPATIENT
Start: 2022-04-04 | End: 2022-04-04

## 2022-04-04 RX ORDER — NALOXONE HCL 0.4 MG/ML
0.02 VIAL (ML) INJECTION
Status: DISCONTINUED | OUTPATIENT
Start: 2022-04-04 | End: 2022-04-08 | Stop reason: HOSPADM

## 2022-04-04 RX ADMIN — DEXTROSE MONOHYDRATE AND SODIUM CHLORIDE: 5; .45 INJECTION, SOLUTION INTRAVENOUS at 09:04

## 2022-04-04 RX ADMIN — PANTOPRAZOLE SODIUM 40 MG: 40 TABLET, DELAYED RELEASE ORAL at 08:04

## 2022-04-04 RX ADMIN — FOLIC ACID 1 MG: 1 TABLET ORAL at 08:04

## 2022-04-04 RX ADMIN — KETOROLAC TROMETHAMINE 15 MG: 30 INJECTION, SOLUTION INTRAMUSCULAR; INTRAVENOUS at 08:04

## 2022-04-04 RX ADMIN — MORPHINE SULFATE: 1 INJECTION INTRAVENOUS at 03:04

## 2022-04-04 RX ADMIN — MORPHINE SULFATE 4 MG: 4 INJECTION INTRAVENOUS at 02:04

## 2022-04-04 RX ADMIN — KETOROLAC TROMETHAMINE 15 MG: 30 INJECTION, SOLUTION INTRAMUSCULAR; INTRAVENOUS at 12:04

## 2022-04-04 RX ADMIN — ACETAMINOPHEN 650 MG: 325 TABLET ORAL at 12:04

## 2022-04-04 RX ADMIN — MORPHINE SULFATE 4 MG: 4 INJECTION INTRAVENOUS at 06:04

## 2022-04-04 RX ADMIN — DEXTROSE MONOHYDRATE AND SODIUM CHLORIDE: 5; .45 INJECTION, SOLUTION INTRAVENOUS at 06:04

## 2022-04-04 RX ADMIN — CYCLOBENZAPRINE HYDROCHLORIDE 10 MG: 10 TABLET, FILM COATED ORAL at 01:04

## 2022-04-04 RX ADMIN — KETOROLAC TROMETHAMINE 15 MG: 30 INJECTION, SOLUTION INTRAMUSCULAR; INTRAVENOUS at 07:04

## 2022-04-04 RX ADMIN — ENOXAPARIN SODIUM 40 MG: 100 INJECTION SUBCUTANEOUS at 05:04

## 2022-04-04 NOTE — PLAN OF CARE
Ongoing (interventions implemented as appropriate)  Pt AAO x4.  VSS  Pt able to make needs known.  Pt remained afebrile throughout this shift.   Pt ambulates in room, gait steady   Pt remained free of falls this shift.   Pt was started on Morphine PCA.  Plan of care reviewed. Patient verbalizes understanding.   Pt moving/turing independent. Frequent weight shifting encouraged.  Patient sinus rhythm on monitor.   Bed low, side rails up x 2, wheels locked, call light in reach.   Hourly rounding completed.   Will continue to monitor.

## 2022-04-04 NOTE — ASSESSMENT & PLAN NOTE
Cont PCA pump( Changed to morphine per pt request )  Cont IVF D5NS  F/U daily cmp and cbc  F/U q48 hrs LDH and Reticular count   CXR and UA did not show any acute finding

## 2022-04-04 NOTE — TELEPHONE ENCOUNTER
"----- Message from Marguerite Price sent at 4/4/2022  8:46 AM CDT -----  Regarding: Consult/Advisory:  Name Of Caller: self    Contact Preference?: [phone]    What is the nature of the call?: calling to advise that she has been admitted into the hospital and is stating that her right leg is burning in pain and the nurse is stating they are waiting on the doctor to come see her. States she is in extreme pain and wants to know what she can do           Additional Notes:  "Thank you for all that you do for our patients'"     "

## 2022-04-04 NOTE — SUBJECTIVE & OBJECTIVE
Interval History:     Review of Systems   Constitutional:  Positive for activity change and fatigue.   HENT: Negative.     Eyes: Negative.    Respiratory: Negative.     Cardiovascular:  Negative for chest pain.   Gastrointestinal: Negative.    Endocrine: Negative.    Musculoskeletal:  Positive for arthralgias and myalgias.   Allergic/Immunologic: Negative.    Neurological:  Positive for weakness.   Hematological: Negative.    Psychiatric/Behavioral: Negative.     Objective:     Vital Signs (Most Recent):  Temp: 98.5 °F (36.9 °C) (04/04/22 1304)  Pulse: 81 (04/04/22 1304)  Resp: 20 (04/04/22 1425)  BP: 114/60 (04/04/22 1304)  SpO2: (!) 93 % (04/04/22 1304)   Vital Signs (24h Range):  Temp:  [97.8 °F (36.6 °C)-100.1 °F (37.8 °C)] 98.5 °F (36.9 °C)  Pulse:  [] 81  Resp:  [14-22] 20  SpO2:  [92 %-100 %] 93 %  BP: (105-150)/(54-74) 114/60     Weight: 52 kg (114 lb 10.2 oz)  Body mass index is 19.68 kg/m².    Intake/Output Summary (Last 24 hours) at 4/4/2022 1444  Last data filed at 4/4/2022 0000  Gross per 24 hour   Intake 337.16 ml   Output --   Net 337.16 ml      Physical Exam  Vitals reviewed.   Constitutional:       General: She is not in acute distress.     Appearance: Normal appearance. She is ill-appearing.   HENT:      Head: Normocephalic and atraumatic.      Nose: Nose normal.      Mouth/Throat:      Mouth: Mucous membranes are moist.      Pharynx: Oropharynx is clear.   Eyes:      Extraocular Movements: Extraocular movements intact.      Conjunctiva/sclera: Conjunctivae normal.      Pupils: Pupils are equal, round, and reactive to light.   Cardiovascular:      Rate and Rhythm: Normal rate and regular rhythm.      Pulses: Normal pulses.      Heart sounds: Normal heart sounds.   Pulmonary:      Effort: Pulmonary effort is normal. No respiratory distress.      Breath sounds: Normal breath sounds. No stridor. No wheezing or rhonchi.   Abdominal:      General: Abdomen is flat. Bowel sounds are normal. There is  no distension.      Palpations: Abdomen is soft. There is no mass.      Tenderness: There is no abdominal tenderness.      Hernia: No hernia is present.   Musculoskeletal:         General: No swelling, tenderness, deformity or signs of injury. Normal range of motion.      Cervical back: Normal range of motion and neck supple.   Skin:     General: Skin is warm.      Coloration: Skin is not pale.      Findings: No bruising or erythema.   Neurological:      General: No focal deficit present.      Mental Status: She is alert and oriented to person, place, and time. Mental status is at baseline.      Cranial Nerves: No cranial nerve deficit.      Sensory: No sensory deficit.      Motor: No weakness.      Coordination: Coordination normal.       Significant Labs: All pertinent labs within the past 24 hours have been reviewed.      Significant Imaging: I have reviewed all pertinent imaging results/findings within the past 24 hours.

## 2022-04-05 PROBLEM — D72.829 LEUKOCYTOSIS: Status: RESOLVED | Noted: 2022-04-02 | Resolved: 2022-04-05

## 2022-04-05 PROBLEM — L03.90 CELLULITIS: Status: ACTIVE | Noted: 2022-04-05

## 2022-04-05 LAB
ALBUMIN SERPL BCP-MCNC: 3.4 G/DL (ref 3.5–5.2)
ALP SERPL-CCNC: 80 U/L (ref 55–135)
ALT SERPL W/O P-5'-P-CCNC: 26 U/L (ref 10–44)
ANION GAP SERPL CALC-SCNC: 11 MMOL/L (ref 8–16)
AST SERPL-CCNC: 26 U/L (ref 10–40)
BASOPHILS # BLD AUTO: 0.05 K/UL (ref 0–0.2)
BASOPHILS NFR BLD: 0.4 % (ref 0–1.9)
BILIRUB SERPL-MCNC: 3.5 MG/DL (ref 0.1–1)
BUN SERPL-MCNC: 4 MG/DL (ref 6–20)
CALCIUM SERPL-MCNC: 8.5 MG/DL (ref 8.7–10.5)
CHLORIDE SERPL-SCNC: 103 MMOL/L (ref 95–110)
CO2 SERPL-SCNC: 23 MMOL/L (ref 23–29)
CREAT SERPL-MCNC: 0.5 MG/DL (ref 0.5–1.4)
DIFFERENTIAL METHOD: ABNORMAL
EOSINOPHIL # BLD AUTO: 0.4 K/UL (ref 0–0.5)
EOSINOPHIL NFR BLD: 2.8 % (ref 0–8)
ERYTHROCYTE [DISTWIDTH] IN BLOOD BY AUTOMATED COUNT: 16.3 % (ref 11.5–14.5)
EST. GFR  (AFRICAN AMERICAN): >60 ML/MIN/1.73 M^2
EST. GFR  (NON AFRICAN AMERICAN): >60 ML/MIN/1.73 M^2
GLUCOSE SERPL-MCNC: 114 MG/DL (ref 70–110)
HCT VFR BLD AUTO: 21.8 % (ref 37–48.5)
HGB BLD-MCNC: 7.8 G/DL (ref 12–16)
IMM GRANULOCYTES # BLD AUTO: 0.07 K/UL (ref 0–0.04)
IMM GRANULOCYTES NFR BLD AUTO: 0.6 % (ref 0–0.5)
LACTATE SERPL-SCNC: 1.8 MMOL/L (ref 0.5–2.2)
LYMPHOCYTES # BLD AUTO: 1.5 K/UL (ref 1–4.8)
LYMPHOCYTES NFR BLD: 12.2 % (ref 18–48)
MCH RBC QN AUTO: 30.5 PG (ref 27–31)
MCHC RBC AUTO-ENTMCNC: 35.8 G/DL (ref 32–36)
MCV RBC AUTO: 85 FL (ref 82–98)
MONOCYTES # BLD AUTO: 1.8 K/UL (ref 0.3–1)
MONOCYTES NFR BLD: 14.3 % (ref 4–15)
NEUTROPHILS # BLD AUTO: 8.7 K/UL (ref 1.8–7.7)
NEUTROPHILS NFR BLD: 69.7 % (ref 38–73)
NRBC BLD-RTO: 2 /100 WBC
PHOSPHATE SERPL-MCNC: 3.1 MG/DL (ref 2.7–4.5)
PLATELET # BLD AUTO: 297 K/UL (ref 150–450)
PMV BLD AUTO: 11.9 FL (ref 9.2–12.9)
POTASSIUM SERPL-SCNC: 3.6 MMOL/L (ref 3.5–5.1)
PROCALCITONIN SERPL IA-MCNC: 0.07 NG/ML
PROT SERPL-MCNC: 6.8 G/DL (ref 6–8.4)
RBC # BLD AUTO: 2.56 M/UL (ref 4–5.4)
SODIUM SERPL-SCNC: 137 MMOL/L (ref 136–145)
WBC # BLD AUTO: 12.41 K/UL (ref 3.9–12.7)

## 2022-04-05 PROCEDURE — 63600175 PHARM REV CODE 636 W HCPCS: Performed by: INTERNAL MEDICINE

## 2022-04-05 PROCEDURE — 36415 COLL VENOUS BLD VENIPUNCTURE: CPT | Performed by: INTERNAL MEDICINE

## 2022-04-05 PROCEDURE — 25500020 PHARM REV CODE 255: Performed by: INTERNAL MEDICINE

## 2022-04-05 PROCEDURE — 84100 ASSAY OF PHOSPHORUS: CPT | Performed by: INTERNAL MEDICINE

## 2022-04-05 PROCEDURE — 25000003 PHARM REV CODE 250: Performed by: INTERNAL MEDICINE

## 2022-04-05 PROCEDURE — 83605 ASSAY OF LACTIC ACID: CPT | Performed by: INTERNAL MEDICINE

## 2022-04-05 PROCEDURE — 25000003 PHARM REV CODE 250: Performed by: NURSE PRACTITIONER

## 2022-04-05 PROCEDURE — 99900035 HC TECH TIME PER 15 MIN (STAT)

## 2022-04-05 PROCEDURE — S5010 5% DEXTROSE AND 0.45% SALINE: HCPCS | Performed by: INTERNAL MEDICINE

## 2022-04-05 PROCEDURE — 94761 N-INVAS EAR/PLS OXIMETRY MLT: CPT

## 2022-04-05 PROCEDURE — 21400001 HC TELEMETRY ROOM

## 2022-04-05 PROCEDURE — 27000221 HC OXYGEN, UP TO 24 HOURS

## 2022-04-05 PROCEDURE — 63600175 PHARM REV CODE 636 W HCPCS: Performed by: NURSE PRACTITIONER

## 2022-04-05 PROCEDURE — 84145 PROCALCITONIN (PCT): CPT | Performed by: INTERNAL MEDICINE

## 2022-04-05 PROCEDURE — 80053 COMPREHEN METABOLIC PANEL: CPT | Performed by: INTERNAL MEDICINE

## 2022-04-05 PROCEDURE — 87040 BLOOD CULTURE FOR BACTERIA: CPT | Performed by: INTERNAL MEDICINE

## 2022-04-05 PROCEDURE — 85025 COMPLETE CBC W/AUTO DIFF WBC: CPT | Performed by: INTERNAL MEDICINE

## 2022-04-05 RX ORDER — DOXYCYCLINE HYCLATE 100 MG
100 TABLET ORAL EVERY 12 HOURS
Status: DISCONTINUED | OUTPATIENT
Start: 2022-04-05 | End: 2022-04-08 | Stop reason: HOSPADM

## 2022-04-05 RX ORDER — MAG HYDROX/ALUMINUM HYD/SIMETH 200-200-20
30 SUSPENSION, ORAL (FINAL DOSE FORM) ORAL EVERY 6 HOURS PRN
Status: DISCONTINUED | OUTPATIENT
Start: 2022-04-05 | End: 2022-04-08 | Stop reason: HOSPADM

## 2022-04-05 RX ADMIN — FOLIC ACID 1 MG: 1 TABLET ORAL at 08:04

## 2022-04-05 RX ADMIN — CEFTRIAXONE 1 G: 1 INJECTION, SOLUTION INTRAVENOUS at 02:04

## 2022-04-05 RX ADMIN — KETOROLAC TROMETHAMINE 15 MG: 30 INJECTION, SOLUTION INTRAMUSCULAR; INTRAVENOUS at 07:04

## 2022-04-05 RX ADMIN — DOXYCYCLINE HYCLATE 100 MG: 100 TABLET, COATED ORAL at 09:04

## 2022-04-05 RX ADMIN — MAGNESIUM HYDROXIDE/ALUMINUM HYDROXICE/SIMETHICONE 30 ML: 120; 1200; 1200 SUSPENSION ORAL at 07:04

## 2022-04-05 RX ADMIN — IOHEXOL 100 ML: 350 INJECTION, SOLUTION INTRAVENOUS at 11:04

## 2022-04-05 RX ADMIN — DEXTROSE MONOHYDRATE AND SODIUM CHLORIDE: 5; .45 INJECTION, SOLUTION INTRAVENOUS at 03:04

## 2022-04-05 RX ADMIN — DEXTROSE MONOHYDRATE AND SODIUM CHLORIDE: 5; .45 INJECTION, SOLUTION INTRAVENOUS at 11:04

## 2022-04-05 RX ADMIN — PANTOPRAZOLE SODIUM 40 MG: 40 TABLET, DELAYED RELEASE ORAL at 08:04

## 2022-04-05 RX ADMIN — MORPHINE SULFATE: 1 INJECTION INTRAVENOUS at 08:04

## 2022-04-05 RX ADMIN — KETOROLAC TROMETHAMINE 15 MG: 30 INJECTION, SOLUTION INTRAMUSCULAR; INTRAVENOUS at 11:04

## 2022-04-05 RX ADMIN — KETOROLAC TROMETHAMINE 15 MG: 30 INJECTION, SOLUTION INTRAMUSCULAR; INTRAVENOUS at 03:04

## 2022-04-05 NOTE — SUBJECTIVE & OBJECTIVE
Review of Systems   Constitutional:  Positive for activity change and fatigue.   HENT: Negative.     Eyes: Negative.    Respiratory: Negative.     Cardiovascular:  Negative for chest pain.   Gastrointestinal: Negative.    Endocrine: Negative.    Musculoskeletal:  Positive for arthralgias and myalgias.   Allergic/Immunologic: Negative.    Neurological:  Positive for weakness.   Hematological: Negative.    Psychiatric/Behavioral: Negative.     Objective:     Vital Signs (Most Recent):  Temp: 98.5 °F (36.9 °C) (04/05/22 0728)  Pulse: 88 (04/05/22 0729)  Resp: 17 (04/05/22 0728)  BP: (!) 97/49 (04/05/22 0729)  SpO2: 99 % (04/05/22 0729)   Vital Signs (24h Range):  Temp:  [98.3 °F (36.8 °C)-102.4 °F (39.1 °C)] 98.5 °F (36.9 °C)  Pulse:  [] 88  Resp:  [16-20] 17  SpO2:  [98 %-100 %] 99 %  BP: ()/(47-73) 97/49     Weight: 68.9 kg (151 lb 14.4 oz)  Body mass index is 26.07 kg/m².    Intake/Output Summary (Last 24 hours) at 4/5/2022 1451  Last data filed at 4/5/2022 1126  Gross per 24 hour   Intake 4.5 ml   Output 900 ml   Net -895.5 ml      Physical Exam  Vitals reviewed.   Constitutional:       General: She is not in acute distress.     Appearance: Normal appearance. She is ill-appearing.   HENT:      Head: Normocephalic and atraumatic.      Nose: Nose normal.      Mouth/Throat:      Mouth: Mucous membranes are moist.      Pharynx: Oropharynx is clear.   Eyes:      Extraocular Movements: Extraocular movements intact.      Conjunctiva/sclera: Conjunctivae normal.      Pupils: Pupils are equal, round, and reactive to light.   Cardiovascular:      Rate and Rhythm: Normal rate and regular rhythm.      Pulses: Normal pulses.      Heart sounds: Normal heart sounds.   Pulmonary:      Effort: Pulmonary effort is normal. No respiratory distress.      Breath sounds: Normal breath sounds. No stridor. No wheezing or rhonchi.   Abdominal:      General: Abdomen is flat. Bowel sounds are normal. There is no distension.       Palpations: Abdomen is soft. There is no mass.      Tenderness: There is no abdominal tenderness.      Hernia: No hernia is present.   Musculoskeletal:         General: No swelling, tenderness, deformity or signs of injury. Normal range of motion.      Cervical back: Normal range of motion and neck supple.        Legs:    Skin:     General: Skin is warm.      Coloration: Skin is not pale.      Findings: No bruising or erythema.   Neurological:      General: No focal deficit present.      Mental Status: She is alert and oriented to person, place, and time. Mental status is at baseline.      Cranial Nerves: No cranial nerve deficit.      Sensory: No sensory deficit.      Motor: No weakness.      Coordination: Coordination normal.       Significant Labs: All pertinent labs within the past 24 hours have been reviewed.  Blood Culture: No results for input(s): LABBLOO in the last 48 hours.  BMP:   Recent Labs   Lab 04/05/22  0342   *      K 3.6      CO2 23   BUN 4*   CREATININE 0.5   CALCIUM 8.5*     CBC:   Recent Labs   Lab 04/04/22  0437 04/05/22  0342   WBC 11.44 12.41   HGB 8.0* 7.8*   HCT 23.0* 21.8*    297       Significant Imaging:   Imaging Results              X-Ray Chest AP Portable (Final result)  Result time 04/02/22 08:47:00      Final result by CARRI Muller Sr., MD (04/02/22 08:47:00)                   Impression:      There is no evidence of an acute pulmonary process. .      Electronically signed by: Agusto Muller MD  Date:    04/02/2022  Time:    08:47               Narrative:    EXAMINATION:  XR CHEST AP PORTABLE    CLINICAL HISTORY:  Hb-SS disease with crisis, unspecified    COMPARISON:  Comparison was made to chest x-rays dating back to 04/03/2020.    FINDINGS:  The size of the heart is normal.  There is no evidence of an acute pulmonary process.  There is no pneumothorax.  The costophrenic angles are sharp.

## 2022-04-05 NOTE — PLAN OF CARE
Plan of care reviewed with patient/family. All questions answered til this point. No new questions as of now. Will continue care per unit policy and patient needs.

## 2022-04-05 NOTE — ASSESSMENT & PLAN NOTE
Cont PCA pump( Changed to morphine per pt request )  Cont IVF D5NS  F/U daily cmp and cbc  F/U q48 hrs LDH and Reticular count   CXR and UA did not show any acute finding     4/5/22  -H/H stable   -Continue current medical management plan   -Supportive care

## 2022-04-05 NOTE — PLAN OF CARE
Ongoing (interventions implemented as appropriate)  Pt AAO x4.  VSS  Pt able to make needs known.  Pt remained afebrile throughout this shift.   Pt ambulates in room, gait unsteady   Pt remained free of falls this shift.   Morphine PCA  Plan of care reviewed. Patient verbalizes understanding.   Pt moving/turing independent. Frequent weight shifting encouraged.  Patient sinus rhythm on monitor.   Bed low, side rails up x 2, wheels locked, call light in reach.   Hourly rounding completed.   Will continue to monitor.

## 2022-04-05 NOTE — PROGRESS NOTES
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 Presents to the ED with C/O of  generalized pain over the last days which has gotten worse.  Is associated with  chest pain and joint paint . She denies any SOB , palpitation , fever , chills , GI or  sx .    Her sickle cell has been well-controlled for most of her life. Per chart review, she followed with Dr. Elvira Nuñez hematologist here at [facility] but has not followed up recently  . 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 She takes oxycodone 5mg and tramadol 50mg at home when she has pain, but does not use them regularly.  ER Course:WBC 17 K , H/H 8.3/23 , Ret 23 ,  . CXR and did not show any  acute finding     Pt will be admitted with a Dx of sickle cell crisis   Code status : Full code   SDM:  Shar Franco Father   [phone]           Overview/Hospital Course:  25 y/o aaf admitted with a Dx of Sickle cell exacerbation . Started on PCA natalia dn IVF . She cont complaining of generalized pain . The H/H is 7 today and plan to transfuse 1 PRBC   4/4 Last night  changed  to  IV push morphine due to  uncontrolled pain .  We will start a morphine PCA pump  . She cont  complaining of severe b/l knee pain . B/L LE US negative for dvt . She is s/p 1 PRBC. As of 4/5/22 pt c/o right shin pain and swelling. Imaging showed Mild pretibial edema or cellulitis; " No discrete abscess seen; Trace knee joint effusion. IV rocephin and oral doxycycline started. H/H stable. Continue current medical management plan.           Review of Systems   Constitutional:  Positive for activity change and fatigue.   HENT: Negative.     Eyes: Negative.    Respiratory: Negative.     Cardiovascular:  Negative for chest pain.   Gastrointestinal: Negative.    Endocrine: Negative.    Musculoskeletal:  Positive for arthralgias and myalgias.   Allergic/Immunologic: Negative.    Neurological:  Positive for weakness.   Hematological: Negative.    Psychiatric/Behavioral: Negative.     Objective:     Vital Signs (Most Recent):  Temp: 98.5 °F (36.9 °C) (04/05/22 0728)  Pulse: 88 (04/05/22 0729)  Resp: 17 (04/05/22 0728)  BP: (!) 97/49 (04/05/22 0729)  SpO2: 99 % (04/05/22 0729)   Vital Signs (24h Range):  Temp:  [98.3 °F (36.8 °C)-102.4 °F (39.1 °C)] 98.5 °F (36.9 °C)  Pulse:  [] 88  Resp:  [16-20] 17  SpO2:  [98 %-100 %] 99 %  BP: ()/(47-73) 97/49     Weight: 68.9 kg (151 lb 14.4 oz)  Body mass index is 26.07 kg/m².    Intake/Output Summary (Last 24 hours) at 4/5/2022 1451  Last data filed at 4/5/2022 1126  Gross per 24 hour   Intake 4.5 ml   Output 900 ml   Net -895.5 ml      Physical Exam  Vitals reviewed.   Constitutional:       General: She is not in acute distress.     Appearance: Normal appearance. She is ill-appearing.   HENT:      Head: Normocephalic and atraumatic.      Nose: Nose normal.      Mouth/Throat:      Mouth: Mucous membranes are moist.      Pharynx: Oropharynx is clear.   Eyes:      Extraocular Movements: Extraocular movements intact.      Conjunctiva/sclera: Conjunctivae normal.      Pupils: Pupils are equal, round, and reactive to light.   Cardiovascular:      Rate and Rhythm: Normal rate and regular rhythm.      Pulses: Normal pulses.      Heart sounds: Normal heart sounds.   Pulmonary:      Effort: Pulmonary effort is normal. No respiratory distress.      Breath  sounds: Normal breath sounds. No stridor. No wheezing or rhonchi.   Abdominal:      General: Abdomen is flat. Bowel sounds are normal. There is no distension.      Palpations: Abdomen is soft. There is no mass.      Tenderness: There is no abdominal tenderness.      Hernia: No hernia is present.   Musculoskeletal:         General: No swelling, tenderness, deformity or signs of injury. Normal range of motion.      Cervical back: Normal range of motion and neck supple.        Legs:    Skin:     General: Skin is warm.      Coloration: Skin is not pale.      Findings: No bruising or erythema.   Neurological:      General: No focal deficit present.      Mental Status: She is alert and oriented to person, place, and time. Mental status is at baseline.      Cranial Nerves: No cranial nerve deficit.      Sensory: No sensory deficit.      Motor: No weakness.      Coordination: Coordination normal.       Significant Labs: All pertinent labs within the past 24 hours have been reviewed.  Blood Culture: No results for input(s): LABBLOO in the last 48 hours.  BMP:   Recent Labs   Lab 04/05/22  0342   *      K 3.6      CO2 23   BUN 4*   CREATININE 0.5   CALCIUM 8.5*     CBC:   Recent Labs   Lab 04/04/22  0437 04/05/22  0342   WBC 11.44 12.41   HGB 8.0* 7.8*   HCT 23.0* 21.8*    297       Significant Imaging:   Imaging Results              X-Ray Chest AP Portable (Final result)  Result time 04/02/22 08:47:00      Final result by CARRI Muller Sr., MD (04/02/22 08:47:00)                   Impression:      There is no evidence of an acute pulmonary process. .      Electronically signed by: Agusto Muller MD  Date:    04/02/2022  Time:    08:47               Narrative:    EXAMINATION:  XR CHEST AP PORTABLE    CLINICAL HISTORY:  Hb-SS disease with crisis, unspecified    COMPARISON:  Comparison was made to chest x-rays dating back to 04/03/2020.    FINDINGS:  The size of the heart is normal.  There is no  evidence of an acute pulmonary process.  There is no pneumothorax.  The costophrenic angles are sharp.                                         Assessment/Plan:      * Sickle cell anemia with pain  Cont PCA pump( Changed to morphine per pt request )  Cont IVF D5NS  F/U daily cmp and cbc  F/U q48 hrs LDH and Reticular count   CXR and UA did not show any acute finding     4/5/22  -H/H stable   -Continue current medical management plan   -Supportive care         Cellulitis  IV rocephin and doxycycline added   Follow blood cx   Monitor fever trend         GERD (gastroesophageal reflux disease)  Cont PPI      Elevated bilirubin  Monitor   Most Likely  due to SS exacerbation   Bilirubin 3.5 down from 4.4    Sickle cell anemia  Monitor H/H  Transfuse if < than 7   Transfuse 1 PRBC       VTE Risk Mitigation (From admission, onward)         Ordered     enoxaparin injection 40 mg  Daily         04/02/22 1313     Place RISSA hose  Until discontinued         04/02/22 1313     IP VTE HIGH RISK PATIENT  Once         04/02/22 1313                Discharge Planning   FARIHA: 4/4/2022     Code Status: Full Code   Is the patient medically ready for discharge?:     Reason for patient still in hospital (select all that apply): Patient trending condition, Laboratory test and Treatment  Discharge Plan A: Home with family                  Tiffanie Adams NP  Department of Hospital Medicine   [facility] ([facility])

## 2022-04-06 LAB
ALBUMIN SERPL BCP-MCNC: 2.9 G/DL (ref 3.5–5.2)
ALP SERPL-CCNC: 89 U/L (ref 55–135)
ALT SERPL W/O P-5'-P-CCNC: 28 U/L (ref 10–44)
ANION GAP SERPL CALC-SCNC: 5 MMOL/L (ref 8–16)
AST SERPL-CCNC: 30 U/L (ref 10–40)
BASOPHILS # BLD AUTO: 0.05 K/UL (ref 0–0.2)
BASOPHILS NFR BLD: 0.5 % (ref 0–1.9)
BILIRUB SERPL-MCNC: 3 MG/DL (ref 0.1–1)
BLD PROD TYP BPU: NORMAL
BLOOD UNIT EXPIRATION DATE: NORMAL
BLOOD UNIT TYPE CODE: 7300
BLOOD UNIT TYPE: NORMAL
BUN SERPL-MCNC: 6 MG/DL (ref 6–20)
CALCIUM SERPL-MCNC: 8.3 MG/DL (ref 8.7–10.5)
CHLORIDE SERPL-SCNC: 105 MMOL/L (ref 95–110)
CO2 SERPL-SCNC: 27 MMOL/L (ref 23–29)
CODING SYSTEM: NORMAL
CREAT SERPL-MCNC: 0.5 MG/DL (ref 0.5–1.4)
DIFFERENTIAL METHOD: ABNORMAL
DISPENSE STATUS: NORMAL
EOSINOPHIL # BLD AUTO: 0.4 K/UL (ref 0–0.5)
EOSINOPHIL NFR BLD: 4.3 % (ref 0–8)
ERYTHROCYTE [DISTWIDTH] IN BLOOD BY AUTOMATED COUNT: 16.8 % (ref 11.5–14.5)
EST. GFR  (AFRICAN AMERICAN): >60 ML/MIN/1.73 M^2
EST. GFR  (NON AFRICAN AMERICAN): >60 ML/MIN/1.73 M^2
GLUCOSE SERPL-MCNC: 103 MG/DL (ref 70–110)
HCT VFR BLD AUTO: 19.9 % (ref 37–48.5)
HGB BLD-MCNC: 6.7 G/DL (ref 12–16)
IMM GRANULOCYTES # BLD AUTO: 0.06 K/UL (ref 0–0.04)
IMM GRANULOCYTES NFR BLD AUTO: 0.6 % (ref 0–0.5)
LYMPHOCYTES # BLD AUTO: 2.9 K/UL (ref 1–4.8)
LYMPHOCYTES NFR BLD: 28.2 % (ref 18–48)
MCH RBC QN AUTO: 29.5 PG (ref 27–31)
MCHC RBC AUTO-ENTMCNC: 33.7 G/DL (ref 32–36)
MCV RBC AUTO: 88 FL (ref 82–98)
MONOCYTES # BLD AUTO: 1.3 K/UL (ref 0.3–1)
MONOCYTES NFR BLD: 13.1 % (ref 4–15)
NEUTROPHILS # BLD AUTO: 5.4 K/UL (ref 1.8–7.7)
NEUTROPHILS NFR BLD: 53.3 % (ref 38–73)
NRBC BLD-RTO: 1 /100 WBC
NUM UNITS TRANS PACKED RBC: NORMAL
PHOSPHATE SERPL-MCNC: 3.3 MG/DL (ref 2.7–4.5)
PLATELET # BLD AUTO: 296 K/UL (ref 150–450)
PMV BLD AUTO: 11.6 FL (ref 9.2–12.9)
POTASSIUM SERPL-SCNC: 3.8 MMOL/L (ref 3.5–5.1)
PROT SERPL-MCNC: 6.2 G/DL (ref 6–8.4)
RBC # BLD AUTO: 2.27 M/UL (ref 4–5.4)
RETICS/RBC NFR AUTO: 15.4 % (ref 0.5–2.5)
SODIUM SERPL-SCNC: 137 MMOL/L (ref 136–145)
WBC # BLD AUTO: 10.12 K/UL (ref 3.9–12.7)

## 2022-04-06 PROCEDURE — 63600175 PHARM REV CODE 636 W HCPCS: Performed by: NURSE PRACTITIONER

## 2022-04-06 PROCEDURE — 85045 AUTOMATED RETICULOCYTE COUNT: CPT | Performed by: INTERNAL MEDICINE

## 2022-04-06 PROCEDURE — 21400001 HC TELEMETRY ROOM

## 2022-04-06 PROCEDURE — P9016 RBC LEUKOCYTES REDUCED: HCPCS | Performed by: NURSE PRACTITIONER

## 2022-04-06 PROCEDURE — 99900035 HC TECH TIME PER 15 MIN (STAT)

## 2022-04-06 PROCEDURE — 99222 PR INITIAL HOSPITAL CARE,LEVL II: ICD-10-PCS | Mod: ,,, | Performed by: INTERNAL MEDICINE

## 2022-04-06 PROCEDURE — 99222 1ST HOSP IP/OBS MODERATE 55: CPT | Mod: ,,, | Performed by: INTERNAL MEDICINE

## 2022-04-06 PROCEDURE — 94761 N-INVAS EAR/PLS OXIMETRY MLT: CPT

## 2022-04-06 PROCEDURE — 25000003 PHARM REV CODE 250: Performed by: INTERNAL MEDICINE

## 2022-04-06 PROCEDURE — 84100 ASSAY OF PHOSPHORUS: CPT | Performed by: INTERNAL MEDICINE

## 2022-04-06 PROCEDURE — 36430 TRANSFUSION BLD/BLD COMPNT: CPT

## 2022-04-06 PROCEDURE — 85025 COMPLETE CBC W/AUTO DIFF WBC: CPT | Performed by: INTERNAL MEDICINE

## 2022-04-06 PROCEDURE — 36415 COLL VENOUS BLD VENIPUNCTURE: CPT | Performed by: INTERNAL MEDICINE

## 2022-04-06 PROCEDURE — 63600175 PHARM REV CODE 636 W HCPCS: Performed by: INTERNAL MEDICINE

## 2022-04-06 PROCEDURE — 27000221 HC OXYGEN, UP TO 24 HOURS

## 2022-04-06 PROCEDURE — 80053 COMPREHEN METABOLIC PANEL: CPT | Performed by: INTERNAL MEDICINE

## 2022-04-06 PROCEDURE — 25000003 PHARM REV CODE 250: Performed by: NURSE PRACTITIONER

## 2022-04-06 RX ORDER — KETOROLAC TROMETHAMINE 30 MG/ML
15 INJECTION, SOLUTION INTRAMUSCULAR; INTRAVENOUS EVERY 8 HOURS PRN
Status: DISCONTINUED | OUTPATIENT
Start: 2022-04-06 | End: 2022-04-08 | Stop reason: HOSPADM

## 2022-04-06 RX ORDER — HYDROCODONE BITARTRATE AND ACETAMINOPHEN 500; 5 MG/1; MG/1
TABLET ORAL
Status: DISCONTINUED | OUTPATIENT
Start: 2022-04-06 | End: 2022-04-08 | Stop reason: HOSPADM

## 2022-04-06 RX ADMIN — CEFTRIAXONE 1 G: 1 INJECTION, SOLUTION INTRAVENOUS at 11:04

## 2022-04-06 RX ADMIN — KETOROLAC TROMETHAMINE 15 MG: 30 INJECTION, SOLUTION INTRAMUSCULAR at 07:04

## 2022-04-06 RX ADMIN — ACETAMINOPHEN 650 MG: 325 TABLET ORAL at 07:04

## 2022-04-06 RX ADMIN — PANTOPRAZOLE SODIUM 40 MG: 40 TABLET, DELAYED RELEASE ORAL at 08:04

## 2022-04-06 RX ADMIN — FOLIC ACID 1 MG: 1 TABLET ORAL at 08:04

## 2022-04-06 RX ADMIN — DOXYCYCLINE HYCLATE 100 MG: 100 TABLET, COATED ORAL at 09:04

## 2022-04-06 RX ADMIN — KETOROLAC TROMETHAMINE 15 MG: 30 INJECTION, SOLUTION INTRAMUSCULAR; INTRAVENOUS at 10:04

## 2022-04-06 RX ADMIN — KETOROLAC TROMETHAMINE 15 MG: 30 INJECTION, SOLUTION INTRAMUSCULAR; INTRAVENOUS at 04:04

## 2022-04-06 RX ADMIN — DOXYCYCLINE HYCLATE 100 MG: 100 TABLET, COATED ORAL at 08:04

## 2022-04-06 NOTE — CONSULTS
[facility] ([facility])  Hematology/Oncology  Consult Note    Patient Name: Elizabeth Franco  MRN: [id number]  Admission Date: 4/2/2022  Hospital Length of Stay: 4 days  Code Status: Full Code   Attending Provider: Conrad Anthony, *  Consulting Provider: Lisa Matute NP  Primary Care Physician: Elvira Nuñez MD  Principal Problem:Sickle cell anemia with pain    Inpatient consult to Hematology/Oncology  Consult performed by: Lisa Matute NP  Consult ordered by: Tiffanie Adams NP        Subjective:     HPI:  26 y.o female with h/o Sickle cell anemia, splenectomy, appendectomy, nocturnal enuresis originally admitted for sickle cell pain crisis. Patient reports developing RLE swelling during this admission. BLE US negative for DVT. F/u CT leg shows--Mild pretibial edema or cellulitis.  No discrete abscess seen.  Trace knee joint effusion. Patient reports resolution of generalized pain associated with SCPC. Reports pain to RLE but improving since antibiotic initiation.     In regards to her sickle cell anemia she follows with Dr. Nuñez, Hematology but has not followed up in over 1 year. She reports rare pain crisis 1-2 x yearly. She denies taking routine medications for her sickle cell.       Oncology Treatment Plan:   [Could not find a treatment plan. This SmartLink may be configured incorrectly. Contact a  for help.]    Medications:  Continuous Infusions:   dextrose 5 % and 0.45 % NaCl 100 mL/hr at 04/06/22 0841    morphine       Scheduled Meds:   cefTRIAXone (ROCEPHIN) IVPB  1 g Intravenous Q24H    doxycycline  100 mg Oral Q12H    enoxaparin  40 mg Subcutaneous Daily    folic acid  1 mg Oral Daily    pantoprazole  40 mg Oral Daily     PRN Meds:sodium chloride, sodium chloride, acetaminophen, aluminum-magnesium hydroxide-simethicone, diphenhydrAMINE, ketorolac, naloxone, ondansetron, sodium chloride 0.9%     Review of patient's allergies indicates:  No Known Allergies      Past Medical History:   Diagnosis Date    Hidradenitis suppurativa     Nocturnal enuresis     Sickle cell anemia      History reviewed. No pertinent surgical history.  Family History       Problem Relation (Age of Onset)    No Known Problems Mother, Father          Tobacco Use    Smoking status: Never Smoker    Smokeless tobacco: Never Used   Substance and Sexual Activity    Alcohol use: No     Alcohol/week: 0.0 standard drinks    Drug use: Never    Sexual activity: Not Currently       Review of Systems   Constitutional:  Positive for activity change. Negative for appetite change, chills, fatigue, fever and unexpected weight change.   HENT:  Negative for congestion, mouth sores, nosebleeds, sore throat, trouble swallowing and voice change.    Eyes:  Negative for photophobia and visual disturbance.   Respiratory:  Negative for cough, chest tightness, shortness of breath and wheezing.    Cardiovascular:  Positive for leg swelling. Negative for chest pain and palpitations.   Gastrointestinal:  Negative for abdominal distention, abdominal pain, blood in stool, constipation, diarrhea, nausea and vomiting.   Genitourinary:  Negative for difficulty urinating, dysuria and hematuria.   Musculoskeletal:  Positive for arthralgias (right lower extremity). Negative for back pain and myalgias.   Skin:  Negative for pallor, rash and wound.   Neurological:  Negative for dizziness, syncope, weakness and headaches.   Hematological:  Negative for adenopathy. Does not bruise/bleed easily.   Psychiatric/Behavioral:  The patient is nervous/anxious.    Objective:     Vital Signs (Most Recent):  Temp: 98.5 °F (36.9 °C) (04/06/22 1227)  Pulse: 84 (04/06/22 1227)  Resp: 18 (04/06/22 1227)  BP: (!) 113/56 (04/06/22 1227)  SpO2: 100 % (04/06/22 1227)   Vital Signs (24h Range):  Temp:  [98.3 °F (36.8 °C)-100.1 °F (37.8 °C)] 98.5 °F (36.9 °C)  Pulse:  [] 84  Resp:  [16-20] 18  SpO2:  [96 %-100 %] 100 %  BP: (102-118)/(55-64)  113/56     Weight: 60.5 kg (133 lb 6.1 oz)  Body mass index is 22.89 kg/m².  Body surface area is 1.65 meters squared.      Intake/Output Summary (Last 24 hours) at 4/6/2022 1504  Last data filed at 4/6/2022 1107  Gross per 24 hour   Intake 240 ml   Output --   Net 240 ml       Physical Exam  Vitals reviewed.   Constitutional:       Appearance: She is well-developed.   HENT:      Head: Normocephalic.      Right Ear: External ear normal.      Left Ear: External ear normal.   Eyes:      General: Lids are normal. No scleral icterus.        Right eye: No discharge.         Left eye: No discharge.      Conjunctiva/sclera: Conjunctivae normal.   Neck:      Thyroid: No thyroid mass.   Cardiovascular:      Rate and Rhythm: Normal rate and regular rhythm.      Heart sounds: Normal heart sounds.   Pulmonary:      Effort: Pulmonary effort is normal. No respiratory distress.      Breath sounds: Normal breath sounds. No wheezing or rales.   Abdominal:      General: Bowel sounds are normal. There is no distension.      Palpations: Abdomen is soft.      Tenderness: There is no abdominal tenderness.   Genitourinary:     Comments: deferred  Musculoskeletal:         General: Normal range of motion.      Cervical back: Normal range of motion.      Right lower leg: Edema present.   Skin:     General: Skin is warm and dry.   Neurological:      Mental Status: She is alert and oriented to person, place, and time.   Psychiatric:         Speech: Speech normal.         Behavior: Behavior normal. Behavior is cooperative.         Thought Content: Thought content normal.       Significant Labs:   BMP:   Recent Labs   Lab 04/05/22  0342 04/06/22  0350   * 103    137   K 3.6 3.8    105   CO2 23 27   BUN 4* 6   CREATININE 0.5 0.5   CALCIUM 8.5* 8.3*   , CBC:   Recent Labs   Lab 04/05/22  0342 04/06/22  0350   WBC 12.41 10.12   HGB 7.8* 6.7*   HCT 21.8* 19.9*    296   , LDH: No results for input(s): LDHCSF, BFSOURCE in the  last 48 hours., LFTs:   Recent Labs   Lab 04/05/22  0342 04/06/22  0350   ALT 26 28   AST 26 30   ALKPHOS 80 89   BILITOT 3.5* 3.0*   PROT 6.8 6.2   ALBUMIN 3.4* 2.9*   , Reticulocytes:   Recent Labs   Lab 04/06/22  0015   RETIC 15.4*   , Urine Studies: No results for input(s): COLORU, APPEARANCEUA, PHUR, SPECGRAV, PROTEINUA, GLUCUA, KETONESU, BILIRUBINUA, OCCULTUA, NITRITE, UROBILINOGEN, LEUKOCYTESUR, RBCUA, WBCUA, BACTERIA, SQUAMEPITHEL, HYALINECASTS in the last 48 hours.    Invalid input(s): WRIGHTSUR, and All pertinent labs from the last 24 hours have been reviewed.    Diagnostic Results:  I have reviewed all pertinent imaging results/findings within the past 24 hours.    Assessment/Plan:     * Sickle cell anemia with pain  -Continue IV hydration, PCA, folic acid supplementation  -patient voices desire for Hematology f/u in [place]  -Arrange outpatient follow up upon d/c  -Consider trial oral analgesia given patient feels she is no longer in SCPC  -hg 6.7. Agree with 1 unit PRBCs transfusion  -CBC, CMP, LDH, retic Q 48 H   -Supportive care    Cellulitis  -management per Primary team    Elevated bilirubin  -Most likely secondary to recent SCPC  -Monitor CMP Q 48 H        Thank you for your consult. I will follow-up with patient. Please contact us if you have any additional questions.    Lisa Matute NP  Hematology/Oncology  [facility] ([facility])

## 2022-04-06 NOTE — PHYSICIAN QUERY
PT Name: Elizabeth Franco  MR #: [id number]     DOCUMENTATION CLARIFICATION     CDS: Rayne Morel RN         Contact information:[email or web address] or (cell) [phone]    This form is a permanent document in the medical record.     Query Date: April 6, 2022    By submitting this query, we are merely seeking further clarification of documentation.  Please utilize your independent clinical judgment when addressing the question(s) below.      The Medical Record contains the following:    Clinical Information Location in Medical Records   Cellulitis  IV rocephin and doxycycline added   Follow blood cx   Monitor fever trend    As of 4/5/22 pt c/o right shin pain and swelling. Imaging showed Mild pretibial edema or cellulitis; No discrete abscess seen; Trace knee joint effusion. IV rocephin and oral doxycycline started. H/H stable.    Pt states pain is generalized to legs     04/02  08:45   WBC 17.64 (H)    HM PN 4/5                  ED Note 4/2    Lab Results        According to coding guidelines, Present on Admission is defined as present at the time the order for inpatient admission occurs. Conditions that develop during an outpatient encounter, including emergency department, observation, or outpatient surgery, are considered as present on admission.       Please clarify the Present on Admission (POA) status of the diagnosis: Cellulitis    [  ] Yes (Y)     [  x] No (N)     [  ] Documentation insufficient to determine if condition is POA (U)     [  ] Clinically Undetermined (W)     Reference:  ICD-10-CM Official Guidelines for Coding and Reporting FY 2021. (2020). Retrieved October 21, 2020, from https://www.cdc.gov/nchs/data/icd/10cmguidelines-FY2021.pdf?[email or web address]    Form No. [id number]

## 2022-04-06 NOTE — ASSESSMENT & PLAN NOTE
-Continue IV hydration, PCA, folic acid supplementation  -patient voices desire for Hematology f/u in [place]  -Arrange outpatient follow up upon d/c  -Consider trial oral analgesia given patient feels she is no longer in SCPC  -hg 6.7. Agree with 1 unit PRBCs transfusion  -CBC, CMP, LDH, retic Q 48 H   -Supportive care

## 2022-04-06 NOTE — HPI
26 y.o female with h/o Sickle cell anemia, splenectomy, appendectomy, nocturnal enuresis originally admitted for sickle cell pain crisis. Patient reports developing RLE swelling during this admission. BLE US negative for DVT. F/u CT leg shows--Mild pretibial edema or cellulitis.  No discrete abscess seen.  Trace knee joint effusion. Patient reports resolution of generalized pain associated with SCPC. Reports pain to RLE but improving since antibiotic initiation.     In regards to her sickle cell anemia she follows with Dr. Nuñez, Hematology but has not followed up in over 1 year. She reports rare pain crisis 1-2 x yearly. She denies taking routine medications for her sickle cell.

## 2022-04-06 NOTE — PROGRESS NOTES
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 Presents to the ED with C/O of  generalized pain over the last days which has gotten worse.  Is associated with  chest pain and joint paint . She denies any SOB , palpitation , fever , chills , GI or  sx .    Her sickle cell has been well-controlled for most of her life. Per chart review, she followed with Dr. Elvira Nuñez hematologist here at [facility] but has not followed up recently  . 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 She takes oxycodone 5mg and tramadol 50mg at home when she has pain, but does not use them regularly.  ER Course:WBC 17 K , H/H 8.3/23 , Ret 23 ,  . CXR and did not show any  acute finding     Pt will be admitted with a Dx of sickle cell crisis   Code status : Full code   SDM:  Shar Franco Father   [phone]           Overview/Hospital Course:  27 y/o aaf admitted with a Dx of Sickle cell exacerbation . Started on PCA natalia dn IVF . She cont complaining of generalized pain . The H/H is 7 today and plan to transfuse 1 PRBC   4/4 Last night  changed  to  IV push morphine due to  uncontrolled pain .  We will start a morphine PCA pump  . She cont  complaining of severe b/l knee pain . B/L LE US negative for dvt . She is s/p 1 PRBC. As of 4/5/22 pt c/o right shin pain and swelling. Imaging showed Mild pretibial edema or cellulitis; " No discrete abscess seen; Trace knee joint effusion. IV rocephin and oral doxycycline started. H/H stable. Continue current medical management plan. As of 4/6/22 pain improving slowly. H/H 6.7/19.9, will transfuse 1 unit of PRBCs. RLE swelling improving. Continue IV abx.           Review of Systems   Constitutional:  Positive for activity change and fatigue.   HENT: Negative.     Eyes: Negative.    Respiratory: Negative.     Cardiovascular:  Negative for chest pain.   Gastrointestinal: Negative.    Endocrine: Negative.    Musculoskeletal:  Positive for arthralgias and myalgias.   Allergic/Immunologic: Negative.    Neurological:  Positive for weakness.   Hematological: Negative.    Psychiatric/Behavioral: Negative.     Objective:     Vital Signs (Most Recent):  Temp: 98.3 °F (36.8 °C) (04/06/22 0754)  Pulse: 74 (04/06/22 0754)  Resp: 18 (04/06/22 0754)  BP: (!) 105/57 (04/06/22 0754)  SpO2: 96 % (04/06/22 0854)   Vital Signs (24h Range):  Temp:  [98.3 °F (36.8 °C)-100.1 °F (37.8 °C)] 98.3 °F (36.8 °C)  Pulse:  [] 74  Resp:  [16-20] 18  SpO2:  [96 %-100 %] 96 %  BP: (102-118)/(55-64) 105/57     Weight: 60.5 kg (133 lb 6.1 oz)  Body mass index is 22.89 kg/m².    Intake/Output Summary (Last 24 hours) at 4/6/2022 1225  Last data filed at 4/6/2022 1107  Gross per 24 hour   Intake 240 ml   Output --   Net 240 ml      Physical Exam  Vitals reviewed.   Constitutional:       General: She is not in acute distress.     Appearance: Normal appearance. She is ill-appearing.   HENT:      Head: Normocephalic and atraumatic.      Nose: Nose normal.      Mouth/Throat:      Mouth: Mucous membranes are moist.      Pharynx: Oropharynx is clear.   Eyes:      Extraocular Movements: Extraocular movements intact.      Conjunctiva/sclera: Conjunctivae normal.      Pupils: Pupils are equal, round, and reactive to light.   Cardiovascular:      Rate and Rhythm: Normal rate and regular rhythm.      Pulses: Normal pulses.      Heart sounds:  Normal heart sounds.   Pulmonary:      Effort: Pulmonary effort is normal. No respiratory distress.      Breath sounds: Normal breath sounds. No stridor. No wheezing or rhonchi.   Abdominal:      General: Abdomen is flat. Bowel sounds are normal. There is no distension.      Palpations: Abdomen is soft. There is no mass.      Tenderness: There is no abdominal tenderness.      Hernia: No hernia is present.   Musculoskeletal:         General: No swelling, tenderness, deformity or signs of injury. Normal range of motion.      Cervical back: Normal range of motion and neck supple.        Legs:    Skin:     General: Skin is warm.      Coloration: Skin is not pale.      Findings: No bruising or erythema.   Neurological:      General: No focal deficit present.      Mental Status: She is alert and oriented to person, place, and time. Mental status is at baseline.      Cranial Nerves: No cranial nerve deficit.      Sensory: No sensory deficit.      Motor: No weakness.      Coordination: Coordination normal.       Significant Labs: All pertinent labs within the past 24 hours have been reviewed.  Blood Culture:   Recent Labs   Lab 04/05/22  0914   LABBLOO No Growth to date  No Growth to date     BMP:   Recent Labs   Lab 04/06/22  0350         K 3.8      CO2 27   BUN 6   CREATININE 0.5   CALCIUM 8.3*     CBC:   Recent Labs   Lab 04/05/22  0342 04/06/22  0350   WBC 12.41 10.12   HGB 7.8* 6.7*   HCT 21.8* 19.9*    296     CMP:   Recent Labs   Lab 04/05/22  0342 04/06/22  0350    137   K 3.6 3.8    105   CO2 23 27   * 103   BUN 4* 6   CREATININE 0.5 0.5   CALCIUM 8.5* 8.3*   PROT 6.8 6.2   ALBUMIN 3.4* 2.9*   BILITOT 3.5* 3.0*   ALKPHOS 80 89   AST 26 30   ALT 26 28   ANIONGAP 11 5*   EGFRNONAA >60 >60       Significant Imaging: I have reviewed all pertinent imaging results/findings within the past 24 hours.      Assessment/Plan:      * Sickle cell anemia with pain  Cont PCA pump(  Changed to morphine per pt request )  Cont IVF D5NS  F/U daily cmp and cbc  F/U q48 hrs LDH and Reticular count   CXR and UA did not show any acute finding     4/6/22  -H/H 6.7/19.9  -Transfuse 1 unit of PRBCs    -Continue current medical management plan   -Supportive care  -Hematology consult          Cellulitis  Continue IV rocephin and doxycycline   Blood cx remain negative   Monitor fever trend         GERD (gastroesophageal reflux disease)  Cont PPI      Elevated bilirubin  Monitor   Most Likely  due to SS exacerbation   Bilirubin trending down     Sickle cell anemia  Monitor H/H  Transfuse if < than 7   Transfuse 1 PRBC       VTE Risk Mitigation (From admission, onward)         Ordered     enoxaparin injection 40 mg  Daily         04/02/22 1313     Place RISSA hose  Until discontinued         04/02/22 1313     IP VTE HIGH RISK PATIENT  Once         04/02/22 1313                Discharge Planning   FARIHA: 4/4/2022     Code Status: Full Code   Is the patient medically ready for discharge?:     Reason for patient still in hospital (select all that apply): Patient trending condition, Laboratory test, Treatment and Consult recommendations  Discharge Plan A: Home with family                  Tiffanie Adams NP  Department of Hospital Medicine   [facility] ([facility])

## 2022-04-06 NOTE — SUBJECTIVE & OBJECTIVE
Review of Systems   Constitutional:  Positive for activity change and fatigue.   HENT: Negative.     Eyes: Negative.    Respiratory: Negative.     Cardiovascular:  Negative for chest pain.   Gastrointestinal: Negative.    Endocrine: Negative.    Musculoskeletal:  Positive for arthralgias and myalgias.   Allergic/Immunologic: Negative.    Neurological:  Positive for weakness.   Hematological: Negative.    Psychiatric/Behavioral: Negative.     Objective:     Vital Signs (Most Recent):  Temp: 98.3 °F (36.8 °C) (04/06/22 0754)  Pulse: 74 (04/06/22 0754)  Resp: 18 (04/06/22 0754)  BP: (!) 105/57 (04/06/22 0754)  SpO2: 96 % (04/06/22 0854)   Vital Signs (24h Range):  Temp:  [98.3 °F (36.8 °C)-100.1 °F (37.8 °C)] 98.3 °F (36.8 °C)  Pulse:  [] 74  Resp:  [16-20] 18  SpO2:  [96 %-100 %] 96 %  BP: (102-118)/(55-64) 105/57     Weight: 60.5 kg (133 lb 6.1 oz)  Body mass index is 22.89 kg/m².    Intake/Output Summary (Last 24 hours) at 4/6/2022 1225  Last data filed at 4/6/2022 1107  Gross per 24 hour   Intake 240 ml   Output --   Net 240 ml      Physical Exam  Vitals reviewed.   Constitutional:       General: She is not in acute distress.     Appearance: Normal appearance. She is ill-appearing.   HENT:      Head: Normocephalic and atraumatic.      Nose: Nose normal.      Mouth/Throat:      Mouth: Mucous membranes are moist.      Pharynx: Oropharynx is clear.   Eyes:      Extraocular Movements: Extraocular movements intact.      Conjunctiva/sclera: Conjunctivae normal.      Pupils: Pupils are equal, round, and reactive to light.   Cardiovascular:      Rate and Rhythm: Normal rate and regular rhythm.      Pulses: Normal pulses.      Heart sounds: Normal heart sounds.   Pulmonary:      Effort: Pulmonary effort is normal. No respiratory distress.      Breath sounds: Normal breath sounds. No stridor. No wheezing or rhonchi.   Abdominal:      General: Abdomen is flat. Bowel sounds are normal. There is no distension.       Palpations: Abdomen is soft. There is no mass.      Tenderness: There is no abdominal tenderness.      Hernia: No hernia is present.   Musculoskeletal:         General: No swelling, tenderness, deformity or signs of injury. Normal range of motion.      Cervical back: Normal range of motion and neck supple.        Legs:    Skin:     General: Skin is warm.      Coloration: Skin is not pale.      Findings: No bruising or erythema.   Neurological:      General: No focal deficit present.      Mental Status: She is alert and oriented to person, place, and time. Mental status is at baseline.      Cranial Nerves: No cranial nerve deficit.      Sensory: No sensory deficit.      Motor: No weakness.      Coordination: Coordination normal.       Significant Labs: All pertinent labs within the past 24 hours have been reviewed.  Blood Culture:   Recent Labs   Lab 04/05/22  0914   LABBLOO No Growth to date  No Growth to date     BMP:   Recent Labs   Lab 04/06/22  0350         K 3.8      CO2 27   BUN 6   CREATININE 0.5   CALCIUM 8.3*     CBC:   Recent Labs   Lab 04/05/22  0342 04/06/22  0350   WBC 12.41 10.12   HGB 7.8* 6.7*   HCT 21.8* 19.9*    296     CMP:   Recent Labs   Lab 04/05/22  0342 04/06/22  0350    137   K 3.6 3.8    105   CO2 23 27   * 103   BUN 4* 6   CREATININE 0.5 0.5   CALCIUM 8.5* 8.3*   PROT 6.8 6.2   ALBUMIN 3.4* 2.9*   BILITOT 3.5* 3.0*   ALKPHOS 80 89   AST 26 30   ALT 26 28   ANIONGAP 11 5*   EGFRNONAA >60 >60       Significant Imaging: I have reviewed all pertinent imaging results/findings within the past 24 hours.

## 2022-04-06 NOTE — SUBJECTIVE & OBJECTIVE
Oncology Treatment Plan:   [Could not find a treatment plan. This SmartLink may be configured incorrectly. Contact a  for help.]    Medications:  Continuous Infusions:   dextrose 5 % and 0.45 % NaCl 100 mL/hr at 04/06/22 0841    morphine       Scheduled Meds:   cefTRIAXone (ROCEPHIN) IVPB  1 g Intravenous Q24H    doxycycline  100 mg Oral Q12H    enoxaparin  40 mg Subcutaneous Daily    folic acid  1 mg Oral Daily    pantoprazole  40 mg Oral Daily     PRN Meds:sodium chloride, sodium chloride, acetaminophen, aluminum-magnesium hydroxide-simethicone, diphenhydrAMINE, ketorolac, naloxone, ondansetron, sodium chloride 0.9%     Review of patient's allergies indicates:  No Known Allergies     Past Medical History:   Diagnosis Date    Hidradenitis suppurativa     Nocturnal enuresis     Sickle cell anemia      History reviewed. No pertinent surgical history.  Family History       Problem Relation (Age of Onset)    No Known Problems Mother, Father          Tobacco Use    Smoking status: Never Smoker    Smokeless tobacco: Never Used   Substance and Sexual Activity    Alcohol use: No     Alcohol/week: 0.0 standard drinks    Drug use: Never    Sexual activity: Not Currently       Review of Systems   Constitutional:  Positive for activity change. Negative for appetite change, chills, fatigue, fever and unexpected weight change.   HENT:  Negative for congestion, mouth sores, nosebleeds, sore throat, trouble swallowing and voice change.    Eyes:  Negative for photophobia and visual disturbance.   Respiratory:  Negative for cough, chest tightness, shortness of breath and wheezing.    Cardiovascular:  Positive for leg swelling. Negative for chest pain and palpitations.   Gastrointestinal:  Negative for abdominal distention, abdominal pain, blood in stool, constipation, diarrhea, nausea and vomiting.   Genitourinary:  Negative for difficulty urinating, dysuria and hematuria.   Musculoskeletal:  Positive for  arthralgias (right lower extremity). Negative for back pain and myalgias.   Skin:  Negative for pallor, rash and wound.   Neurological:  Negative for dizziness, syncope, weakness and headaches.   Hematological:  Negative for adenopathy. Does not bruise/bleed easily.   Psychiatric/Behavioral:  The patient is nervous/anxious.    Objective:     Vital Signs (Most Recent):  Temp: 98.5 °F (36.9 °C) (04/06/22 1227)  Pulse: 84 (04/06/22 1227)  Resp: 18 (04/06/22 1227)  BP: (!) 113/56 (04/06/22 1227)  SpO2: 100 % (04/06/22 1227)   Vital Signs (24h Range):  Temp:  [98.3 °F (36.8 °C)-100.1 °F (37.8 °C)] 98.5 °F (36.9 °C)  Pulse:  [] 84  Resp:  [16-20] 18  SpO2:  [96 %-100 %] 100 %  BP: (102-118)/(55-64) 113/56     Weight: 60.5 kg (133 lb 6.1 oz)  Body mass index is 22.89 kg/m².  Body surface area is 1.65 meters squared.      Intake/Output Summary (Last 24 hours) at 4/6/2022 1504  Last data filed at 4/6/2022 1107  Gross per 24 hour   Intake 240 ml   Output --   Net 240 ml       Physical Exam  Vitals reviewed.   Constitutional:       Appearance: She is well-developed.   HENT:      Head: Normocephalic.      Right Ear: External ear normal.      Left Ear: External ear normal.   Eyes:      General: Lids are normal. No scleral icterus.        Right eye: No discharge.         Left eye: No discharge.      Conjunctiva/sclera: Conjunctivae normal.   Neck:      Thyroid: No thyroid mass.   Cardiovascular:      Rate and Rhythm: Normal rate and regular rhythm.      Heart sounds: Normal heart sounds.   Pulmonary:      Effort: Pulmonary effort is normal. No respiratory distress.      Breath sounds: Normal breath sounds. No wheezing or rales.   Abdominal:      General: Bowel sounds are normal. There is no distension.      Palpations: Abdomen is soft.      Tenderness: There is no abdominal tenderness.   Genitourinary:     Comments: deferred  Musculoskeletal:         General: Normal range of motion.      Cervical back: Normal range of  motion.      Right lower leg: Edema present.   Skin:     General: Skin is warm and dry.   Neurological:      Mental Status: She is alert and oriented to person, place, and time.   Psychiatric:         Speech: Speech normal.         Behavior: Behavior normal. Behavior is cooperative.         Thought Content: Thought content normal.       Significant Labs:   BMP:   Recent Labs   Lab 04/05/22 0342 04/06/22  0350   * 103    137   K 3.6 3.8    105   CO2 23 27   BUN 4* 6   CREATININE 0.5 0.5   CALCIUM 8.5* 8.3*   , CBC:   Recent Labs   Lab 04/05/22 0342 04/06/22  0350   WBC 12.41 10.12   HGB 7.8* 6.7*   HCT 21.8* 19.9*    296   , LDH: No results for input(s): LDHCSF, BFSOURCE in the last 48 hours., LFTs:   Recent Labs   Lab 04/05/22 0342 04/06/22  0350   ALT 26 28   AST 26 30   ALKPHOS 80 89   BILITOT 3.5* 3.0*   PROT 6.8 6.2   ALBUMIN 3.4* 2.9*   , Reticulocytes:   Recent Labs   Lab 04/06/22  0015   RETIC 15.4*   , Urine Studies: No results for input(s): COLORU, APPEARANCEUA, PHUR, SPECGRAV, PROTEINUA, GLUCUA, KETONESU, BILIRUBINUA, OCCULTUA, NITRITE, UROBILINOGEN, LEUKOCYTESUR, RBCUA, WBCUA, BACTERIA, SQUAMEPITHEL, HYALINECASTS in the last 48 hours.    Invalid input(s): WRIGHTSUR, and All pertinent labs from the last 24 hours have been reviewed.    Diagnostic Results:  I have reviewed all pertinent imaging results/findings within the past 24 hours.

## 2022-04-07 PROBLEM — L03.119 CELLULITIS OF EXTREMITY: Status: ACTIVE | Noted: 2022-04-05

## 2022-04-07 PROBLEM — A41.9 SEPSIS: Status: ACTIVE | Noted: 2022-04-07

## 2022-04-07 LAB
ALBUMIN SERPL BCP-MCNC: 3 G/DL (ref 3.5–5.2)
ALP SERPL-CCNC: 94 U/L (ref 55–135)
ALT SERPL W/O P-5'-P-CCNC: 24 U/L (ref 10–44)
ANION GAP SERPL CALC-SCNC: 8 MMOL/L (ref 8–16)
AST SERPL-CCNC: 28 U/L (ref 10–40)
BASOPHILS # BLD AUTO: 0.07 K/UL (ref 0–0.2)
BASOPHILS NFR BLD: 0.7 % (ref 0–1.9)
BILIRUB SERPL-MCNC: 2.7 MG/DL (ref 0.1–1)
BILIRUB UR QL STRIP: NEGATIVE
BUN SERPL-MCNC: 4 MG/DL (ref 6–20)
CALCIUM SERPL-MCNC: 8.4 MG/DL (ref 8.7–10.5)
CHLORIDE SERPL-SCNC: 106 MMOL/L (ref 95–110)
CLARITY UR: CLEAR
CO2 SERPL-SCNC: 25 MMOL/L (ref 23–29)
COLOR UR: YELLOW
CREAT SERPL-MCNC: 0.5 MG/DL (ref 0.5–1.4)
DIFFERENTIAL METHOD: ABNORMAL
EOSINOPHIL # BLD AUTO: 0.7 K/UL (ref 0–0.5)
EOSINOPHIL NFR BLD: 7.2 % (ref 0–8)
ERYTHROCYTE [DISTWIDTH] IN BLOOD BY AUTOMATED COUNT: 16.2 % (ref 11.5–14.5)
EST. GFR  (AFRICAN AMERICAN): >60 ML/MIN/1.73 M^2
EST. GFR  (NON AFRICAN AMERICAN): >60 ML/MIN/1.73 M^2
GLUCOSE SERPL-MCNC: 98 MG/DL (ref 70–110)
GLUCOSE UR QL STRIP: NEGATIVE
HCT VFR BLD AUTO: 24.3 % (ref 37–48.5)
HGB BLD-MCNC: 8.1 G/DL (ref 12–16)
HGB UR QL STRIP: NEGATIVE
IMM GRANULOCYTES # BLD AUTO: 0.04 K/UL (ref 0–0.04)
IMM GRANULOCYTES NFR BLD AUTO: 0.4 % (ref 0–0.5)
KETONES UR QL STRIP: NEGATIVE
LDH SERPL L TO P-CCNC: 439 U/L (ref 110–260)
LEUKOCYTE ESTERASE UR QL STRIP: NEGATIVE
LYMPHOCYTES # BLD AUTO: 2.2 K/UL (ref 1–4.8)
LYMPHOCYTES NFR BLD: 21.2 % (ref 18–48)
MCH RBC QN AUTO: 28.5 PG (ref 27–31)
MCHC RBC AUTO-ENTMCNC: 33.3 G/DL (ref 32–36)
MCV RBC AUTO: 86 FL (ref 82–98)
MONOCYTES # BLD AUTO: 0.9 K/UL (ref 0.3–1)
MONOCYTES NFR BLD: 8.4 % (ref 4–15)
NEUTROPHILS # BLD AUTO: 6.3 K/UL (ref 1.8–7.7)
NEUTROPHILS NFR BLD: 62.1 % (ref 38–73)
NITRITE UR QL STRIP: NEGATIVE
NRBC BLD-RTO: 1 /100 WBC
PH UR STRIP: 8 [PH] (ref 5–8)
PHOSPHATE SERPL-MCNC: 3.5 MG/DL (ref 2.7–4.5)
PLATELET # BLD AUTO: 289 K/UL (ref 150–450)
PMV BLD AUTO: 11.8 FL (ref 9.2–12.9)
POTASSIUM SERPL-SCNC: 3.5 MMOL/L (ref 3.5–5.1)
PROT SERPL-MCNC: 6.6 G/DL (ref 6–8.4)
PROT UR QL STRIP: NEGATIVE
RBC # BLD AUTO: 2.84 M/UL (ref 4–5.4)
SODIUM SERPL-SCNC: 139 MMOL/L (ref 136–145)
SP GR UR STRIP: >=1.03 (ref 1–1.03)
URN SPEC COLLECT METH UR: ABNORMAL
UROBILINOGEN UR STRIP-ACNC: NEGATIVE EU/DL
WBC # BLD AUTO: 10.17 K/UL (ref 3.9–12.7)

## 2022-04-07 PROCEDURE — 25000003 PHARM REV CODE 250: Performed by: INTERNAL MEDICINE

## 2022-04-07 PROCEDURE — 84100 ASSAY OF PHOSPHORUS: CPT | Performed by: INTERNAL MEDICINE

## 2022-04-07 PROCEDURE — 81003 URINALYSIS AUTO W/O SCOPE: CPT | Performed by: NURSE PRACTITIONER

## 2022-04-07 PROCEDURE — 99232 SBSQ HOSP IP/OBS MODERATE 35: CPT | Mod: ,,, | Performed by: INTERNAL MEDICINE

## 2022-04-07 PROCEDURE — 63600175 PHARM REV CODE 636 W HCPCS: Performed by: NURSE PRACTITIONER

## 2022-04-07 PROCEDURE — 99900035 HC TECH TIME PER 15 MIN (STAT)

## 2022-04-07 PROCEDURE — 85025 COMPLETE CBC W/AUTO DIFF WBC: CPT | Performed by: INTERNAL MEDICINE

## 2022-04-07 PROCEDURE — S5010 5% DEXTROSE AND 0.45% SALINE: HCPCS | Performed by: NURSE PRACTITIONER

## 2022-04-07 PROCEDURE — 25000003 PHARM REV CODE 250: Performed by: NURSE PRACTITIONER

## 2022-04-07 PROCEDURE — 94761 N-INVAS EAR/PLS OXIMETRY MLT: CPT

## 2022-04-07 PROCEDURE — 36415 COLL VENOUS BLD VENIPUNCTURE: CPT | Performed by: INTERNAL MEDICINE

## 2022-04-07 PROCEDURE — 99232 PR SUBSEQUENT HOSPITAL CARE,LEVL II: ICD-10-PCS | Mod: ,,, | Performed by: INTERNAL MEDICINE

## 2022-04-07 PROCEDURE — 80053 COMPREHEN METABOLIC PANEL: CPT | Performed by: INTERNAL MEDICINE

## 2022-04-07 PROCEDURE — 83615 LACTATE (LD) (LDH) ENZYME: CPT | Performed by: NURSE PRACTITIONER

## 2022-04-07 PROCEDURE — 21400001 HC TELEMETRY ROOM

## 2022-04-07 PROCEDURE — 63600175 PHARM REV CODE 636 W HCPCS: Performed by: INTERNAL MEDICINE

## 2022-04-07 PROCEDURE — 36415 COLL VENOUS BLD VENIPUNCTURE: CPT | Performed by: NURSE PRACTITIONER

## 2022-04-07 PROCEDURE — 27000221 HC OXYGEN, UP TO 24 HOURS

## 2022-04-07 RX ORDER — OXYCODONE AND ACETAMINOPHEN 10; 325 MG/1; MG/1
1 TABLET ORAL EVERY 4 HOURS PRN
Status: DISCONTINUED | OUTPATIENT
Start: 2022-04-07 | End: 2022-04-08 | Stop reason: HOSPADM

## 2022-04-07 RX ORDER — OXYCODONE AND ACETAMINOPHEN 7.5; 325 MG/1; MG/1
1 TABLET ORAL EVERY 4 HOURS PRN
Status: DISCONTINUED | OUTPATIENT
Start: 2022-04-07 | End: 2022-04-08 | Stop reason: HOSPADM

## 2022-04-07 RX ADMIN — ENOXAPARIN SODIUM 40 MG: 100 INJECTION SUBCUTANEOUS at 04:04

## 2022-04-07 RX ADMIN — CEFTRIAXONE 1 G: 1 INJECTION, SOLUTION INTRAVENOUS at 11:04

## 2022-04-07 RX ADMIN — DEXTROSE MONOHYDRATE AND SODIUM CHLORIDE: 5; .45 INJECTION, SOLUTION INTRAVENOUS at 03:04

## 2022-04-07 RX ADMIN — DOXYCYCLINE HYCLATE 100 MG: 100 TABLET, COATED ORAL at 09:04

## 2022-04-07 RX ADMIN — ACETAMINOPHEN 650 MG: 325 TABLET ORAL at 08:04

## 2022-04-07 RX ADMIN — DEXTROSE MONOHYDRATE AND SODIUM CHLORIDE: 5; .45 INJECTION, SOLUTION INTRAVENOUS at 02:04

## 2022-04-07 RX ADMIN — FOLIC ACID 1 MG: 1 TABLET ORAL at 09:04

## 2022-04-07 RX ADMIN — KETOROLAC TROMETHAMINE 15 MG: 30 INJECTION, SOLUTION INTRAMUSCULAR at 04:04

## 2022-04-07 RX ADMIN — OXYCODONE HYDROCHLORIDE AND ACETAMINOPHEN 1 TABLET: 10; 325 TABLET ORAL at 08:04

## 2022-04-07 RX ADMIN — KETOROLAC TROMETHAMINE 15 MG: 30 INJECTION, SOLUTION INTRAMUSCULAR at 02:04

## 2022-04-07 RX ADMIN — DOXYCYCLINE HYCLATE 100 MG: 100 TABLET, COATED ORAL at 08:04

## 2022-04-07 RX ADMIN — PANTOPRAZOLE SODIUM 40 MG: 40 TABLET, DELAYED RELEASE ORAL at 09:04

## 2022-04-07 NOTE — ASSESSMENT & PLAN NOTE
Cont PCA pump( Changed to morphine per pt request )  Cont IVF D5NS  F/U daily cmp and cbc  F/U q48 hrs LDH and Reticular count   CXR and UA did not show any acute finding     4/7/22  -Pt reports feels not in a crisis   -H/H 8.1/24.3  -Transition to oral analgesics   -Supportive care  -Hematology

## 2022-04-07 NOTE — PROGRESS NOTES
[facility] ([facility])  Hematology/Oncology  Progress Note    Patient Name: Elizabeth Franco  Admission Date: 4/2/2022  Hospital Length of Stay: 5 days  Code Status: Full Code     Subjective:     HPI:  26 y.o female with h/o Sickle cell anemia, splenectomy, appendectomy, nocturnal enuresis originally admitted for sickle cell pain crisis. Patient reports developing RLE swelling during this admission. BLE US negative for DVT. F/u CT leg shows--Mild pretibial edema or cellulitis.  No discrete abscess seen.  Trace knee joint effusion. Patient reports resolution of generalized pain associated with SCPC. Reports pain to RLE but improving since antibiotic initiation.     In regards to her sickle cell anemia she follows with Dr. Nuñez, Hematology but has not followed up in over 1 year. She reports rare pain crisis 1-2 x yearly. She denies taking routine medications for her sickle cell.       Interval History: No acute events overnight. Labs continue to improve. Patient reports pain localized to RLE. Continued management of cellulitis per primary team  Oncology Treatment Plan:   [Could not find a treatment plan. This SmartLink may be configured incorrectly. Contact a  for help.]    Medications:  Continuous Infusions:   dextrose 5 % and 0.45 % NaCl 100 mL/hr at 04/07/22 0332    morphine       Scheduled Meds:   cefTRIAXone (ROCEPHIN) IVPB  1 g Intravenous Q24H    doxycycline  100 mg Oral Q12H    enoxaparin  40 mg Subcutaneous Daily    folic acid  1 mg Oral Daily    pantoprazole  40 mg Oral Daily     PRN Meds:sodium chloride, sodium chloride, acetaminophen, aluminum-magnesium hydroxide-simethicone, diphenhydrAMINE, ketorolac, naloxone, ondansetron, sodium chloride 0.9%     Review of patient's allergies indicates:  No Known Allergies     Past Medical History:   Diagnosis Date    Hidradenitis suppurativa     Nocturnal enuresis     Sickle cell anemia      History reviewed. No pertinent surgical  history.  Family History       Problem Relation (Age of Onset)    No Known Problems Mother, Father          Tobacco Use    Smoking status: Never Smoker    Smokeless tobacco: Never Used   Substance and Sexual Activity    Alcohol use: No     Alcohol/week: 0.0 standard drinks    Drug use: Never    Sexual activity: Not Currently       Review of Systems   Constitutional:  Positive for activity change. Negative for appetite change, chills, fatigue, fever and unexpected weight change.   HENT:  Negative for congestion, mouth sores, nosebleeds, sore throat, trouble swallowing and voice change.    Eyes:  Negative for photophobia and visual disturbance.   Respiratory:  Negative for cough, chest tightness, shortness of breath and wheezing.    Cardiovascular:  Positive for leg swelling. Negative for chest pain and palpitations.   Gastrointestinal:  Negative for abdominal distention, abdominal pain, blood in stool, constipation, diarrhea, nausea and vomiting.   Genitourinary:  Negative for difficulty urinating, dysuria and hematuria.   Musculoskeletal:  Positive for arthralgias (right lower extremity). Negative for back pain and myalgias.   Skin:  Negative for pallor, rash and wound.   Neurological:  Negative for dizziness, syncope, weakness and headaches.   Hematological:  Negative for adenopathy. Does not bruise/bleed easily.   Psychiatric/Behavioral:  The patient is nervous/anxious.    Objective:     Vital Signs (Most Recent):  Temp: 98.2 °F (36.8 °C) (04/07/22 0729)  Pulse: 90 (04/07/22 0729)  Resp: 17 (04/07/22 0729)  BP: 104/61 (04/07/22 0729)  SpO2: 97 % (04/07/22 0729)   Vital Signs (24h Range):  Temp:  [98.1 °F (36.7 °C)-101.3 °F (38.5 °C)] 98.2 °F (36.8 °C)  Pulse:  [] 90  Resp:  [14-20] 17  SpO2:  [91 %-100 %] 97 %  BP: ()/(52-86) 104/61     Weight: 58.7 kg (129 lb 6.6 oz)  Body mass index is 22.21 kg/m².  Body surface area is 1.63 meters squared.      Intake/Output Summary (Last 24 hours) at 4/7/2022  1012  Last data filed at 4/7/2022 0800  Gross per 24 hour   Intake 1113 ml   Output --   Net 1113 ml         Physical Exam  Vitals reviewed.   Constitutional:       Appearance: She is well-developed.   HENT:      Head: Normocephalic.      Right Ear: External ear normal.      Left Ear: External ear normal.   Eyes:      General: Lids are normal. No scleral icterus.        Right eye: No discharge.         Left eye: No discharge.      Conjunctiva/sclera: Conjunctivae normal.   Neck:      Thyroid: No thyroid mass.   Cardiovascular:      Rate and Rhythm: Normal rate and regular rhythm.      Heart sounds: Normal heart sounds.   Pulmonary:      Effort: Pulmonary effort is normal. No respiratory distress.      Breath sounds: Normal breath sounds. No wheezing or rales.   Abdominal:      General: Bowel sounds are normal. There is no distension.      Palpations: Abdomen is soft.      Tenderness: There is no abdominal tenderness.   Genitourinary:     Comments: deferred  Musculoskeletal:         General: Normal range of motion.      Cervical back: Normal range of motion.      Right lower leg: Edema present.   Skin:     General: Skin is warm and dry.   Neurological:      Mental Status: She is alert and oriented to person, place, and time.   Psychiatric:         Speech: Speech normal.         Behavior: Behavior normal. Behavior is cooperative.         Thought Content: Thought content normal.       Significant Labs:   BMP:   Recent Labs   Lab 04/06/22  0350 04/07/22  0511    98    139   K 3.8 3.5    106   CO2 27 25   BUN 6 4*   CREATININE 0.5 0.5   CALCIUM 8.3* 8.4*     , CBC:   Recent Labs   Lab 04/06/22  0350 04/07/22  0511   WBC 10.12 10.17   HGB 6.7* 8.1*   HCT 19.9* 24.3*    289     , LDH: No results for input(s): LDHCSF, BFSOURCE in the last 48 hours., LFTs:   Recent Labs   Lab 04/06/22  0350 04/07/22  0511   ALT 28 24   AST 30 28   ALKPHOS 89 94   BILITOT 3.0* 2.7*   PROT 6.2 6.6   ALBUMIN 2.9* 3.0*      , Reticulocytes:   Recent Labs   Lab 04/06/22  0015   RETIC 15.4*     , Urine Studies: No results for input(s): COLORU, APPEARANCEUA, PHUR, SPECGRAV, PROTEINUA, GLUCUA, KETONESU, BILIRUBINUA, OCCULTUA, NITRITE, UROBILINOGEN, LEUKOCYTESUR, RBCUA, WBCUA, BACTERIA, SQUAMEPITHEL, HYALINECASTS in the last 48 hours.    Invalid input(s): WRIGHTSUR, and All pertinent labs from the last 24 hours have been reviewed.    Diagnostic Results:  I have reviewed all pertinent imaging results/findings within the past 24 hours.    Assessment/Plan:     * Sickle cell anemia with pain  -patient feels not in current pain crisis. Pain localized to RLE  -Continue IV hydration, folic acid supplementation  -consider transition to PO analgesia   -patient voices desire for Hematology f/u in [place]  -Arrange outpatient follow up upon d/c  -Consider trial oral analgesia given patient feels she is no longer in SCPC  -hg 8.1 s/p 1 unit PRBCs transfusion  -CBC, CMP, LDH, retic Q 48 H   -Supportive care    Cellulitis  -management per Primary team    Elevated bilirubin  -Most likely secondary to recent SCPC  -trending downward  -Monitor CMP Q 48 H        Thank you for your consult. I will follow-up with patient. Please contact us if you have any additional questions.     Lisa Matute NP  Hematology/Oncology  [facility] ([facility])

## 2022-04-07 NOTE — ASSESSMENT & PLAN NOTE
-patient feels not in current pain crisis. Pain localized to RLE  -Continue IV hydration, folic acid supplementation  -consider transition to PO analgesia   -patient voices desire for Hematology f/u in [place]  -Arrange outpatient follow up upon d/c  -Consider trial oral analgesia given patient feels she is no longer in [facility]  -hg 8.1 s/p 1 unit PRBCs transfusion  -CBC, CMP, LDH, retic Q 48 H   -Supportive care

## 2022-04-07 NOTE — SUBJECTIVE & OBJECTIVE
Review of Systems   Constitutional:  Positive for activity change and fatigue.   HENT: Negative.     Eyes: Negative.    Respiratory: Negative.     Cardiovascular:  Negative for chest pain.   Gastrointestinal: Negative.    Endocrine: Negative.    Musculoskeletal:  Positive for arthralgias and myalgias.   Allergic/Immunologic: Negative.    Neurological:  Positive for weakness.   Hematological: Negative.    Psychiatric/Behavioral: Negative.     Objective:     Vital Signs (Most Recent):  Temp: 100.1 °F (37.8 °C) (04/07/22 1237)  Pulse: 100 (04/07/22 1237)  Resp: 18 (04/07/22 1237)  BP: 133/72 (04/07/22 1237)  SpO2: 97 % (04/07/22 1237) Vital Signs (24h Range):  Temp:  [98.1 °F (36.7 °C)-101.3 °F (38.5 °C)] 100.1 °F (37.8 °C)  Pulse:  [] 100  Resp:  [14-20] 18  SpO2:  [91 %-98 %] 97 %  BP: ()/(52-86) 133/72     Weight: 58.7 kg (129 lb 6.6 oz)  Body mass index is 22.21 kg/m².    Intake/Output Summary (Last 24 hours) at 4/7/2022 1430  Last data filed at 4/7/2022 0800  Gross per 24 hour   Intake 813 ml   Output --   Net 813 ml      Physical Exam  Vitals reviewed.   Constitutional:       General: She is not in acute distress.     Appearance: Normal appearance.   HENT:      Head: Normocephalic and atraumatic.      Nose: Nose normal.      Mouth/Throat:      Mouth: Mucous membranes are moist.      Pharynx: Oropharynx is clear.   Eyes:      Extraocular Movements: Extraocular movements intact.      Conjunctiva/sclera: Conjunctivae normal.      Pupils: Pupils are equal, round, and reactive to light.   Cardiovascular:      Rate and Rhythm: Normal rate and regular rhythm.      Pulses: Normal pulses.      Heart sounds: Normal heart sounds.   Pulmonary:      Effort: Pulmonary effort is normal. No respiratory distress.      Breath sounds: Normal breath sounds. No stridor. No wheezing or rhonchi.   Abdominal:      General: Abdomen is flat. Bowel sounds are normal. There is no distension.      Palpations: Abdomen is soft.  There is no mass.      Tenderness: There is no abdominal tenderness.      Hernia: No hernia is present.   Musculoskeletal:         General: No swelling, tenderness, deformity or signs of injury. Normal range of motion.      Cervical back: Normal range of motion and neck supple.        Legs:    Skin:     General: Skin is warm.      Coloration: Skin is not pale.      Findings: No bruising or erythema.   Neurological:      General: No focal deficit present.      Mental Status: She is alert and oriented to person, place, and time. Mental status is at baseline.      Cranial Nerves: No cranial nerve deficit.      Sensory: No sensory deficit.      Motor: No weakness.      Coordination: Coordination normal.       Significant Labs: All pertinent labs within the past 24 hours have been reviewed.  Blood Culture: No results for input(s): LABBLOO in the last 48 hours.  BMP:   Recent Labs   Lab 04/07/22  0511   GLU 98      K 3.5      CO2 25   BUN 4*   CREATININE 0.5   CALCIUM 8.4*     CBC:   Recent Labs   Lab 04/06/22  0350 04/07/22  0511   WBC 10.12 10.17   HGB 6.7* 8.1*   HCT 19.9* 24.3*    289     CMP:   Recent Labs   Lab 04/06/22  0350 04/07/22  0511    139   K 3.8 3.5    106   CO2 27 25    98   BUN 6 4*   CREATININE 0.5 0.5   CALCIUM 8.3* 8.4*   PROT 6.2 6.6   ALBUMIN 2.9* 3.0*   BILITOT 3.0* 2.7*   ALKPHOS 89 94   AST 30 28   ALT 28 24   ANIONGAP 5* 8   EGFRNONAA >60 >60     Urine Studies: No results for input(s): COLORU, APPEARANCEUA, PHUR, SPECGRAV, PROTEINUA, GLUCUA, KETONESU, BILIRUBINUA, OCCULTUA, NITRITE, UROBILINOGEN, LEUKOCYTESUR, RBCUA, WBCUA, BACTERIA, SQUAMEPITHEL, HYALINECASTS in the last 48 hours.    Invalid input(s): WRIGHTSUR    Significant Imaging:   Imaging Results              X-Ray Chest AP Portable (Final result)  Result time 04/02/22 08:47:00      Final result by CARRI Muller Sr., MD (04/02/22 08:47:00)                   Impression:      There is no evidence of  an acute pulmonary process. .      Electronically signed by: Agusto Muller MD  Date:    04/02/2022  Time:    08:47               Narrative:    EXAMINATION:  XR CHEST AP PORTABLE    CLINICAL HISTORY:  Hb-SS disease with crisis, unspecified    COMPARISON:  Comparison was made to chest x-rays dating back to 04/03/2020.    FINDINGS:  The size of the heart is normal.  There is no evidence of an acute pulmonary process.  There is no pneumothorax.  The costophrenic angles are sharp.

## 2022-04-07 NOTE — PROGRESS NOTES
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 Presents to the ED with C/O of  generalized pain over the last days which has gotten worse.  Is associated with  chest pain and joint paint . She denies any SOB , palpitation , fever , chills , GI or  sx .    Her sickle cell has been well-controlled for most of her life. Per chart review, she followed with Dr. Elvira Nuñez hematologist here at [facility] but has not followed up recently  . 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 She takes oxycodone 5mg and tramadol 50mg at home when she has pain, but does not use them regularly.  ER Course:WBC 17 K , H/H 8.3/23 , Ret 23 ,  . CXR and did not show any  acute finding     Pt will be admitted with a Dx of sickle cell crisis   Code status : Full code   SDM:  Shar Franco Father   [phone]           Overview/Hospital Course:  25 y/o aaf admitted with a Dx of Sickle cell exacerbation . Started on PCA natalia dn IVF . She cont complaining of generalized pain . The H/H is 7 today and plan to transfuse 1 PRBC   4/4 Last night  changed  to  IV push morphine due to  uncontrolled pain .  We will start a morphine PCA pump  . She cont  complaining of severe b/l knee pain . B/L LE US negative for dvt . She is s/p 1 PRBC. As of 4/5/22 pt c/o right shin pain and swelling. Imaging showed Mild pretibial edema or cellulitis; " No discrete abscess seen; Trace knee joint effusion. IV rocephin and oral doxycycline started. H/H stable. Continue current medical management plan. As of 4/6/22 pain improving slowly. H/H 6.7/19.9, will transfuse 1 unit of PRBCs. RLE swelling improving. Continue IV abx. As of 4/7/22 pt spike fever overnight. Blood cx remain negative. CXR negative. Repeat RLE US negative for DVT. UA pending. Care plan discussed with Dr. Kong.           Review of Systems   Constitutional:  Positive for activity change and fatigue.   HENT: Negative.     Eyes: Negative.    Respiratory: Negative.     Cardiovascular:  Negative for chest pain.   Gastrointestinal: Negative.    Endocrine: Negative.    Musculoskeletal:  Positive for arthralgias and myalgias.   Allergic/Immunologic: Negative.    Neurological:  Positive for weakness.   Hematological: Negative.    Psychiatric/Behavioral: Negative.     Objective:     Vital Signs (Most Recent):  Temp: 100.1 °F (37.8 °C) (04/07/22 1237)  Pulse: 100 (04/07/22 1237)  Resp: 18 (04/07/22 1237)  BP: 133/72 (04/07/22 1237)  SpO2: 97 % (04/07/22 1237) Vital Signs (24h Range):  Temp:  [98.1 °F (36.7 °C)-101.3 °F (38.5 °C)] 100.1 °F (37.8 °C)  Pulse:  [] 100  Resp:  [14-20] 18  SpO2:  [91 %-98 %] 97 %  BP: ()/(52-86) 133/72     Weight: 58.7 kg (129 lb 6.6 oz)  Body mass index is 22.21 kg/m².    Intake/Output Summary (Last 24 hours) at 4/7/2022 1430  Last data filed at 4/7/2022 0800  Gross per 24 hour   Intake 813 ml   Output --   Net 813 ml      Physical Exam  Vitals reviewed.   Constitutional:       General: She is not in acute distress.     Appearance: Normal appearance.   HENT:      Head: Normocephalic and atraumatic.      Nose: Nose normal.      Mouth/Throat:      Mouth: Mucous membranes are moist.      Pharynx: Oropharynx is clear.   Eyes:      Extraocular Movements: Extraocular movements intact.      Conjunctiva/sclera: Conjunctivae normal.      Pupils: Pupils are equal, round, and  reactive to light.   Cardiovascular:      Rate and Rhythm: Normal rate and regular rhythm.      Pulses: Normal pulses.      Heart sounds: Normal heart sounds.   Pulmonary:      Effort: Pulmonary effort is normal. No respiratory distress.      Breath sounds: Normal breath sounds. No stridor. No wheezing or rhonchi.   Abdominal:      General: Abdomen is flat. Bowel sounds are normal. There is no distension.      Palpations: Abdomen is soft. There is no mass.      Tenderness: There is no abdominal tenderness.      Hernia: No hernia is present.   Musculoskeletal:         General: No swelling, tenderness, deformity or signs of injury. Normal range of motion.      Cervical back: Normal range of motion and neck supple.        Legs:    Skin:     General: Skin is warm.      Coloration: Skin is not pale.      Findings: No bruising or erythema.   Neurological:      General: No focal deficit present.      Mental Status: She is alert and oriented to person, place, and time. Mental status is at baseline.      Cranial Nerves: No cranial nerve deficit.      Sensory: No sensory deficit.      Motor: No weakness.      Coordination: Coordination normal.       Significant Labs: All pertinent labs within the past 24 hours have been reviewed.  Blood Culture: No results for input(s): LABBLOO in the last 48 hours.  BMP:   Recent Labs   Lab 04/07/22  0511   GLU 98      K 3.5      CO2 25   BUN 4*   CREATININE 0.5   CALCIUM 8.4*     CBC:   Recent Labs   Lab 04/06/22  0350 04/07/22  0511   WBC 10.12 10.17   HGB 6.7* 8.1*   HCT 19.9* 24.3*    289     CMP:   Recent Labs   Lab 04/06/22  0350 04/07/22  0511    139   K 3.8 3.5    106   CO2 27 25    98   BUN 6 4*   CREATININE 0.5 0.5   CALCIUM 8.3* 8.4*   PROT 6.2 6.6   ALBUMIN 2.9* 3.0*   BILITOT 3.0* 2.7*   ALKPHOS 89 94   AST 30 28   ALT 28 24   ANIONGAP 5* 8   EGFRNONAA >60 >60     Urine Studies: No results for input(s): COLORU, APPEARANCEUA, PHUR, SPECGRAV,  PROTEINUA, GLUCUA, KETONESU, BILIRUBINUA, OCCULTUA, NITRITE, UROBILINOGEN, LEUKOCYTESUR, RBCUA, WBCUA, BACTERIA, SQUAMEPITHEL, HYALINECASTS in the last 48 hours.    Invalid input(s): PRESLEY    Significant Imaging:   Imaging Results              X-Ray Chest AP Portable (Final result)  Result time 04/02/22 08:47:00      Final result by CARRI Muller Sr., MD (04/02/22 08:47:00)                   Impression:      There is no evidence of an acute pulmonary process. .      Electronically signed by: Agusto Muller MD  Date:    04/02/2022  Time:    08:47               Narrative:    EXAMINATION:  XR CHEST AP PORTABLE    CLINICAL HISTORY:  Hb-SS disease with crisis, unspecified    COMPARISON:  Comparison was made to chest x-rays dating back to 04/03/2020.    FINDINGS:  The size of the heart is normal.  There is no evidence of an acute pulmonary process.  There is no pneumothorax.  The costophrenic angles are sharp.                                         Assessment/Plan:      * Sickle cell anemia with pain  Cont PCA pump( Changed to morphine per pt request )  Cont IVF D5NS  F/U daily cmp and cbc  F/U q48 hrs LDH and Reticular count   CXR and UA did not show any acute finding     4/7/22  -Pt reports feels not in a crisis   -H/H 8.1/24.3  -Transition to oral analgesics   -Supportive care  -Hematology       Sepsis  This patient does have evidence of infective focus  My overall impression is sepsis. Vital signs were reviewed and noted in progress note.  Antibiotics given-   Antibiotics (From admission, onward)            Start     Stop Route Frequency Ordered    04/05/22 2100  doxycycline tablet 100 mg         -- Oral Every 12 hours 04/05/22 1348    04/05/22 1115  cefTRIAXone (ROCEPHIN) 1 g/50 mL D5W IVPB         -- IV Every 24 hours (non-standard times) 04/05/22 1008        Cultures were taken-   Microbiology Results (last 7 days)     Procedure Component Value Units Date/Time    Blood culture [[id number]] Collected:  04/05/22 0914    Order Status: Completed Specimen: Blood from Peripheral, Right Hand Updated: 04/06/22 1812     Blood Culture, Routine No Growth to date      No Growth to date    Blood culture [[id number]] Collected: 04/05/22 0914    Order Status: Completed Specimen: Blood from Peripheral, Left Arm Updated: 04/06/22 1812     Blood Culture, Routine No Growth to date      No Growth to date        Latest lactate reviewed, they are-  Recent Labs   Lab 04/05/22 0914   LACTATE 1.8     Source- skin     Source control Achieved by- IV abx   Pt spike fever overnight  CXR negative  UA pending  Concern ID consult pending clinical course     Cellulitis of extremity  Continue IV rocephin and doxycycline   Blood cx remain negative   Monitor fever trend   Repeat RLE US negative for DVT        GERD (gastroesophageal reflux disease)  Cont PPI      Elevated bilirubin  Monitor   Most Likely  due to SS exacerbation   Bilirubin 2.7    Sickle cell anemia  Monitor H/H  Transfuse if < than 7   Transfuse 1 PRBC       VTE Risk Mitigation (From admission, onward)         Ordered     enoxaparin injection 40 mg  Daily         04/02/22 1313     Place RISSA hose  Until discontinued         04/02/22 1313     IP VTE HIGH RISK PATIENT  Once         04/02/22 1313                Discharge Planning   FARIHA: 4/4/2022     Code Status: Full Code   Is the patient medically ready for discharge?:     Reason for patient still in hospital (select all that apply): Patient trending condition, Laboratory test and Treatment  Discharge Plan A: Home with family                  Tiffanie Adams NP  Department of Hospital Medicine   [facility] ([facility])

## 2022-04-07 NOTE — ASSESSMENT & PLAN NOTE
This patient does have evidence of infective focus  My overall impression is sepsis. Vital signs were reviewed and noted in progress note.  Antibiotics given-   Antibiotics (From admission, onward)            Start     Stop Route Frequency Ordered    04/05/22 2100  doxycycline tablet 100 mg         -- Oral Every 12 hours 04/05/22 1348    04/05/22 1115  cefTRIAXone (ROCEPHIN) 1 g/50 mL D5W IVPB         -- IV Every 24 hours (non-standard times) 04/05/22 1008        Cultures were taken-   Microbiology Results (last 7 days)     Procedure Component Value Units Date/Time    Blood culture [[id number]] Collected: 04/05/22 0914    Order Status: Completed Specimen: Blood from Peripheral, Right Hand Updated: 04/06/22 1812     Blood Culture, Routine No Growth to date      No Growth to date    Blood culture [[id number]] Collected: 04/05/22 0914    Order Status: Completed Specimen: Blood from Peripheral, Left Arm Updated: 04/06/22 1812     Blood Culture, Routine No Growth to date      No Growth to date        Latest lactate reviewed, they are-  Recent Labs   Lab 04/05/22 0914   LACTATE 1.8     Source- skin     Source control Achieved by- IV abx   Pt spike fever overnight  CXR negative  UA pending  Concern ID consult pending clinical course

## 2022-04-07 NOTE — SUBJECTIVE & OBJECTIVE
Interval History: No acute events overnight. Labs continue to improve. Patient reports pain localized to RLE. Continued management of cellulitis per primary team  Oncology Treatment Plan:   [Could not find a treatment plan. This SmartLink may be configured incorrectly. Contact a  for help.]    Medications:  Continuous Infusions:   dextrose 5 % and 0.45 % NaCl 100 mL/hr at 04/07/22 0332    morphine       Scheduled Meds:   cefTRIAXone (ROCEPHIN) IVPB  1 g Intravenous Q24H    doxycycline  100 mg Oral Q12H    enoxaparin  40 mg Subcutaneous Daily    folic acid  1 mg Oral Daily    pantoprazole  40 mg Oral Daily     PRN Meds:sodium chloride, sodium chloride, acetaminophen, aluminum-magnesium hydroxide-simethicone, diphenhydrAMINE, ketorolac, naloxone, ondansetron, sodium chloride 0.9%     Review of patient's allergies indicates:  No Known Allergies     Past Medical History:   Diagnosis Date    Hidradenitis suppurativa     Nocturnal enuresis     Sickle cell anemia      History reviewed. No pertinent surgical history.  Family History       Problem Relation (Age of Onset)    No Known Problems Mother, Father          Tobacco Use    Smoking status: Never Smoker    Smokeless tobacco: Never Used   Substance and Sexual Activity    Alcohol use: No     Alcohol/week: 0.0 standard drinks    Drug use: Never    Sexual activity: Not Currently       Review of Systems   Constitutional:  Positive for activity change. Negative for appetite change, chills, fatigue, fever and unexpected weight change.   HENT:  Negative for congestion, mouth sores, nosebleeds, sore throat, trouble swallowing and voice change.    Eyes:  Negative for photophobia and visual disturbance.   Respiratory:  Negative for cough, chest tightness, shortness of breath and wheezing.    Cardiovascular:  Positive for leg swelling. Negative for chest pain and palpitations.   Gastrointestinal:  Negative for abdominal distention, abdominal pain, blood in  stool, constipation, diarrhea, nausea and vomiting.   Genitourinary:  Negative for difficulty urinating, dysuria and hematuria.   Musculoskeletal:  Positive for arthralgias (right lower extremity). Negative for back pain and myalgias.   Skin:  Negative for pallor, rash and wound.   Neurological:  Negative for dizziness, syncope, weakness and headaches.   Hematological:  Negative for adenopathy. Does not bruise/bleed easily.   Psychiatric/Behavioral:  The patient is nervous/anxious.    Objective:     Vital Signs (Most Recent):  Temp: 98.2 °F (36.8 °C) (04/07/22 0729)  Pulse: 90 (04/07/22 0729)  Resp: 17 (04/07/22 0729)  BP: 104/61 (04/07/22 0729)  SpO2: 97 % (04/07/22 0729)   Vital Signs (24h Range):  Temp:  [98.1 °F (36.7 °C)-101.3 °F (38.5 °C)] 98.2 °F (36.8 °C)  Pulse:  [] 90  Resp:  [14-20] 17  SpO2:  [91 %-100 %] 97 %  BP: ()/(52-86) 104/61     Weight: 58.7 kg (129 lb 6.6 oz)  Body mass index is 22.21 kg/m².  Body surface area is 1.63 meters squared.      Intake/Output Summary (Last 24 hours) at 4/7/2022 1012  Last data filed at 4/7/2022 0800  Gross per 24 hour   Intake 1113 ml   Output --   Net 1113 ml         Physical Exam  Vitals reviewed.   Constitutional:       Appearance: She is well-developed.   HENT:      Head: Normocephalic.      Right Ear: External ear normal.      Left Ear: External ear normal.   Eyes:      General: Lids are normal. No scleral icterus.        Right eye: No discharge.         Left eye: No discharge.      Conjunctiva/sclera: Conjunctivae normal.   Neck:      Thyroid: No thyroid mass.   Cardiovascular:      Rate and Rhythm: Normal rate and regular rhythm.      Heart sounds: Normal heart sounds.   Pulmonary:      Effort: Pulmonary effort is normal. No respiratory distress.      Breath sounds: Normal breath sounds. No wheezing or rales.   Abdominal:      General: Bowel sounds are normal. There is no distension.      Palpations: Abdomen is soft.      Tenderness: There is no  abdominal tenderness.   Genitourinary:     Comments: deferred  Musculoskeletal:         General: Normal range of motion.      Cervical back: Normal range of motion.      Right lower leg: Edema present.   Skin:     General: Skin is warm and dry.   Neurological:      Mental Status: She is alert and oriented to person, place, and time.   Psychiatric:         Speech: Speech normal.         Behavior: Behavior normal. Behavior is cooperative.         Thought Content: Thought content normal.       Significant Labs:   BMP:   Recent Labs   Lab 04/06/22 0350 04/07/22  0511    98    139   K 3.8 3.5    106   CO2 27 25   BUN 6 4*   CREATININE 0.5 0.5   CALCIUM 8.3* 8.4*     , CBC:   Recent Labs   Lab 04/06/22 0350 04/07/22  0511   WBC 10.12 10.17   HGB 6.7* 8.1*   HCT 19.9* 24.3*    289     , LDH: No results for input(s): LDHCSF, BFSOURCE in the last 48 hours., LFTs:   Recent Labs   Lab 04/06/22 0350 04/07/22  0511   ALT 28 24   AST 30 28   ALKPHOS 89 94   BILITOT 3.0* 2.7*   PROT 6.2 6.6   ALBUMIN 2.9* 3.0*     , Reticulocytes:   Recent Labs   Lab 04/06/22  0015   RETIC 15.4*     , Urine Studies: No results for input(s): COLORU, APPEARANCEUA, PHUR, SPECGRAV, PROTEINUA, GLUCUA, KETONESU, BILIRUBINUA, OCCULTUA, NITRITE, UROBILINOGEN, LEUKOCYTESUR, RBCUA, WBCUA, BACTERIA, SQUAMEPITHEL, HYALINECASTS in the last 48 hours.    Invalid input(s): WRIGHTSUR, and All pertinent labs from the last 24 hours have been reviewed.    Diagnostic Results:  I have reviewed all pertinent imaging results/findings within the past 24 hours.

## 2022-04-07 NOTE — PLAN OF CARE
Pt AAOx4. VSS. Pt remained free of falls this shift. Pain to RLE controlled with PRN medications. Medications administered as ordered. Pt is SR on monitor. PIV intact, IVF infusing. Hourly rounding completed. Pt instructed to call for assistance. POC reviewed. Pt verbalized understanding.       Problem: Adult Inpatient Plan of Care  Goal: Plan of Care Review  Outcome: Ongoing, Progressing     Problem: Pain Acute  Goal: Acceptable Pain Control and Functional Ability  Outcome: Ongoing, Progressing     Problem: Pain (Sickle Cell Disease)  Goal: Acceptable Pain Control  Outcome: Ongoing, Progressing

## 2022-04-07 NOTE — ASSESSMENT & PLAN NOTE
Continue IV rocephin and doxycycline   Blood cx remain negative   Monitor fever trend   Repeat RLE US negative for DVT

## 2022-04-08 ENCOUNTER — NURSE TRIAGE (OUTPATIENT)
Dept: ADMINISTRATIVE | Facility: CLINIC | Age: 27
End: 2022-04-08
Payer: COMMERCIAL

## 2022-04-08 VITALS
BODY MASS INDEX: 22.17 KG/M2 | DIASTOLIC BLOOD PRESSURE: 80 MMHG | OXYGEN SATURATION: 93 % | WEIGHT: 129.88 LBS | HEART RATE: 94 BPM | SYSTOLIC BLOOD PRESSURE: 126 MMHG | RESPIRATION RATE: 18 BRPM | HEIGHT: 64 IN | TEMPERATURE: 99 F

## 2022-04-08 LAB
ALBUMIN SERPL BCP-MCNC: 2.9 G/DL (ref 3.5–5.2)
ALP SERPL-CCNC: 95 U/L (ref 55–135)
ALT SERPL W/O P-5'-P-CCNC: 29 U/L (ref 10–44)
ANION GAP SERPL CALC-SCNC: 8 MMOL/L (ref 8–16)
AST SERPL-CCNC: 29 U/L (ref 10–40)
BASOPHILS # BLD AUTO: 0.13 K/UL (ref 0–0.2)
BASOPHILS NFR BLD: 1.4 % (ref 0–1.9)
BILIRUB SERPL-MCNC: 2.2 MG/DL (ref 0.1–1)
BUN SERPL-MCNC: 7 MG/DL (ref 6–20)
CALCIUM SERPL-MCNC: 8.5 MG/DL (ref 8.7–10.5)
CHLORIDE SERPL-SCNC: 105 MMOL/L (ref 95–110)
CO2 SERPL-SCNC: 25 MMOL/L (ref 23–29)
CREAT SERPL-MCNC: 0.6 MG/DL (ref 0.5–1.4)
DIFFERENTIAL METHOD: ABNORMAL
EOSINOPHIL # BLD AUTO: 0.8 K/UL (ref 0–0.5)
EOSINOPHIL NFR BLD: 8.6 % (ref 0–8)
ERYTHROCYTE [DISTWIDTH] IN BLOOD BY AUTOMATED COUNT: 16.7 % (ref 11.5–14.5)
EST. GFR  (AFRICAN AMERICAN): >60 ML/MIN/1.73 M^2
EST. GFR  (NON AFRICAN AMERICAN): >60 ML/MIN/1.73 M^2
GLUCOSE SERPL-MCNC: 96 MG/DL (ref 70–110)
HCT VFR BLD AUTO: 21.8 % (ref 37–48.5)
HGB BLD-MCNC: 7.1 G/DL (ref 12–16)
IMM GRANULOCYTES # BLD AUTO: 0.04 K/UL (ref 0–0.04)
IMM GRANULOCYTES NFR BLD AUTO: 0.4 % (ref 0–0.5)
LYMPHOCYTES # BLD AUTO: 3.5 K/UL (ref 1–4.8)
LYMPHOCYTES NFR BLD: 37.3 % (ref 18–48)
MCH RBC QN AUTO: 28 PG (ref 27–31)
MCHC RBC AUTO-ENTMCNC: 32.6 G/DL (ref 32–36)
MCV RBC AUTO: 86 FL (ref 82–98)
MONOCYTES # BLD AUTO: 1.1 K/UL (ref 0.3–1)
MONOCYTES NFR BLD: 11.2 % (ref 4–15)
NEUTROPHILS # BLD AUTO: 3.9 K/UL (ref 1.8–7.7)
NEUTROPHILS NFR BLD: 41.1 % (ref 38–73)
NRBC BLD-RTO: 1 /100 WBC
PHOSPHATE SERPL-MCNC: 4.9 MG/DL (ref 2.7–4.5)
PLATELET # BLD AUTO: 311 K/UL (ref 150–450)
PMV BLD AUTO: 11.6 FL (ref 9.2–12.9)
POTASSIUM SERPL-SCNC: 3.9 MMOL/L (ref 3.5–5.1)
PROT SERPL-MCNC: 6.3 G/DL (ref 6–8.4)
RBC # BLD AUTO: 2.54 M/UL (ref 4–5.4)
RETICS/RBC NFR AUTO: 12.5 % (ref 0.5–2.5)
SODIUM SERPL-SCNC: 138 MMOL/L (ref 136–145)
WBC # BLD AUTO: 9.49 K/UL (ref 3.9–12.7)

## 2022-04-08 PROCEDURE — 99900035 HC TECH TIME PER 15 MIN (STAT)

## 2022-04-08 PROCEDURE — 36415 COLL VENOUS BLD VENIPUNCTURE: CPT | Performed by: INTERNAL MEDICINE

## 2022-04-08 PROCEDURE — 85025 COMPLETE CBC W/AUTO DIFF WBC: CPT | Performed by: INTERNAL MEDICINE

## 2022-04-08 PROCEDURE — 84100 ASSAY OF PHOSPHORUS: CPT | Performed by: INTERNAL MEDICINE

## 2022-04-08 PROCEDURE — 25000003 PHARM REV CODE 250: Performed by: INTERNAL MEDICINE

## 2022-04-08 PROCEDURE — 25000003 PHARM REV CODE 250: Performed by: NURSE PRACTITIONER

## 2022-04-08 PROCEDURE — 85045 AUTOMATED RETICULOCYTE COUNT: CPT | Performed by: INTERNAL MEDICINE

## 2022-04-08 PROCEDURE — 63600175 PHARM REV CODE 636 W HCPCS: Performed by: NURSE PRACTITIONER

## 2022-04-08 PROCEDURE — 99232 PR SUBSEQUENT HOSPITAL CARE,LEVL II: ICD-10-PCS | Mod: ,,, | Performed by: INTERNAL MEDICINE

## 2022-04-08 PROCEDURE — 99232 SBSQ HOSP IP/OBS MODERATE 35: CPT | Mod: ,,, | Performed by: INTERNAL MEDICINE

## 2022-04-08 PROCEDURE — 94761 N-INVAS EAR/PLS OXIMETRY MLT: CPT

## 2022-04-08 PROCEDURE — 80053 COMPREHEN METABOLIC PANEL: CPT | Performed by: INTERNAL MEDICINE

## 2022-04-08 RX ORDER — DOXYCYCLINE HYCLATE 100 MG
100 TABLET ORAL EVERY 12 HOURS
Qty: 6 TABLET | Refills: 0 | Status: SHIPPED | OUTPATIENT
Start: 2022-04-08 | End: 2022-04-12

## 2022-04-08 RX ORDER — CEPHALEXIN 500 MG/1
500 CAPSULE ORAL EVERY 6 HOURS
Qty: 12 CAPSULE | Refills: 0 | Status: SHIPPED | OUTPATIENT
Start: 2022-04-08 | End: 2022-04-12

## 2022-04-08 RX ORDER — FOLIC ACID 1 MG/1
1 TABLET ORAL DAILY
Qty: 30 TABLET | Refills: 0 | Status: ON HOLD | COMMUNITY
Start: 2022-04-09 | End: 2022-06-20

## 2022-04-08 RX ORDER — CEPHALEXIN 500 MG/1
500 CAPSULE ORAL EVERY 6 HOURS
Qty: 12 CAPSULE | Refills: 0 | Status: SHIPPED | OUTPATIENT
Start: 2022-04-08 | End: 2022-04-08 | Stop reason: SDUPTHER

## 2022-04-08 RX ADMIN — KETOROLAC TROMETHAMINE 15 MG: 30 INJECTION, SOLUTION INTRAMUSCULAR at 02:04

## 2022-04-08 RX ADMIN — OXYCODONE AND ACETAMINOPHEN 1 TABLET: 7.5; 325 TABLET ORAL at 12:04

## 2022-04-08 RX ADMIN — FOLIC ACID 1 MG: 1 TABLET ORAL at 08:04

## 2022-04-08 RX ADMIN — DOXYCYCLINE HYCLATE 100 MG: 100 TABLET, COATED ORAL at 08:04

## 2022-04-08 RX ADMIN — PANTOPRAZOLE SODIUM 40 MG: 40 TABLET, DELAYED RELEASE ORAL at 08:04

## 2022-04-08 RX ADMIN — OXYCODONE HYDROCHLORIDE AND ACETAMINOPHEN 1 TABLET: 10; 325 TABLET ORAL at 04:04

## 2022-04-08 NOTE — ASSESSMENT & PLAN NOTE
Cont PCA pump( Changed to morphine per pt request )  Cont IVF D5NS  F/U daily cmp and cbc  F/U q48 hrs LDH and Reticular count   CXR and UA did not show any acute finding     4/7/22  -Pt reports feels not in a crisis   -H/H 8.1/24.3  -Transition to oral analgesics   -Supportive care  -Hematology   -4/8/22- H/H stable with downward trend- Heme/Onc following- pt on oral medications- Heme/Onc following

## 2022-04-08 NOTE — ASSESSMENT & PLAN NOTE
-improved   This patient does have evidence of infective focus  My overall impression is sepsis. Vital signs were reviewed and noted in progress note.  Antibiotics given-   Antibiotics (From admission, onward)            Start     Stop Route Frequency Ordered    04/05/22 2100  doxycycline tablet 100 mg         -- Oral Every 12 hours 04/05/22 1348    04/05/22 1115  cefTRIAXone (ROCEPHIN) 1 g/50 mL D5W IVPB         -- IV Every 24 hours (non-standard times) 04/05/22 1008        Cultures were taken-   Microbiology Results (last 7 days)     Procedure Component Value Units Date/Time    Blood culture [[id number]] Collected: 04/05/22 0914    Order Status: Completed Specimen: Blood from Peripheral, Right Hand Updated: 04/07/22 1812     Blood Culture, Routine No Growth to date      No Growth to date      No Growth to date    Blood culture [[id number]] Collected: 04/05/22 0914    Order Status: Completed Specimen: Blood from Peripheral, Left Arm Updated: 04/07/22 1812     Blood Culture, Routine No Growth to date      No Growth to date      No Growth to date        Latest lactate reviewed, they are-  No results for input(s): LACTATE in the last 72 hours.  Source- skin     Source control Achieved by- IV abx - transferred to oral dosing   Pt spike fever overnight  CXR negative  UA pending  Concern ID consult pending clinical course

## 2022-04-08 NOTE — PLAN OF CARE
Pt AAOx4. VSS. Pt remained free of falls this shift. Pain to RLE controlled with oral  PRN medications. Medications administered as ordered. Pt is SR on monitor. PIV intact, IVF infusing. Hourly rounding completed. Pt instructed to call for assistance. POC reviewed. Pt verbalized understanding.         Problem: Adult Inpatient Plan of Care  Goal: Plan of Care Review  Outcome: Ongoing, Progressing     Problem: Pain Acute  Goal: Acceptable Pain Control and Functional Ability  Outcome: Ongoing, Progressing     Problem: Pain (Sickle Cell Disease)  Goal: Acceptable Pain Control  Outcome: Ongoing, Progressing

## 2022-04-08 NOTE — CONSULTS
Spoke with patient.  She states she is safe at home.  She had developed significant relationships with members at her [employer] and recently their relationship has broken down.  It caused her to question her angy but has resolved that issue.  She has concerns about encountering these members at her [employer] but prefers to not change.  She admitted that she has seen a therapist in the past when she was in school but is concerned about how much it would cost for her now.  Patient has [employer] insurance.  Discussed how to call membership services to find out the co-pay for therapy through her plan.  Also how to find participating therapist on her plan.  She prefers to see a angy based therapist.  Recommended she speak with her  but apparently the  is part of the group she has concerns with.  Advised to get the names of the therapist and search web sites or call.  Advised there are angy based therapist available.  She feels that if she stays another day, she would mentally feel better about going home.  Advised that insurance may not approve this.  Discussed staying late and going home around 7pm tonight.  Discussed finding with Abbi Ayon NP.

## 2022-04-08 NOTE — SUBJECTIVE & OBJECTIVE
"Interval History: pt verbalized symptom improvement and currently on oral pain medications. H/H stable post transfusion with downward trend.  Pt states she was ready for discharge but changed her mind and states she does not want to go and her "mental health was triggered" when discussing discharge.  Pt denies suicidal and homicidal ideation- refused Psych. Social work consulted for further evaluation. Heme/Onc following.     Review of Systems   Constitutional:  Positive for activity change. Negative for appetite change, chills, fatigue, fever and unexpected weight change.   HENT:  Negative for congestion, mouth sores, nosebleeds, sore throat, trouble swallowing and voice change.    Eyes:  Negative for photophobia and visual disturbance.   Respiratory:  Negative for cough, chest tightness, shortness of breath and wheezing.    Cardiovascular:  Positive for leg swelling (improved). Negative for chest pain and palpitations.   Gastrointestinal:  Negative for abdominal distention, abdominal pain, blood in stool, constipation, diarrhea, nausea and vomiting.   Genitourinary:  Negative for difficulty urinating, dysuria and hematuria.   Musculoskeletal:  Positive for arthralgias (right lower extremity-improved). Negative for back pain and myalgias.   Skin:  Negative for pallor, rash and wound.   Neurological:  Negative for dizziness, syncope, weakness and headaches.   Hematological:  Negative for adenopathy. Does not bruise/bleed easily.   Psychiatric/Behavioral:  The patient is nervous/anxious.    Objective:     Vital Signs (Most Recent):  Temp: 98.1 °F (36.7 °C) (04/08/22 0745)  Pulse: 82 (04/08/22 0919)  Resp: 17 (04/08/22 0745)  BP: (!) 117/58 (04/08/22 0745)  SpO2: 97 % (04/08/22 0919)   Vital Signs (24h Range):  Temp:  [97.7 °F (36.5 °C)-100.1 °F (37.8 °C)] 98.1 °F (36.7 °C)  Pulse:  [] 82  Resp:  [16-18] 17  SpO2:  [90 %-100 %] 97 %  BP: (102-133)/(56-72) 117/58     Weight: 58.9 kg (129 lb 13.6 oz)  Body mass " index is 22.29 kg/m².    Intake/Output Summary (Last 24 hours) at 4/8/2022 1132  Last data filed at 4/8/2022 0900  Gross per 24 hour   Intake 1829.55 ml   Output --   Net 1829.55 ml      Physical Exam  Vitals reviewed.   Constitutional:       General: She is not in acute distress.     Appearance: Normal appearance.   HENT:      Head: Normocephalic and atraumatic.      Nose: Nose normal.      Mouth/Throat:      Mouth: Mucous membranes are moist.      Pharynx: Oropharynx is clear.   Eyes:      Extraocular Movements: Extraocular movements intact.      Conjunctiva/sclera: Conjunctivae normal.      Pupils: Pupils are equal, round, and reactive to light.   Cardiovascular:      Rate and Rhythm: Normal rate and regular rhythm.      Pulses: Normal pulses.      Heart sounds: Normal heart sounds.   Pulmonary:      Effort: Pulmonary effort is normal. No respiratory distress.      Breath sounds: Normal breath sounds. No stridor. No wheezing or rhonchi.   Abdominal:      General: Abdomen is flat. Bowel sounds are normal. There is no distension.      Palpations: Abdomen is soft. There is no mass.      Tenderness: There is no abdominal tenderness.      Hernia: No hernia is present.   Musculoskeletal:         General: No swelling, tenderness, deformity or signs of injury. Normal range of motion.      Cervical back: Normal range of motion and neck supple.        Legs:    Skin:     General: Skin is warm.      Coloration: Skin is not pale.      Findings: No bruising or erythema.   Neurological:      General: No focal deficit present.      Mental Status: She is alert and oriented to person, place, and time. Mental status is at baseline.      Cranial Nerves: No cranial nerve deficit.      Sensory: No sensory deficit.      Motor: No weakness.      Coordination: Coordination normal.       Significant Labs: All pertinent labs within the past 24 hours have been reviewed.  CBC:   Recent Labs   Lab 04/07/22  0511 04/08/22  0505   WBC 10.17  9.49   HGB 8.1* 7.1*   HCT 24.3* 21.8*    311     CMP:   Recent Labs   Lab 04/07/22  0511 04/08/22  0505    138   K 3.5 3.9    105   CO2 25 25   GLU 98 96   BUN 4* 7   CREATININE 0.5 0.6   CALCIUM 8.4* 8.5*   PROT 6.6 6.3   ALBUMIN 3.0* 2.9*   BILITOT 2.7* 2.2*   ALKPHOS 94 95   AST 28 29   ALT 24 29   ANIONGAP 8 8   EGFRNONAA >60 >60       Significant Imaging: I have reviewed all pertinent imaging results/findings within the past 24 hours.

## 2022-04-08 NOTE — SUBJECTIVE & OBJECTIVE
Interval History: No acute events overnight. Labs continue to improve. Patient reports resolution of RLE pain. Continued management of cellulitis per primary team  Oncology Treatment Plan:   [Could not find a treatment plan. This SmartLink may be configured incorrectly. Contact a  for help.]    Medications:  Continuous Infusions:   dextrose 5 % and 0.45 % NaCl 100 mL/hr at 04/07/22 1732     Scheduled Meds:   cefTRIAXone (ROCEPHIN) IVPB  1 g Intravenous Q24H    doxycycline  100 mg Oral Q12H    enoxaparin  40 mg Subcutaneous Daily    folic acid  1 mg Oral Daily    pantoprazole  40 mg Oral Daily     PRN Meds:sodium chloride, sodium chloride, acetaminophen, aluminum-magnesium hydroxide-simethicone, diphenhydrAMINE, ketorolac, naloxone, ondansetron, oxyCODONE-acetaminophen, oxyCODONE-acetaminophen, sodium chloride 0.9%     Review of patient's allergies indicates:  No Known Allergies     Past Medical History:   Diagnosis Date    Hidradenitis suppurativa     Nocturnal enuresis     Sickle cell anemia      History reviewed. No pertinent surgical history.  Family History       Problem Relation (Age of Onset)    No Known Problems Mother, Father          Tobacco Use    Smoking status: Never Smoker    Smokeless tobacco: Never Used   Substance and Sexual Activity    Alcohol use: No     Alcohol/week: 0.0 standard drinks    Drug use: Never    Sexual activity: Not Currently       Review of Systems   Constitutional:  Positive for activity change. Negative for appetite change, chills, fatigue, fever and unexpected weight change.   HENT:  Negative for congestion, mouth sores, nosebleeds, sore throat, trouble swallowing and voice change.    Eyes:  Negative for photophobia and visual disturbance.   Respiratory:  Negative for cough, chest tightness, shortness of breath and wheezing.    Cardiovascular:  Positive for leg swelling. Negative for chest pain and palpitations.   Gastrointestinal:  Negative for abdominal  distention, abdominal pain, blood in stool, constipation, diarrhea, nausea and vomiting.   Genitourinary:  Negative for difficulty urinating, dysuria and hematuria.   Musculoskeletal:  Positive for arthralgias (right lower extremity). Negative for back pain and myalgias.   Skin:  Negative for pallor, rash and wound.   Neurological:  Negative for dizziness, syncope, weakness and headaches.   Hematological:  Negative for adenopathy. Does not bruise/bleed easily.   Psychiatric/Behavioral:  The patient is nervous/anxious.    Objective:     Vital Signs (Most Recent):  Temp: 98.1 °F (36.7 °C) (04/08/22 0745)  Pulse: 82 (04/08/22 0919)  Resp: 17 (04/08/22 0745)  BP: (!) 117/58 (04/08/22 0745)  SpO2: 97 % (04/08/22 0919)   Vital Signs (24h Range):  Temp:  [97.7 °F (36.5 °C)-100.1 °F (37.8 °C)] 98.1 °F (36.7 °C)  Pulse:  [] 82  Resp:  [16-18] 17  SpO2:  [90 %-100 %] 97 %  BP: (102-133)/(56-72) 117/58     Weight: 58.9 kg (129 lb 13.6 oz)  Body mass index is 22.29 kg/m².  Body surface area is 1.63 meters squared.      Intake/Output Summary (Last 24 hours) at 4/8/2022 1000  Last data filed at 4/8/2022 0900  Gross per 24 hour   Intake 1829.55 ml   Output --   Net 1829.55 ml         Physical Exam  Vitals reviewed.   Constitutional:       Appearance: She is well-developed.   HENT:      Head: Normocephalic.      Right Ear: External ear normal.      Left Ear: External ear normal.   Eyes:      General: Lids are normal. No scleral icterus.        Right eye: No discharge.         Left eye: No discharge.      Conjunctiva/sclera: Conjunctivae normal.   Neck:      Thyroid: No thyroid mass.   Cardiovascular:      Rate and Rhythm: Normal rate and regular rhythm.      Heart sounds: Normal heart sounds.   Pulmonary:      Effort: Pulmonary effort is normal. No respiratory distress.      Breath sounds: Normal breath sounds. No wheezing or rales.   Abdominal:      General: Bowel sounds are normal. There is no distension.      Palpations:  Abdomen is soft.      Tenderness: There is no abdominal tenderness.   Genitourinary:     Comments: deferred  Musculoskeletal:         General: Normal range of motion.      Cervical back: Normal range of motion.      Right lower leg: Edema present.   Skin:     General: Skin is warm and dry.   Neurological:      Mental Status: She is alert and oriented to person, place, and time.   Psychiatric:         Speech: Speech normal.         Behavior: Behavior normal. Behavior is cooperative.         Thought Content: Thought content normal.       Significant Labs:   BMP:   Recent Labs   Lab 04/07/22  0511 04/08/22  0505   GLU 98 96    138   K 3.5 3.9    105   CO2 25 25   BUN 4* 7   CREATININE 0.5 0.6   CALCIUM 8.4* 8.5*     , CBC:   Recent Labs   Lab 04/07/22  0511 04/08/22  0505   WBC 10.17 9.49   HGB 8.1* 7.1*   HCT 24.3* 21.8*    311     , LDH: No results for input(s): LDHCSF, BFSOURCE in the last 48 hours., LFTs:   Recent Labs   Lab 04/07/22  0511 04/08/22  0505   ALT 24 29   AST 28 29   ALKPHOS 94 95   BILITOT 2.7* 2.2*   PROT 6.6 6.3   ALBUMIN 3.0* 2.9*     , Reticulocytes:   Recent Labs   Lab 04/08/22  0022   RETIC 12.5*     , Urine Studies:   Recent Labs   Lab 04/07/22  1419   COLORU Yellow   APPEARANCEUA Clear   PHUR 8.0   SPECGRAV >=1.030*   PROTEINUA Negative   GLUCUA Negative   KETONESU Negative   BILIRUBINUA Negative   OCCULTUA Negative   NITRITE Negative   UROBILINOGEN Negative   LEUKOCYTESUR Negative     , and All pertinent labs from the last 24 hours have been reviewed.    Diagnostic Results:  I have reviewed all pertinent imaging results/findings within the past 24 hours.

## 2022-04-08 NOTE — PLAN OF CARE
[facility] - Tele[facility] (Hospital)  Discharge Reassessment    Primary Care Provider: Elvira Nuñez MD    Expected Discharge Date: 4/8/2022    Reassessment (most recent)     Discharge Reassessment - 04/08/22 1701        Discharge Reassessment    Assessment Type Discharge Planning Reassessment     Did the patient's condition or plan change since previous assessment? Yes     Discharge Plan discussed with: Patient     Communicated FARIHA with patient/caregiver Yes     Discharge Plan A Home with family     DME Needed Upon Discharge  none     Discharge Barriers Identified None     Why the patient remains in the hospital Requires continued medical care

## 2022-04-08 NOTE — PROGRESS NOTES
[facility] - Tele[facility] ([facility])  Hematology/Oncology  Progress Note    Patient Name: Elizabeth Franco  Admission Date: 4/2/2022  Hospital Length of Stay: 6 days  Code Status: Full Code     Subjective:     HPI:  26 y.o female with h/o Sickle cell anemia, splenectomy, appendectomy, nocturnal enuresis originally admitted for sickle cell pain crisis. Patient reports developing RLE swelling during this admission. BLE US negative for DVT. F/u CT leg shows--Mild pretibial edema or cellulitis.  No discrete abscess seen.  Trace knee joint effusion. Patient reports resolution of generalized pain associated with SCPC. Reports pain to RLE but improving since antibiotic initiation.     In regards to her sickle cell anemia she follows with Dr. Nuñez, Hematology but has not followed up in over 1 year. She reports rare pain crisis 1-2 x yearly. She denies taking routine medications for her sickle cell.       Interval History: No acute events overnight. Labs continue to improve. Patient reports resolution of RLE pain. Continued management of cellulitis per primary team  Oncology Treatment Plan:   [Could not find a treatment plan. This SmartLink may be configured incorrectly. Contact a  for help.]    Medications:  Continuous Infusions:   dextrose 5 % and 0.45 % NaCl 100 mL/hr at 04/07/22 1732     Scheduled Meds:   cefTRIAXone (ROCEPHIN) IVPB  1 g Intravenous Q24H    doxycycline  100 mg Oral Q12H    enoxaparin  40 mg Subcutaneous Daily    folic acid  1 mg Oral Daily    pantoprazole  40 mg Oral Daily     PRN Meds:sodium chloride, sodium chloride, acetaminophen, aluminum-magnesium hydroxide-simethicone, diphenhydrAMINE, ketorolac, naloxone, ondansetron, oxyCODONE-acetaminophen, oxyCODONE-acetaminophen, sodium chloride 0.9%     Review of patient's allergies indicates:  No Known Allergies     Past Medical History:   Diagnosis Date    Hidradenitis suppurativa     Nocturnal enuresis     Sickle cell anemia       History reviewed. No pertinent surgical history.  Family History       Problem Relation (Age of Onset)    No Known Problems Mother, Father          Tobacco Use    Smoking status: Never Smoker    Smokeless tobacco: Never Used   Substance and Sexual Activity    Alcohol use: No     Alcohol/week: 0.0 standard drinks    Drug use: Never    Sexual activity: Not Currently       Review of Systems   Constitutional:  Positive for activity change. Negative for appetite change, chills, fatigue, fever and unexpected weight change.   HENT:  Negative for congestion, mouth sores, nosebleeds, sore throat, trouble swallowing and voice change.    Eyes:  Negative for photophobia and visual disturbance.   Respiratory:  Negative for cough, chest tightness, shortness of breath and wheezing.    Cardiovascular:  Positive for leg swelling. Negative for chest pain and palpitations.   Gastrointestinal:  Negative for abdominal distention, abdominal pain, blood in stool, constipation, diarrhea, nausea and vomiting.   Genitourinary:  Negative for difficulty urinating, dysuria and hematuria.   Musculoskeletal:  Positive for arthralgias (right lower extremity). Negative for back pain and myalgias.   Skin:  Negative for pallor, rash and wound.   Neurological:  Negative for dizziness, syncope, weakness and headaches.   Hematological:  Negative for adenopathy. Does not bruise/bleed easily.   Psychiatric/Behavioral:  The patient is nervous/anxious.    Objective:     Vital Signs (Most Recent):  Temp: 98.1 °F (36.7 °C) (04/08/22 0745)  Pulse: 82 (04/08/22 0919)  Resp: 17 (04/08/22 0745)  BP: (!) 117/58 (04/08/22 0745)  SpO2: 97 % (04/08/22 0919)   Vital Signs (24h Range):  Temp:  [97.7 °F (36.5 °C)-100.1 °F (37.8 °C)] 98.1 °F (36.7 °C)  Pulse:  [] 82  Resp:  [16-18] 17  SpO2:  [90 %-100 %] 97 %  BP: (102-133)/(56-72) 117/58     Weight: 58.9 kg (129 lb 13.6 oz)  Body mass index is 22.29 kg/m².  Body surface area is 1.63 meters  squared.      Intake/Output Summary (Last 24 hours) at 4/8/2022 1000  Last data filed at 4/8/2022 0900  Gross per 24 hour   Intake 1829.55 ml   Output --   Net 1829.55 ml         Physical Exam  Vitals reviewed.   Constitutional:       Appearance: She is well-developed.   HENT:      Head: Normocephalic.      Right Ear: External ear normal.      Left Ear: External ear normal.   Eyes:      General: Lids are normal. No scleral icterus.        Right eye: No discharge.         Left eye: No discharge.      Conjunctiva/sclera: Conjunctivae normal.   Neck:      Thyroid: No thyroid mass.   Cardiovascular:      Rate and Rhythm: Normal rate and regular rhythm.      Heart sounds: Normal heart sounds.   Pulmonary:      Effort: Pulmonary effort is normal. No respiratory distress.      Breath sounds: Normal breath sounds. No wheezing or rales.   Abdominal:      General: Bowel sounds are normal. There is no distension.      Palpations: Abdomen is soft.      Tenderness: There is no abdominal tenderness.   Genitourinary:     Comments: deferred  Musculoskeletal:         General: Normal range of motion.      Cervical back: Normal range of motion.      Right lower leg: Edema present.   Skin:     General: Skin is warm and dry.   Neurological:      Mental Status: She is alert and oriented to person, place, and time.   Psychiatric:         Speech: Speech normal.         Behavior: Behavior normal. Behavior is cooperative.         Thought Content: Thought content normal.       Significant Labs:   BMP:   Recent Labs   Lab 04/07/22  0511 04/08/22  0505   GLU 98 96    138   K 3.5 3.9    105   CO2 25 25   BUN 4* 7   CREATININE 0.5 0.6   CALCIUM 8.4* 8.5*     , CBC:   Recent Labs   Lab 04/07/22  0511 04/08/22  0505   WBC 10.17 9.49   HGB 8.1* 7.1*   HCT 24.3* 21.8*    311     , LDH: No results for input(s): LDHCSF, BFSOURCE in the last 48 hours., LFTs:   Recent Labs   Lab 04/07/22  0511 04/08/22  0505   ALT 24 29   AST 28 29    ALKPHOS 94 95   BILITOT 2.7* 2.2*   PROT 6.6 6.3   ALBUMIN 3.0* 2.9*     , Reticulocytes:   Recent Labs   Lab 04/08/22  0022   RETIC 12.5*     , Urine Studies:   Recent Labs   Lab 04/07/22  1419   COLORU Yellow   APPEARANCEUA Clear   PHUR 8.0   SPECGRAV >=1.030*   PROTEINUA Negative   GLUCUA Negative   KETONESU Negative   BILIRUBINUA Negative   OCCULTUA Negative   NITRITE Negative   UROBILINOGEN Negative   LEUKOCYTESUR Negative     , and All pertinent labs from the last 24 hours have been reviewed.    Diagnostic Results:  I have reviewed all pertinent imaging results/findings within the past 24 hours.    Assessment/Plan:     * Sickle cell anemia with pain  -patient feels not in current pain crisis. Reports continued improvement in RLE swelling/pain  -Feels ready for d/c. Ok for d/c from hem/onc standpoint in regards to SCPC  -Continue folic acid supplementation  -patient voices desire for Hematology f/u in [place]  -Arrange outpatient follow up upon d/c  -hg 7.1. No urgent indication for additional PRBCs transfusion  -CBC, CMP, LDH, retic Q 48 H   -Supportive care    Cellulitis of extremity  -management per Primary team    Elevated bilirubin  -Most likely secondary to recent SCPC  -trending downward  -Can monitor in outpatient setting        Thank you for your consult. I will follow-up with patient. Please contact us if you have any additional questions.     Lisa Matute NP  Hematology/Oncology  [facility] - Tele[facility] ([facility])

## 2022-04-08 NOTE — ASSESSMENT & PLAN NOTE
-patient feels not in current pain crisis. Reports continued improvement in RLE swelling/pain  -Feels ready for d/c. Ok for d/c from hem/onc standpoint in regards to SCPC  -Continue folic acid supplementation  -patient voices desire for Hematology f/u in [place]  -Arrange outpatient follow up upon d/c  -hg 7.1. No urgent indication for additional PRBCs transfusion  -CBC, CMP, LDH, retic Q 48 H   -Supportive care

## 2022-04-08 NOTE — PROGRESS NOTES
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 Presents to the ED with C/O of  generalized pain over the last days which has gotten worse.  Is associated with  chest pain and joint paint . She denies any SOB , palpitation , fever , chills , GI or  sx .    Her sickle cell has been well-controlled for most of her life. Per chart review, she followed with Dr. Elvira Nuñez hematologist here at [facility] but has not followed up recently  . 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 She takes oxycodone 5mg and tramadol 50mg at home when she has pain, but does not use them regularly.  ER Course:WBC 17 K , H/H 8.3/23 , Ret 23 ,  . CXR and did not show any  acute finding     Pt will be admitted with a Dx of sickle cell crisis   Code status : Full code   SDM:  Shar Franco Father   [phone]           Overview/Hospital Course:  25 y/o aaf admitted with a Dx of Sickle cell exacerbation . Started on PCA natalia dn IVF . She cont complaining of generalized pain . The H/H is 7 today and plan to transfuse 1 PRBC   4/4 Last night  changed  to  IV push morphine due to  uncontrolled pain .  We will start a morphine PCA pump  . She cont  complaining of severe b/l knee pain . B/L LE US negative for dvt . She is s/p 1 PRBC. As of 4/5/22 pt c/o right shin pain and swelling. Imaging showed Mild pretibial edema or cellulitis; No " "discrete abscess seen; Trace knee joint effusion. IV rocephin and oral doxycycline started. H/H stable. Continue current medical management plan. As of 4/6/22 pain improving slowly. H/H 6.7/19.9, will transfuse 1 unit of PRBCs. RLE swelling improving. Continue IV abx. As of 4/7/22 pt spike fever overnight. Blood cx remain negative. CXR negative. Repeat RLE US negative for DVT. UA pending. Care plan discussed with Dr. Kong. On 4/8/22, pt verbalized symptom improvement and agreed with discharge.  Pt later changed her mind and became anxious and tearful and stated her "mental health was triggered" when talking about discharge.  Pt denies suicidal or homicidal ideation and refused Psych. Social work consulted for further assistance. Heme/Onc following.       Interval History: pt verbalized symptom improvement and currently on oral pain medications. H/H stable post transfusion with downward trend.  Pt states she was ready for discharge but changed her mind and states she does not want to go and her "mental health was triggered" when discussing discharge.  Pt denies suicidal and homicidal ideation- refused Psych. Social work consulted for further evaluation and to ensure and verify patient safety. Heme/Onc following.     Review of Systems   Constitutional:  Positive for activity change. Negative for appetite change, chills, fatigue, fever and unexpected weight change.   HENT:  Negative for congestion, mouth sores, nosebleeds, sore throat, trouble swallowing and voice change.    Eyes:  Negative for photophobia and visual disturbance.   Respiratory:  Negative for cough, chest tightness, shortness of breath and wheezing.    Cardiovascular:  Positive for leg swelling (improved). Negative for chest pain and palpitations.   Gastrointestinal:  Negative for abdominal distention, abdominal pain, blood in stool, constipation, diarrhea, nausea and vomiting.   Genitourinary:  Negative for difficulty urinating, dysuria and " hematuria.   Musculoskeletal:  Positive for arthralgias (right lower extremity-improved). Negative for back pain and myalgias.   Skin:  Negative for pallor, rash and wound.   Neurological:  Negative for dizziness, syncope, weakness and headaches.   Hematological:  Negative for adenopathy. Does not bruise/bleed easily.   Psychiatric/Behavioral:  The patient is nervous/anxious.    Objective:     Vital Signs (Most Recent):  Temp: 98.1 °F (36.7 °C) (04/08/22 0745)  Pulse: 82 (04/08/22 0919)  Resp: 17 (04/08/22 0745)  BP: (!) 117/58 (04/08/22 0745)  SpO2: 97 % (04/08/22 0919)   Vital Signs (24h Range):  Temp:  [97.7 °F (36.5 °C)-100.1 °F (37.8 °C)] 98.1 °F (36.7 °C)  Pulse:  [] 82  Resp:  [16-18] 17  SpO2:  [90 %-100 %] 97 %  BP: (102-133)/(56-72) 117/58     Weight: 58.9 kg (129 lb 13.6 oz)  Body mass index is 22.29 kg/m².    Intake/Output Summary (Last 24 hours) at 4/8/2022 1132  Last data filed at 4/8/2022 0900  Gross per 24 hour   Intake 1829.55 ml   Output --   Net 1829.55 ml      Physical Exam  Vitals reviewed.   Constitutional:       General: She is not in acute distress.     Appearance: Normal appearance.   HENT:      Head: Normocephalic and atraumatic.      Nose: Nose normal.      Mouth/Throat:      Mouth: Mucous membranes are moist.      Pharynx: Oropharynx is clear.   Eyes:      Extraocular Movements: Extraocular movements intact.      Conjunctiva/sclera: Conjunctivae normal.      Pupils: Pupils are equal, round, and reactive to light.   Cardiovascular:      Rate and Rhythm: Normal rate and regular rhythm.      Pulses: Normal pulses.      Heart sounds: Normal heart sounds.   Pulmonary:      Effort: Pulmonary effort is normal. No respiratory distress.      Breath sounds: Normal breath sounds. No stridor. No wheezing or rhonchi.   Abdominal:      General: Abdomen is flat. Bowel sounds are normal. There is no distension.      Palpations: Abdomen is soft. There is no mass.      Tenderness: There is no  abdominal tenderness.      Hernia: No hernia is present.   Musculoskeletal:         General: No swelling, tenderness, deformity or signs of injury. Normal range of motion.      Cervical back: Normal range of motion and neck supple.        Legs:    Skin:     General: Skin is warm.      Coloration: Skin is not pale.      Findings: No bruising or erythema.   Neurological:      General: No focal deficit present.      Mental Status: She is alert and oriented to person, place, and time. Mental status is at baseline.      Cranial Nerves: No cranial nerve deficit.      Sensory: No sensory deficit.      Motor: No weakness.      Coordination: Coordination normal.       Significant Labs: All pertinent labs within the past 24 hours have been reviewed.  CBC:   Recent Labs   Lab 04/07/22  0511 04/08/22  0505   WBC 10.17 9.49   HGB 8.1* 7.1*   HCT 24.3* 21.8*    311     CMP:   Recent Labs   Lab 04/07/22  0511 04/08/22  0505    138   K 3.5 3.9    105   CO2 25 25   GLU 98 96   BUN 4* 7   CREATININE 0.5 0.6   CALCIUM 8.4* 8.5*   PROT 6.6 6.3   ALBUMIN 3.0* 2.9*   BILITOT 2.7* 2.2*   ALKPHOS 94 95   AST 28 29   ALT 24 29   ANIONGAP 8 8   EGFRNONAA >60 >60       Significant Imaging: I have reviewed all pertinent imaging results/findings within the past 24 hours.      Assessment/Plan:      * Sickle cell anemia with pain  Cont PCA pump( Changed to morphine per pt request )  Cont IVF D5NS  F/U daily cmp and cbc  F/U q48 hrs LDH and Reticular count   CXR and UA did not show any acute finding     4/7/22  -Pt reports feels not in a crisis   -H/H 8.1/24.3  -Transition to oral analgesics   -Supportive care  -Hematology   -4/8/22- H/H stable with downward trend- Heme/Onc following- pt on oral medications- Heme/Onc following     Sepsis  -improved   This patient does have evidence of infective focus  My overall impression is sepsis. Vital signs were reviewed and noted in progress note.  Antibiotics given-   Antibiotics (From  admission, onward)            Start     Stop Route Frequency Ordered    04/05/22 2100  doxycycline tablet 100 mg         -- Oral Every 12 hours 04/05/22 1348    04/05/22 1115  cefTRIAXone (ROCEPHIN) 1 g/50 mL D5W IVPB         -- IV Every 24 hours (non-standard times) 04/05/22 1008        Cultures were taken-   Microbiology Results (last 7 days)     Procedure Component Value Units Date/Time    Blood culture [[id number]] Collected: 04/05/22 0914    Order Status: Completed Specimen: Blood from Peripheral, Right Hand Updated: 04/07/22 1812     Blood Culture, Routine No Growth to date      No Growth to date      No Growth to date    Blood culture [[id number]] Collected: 04/05/22 0914    Order Status: Completed Specimen: Blood from Peripheral, Left Arm Updated: 04/07/22 1812     Blood Culture, Routine No Growth to date      No Growth to date      No Growth to date        Latest lactate reviewed, they are-  No results for input(s): LACTATE in the last 72 hours.  Source- skin     Source control Achieved by- IV abx - transferred to oral dosing   Pt spike fever overnight  CXR negative  UA pending  Concern ID consult pending clinical course     Cellulitis of extremity  -improved   Continue IV rocephin and doxycycline   Blood cx remain negative   Monitor fever trend   Repeat RLE US negative for DVT  -4/8/22- antibiotics transitioned to oral dosing         GERD (gastroesophageal reflux disease)  Cont PPI      Elevated bilirubin  Monitor   Most Likely  due to SS exacerbation   Bilirubin 2.7>2.2    Sickle cell anemia  Monitor H/H- stable with decline  Transfuse if < than 7   Transfuse 1 PRBC       VTE Risk Mitigation (From admission, onward)         Ordered     enoxaparin injection 40 mg  Daily         04/02/22 1313     Place RISSA hose  Until discontinued         04/02/22 1313     IP VTE HIGH RISK PATIENT  Once         04/02/22 1313                Discharge Planning   FARIHA: 4/8/2022     Code Status: Full Code   Is the patient  medically ready for discharge?:     Reason for patient still in hospital (select all that apply): Patient trending condition and Pending disposition  Discharge Plan A: Home with family                  Abbi Ayon NP  Department of Hospital Medicine   [facility] ([facility])

## 2022-04-08 NOTE — NURSING
Telemetry monitor removed and returned to monitor tech.  Discharge instructions reviewed with patient including discharge medications, follow-up appointments, diet, and activity restrictions.  Patient verbalizes understanding and voices no concerns.  Wheelchair requested, all belongings collected and sent to vehicle with .

## 2022-04-08 NOTE — ASSESSMENT & PLAN NOTE
-improved   Continue IV rocephin and doxycycline   Blood cx remain negative   Monitor fever trend   Repeat RLE US negative for DVT  -4/8/22- antibiotics transitioned to oral dosing

## 2022-04-08 NOTE — DISCHARGE SUMMARY
"[facility] Telemetry ([facility])  [facility] Medicine  Discharge Summary      Patient Name: Elizabeth Franco  MRN: [id number]  Patient Class: IP- Inpatient  Admission Date: 4/2/2022  Hospital Length of Stay: 6 days  Discharge Date and Time: 4/8/2022  6:45 PM  Attending Physician: Dr. 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 Presents to the ED with C/O of  generalized pain over the last days which has gotten worse.  Is associated with  chest pain and joint paint . She denies any SOB , palpitation , fever , chills , GI or  sx .    Her sickle cell has been well-controlled for most of her life. Per chart review, she followed with Dr. Elvira Nuñez hematologist here at [facility] but has not followed up recently  . 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 She takes oxycodone 5mg and tramadol 50mg at home when she has pain, but does not use them regularly.  ER Course:WBC 17 K , H/H 8.3/23 , Ret 23 ,  . CXR and did not show any  acute finding     Pt will be admitted with a Dx of sickle cell crisis   Code status : Full code   SDM:  Shar Franco Father   [phone]           * No surgery found *      Hospital Course:   27 y/o aaf admitted with a Dx of Sickle cell exacerbation . Started on PCA natalia dn IVF . She cont complaining of generalized pain . The H/H is 7 today and plan to transfuse 1 PRBC. On 4/4/22 changed  to  IV push morphine due to  uncontrolled pain .  We will start a morphine PCA pump  . She cont  complaining of severe b/l knee pain . B/L LE US negative for dvt . She is s/p 1 PRBC. As of 4/5/22 pt c/o right shin pain and swelling. Imaging showed Mild " "pretibial edema or cellulitis; No discrete abscess seen; Trace knee joint effusion. IV rocephin and oral doxycycline started. H/H stable. Continue current medical management plan. As of 4/6/22 pain improving slowly. H/H 6.7/19.9, will transfuse 1 unit of PRBCs. RLE swelling improving. Continue IV abx. As of 4/7/22 pt spike fever overnight. Blood cx remain negative. CXR negative. Repeat RLE US negative for DVT. UA pending. Care plan discussed with Dr. Kong. On 4/8/22, pt verbalized symptom improvement and agreed with discharge.  Pt later changed her mind and became anxious and tearful and stated her "mental health was triggered" when talking about discharge.  Pt denies suicidal or homicidal ideation and refused Psych. Social work consulted for further assistance. Heme/Onc following. Pt had lengthy discussion with  and stated she felt safe going and was ready for discharge.  Pt seen and examined and deemed stable for discharge to home.  Medications reconciled for discharge. Pt instructed to follow up with Heme/Onc upon discharge for further evaluation.        Goals of Care Treatment Preferences:  Code Status: Full Code      Consults:   Consults (From admission, onward)        Status Ordering Provider     Inpatient consult to Social Work/Case Management  Once        Provider:  (Not yet assigned)    Completed ARMEN DURON     Inpatient consult to Hematology/Oncology  Once        Provider:  Faheem Acosta MD    Completed RA GU          Final Active Diagnoses:    Diagnosis Date Noted POA    PRINCIPAL PROBLEM:  Sickle cell anemia with pain [D57.00] 07/29/2016 Yes    Sepsis [A41.9] 04/07/2022 Yes    Cellulitis of extremity [L03.119] 04/05/2022 Yes    GERD (gastroesophageal reflux disease) [K21.9] 04/02/2022 Yes    Elevated bilirubin [R17] 03/03/2021 Yes      Problems Resolved During this Admission:    Diagnosis Date Noted Date Resolved POA    Leukocytosis [D72.829] 04/02/2022 04/05/2022 " Yes       Discharged Condition: stable    Disposition: Home or Self Care    Follow Up:   Follow-up Information     Elvira Nuñez MD Follow up in 3 day(s).    Specialty: Hematology and Oncology  Why: -hospital follow up- please keep previously scheduled appointment  Contact information:  [address]  [place] [address]  [phone]                       Patient Instructions:      Diet Adult Regular     Notify your health care provider if you experience any of the following:  temperature >100.4     Notify your health care provider if you experience any of the following:  persistent nausea and vomiting or diarrhea     Notify your health care provider if you experience any of the following:  increased confusion or weakness     Notify your health care provider if you experience any of the following:  persistent dizziness, light-headedness, or visual disturbances     Notify your health care provider if you experience any of the following:  severe persistent headache     Notify your health care provider if you experience any of the following:  difficulty breathing or increased cough     Notify your health care provider if you experience any of the following:  severe uncontrolled pain     Activity as tolerated       Significant Diagnostic Studies: Labs: All labs within the past 24 hours have been reviewed    Pending Diagnostic Studies:     None         Medications:  Reconciled Home Medications:      Medication List      START taking these medications    cephALEXin 500 MG capsule  Commonly known as: KEFLEX  Take 1 capsule (500 mg total) by mouth every 6 (six) hours. for 3 days     doxycycline 100 MG tablet  Commonly known as: VIBRA-TABS  Take 1 tablet (100 mg total) by mouth every 12 (twelve) hours. for 3 days     folic acid 1 MG tablet  Commonly known as: FOLVITE  Take 1 tablet (1 mg total) by mouth once daily.            Indwelling Lines/Drains at time of discharge:   Lines/Drains/Airways     None                  Time spent on the discharge of patient: > 32 minutes         Abbi Ayon NP  Department of Hospital Medicine  '[place] - Telemetry ([facility])

## 2022-04-09 NOTE — TELEPHONE ENCOUNTER
Reason for Disposition   [1] Caller has URGENT medicine question about med that PCP or specialist prescribed AND [2] triager unable to answer question    Additional Information   Negative: [1] Intentional drug overdose AND [2] suicidal thoughts or ideas   Negative: Drug overdose and triager unable to answer question   Negative: Caller requesting information unrelated to medicine   Negative: Caller requesting information about COVID-19 Vaccine   Negative: Caller requesting information about Emergency Contraception   Negative: Caller requesting information about Combined Birth Control Pills   Negative: Caller requesting information about Progestin Birth Control Pills   Negative: Caller requesting information about Post-Op pain or medicines   Negative: Caller requesting a prescription antibiotic (such as Penicillin) for Strep throat and has a positive culture result   Negative: Caller requesting a prescription anti-viral med (such as Tamiflu) and has influenza (flu)  symptoms   Negative: Immunization reaction suspected   Negative: Rash while taking a medicine or within 3 days of stopping it   Negative: [1] Asthma and [2] having symptoms of asthma (cough, wheezing, etc.)   Negative: [1] Symptom of illness (e.g., headache, abdominal pain, earache, vomiting) AND [2] more than mild   Negative: Breastfeeding questions about mother's medicines and diet   Negative: MORE THAN A DOUBLE DOSE of a prescription or over-the-counter (OTC) drug   Negative: [1] DOUBLE DOSE (an extra dose or lesser amount) of prescription drug AND [2] any symptoms (e.g., dizziness, nausea, pain, sleepiness)   Negative: [1] DOUBLE DOSE (an extra dose or lesser amount) of over-the-counter (OTC) drug AND [2] any symptoms (e.g., dizziness, nausea, pain, sleepiness)   Negative: Took another person's prescription drug   Negative: [1] Pharmacy calling with prescription question AND [2] triager unable to answer question   Negative:  [1] Prescription not at pharmacy AND [2] was prescribed by PCP recently (Exception: triager has access to EMR and prescription is recorded there. Go to Home Care and confirm for pharmacy.)   Negative: [1] Prescription refill request for ESSENTIAL medicine (i.e., likelihood of harm to patient if not taken) AND [2] triager unable to refill per department policy   Negative: Diabetes drug error or overdose (e.g., took wrong type of insulin or took extra dose)   Negative: [1] DOUBLE DOSE (an extra dose or lesser amount) of prescription drug AND [2] NO symptoms (Exception: a double dose of antibiotics)    Protocols used: MEDICATION QUESTION CALL-OTTO    DIEGO at 729pm at [phone]  pt called re just dc from hosp. miscommunication with meds. pt states no allowed to write narcotics and doesnt have meds at home. pt wants dr blum to write meds for sickle cell crisis. Spoke with efrain carver. Will have charge nurse give pt a call. Pt notified. Call back with questions   [facility]

## 2022-04-10 LAB
BACTERIA BLD CULT: NORMAL
BACTERIA BLD CULT: NORMAL

## 2022-04-11 ENCOUNTER — PATIENT OUTREACH (OUTPATIENT)
Dept: ADMINISTRATIVE | Facility: CLINIC | Age: 27
End: 2022-04-11
Payer: COMMERCIAL

## 2022-04-11 DIAGNOSIS — D57.00 SICKLE CELL ANEMIA WITH PAIN: Primary | ICD-10-CM

## 2022-04-11 NOTE — TELEPHONE ENCOUNTER
"----- Message from Marguerite Price sent at 4/11/2022  3:53 PM CDT -----  RX Name and Strength: oxyCODONE-acetaminophen 7.5-325 mg per tablet 1 tablet          How is the patient currently taking it? Every 4 hours PRN for moderate pain 4-6/10 pain scale         Is this a 30 day or 90 day Rx?        RX Name and Strength: traMADoL tablet 50 mg    How is the patient currently taking it?          Is this a 30 day or 90 day Rx?      Preferred Pharmacy with phone number:     [id number] [place], [place] - [address]  [address] [address]  Phone: [phone] Fax: [phone]         Local or Mail Order: local         Ordering Provider: Dr Nuñez         Contact Preference: [phone]                   Additional Information:  "Thank you for all that you do for our patients"     "

## 2022-04-11 NOTE — PROGRESS NOTES
C3 nurse attempted to contact Elizabeth Franco for a TCC post hospital discharge follow up call. The patient is unable to conduct the call @ this time. The patient requested a callback tomorrow @ 11-12.    The patient has a scheduled appointment with Elvira Nuñez MD on 4/13/2022 @ 8914. Message sent to Physician staff.

## 2022-04-12 ENCOUNTER — PATIENT MESSAGE (OUTPATIENT)
Dept: ADMINISTRATIVE | Facility: CLINIC | Age: 27
End: 2022-04-12
Payer: COMMERCIAL

## 2022-04-12 ENCOUNTER — LAB VISIT (OUTPATIENT)
Dept: LAB | Facility: HOSPITAL | Age: 27
End: 2022-04-12
Attending: INTERNAL MEDICINE
Payer: COMMERCIAL

## 2022-04-12 DIAGNOSIS — D57.1 SICKLE CELL ANEMIA: ICD-10-CM

## 2022-04-12 LAB
ALBUMIN SERPL BCP-MCNC: 4.3 G/DL (ref 3.5–5.2)
ALP SERPL-CCNC: 119 U/L (ref 55–135)
ALT SERPL W/O P-5'-P-CCNC: 69 U/L (ref 10–44)
ANION GAP SERPL CALC-SCNC: 11 MMOL/L (ref 8–16)
ANISOCYTOSIS BLD QL SMEAR: SLIGHT
AST SERPL-CCNC: 59 U/L (ref 10–40)
BASOPHILS # BLD AUTO: 0.15 K/UL (ref 0–0.2)
BASOPHILS NFR BLD: 1.8 % (ref 0–1.9)
BILIRUB SERPL-MCNC: 2.3 MG/DL (ref 0.1–1)
BUN SERPL-MCNC: 14 MG/DL (ref 6–20)
CALCIUM SERPL-MCNC: 9.7 MG/DL (ref 8.7–10.5)
CHLORIDE SERPL-SCNC: 104 MMOL/L (ref 95–110)
CO2 SERPL-SCNC: 21 MMOL/L (ref 23–29)
CREAT SERPL-MCNC: 0.7 MG/DL (ref 0.5–1.4)
DACRYOCYTES BLD QL SMEAR: ABNORMAL
DIFFERENTIAL METHOD: ABNORMAL
EOSINOPHIL # BLD AUTO: 0.4 K/UL (ref 0–0.5)
EOSINOPHIL NFR BLD: 4.7 % (ref 0–8)
ERYTHROCYTE [DISTWIDTH] IN BLOOD BY AUTOMATED COUNT: 16.8 % (ref 11.5–14.5)
EST. GFR  (AFRICAN AMERICAN): >60 ML/MIN/1.73 M^2
EST. GFR  (NON AFRICAN AMERICAN): >60 ML/MIN/1.73 M^2
FERRITIN SERPL-MCNC: 2510 NG/ML (ref 20–300)
GLUCOSE SERPL-MCNC: 91 MG/DL (ref 70–110)
HCT VFR BLD AUTO: 29 % (ref 37–48.5)
HGB BLD-MCNC: 9.4 G/DL (ref 12–16)
HYPOCHROMIA BLD QL SMEAR: ABNORMAL
IMM GRANULOCYTES # BLD AUTO: 0.09 K/UL (ref 0–0.04)
IMM GRANULOCYTES NFR BLD AUTO: 1.1 % (ref 0–0.5)
LYMPHOCYTES # BLD AUTO: 2.3 K/UL (ref 1–4.8)
LYMPHOCYTES NFR BLD: 28 % (ref 18–48)
MCH RBC QN AUTO: 28.1 PG (ref 27–31)
MCHC RBC AUTO-ENTMCNC: 32.4 G/DL (ref 32–36)
MCV RBC AUTO: 87 FL (ref 82–98)
MONOCYTES # BLD AUTO: 1.3 K/UL (ref 0.3–1)
MONOCYTES NFR BLD: 16.5 % (ref 4–15)
NEUTROPHILS # BLD AUTO: 3.9 K/UL (ref 1.8–7.7)
NEUTROPHILS NFR BLD: 47.9 % (ref 38–73)
NRBC BLD-RTO: 1 /100 WBC
OVALOCYTES BLD QL SMEAR: ABNORMAL
PLATELET # BLD AUTO: 551 K/UL (ref 150–450)
PLATELET BLD QL SMEAR: ABNORMAL
PMV BLD AUTO: 11.1 FL (ref 9.2–12.9)
POIKILOCYTOSIS BLD QL SMEAR: ABNORMAL
POLYCHROMASIA BLD QL SMEAR: ABNORMAL
POTASSIUM SERPL-SCNC: 5.1 MMOL/L (ref 3.5–5.1)
PROT SERPL-MCNC: 9.3 G/DL (ref 6–8.4)
RBC # BLD AUTO: 3.34 M/UL (ref 4–5.4)
RETICS/RBC NFR AUTO: 13.6 % (ref 0.5–2.5)
SICKLE CELLS BLD QL SMEAR: ABNORMAL
SODIUM SERPL-SCNC: 136 MMOL/L (ref 136–145)
SPHEROCYTES BLD QL SMEAR: ABNORMAL
TARGETS BLD QL SMEAR: ABNORMAL
WBC # BLD AUTO: 8.14 K/UL (ref 3.9–12.7)

## 2022-04-12 PROCEDURE — 83020 HEMOGLOBIN ELECTROPHORESIS: CPT | Performed by: INTERNAL MEDICINE

## 2022-04-12 PROCEDURE — 82728 ASSAY OF FERRITIN: CPT | Performed by: INTERNAL MEDICINE

## 2022-04-12 PROCEDURE — 36415 COLL VENOUS BLD VENIPUNCTURE: CPT | Performed by: INTERNAL MEDICINE

## 2022-04-12 PROCEDURE — 80053 COMPREHEN METABOLIC PANEL: CPT | Performed by: INTERNAL MEDICINE

## 2022-04-12 PROCEDURE — 85025 COMPLETE CBC W/AUTO DIFF WBC: CPT | Performed by: INTERNAL MEDICINE

## 2022-04-12 PROCEDURE — 85045 AUTOMATED RETICULOCYTE COUNT: CPT | Performed by: INTERNAL MEDICINE

## 2022-04-12 RX ORDER — OXYCODONE HYDROCHLORIDE 5 MG/1
5 TABLET ORAL EVERY 4 HOURS PRN
Qty: 30 TABLET | Refills: 0 | Status: SHIPPED | OUTPATIENT
Start: 2022-04-12 | End: 2022-06-15 | Stop reason: ALTCHOICE

## 2022-04-12 NOTE — PROGRESS NOTES
C3 nurse spoke with Elizabeth Franco for a TCC post hospital discharge follow up call. The patient has a scheduled HOSFU appointment with Elvira Nuñez MD on 4/13/2022 @ 0830.  Sent message to patient portal for additional resources.

## 2022-04-13 ENCOUNTER — OFFICE VISIT (OUTPATIENT)
Dept: HEMATOLOGY/ONCOLOGY | Facility: CLINIC | Age: 27
End: 2022-04-13
Payer: COMMERCIAL

## 2022-04-13 VITALS
HEIGHT: 62 IN | SYSTOLIC BLOOD PRESSURE: 117 MMHG | TEMPERATURE: 99 F | DIASTOLIC BLOOD PRESSURE: 67 MMHG | OXYGEN SATURATION: 100 % | RESPIRATION RATE: 16 BRPM | WEIGHT: 108.25 LBS | HEART RATE: 82 BPM | BODY MASS INDEX: 19.92 KG/M2

## 2022-04-13 DIAGNOSIS — D57.09 SICKLE CELL DISEASE WITH CRISIS AND OTHER COMPLICATION: Primary | ICD-10-CM

## 2022-04-13 LAB
HGB FRACT BLD ELPH-IMP: NORMAL
HGB S MFR BLD ELPH: 53 %

## 2022-04-13 PROCEDURE — 99999 PR PBB SHADOW E&M-EST. PATIENT-LVL III: CPT | Mod: PBBFAC,,, | Performed by: INTERNAL MEDICINE

## 2022-04-13 PROCEDURE — 99215 OFFICE O/P EST HI 40 MIN: CPT | Mod: S$GLB,,, | Performed by: INTERNAL MEDICINE

## 2022-04-13 PROCEDURE — 3078F PR MOST RECENT DIASTOLIC BLOOD PRESSURE < 80 MM HG: ICD-10-PCS | Mod: CPTII,S$GLB,, | Performed by: INTERNAL MEDICINE

## 2022-04-13 PROCEDURE — 3008F BODY MASS INDEX DOCD: CPT | Mod: CPTII,S$GLB,, | Performed by: INTERNAL MEDICINE

## 2022-04-13 PROCEDURE — 1111F DSCHRG MED/CURRENT MED MERGE: CPT | Mod: CPTII,S$GLB,, | Performed by: INTERNAL MEDICINE

## 2022-04-13 PROCEDURE — 99215 PR OFFICE/OUTPT VISIT, EST, LEVL V, 40-54 MIN: ICD-10-PCS | Mod: S$GLB,,, | Performed by: INTERNAL MEDICINE

## 2022-04-13 PROCEDURE — 1159F MED LIST DOCD IN RCRD: CPT | Mod: CPTII,S$GLB,, | Performed by: INTERNAL MEDICINE

## 2022-04-13 PROCEDURE — 1160F RVW MEDS BY RX/DR IN RCRD: CPT | Mod: CPTII,S$GLB,, | Performed by: INTERNAL MEDICINE

## 2022-04-13 PROCEDURE — 3074F PR MOST RECENT SYSTOLIC BLOOD PRESSURE < 130 MM HG: ICD-10-PCS | Mod: CPTII,S$GLB,, | Performed by: INTERNAL MEDICINE

## 2022-04-13 PROCEDURE — 1111F PR DISCHARGE MEDS RECONCILED W/ CURRENT OUTPATIENT MED LIST: ICD-10-PCS | Mod: CPTII,S$GLB,, | Performed by: INTERNAL MEDICINE

## 2022-04-13 PROCEDURE — 3074F SYST BP LT 130 MM HG: CPT | Mod: CPTII,S$GLB,, | Performed by: INTERNAL MEDICINE

## 2022-04-13 PROCEDURE — 3008F PR BODY MASS INDEX (BMI) DOCUMENTED: ICD-10-PCS | Mod: CPTII,S$GLB,, | Performed by: INTERNAL MEDICINE

## 2022-04-13 PROCEDURE — 1160F PR REVIEW ALL MEDS BY PRESCRIBER/CLIN PHARMACIST DOCUMENTED: ICD-10-PCS | Mod: CPTII,S$GLB,, | Performed by: INTERNAL MEDICINE

## 2022-04-13 PROCEDURE — 3078F DIAST BP <80 MM HG: CPT | Mod: CPTII,S$GLB,, | Performed by: INTERNAL MEDICINE

## 2022-04-13 PROCEDURE — 1159F PR MEDICATION LIST DOCUMENTED IN MEDICAL RECORD: ICD-10-PCS | Mod: CPTII,S$GLB,, | Performed by: INTERNAL MEDICINE

## 2022-04-13 PROCEDURE — 99999 PR PBB SHADOW E&M-EST. PATIENT-LVL III: ICD-10-PCS | Mod: PBBFAC,,, | Performed by: INTERNAL MEDICINE

## 2022-04-13 NOTE — PROGRESS NOTES
Route Chart for Scheduling    BMT Chart Routing      Follow up with physician 6 months. At [facility]   Follow up with FILIBERTO    Labs CBC, CMP and ferritin   Lab interval:  Retic, hemoglobin S quant   Imaging ECHO      Pharmacy appointment    Other referrals               Subjective:       Patient ID: Elizabeth Franco is a 26 y.o. female.    Chief Complaint: No chief complaint on file.  Initial Consult:  The patient is a very pleasant 20 year old woman transferring care from [facility] for sickle cell anemia. Her parents have sickle cell trait. She reports a history of very well managed sickle cell disease. 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 She has a horizontal left upper quadrant scar from this surgery. She was treated with transfusion therapy for about 10 years. This was facilitated by a Port-A-Cath that was removed at the completion of therapy. The therapy was limited by iron overload and the patient was not able to tolerate oral chelation therapy. By description she was treated at least once with phlebotomy.    She has rare pain crises that lead to ER evaluation and treatment. These are often pain events of the left upper extremity or lower extremities. They occur about 1-2x yearly. Most often in the winter during finals week at school. She attends [employer] as a [employer] major. She has just started her second semester. Her last blood transfusion was 2 years ago. She has never required an exchange transfusion.     The patient notes that she has had nocturnal enuresis since her splenectomy at 4 years of age. This has been treated with pelvic exercises and oxybutynin. The incidence of enuresis has greatly decreased to resolved over the past year. She has not had increased UTI occurrence. Chaffing and skin irritation was a prior issue.    Elizabeth also has a recent diagnosis of hidradenitis supprativa of the right axilla. Flares have been treated with  oral Augmentin and mupirocin 2% cream.     In addition she reports severe episodes of GERD that impact her quality of life. Events are described as immobilizing.incapacitating epigastric pain that radiates to the entire chest. Events last at least 24 hours. This has not been evaluated by Gastroenterology to date.    TODAY  Hospital discharge follow-up after acute sickle cell pain and right knee pain/swelling. Noted she has a hospital admission every year around this time- suspects combination of weather (had a cold front last week, poor diet, and increased exertion)  Labs consistent with recent pain event, Hgb S pending but may need repeat to reflect steady state  Currently off hydrea as she is off pork and they are possibly made from a pork by-product; using holistic methods and reports feeling better  Need updated eye exam and echocardiogram this year.  Reports recent sneezing and sudden pain at splenectomy incision      HPI  Review of Systems   Constitutional: Negative for activity change, appetite change, fever and unexpected weight change.   HENT: Negative.  Negative for mouth sores.    Eyes: Negative.  Negative for visual disturbance.   Respiratory: Negative for cough and shortness of breath.    Cardiovascular: Negative for chest pain and leg swelling.   Gastrointestinal: Negative for abdominal pain and diarrhea.   Endocrine: Negative.    Genitourinary: Positive for enuresis and frequency.   Musculoskeletal: Negative.  Negative for back pain.   Skin: Negative for pallor and rash.   Allergic/Immunologic: Negative for environmental allergies, food allergies and immunocompromised state.   Neurological: Negative.  Negative for headaches.   Hematological: Negative for adenopathy. Does not bruise/bleed easily.   Psychiatric/Behavioral: Negative.  The patient is not nervous/anxious.        Objective:      Physical Exam  Vitals and nursing note reviewed.   Constitutional:       General: She is in acute distress.       Appearance: Normal appearance. She is well-developed.   HENT:      Head: Normocephalic and atraumatic.   Eyes:      General: No scleral icterus.     Conjunctiva/sclera: Conjunctivae normal.   Cardiovascular:      Rate and Rhythm: Normal rate.   Pulmonary:      Effort: Pulmonary effort is normal. No respiratory distress.   Abdominal:      General: There is no distension.      Palpations: Abdomen is soft.      Tenderness: There is no abdominal tenderness.      Hernia: No hernia is present.   Musculoskeletal:         General: Normal range of motion.      Cervical back: Normal range of motion and neck supple.   Skin:     General: Skin is warm and dry.   Neurological:      Mental Status: She is alert and oriented to person, place, and time.      Cranial Nerves: No cranial nerve deficit.   Psychiatric:         Behavior: Behavior normal.         Assessment:       No diagnosis found.    Plan:       1. Ophthalmology for eye exam including retinal exam  3. ECHO ordered    Return visit in 6 months with CBC, CMP, Retic, ferritin, and hemoglobin S quant

## 2022-05-26 ENCOUNTER — PATIENT MESSAGE (OUTPATIENT)
Dept: HEMATOLOGY/ONCOLOGY | Facility: CLINIC | Age: 27
End: 2022-05-26
Payer: COMMERCIAL

## 2022-05-31 ENCOUNTER — OFFICE VISIT (OUTPATIENT)
Dept: OBSTETRICS AND GYNECOLOGY | Facility: CLINIC | Age: 27
End: 2022-05-31
Payer: COMMERCIAL

## 2022-05-31 VITALS
WEIGHT: 110.25 LBS | BODY MASS INDEX: 20.16 KG/M2 | SYSTOLIC BLOOD PRESSURE: 102 MMHG | DIASTOLIC BLOOD PRESSURE: 64 MMHG

## 2022-05-31 DIAGNOSIS — N60.12 FIBROCYSTIC BREAST CHANGES OF BOTH BREASTS: Primary | ICD-10-CM

## 2022-05-31 DIAGNOSIS — N60.11 FIBROCYSTIC BREAST CHANGES OF BOTH BREASTS: Primary | ICD-10-CM

## 2022-05-31 PROCEDURE — 3078F PR MOST RECENT DIASTOLIC BLOOD PRESSURE < 80 MM HG: ICD-10-PCS | Mod: CPTII,S$GLB,, | Performed by: NURSE PRACTITIONER

## 2022-05-31 PROCEDURE — 1160F RVW MEDS BY RX/DR IN RCRD: CPT | Mod: CPTII,S$GLB,, | Performed by: NURSE PRACTITIONER

## 2022-05-31 PROCEDURE — 3008F BODY MASS INDEX DOCD: CPT | Mod: CPTII,S$GLB,, | Performed by: NURSE PRACTITIONER

## 2022-05-31 PROCEDURE — 1159F PR MEDICATION LIST DOCUMENTED IN MEDICAL RECORD: ICD-10-PCS | Mod: CPTII,S$GLB,, | Performed by: NURSE PRACTITIONER

## 2022-05-31 PROCEDURE — 3078F DIAST BP <80 MM HG: CPT | Mod: CPTII,S$GLB,, | Performed by: NURSE PRACTITIONER

## 2022-05-31 PROCEDURE — 1160F PR REVIEW ALL MEDS BY PRESCRIBER/CLIN PHARMACIST DOCUMENTED: ICD-10-PCS | Mod: CPTII,S$GLB,, | Performed by: NURSE PRACTITIONER

## 2022-05-31 PROCEDURE — 3074F PR MOST RECENT SYSTOLIC BLOOD PRESSURE < 130 MM HG: ICD-10-PCS | Mod: CPTII,S$GLB,, | Performed by: NURSE PRACTITIONER

## 2022-05-31 PROCEDURE — 99999 PR PBB SHADOW E&M-EST. PATIENT-LVL III: ICD-10-PCS | Mod: PBBFAC,,, | Performed by: NURSE PRACTITIONER

## 2022-05-31 PROCEDURE — 99203 PR OFFICE/OUTPT VISIT, NEW, LEVL III, 30-44 MIN: ICD-10-PCS | Mod: S$GLB,,, | Performed by: NURSE PRACTITIONER

## 2022-05-31 PROCEDURE — 3074F SYST BP LT 130 MM HG: CPT | Mod: CPTII,S$GLB,, | Performed by: NURSE PRACTITIONER

## 2022-05-31 PROCEDURE — 99203 OFFICE O/P NEW LOW 30 MIN: CPT | Mod: S$GLB,,, | Performed by: NURSE PRACTITIONER

## 2022-05-31 PROCEDURE — 99999 PR PBB SHADOW E&M-EST. PATIENT-LVL III: CPT | Mod: PBBFAC,,, | Performed by: NURSE PRACTITIONER

## 2022-05-31 PROCEDURE — 1159F MED LIST DOCD IN RCRD: CPT | Mod: CPTII,S$GLB,, | Performed by: NURSE PRACTITIONER

## 2022-05-31 PROCEDURE — 3008F PR BODY MASS INDEX (BMI) DOCUMENTED: ICD-10-PCS | Mod: CPTII,S$GLB,, | Performed by: NURSE PRACTITIONER

## 2022-05-31 NOTE — PROGRESS NOTES
Subjective:       Patient ID: Elizabeth Franco is a 26 y.o. female.    Chief Complaint:  Breast Pain    Patient's last menstrual period was 05/04/2022.  History of Present Illness  Patient presents to clinic for evaluation of breast pain.  Patient reports experiencing increase in tenderness and lumpy breast tissue to left breast for 2-3 weeks.  Patient reports tenderness has significantly decreased over the last week.  Denies nipple discharge.  Denies skin changes.  Denies family history of breast cancer.    OB History   No obstetric history on file.       Review of Systems  Review of Systems   Constitutional: Negative for appetite change, fatigue and fever.   Gastrointestinal: Negative for abdominal pain, bloating, constipation, diarrhea, nausea and vomiting.   Genitourinary: Negative for bladder incontinence, dysmenorrhea, dyspareunia, dysuria, flank pain, frequency, genital sores, menorrhagia, menstrual problem, pelvic pain, urgency, vaginal bleeding, vaginal discharge, vaginal pain, postcoital bleeding, vaginal dryness and vaginal odor.   Integumentary:  Positive for breast tenderness. Negative for breast mass, nipple discharge and breast skin changes.   All other systems reviewed and are negative.  Breast: Positive for breast self exam, lump and tenderness.Negative for asymmetry, mass, mastodynia, nipple discharge and skin changes           Objective:      Physical Exam:   Constitutional: She is oriented to person, place, and time. She appears well-developed and well-nourished.    HENT:   Head: Normocephalic and atraumatic.    Eyes: Pupils are equal, round, and reactive to light. Conjunctivae and EOM are normal.     Cardiovascular: Normal rate, regular rhythm and normal heart sounds.     Pulmonary/Chest: Effort normal. Right breast exhibits no inverted nipple, no mass, no nipple discharge, no skin change, no tenderness, no bleeding and no swelling. Left breast exhibits tenderness (mild). Left breast  exhibits no inverted nipple, no mass, no nipple discharge, no skin change, no bleeding and no swelling. Breasts are symmetrical.   Fibrocystic breast tissue to upper outer quadrant of breasts bilaterally. No isolated masses.         Abdominal: Soft. There is no abdominal tenderness.     Genitourinary:    Genitourinary Comments: deferred             Musculoskeletal: Normal range of motion and moves all extremeties.       Neurological: She is alert and oriented to person, place, and time.    Skin: Skin is warm and dry. She is not diaphoretic.    Psychiatric: She has a normal mood and affect. Her behavior is normal. Judgment and thought content normal.            Assessment:     1. Fibrocystic breast changes of both breasts              Plan:   Elizabeth was seen today for breast pain.    Diagnoses and all orders for this visit:    Fibrocystic breast changes of both breasts      Reassurance provided.  Ibuprofen as needed, supportive bra, ice pack to breast, vitamin E, evening primrose oil as needed.  Discussed decreased caffeine intake.     F/u if worsening or continues > 3months.

## 2022-06-15 ENCOUNTER — HOSPITAL ENCOUNTER (INPATIENT)
Facility: HOSPITAL | Age: 27
LOS: 5 days | Discharge: HOME | DRG: 871 | End: 2022-06-20
Attending: EMERGENCY MEDICINE | Admitting: STUDENT IN AN ORGANIZED HEALTH CARE EDUCATION/TRAINING PROGRAM
Payer: COMMERCIAL

## 2022-06-15 DIAGNOSIS — R07.9 CHEST PAIN: ICD-10-CM

## 2022-06-15 DIAGNOSIS — D64.9 ANEMIA, UNSPECIFIED TYPE: ICD-10-CM

## 2022-06-15 DIAGNOSIS — R91.8 LUNG INFILTRATE: ICD-10-CM

## 2022-06-15 DIAGNOSIS — D57.00 SICKLE CELL CRISIS: ICD-10-CM

## 2022-06-15 DIAGNOSIS — D57.01 ACUTE CHEST SYNDROME DUE TO HEMOGLOBIN S DISEASE: Primary | ICD-10-CM

## 2022-06-15 DIAGNOSIS — D57.1 SICKLE CELL ANEMIA: ICD-10-CM

## 2022-06-15 DIAGNOSIS — R79.89 ELEVATED TROPONIN: ICD-10-CM

## 2022-06-15 DIAGNOSIS — D57.00 SICKLE CELL ANEMIA WITH PAIN: ICD-10-CM

## 2022-06-15 PROBLEM — R09.02 HYPOXEMIA: Status: ACTIVE | Noted: 2022-06-15

## 2022-06-15 PROBLEM — J18.9 PNEUMONIA: Status: ACTIVE | Noted: 2022-06-15

## 2022-06-15 PROBLEM — K59.00 CONSTIPATION: Status: ACTIVE | Noted: 2022-06-15

## 2022-06-15 PROBLEM — K80.20 GALL BLADDER STONES: Status: ACTIVE | Noted: 2022-06-15

## 2022-06-15 PROBLEM — R65.20 SEVERE SEPSIS: Status: ACTIVE | Noted: 2022-06-15

## 2022-06-15 PROBLEM — R74.8 ELEVATED LIVER ENZYMES: Status: ACTIVE | Noted: 2022-06-15

## 2022-06-15 PROBLEM — R16.0 HEPATOMEGALY: Status: ACTIVE | Noted: 2022-06-15

## 2022-06-15 PROBLEM — A41.9 SEVERE SEPSIS: Status: ACTIVE | Noted: 2022-06-15

## 2022-06-15 LAB
ALBUMIN SERPL BCP-MCNC: 4.3 G/DL (ref 3.5–5.2)
ALLENS TEST: ABNORMAL
ALP SERPL-CCNC: 127 U/L (ref 55–135)
ALT SERPL W/O P-5'-P-CCNC: 33 U/L (ref 10–44)
ANION GAP SERPL CALC-SCNC: 10 MMOL/L (ref 8–16)
ANISOCYTOSIS BLD QL SMEAR: SLIGHT
AORTIC ROOT ANNULUS: 2.8 CM
ASCENDING AORTA: 2.22 CM
AST SERPL-CCNC: 110 U/L (ref 10–40)
AV INDEX (PROSTH): 0.65
AV MEAN GRADIENT: 9 MMHG
AV PEAK GRADIENT: 16 MMHG
AV VALVE AREA: 1.87 CM2
AV VELOCITY RATIO: 0.76
B-HCG UR QL: NEGATIVE
BASOPHILS # BLD AUTO: 0.09 K/UL (ref 0–0.2)
BASOPHILS NFR BLD: 0.5 % (ref 0–1.9)
BILIRUB SERPL-MCNC: 7.6 MG/DL (ref 0.1–1)
BILIRUB UR QL STRIP: NEGATIVE
BNP SERPL-MCNC: 206 PG/ML (ref 0–99)
BSA FOR ECHO PROCEDURE: 1.5 M2
BUN SERPL-MCNC: 8 MG/DL (ref 6–20)
CALCIUM SERPL-MCNC: 9.1 MG/DL (ref 8.7–10.5)
CHLORIDE SERPL-SCNC: 103 MMOL/L (ref 95–110)
CLARITY UR: CLEAR
CO2 SERPL-SCNC: 22 MMOL/L (ref 23–29)
COLOR UR: YELLOW
CREAT SERPL-MCNC: 0.6 MG/DL (ref 0.5–1.4)
CTP QC/QA: YES
CV ECHO LV RWT: 0.51 CM
D DIMER PPP IA.FEU-MCNC: 24.04 MG/L FEU
DELSYS: ABNORMAL
DIFFERENTIAL METHOD: ABNORMAL
DOP CALC AO PEAK VEL: 1.97 M/S
DOP CALC AO VTI: 37.6 CM
DOP CALC LVOT AREA: 2.9 CM2
DOP CALC LVOT DIAMETER: 1.91 CM
DOP CALC LVOT PEAK VEL: 1.49 M/S
DOP CALC LVOT STROKE VOLUME: 70.45 CM3
DOP CALC RVOT PEAK VEL: 1.28 M/S
DOP CALC RVOT VTI: 24.2 CM
DOP CALCLVOT PEAK VEL VTI: 24.6 CM
E WAVE DECELERATION TIME: 255.42 MSEC
E/A RATIO: 1.3
E/E' RATIO: 7.21 M/S
ECHO LV POSTERIOR WALL: 0.99 CM (ref 0.6–1.1)
EJECTION FRACTION: 60 %
EOSINOPHIL # BLD AUTO: 0 K/UL (ref 0–0.5)
EOSINOPHIL NFR BLD: 0.1 % (ref 0–8)
ERYTHROCYTE [DISTWIDTH] IN BLOOD BY AUTOMATED COUNT: 21.3 % (ref 11.5–14.5)
EST. GFR  (AFRICAN AMERICAN): >60 ML/MIN/1.73 M^2
EST. GFR  (NON AFRICAN AMERICAN): >60 ML/MIN/1.73 M^2
FIO2: 34
FLOW: 3.5
FRACTIONAL SHORTENING: 37 % (ref 28–44)
GLUCOSE SERPL-MCNC: 125 MG/DL (ref 70–110)
GLUCOSE UR QL STRIP: NEGATIVE
HCO3 UR-SCNC: 24.7 MMOL/L (ref 24–28)
HCT VFR BLD AUTO: 18.2 % (ref 37–48.5)
HCV AB SERPL QL IA: NEGATIVE
HEP C VIRUS HOLD SPECIMEN: NORMAL
HGB BLD-MCNC: 6.5 G/DL (ref 12–16)
HGB UR QL STRIP: ABNORMAL
HIV 1+2 AB+HIV1 P24 AG SERPL QL IA: NEGATIVE
IMM GRANULOCYTES # BLD AUTO: 0.6 K/UL (ref 0–0.04)
IMM GRANULOCYTES NFR BLD AUTO: 3.1 % (ref 0–0.5)
INTERVENTRICULAR SEPTUM: 1.19 CM (ref 0.6–1.1)
IVC DIAMETER: 1.87 CM
IVRT: 60.89 MSEC
KETONES UR QL STRIP: NEGATIVE
LA MAJOR: 5.19 CM
LA MINOR: 4.64 CM
LACTATE SERPL-SCNC: 1 MMOL/L (ref 0.5–2.2)
LEFT ATRIUM SIZE: 3.55 CM
LEFT INTERNAL DIMENSION IN SYSTOLE: 2.47 CM (ref 2.1–4)
LEFT VENTRICLE DIASTOLIC VOLUME INDEX: 43.6 ML/M2
LEFT VENTRICLE DIASTOLIC VOLUME: 65.83 ML
LEFT VENTRICLE MASS INDEX: 92 G/M2
LEFT VENTRICLE SYSTOLIC VOLUME INDEX: 14.3 ML/M2
LEFT VENTRICLE SYSTOLIC VOLUME: 21.56 ML
LEFT VENTRICULAR INTERNAL DIMENSION IN DIASTOLE: 3.9 CM (ref 3.5–6)
LEFT VENTRICULAR MASS: 138.24 G
LEUKOCYTE ESTERASE UR QL STRIP: NEGATIVE
LV LATERAL E/E' RATIO: 6.52 M/S
LV SEPTAL E/E' RATIO: 8.06 M/S
LVOT MG: 6.29 MMHG
LVOT MV: 1.24 CM/S
LYMPHOCYTES # BLD AUTO: 3.2 K/UL (ref 1–4.8)
LYMPHOCYTES NFR BLD: 16.2 % (ref 18–48)
MCH RBC QN AUTO: 30.5 PG (ref 27–31)
MCHC RBC AUTO-ENTMCNC: 35.7 G/DL (ref 32–36)
MCV RBC AUTO: 85 FL (ref 82–98)
MODE: ABNORMAL
MONOCYTES # BLD AUTO: 2.9 K/UL (ref 0.3–1)
MONOCYTES NFR BLD: 14.7 % (ref 4–15)
MV PEAK A VEL: 1.05 M/S
MV PEAK E VEL: 1.37 M/S
MV STENOSIS PRESSURE HALF TIME: 74.07 MS
MV VALVE AREA P 1/2 METHOD: 2.97 CM2
NEUTROPHILS # BLD AUTO: 12.9 K/UL (ref 1.8–7.7)
NEUTROPHILS NFR BLD: 65.4 % (ref 38–73)
NITRITE UR QL STRIP: NEGATIVE
NRBC BLD-RTO: 15 /100 WBC
PCO2 BLDA: 43.7 MMHG (ref 35–45)
PH SMN: 7.36 [PH] (ref 7.35–7.45)
PH UR STRIP: 6 [PH] (ref 5–8)
PISA MRMAX VEL: 3.88 M/S
PISA TR MAX VEL: 2.2 M/S
PLATELET # BLD AUTO: 163 K/UL (ref 150–450)
PMV BLD AUTO: 10.8 FL (ref 9.2–12.9)
PO2 BLDA: 113 MMHG (ref 80–100)
POC BE: -1 MMOL/L
POC SATURATED O2: 98 % (ref 95–100)
POIKILOCYTOSIS BLD QL SMEAR: ABNORMAL
POLYCHROMASIA BLD QL SMEAR: ABNORMAL
POTASSIUM SERPL-SCNC: 4.3 MMOL/L (ref 3.5–5.1)
PROT SERPL-MCNC: 8.2 G/DL (ref 6–8.4)
PROT UR QL STRIP: NEGATIVE
PV MEAN GRADIENT: 4.09 MMHG
RA MAJOR: 3.83 CM
RA PRESSURE: 3 MMHG
RBC # BLD AUTO: 2.13 M/UL (ref 4–5.4)
RETICS/RBC NFR AUTO: >23 % (ref 0.5–2.5)
SAMPLE: ABNORMAL
SARS-COV-2 RDRP RESP QL NAA+PROBE: NEGATIVE
SICKLE CELLS BLD QL SMEAR: ABNORMAL
SITE: ABNORMAL
SODIUM SERPL-SCNC: 135 MMOL/L (ref 136–145)
SP GR UR STRIP: 1.01 (ref 1–1.03)
STJ: 2.48 CM
TARGETS BLD QL SMEAR: ABNORMAL
TDI LATERAL: 0.21 M/S
TDI SEPTAL: 0.17 M/S
TDI: 0.19 M/S
TR MAX PG: 19 MMHG
TR MEAN GRADIENT: 17 MMHG
TROPONIN I SERPL DL<=0.01 NG/ML-MCNC: 0.01 NG/ML (ref 0–0.03)
TROPONIN I SERPL DL<=0.01 NG/ML-MCNC: 0.02 NG/ML (ref 0–0.03)
TROPONIN I SERPL DL<=0.01 NG/ML-MCNC: 0.03 NG/ML (ref 0–0.03)
TV REST PULMONARY ARTERY PRESSURE: 22 MMHG
URN SPEC COLLECT METH UR: ABNORMAL
UROBILINOGEN UR STRIP-ACNC: ABNORMAL EU/DL
WBC # BLD AUTO: 19.64 K/UL (ref 3.9–12.7)

## 2022-06-15 PROCEDURE — 80504 PATH CLIN CONSLTJ MOD 21-40: CPT | Mod: ,,, | Performed by: PATHOLOGY

## 2022-06-15 PROCEDURE — 85379 FIBRIN DEGRADATION QUANT: CPT | Performed by: EMERGENCY MEDICINE

## 2022-06-15 PROCEDURE — 93010 ELECTROCARDIOGRAM REPORT: CPT | Mod: ,,, | Performed by: INTERNAL MEDICINE

## 2022-06-15 PROCEDURE — 99291 CRITICAL CARE FIRST HOUR: CPT | Mod: 25

## 2022-06-15 PROCEDURE — 80504 PR PATH CLINICAL CONSULT, MODERATE COMPLEX, 21-40 MIN: ICD-10-PCS | Mod: ,,, | Performed by: PATHOLOGY

## 2022-06-15 PROCEDURE — 96375 TX/PRO/DX INJ NEW DRUG ADDON: CPT

## 2022-06-15 PROCEDURE — 25000003 PHARM REV CODE 250: Performed by: EMERGENCY MEDICINE

## 2022-06-15 PROCEDURE — 63600175 PHARM REV CODE 636 W HCPCS: Performed by: NURSE PRACTITIONER

## 2022-06-15 PROCEDURE — 86900 BLOOD TYPING SEROLOGIC ABO: CPT | Performed by: EMERGENCY MEDICINE

## 2022-06-15 PROCEDURE — 25000003 PHARM REV CODE 250: Performed by: NURSE PRACTITIONER

## 2022-06-15 PROCEDURE — 93010 EKG 12-LEAD: ICD-10-PCS | Mod: ,,, | Performed by: INTERNAL MEDICINE

## 2022-06-15 PROCEDURE — 86850 RBC ANTIBODY SCREEN: CPT | Performed by: EMERGENCY MEDICINE

## 2022-06-15 PROCEDURE — 86922 COMPATIBILITY TEST ANTIGLOB: CPT | Performed by: PATHOLOGY

## 2022-06-15 PROCEDURE — 96368 THER/DIAG CONCURRENT INF: CPT

## 2022-06-15 PROCEDURE — 87040 BLOOD CULTURE FOR BACTERIA: CPT | Performed by: EMERGENCY MEDICINE

## 2022-06-15 PROCEDURE — U0002 COVID-19 LAB TEST NON-CDC: HCPCS | Performed by: EMERGENCY MEDICINE

## 2022-06-15 PROCEDURE — 21400001 HC TELEMETRY ROOM

## 2022-06-15 PROCEDURE — 93005 ELECTROCARDIOGRAM TRACING: CPT

## 2022-06-15 PROCEDURE — 83605 ASSAY OF LACTIC ACID: CPT | Performed by: EMERGENCY MEDICINE

## 2022-06-15 PROCEDURE — 87389 HIV-1 AG W/HIV-1&-2 AB AG IA: CPT | Performed by: EMERGENCY MEDICINE

## 2022-06-15 PROCEDURE — 99900035 HC TECH TIME PER 15 MIN (STAT)

## 2022-06-15 PROCEDURE — 36415 COLL VENOUS BLD VENIPUNCTURE: CPT | Performed by: EMERGENCY MEDICINE

## 2022-06-15 PROCEDURE — 63600175 PHARM REV CODE 636 W HCPCS: Performed by: EMERGENCY MEDICINE

## 2022-06-15 PROCEDURE — 81025 URINE PREGNANCY TEST: CPT | Performed by: EMERGENCY MEDICINE

## 2022-06-15 PROCEDURE — 25500020 PHARM REV CODE 255: Performed by: EMERGENCY MEDICINE

## 2022-06-15 PROCEDURE — 86803 HEPATITIS C AB TEST: CPT | Performed by: EMERGENCY MEDICINE

## 2022-06-15 PROCEDURE — 83880 ASSAY OF NATRIURETIC PEPTIDE: CPT | Performed by: EMERGENCY MEDICINE

## 2022-06-15 PROCEDURE — 86870 RBC ANTIBODY IDENTIFICATION: CPT | Performed by: EMERGENCY MEDICINE

## 2022-06-15 PROCEDURE — 63600175 PHARM REV CODE 636 W HCPCS: Performed by: PHYSICIAN ASSISTANT

## 2022-06-15 PROCEDURE — 85045 AUTOMATED RETICULOCYTE COUNT: CPT | Performed by: EMERGENCY MEDICINE

## 2022-06-15 PROCEDURE — 96365 THER/PROPH/DIAG IV INF INIT: CPT

## 2022-06-15 PROCEDURE — 99223 1ST HOSP IP/OBS HIGH 75: CPT | Mod: ,,, | Performed by: INTERNAL MEDICINE

## 2022-06-15 PROCEDURE — 84484 ASSAY OF TROPONIN QUANT: CPT | Performed by: EMERGENCY MEDICINE

## 2022-06-15 PROCEDURE — 36600 WITHDRAWAL OF ARTERIAL BLOOD: CPT

## 2022-06-15 PROCEDURE — 99223 1ST HOSP IP/OBS HIGH 75: CPT | Mod: 25,,, | Performed by: INTERNAL MEDICINE

## 2022-06-15 PROCEDURE — 80053 COMPREHEN METABOLIC PANEL: CPT | Performed by: EMERGENCY MEDICINE

## 2022-06-15 PROCEDURE — 27000221 HC OXYGEN, UP TO 24 HOURS

## 2022-06-15 PROCEDURE — 94761 N-INVAS EAR/PLS OXIMETRY MLT: CPT

## 2022-06-15 PROCEDURE — 83020 HEMOGLOBIN ELECTROPHORESIS: CPT | Performed by: EMERGENCY MEDICINE

## 2022-06-15 PROCEDURE — 84484 ASSAY OF TROPONIN QUANT: CPT | Mod: 91 | Performed by: STUDENT IN AN ORGANIZED HEALTH CARE EDUCATION/TRAINING PROGRAM

## 2022-06-15 PROCEDURE — 86922 COMPATIBILITY TEST ANTIGLOB: CPT | Performed by: STUDENT IN AN ORGANIZED HEALTH CARE EDUCATION/TRAINING PROGRAM

## 2022-06-15 PROCEDURE — 11000001 HC ACUTE MED/SURG PRIVATE ROOM

## 2022-06-15 PROCEDURE — 99223 PR INITIAL HOSPITAL CARE,LEVL III: ICD-10-PCS | Mod: 25,,, | Performed by: INTERNAL MEDICINE

## 2022-06-15 PROCEDURE — 82803 BLOOD GASES ANY COMBINATION: CPT

## 2022-06-15 PROCEDURE — 81003 URINALYSIS AUTO W/O SCOPE: CPT | Performed by: EMERGENCY MEDICINE

## 2022-06-15 PROCEDURE — 99223 PR INITIAL HOSPITAL CARE,LEVL III: ICD-10-PCS | Mod: ,,, | Performed by: INTERNAL MEDICINE

## 2022-06-15 PROCEDURE — 36430 TRANSFUSION BLD/BLD COMPNT: CPT

## 2022-06-15 PROCEDURE — 85025 COMPLETE CBC W/AUTO DIFF WBC: CPT | Performed by: EMERGENCY MEDICINE

## 2022-06-15 PROCEDURE — 36415 COLL VENOUS BLD VENIPUNCTURE: CPT | Performed by: STUDENT IN AN ORGANIZED HEALTH CARE EDUCATION/TRAINING PROGRAM

## 2022-06-15 RX ORDER — ACETAMINOPHEN 325 MG/1
650 TABLET ORAL EVERY 8 HOURS PRN
Status: DISCONTINUED | OUTPATIENT
Start: 2022-06-15 | End: 2022-06-20 | Stop reason: HOSPADM

## 2022-06-15 RX ORDER — CALCIUM GLUCONATE 20 MG/ML
1 INJECTION, SOLUTION INTRAVENOUS
Status: COMPLETED | OUTPATIENT
Start: 2022-06-16 | End: 2022-06-16

## 2022-06-15 RX ORDER — HEPARIN SODIUM 1000 [USP'U]/ML
4000 INJECTION, SOLUTION INTRAVENOUS; SUBCUTANEOUS ONCE
Status: DISCONTINUED | OUTPATIENT
Start: 2022-06-15 | End: 2022-06-15

## 2022-06-15 RX ORDER — HYDROCODONE BITARTRATE AND ACETAMINOPHEN 500; 5 MG/1; MG/1
TABLET ORAL
Status: DISCONTINUED | OUTPATIENT
Start: 2022-06-15 | End: 2022-06-20 | Stop reason: HOSPADM

## 2022-06-15 RX ORDER — ONDANSETRON 2 MG/ML
4 INJECTION INTRAMUSCULAR; INTRAVENOUS EVERY 6 HOURS PRN
Status: DISCONTINUED | OUTPATIENT
Start: 2022-06-15 | End: 2022-06-20 | Stop reason: HOSPADM

## 2022-06-15 RX ORDER — ASPIRIN 325 MG
325 TABLET ORAL ONCE
Status: COMPLETED | OUTPATIENT
Start: 2022-06-15 | End: 2022-06-15

## 2022-06-15 RX ORDER — NALOXONE HCL 0.4 MG/ML
0.02 VIAL (ML) INJECTION
Status: DISCONTINUED | OUTPATIENT
Start: 2022-06-15 | End: 2022-06-15 | Stop reason: SDUPTHER

## 2022-06-15 RX ORDER — ACETAMINOPHEN 325 MG/1
650 TABLET ORAL EVERY 4 HOURS PRN
Status: DISCONTINUED | OUTPATIENT
Start: 2022-06-15 | End: 2022-06-20 | Stop reason: HOSPADM

## 2022-06-15 RX ORDER — DIPHENHYDRAMINE HYDROCHLORIDE 50 MG/ML
25 INJECTION INTRAMUSCULAR; INTRAVENOUS ONCE
Status: DISCONTINUED | OUTPATIENT
Start: 2022-06-16 | End: 2022-06-17

## 2022-06-15 RX ORDER — ACETAMINOPHEN 325 MG/1
650 TABLET ORAL
Status: COMPLETED | OUTPATIENT
Start: 2022-06-15 | End: 2022-06-15

## 2022-06-15 RX ORDER — SODIUM CHLORIDE 0.9 % (FLUSH) 0.9 %
10 SYRINGE (ML) INJECTION EVERY 12 HOURS PRN
Status: DISCONTINUED | OUTPATIENT
Start: 2022-06-15 | End: 2022-06-20 | Stop reason: HOSPADM

## 2022-06-15 RX ORDER — SODIUM CHLORIDE 0.9 % (FLUSH) 0.9 %
10 SYRINGE (ML) INJECTION
Status: DISCONTINUED | OUTPATIENT
Start: 2022-06-15 | End: 2022-06-20 | Stop reason: HOSPADM

## 2022-06-15 RX ORDER — ONDANSETRON 2 MG/ML
4 INJECTION INTRAMUSCULAR; INTRAVENOUS
Status: COMPLETED | OUTPATIENT
Start: 2022-06-15 | End: 2022-06-15

## 2022-06-15 RX ORDER — ENOXAPARIN SODIUM 100 MG/ML
40 INJECTION SUBCUTANEOUS EVERY 24 HOURS
Status: DISCONTINUED | OUTPATIENT
Start: 2022-06-15 | End: 2022-06-20 | Stop reason: HOSPADM

## 2022-06-15 RX ORDER — BISACODYL 10 MG
10 SUPPOSITORY, RECTAL RECTAL DAILY PRN
Status: DISCONTINUED | OUTPATIENT
Start: 2022-06-15 | End: 2022-06-20 | Stop reason: HOSPADM

## 2022-06-15 RX ORDER — FENTANYL CITRATE 50 UG/ML
100 INJECTION, SOLUTION INTRAMUSCULAR; INTRAVENOUS
Status: COMPLETED | OUTPATIENT
Start: 2022-06-15 | End: 2022-06-15

## 2022-06-15 RX ORDER — DIPHENHYDRAMINE HYDROCHLORIDE 50 MG/ML
25 INJECTION INTRAMUSCULAR; INTRAVENOUS ONCE
Status: DISCONTINUED | OUTPATIENT
Start: 2022-06-15 | End: 2022-06-15

## 2022-06-15 RX ORDER — HEPARIN SODIUM 1000 [USP'U]/ML
4000 INJECTION, SOLUTION INTRAVENOUS; SUBCUTANEOUS ONCE
Status: DISCONTINUED | OUTPATIENT
Start: 2022-06-16 | End: 2022-06-20 | Stop reason: HOSPADM

## 2022-06-15 RX ORDER — NALOXONE HYDROCHLORIDE 1 MG/ML
0.02 INJECTION INTRAMUSCULAR; INTRAVENOUS; SUBCUTANEOUS
Status: DISCONTINUED | OUTPATIENT
Start: 2022-06-15 | End: 2022-06-20 | Stop reason: HOSPADM

## 2022-06-15 RX ORDER — MAG HYDROX/ALUMINUM HYD/SIMETH 200-200-20
30 SUSPENSION, ORAL (FINAL DOSE FORM) ORAL 4 TIMES DAILY PRN
Status: DISCONTINUED | OUTPATIENT
Start: 2022-06-15 | End: 2022-06-20 | Stop reason: HOSPADM

## 2022-06-15 RX ORDER — SIMETHICONE 80 MG
1 TABLET,CHEWABLE ORAL 4 TIMES DAILY PRN
Status: DISCONTINUED | OUTPATIENT
Start: 2022-06-15 | End: 2022-06-20 | Stop reason: HOSPADM

## 2022-06-15 RX ORDER — ACETAMINOPHEN 325 MG/1
650 TABLET ORAL ONCE
Status: DISCONTINUED | OUTPATIENT
Start: 2022-06-15 | End: 2022-06-15

## 2022-06-15 RX ORDER — FOLIC ACID 1 MG/1
1 TABLET ORAL DAILY
Status: DISCONTINUED | OUTPATIENT
Start: 2022-06-15 | End: 2022-06-20 | Stop reason: HOSPADM

## 2022-06-15 RX ORDER — HYDROMORPHONE HCL IN 0.9% NACL 6 MG/30 ML
PATIENT CONTROLLED ANALGESIA SYRINGE INTRAVENOUS CONTINUOUS
Status: DISCONTINUED | OUTPATIENT
Start: 2022-06-15 | End: 2022-06-19

## 2022-06-15 RX ORDER — DEXTROSE MONOHYDRATE AND SODIUM CHLORIDE 5; .45 G/100ML; G/100ML
INJECTION, SOLUTION INTRAVENOUS CONTINUOUS
Status: DISCONTINUED | OUTPATIENT
Start: 2022-06-15 | End: 2022-06-15

## 2022-06-15 RX ORDER — AMOXICILLIN 250 MG
1 CAPSULE ORAL 2 TIMES DAILY
Status: DISCONTINUED | OUTPATIENT
Start: 2022-06-15 | End: 2022-06-17

## 2022-06-15 RX ORDER — FUROSEMIDE 10 MG/ML
20 INJECTION INTRAMUSCULAR; INTRAVENOUS ONCE
Status: COMPLETED | OUTPATIENT
Start: 2022-06-15 | End: 2022-06-15

## 2022-06-15 RX ORDER — ADHESIVE BANDAGE
30 BANDAGE TOPICAL ONCE
Status: COMPLETED | OUTPATIENT
Start: 2022-06-15 | End: 2022-06-15

## 2022-06-15 RX ORDER — CALCIUM GLUCONATE 20 MG/ML
1 INJECTION, SOLUTION INTRAVENOUS
Status: DISPENSED | OUTPATIENT
Start: 2022-06-15 | End: 2022-06-15

## 2022-06-15 RX ORDER — POLYETHYLENE GLYCOL 3350 17 G/17G
17 POWDER, FOR SOLUTION ORAL 2 TIMES DAILY
Status: DISCONTINUED | OUTPATIENT
Start: 2022-06-15 | End: 2022-06-19

## 2022-06-15 RX ADMIN — ASPIRIN 325 MG: 325 TABLET ORAL at 02:06

## 2022-06-15 RX ADMIN — FUROSEMIDE 20 MG: 10 INJECTION, SOLUTION INTRAMUSCULAR; INTRAVENOUS at 09:06

## 2022-06-15 RX ADMIN — FOLIC ACID 1 MG: 1 TABLET ORAL at 02:06

## 2022-06-15 RX ADMIN — ENOXAPARIN SODIUM 40 MG: 40 INJECTION SUBCUTANEOUS at 05:06

## 2022-06-15 RX ADMIN — POLYETHYLENE GLYCOL 3350 17 G: 17 POWDER, FOR SOLUTION ORAL at 09:06

## 2022-06-15 RX ADMIN — MAGNESIUM HYDROXIDE 2400 MG: 400 SUSPENSION ORAL at 02:06

## 2022-06-15 RX ADMIN — AZITHROMYCIN MONOHYDRATE 500 MG: 500 INJECTION, POWDER, LYOPHILIZED, FOR SOLUTION INTRAVENOUS at 09:06

## 2022-06-15 RX ADMIN — ACETAMINOPHEN 650 MG: 325 TABLET ORAL at 08:06

## 2022-06-15 RX ADMIN — DOCUSATE SODIUM AND SENNOSIDES 1 TABLET: 8.6; 5 TABLET, FILM COATED ORAL at 09:06

## 2022-06-15 RX ADMIN — ONDANSETRON 4 MG: 2 INJECTION INTRAMUSCULAR; INTRAVENOUS at 08:06

## 2022-06-15 RX ADMIN — CEFTRIAXONE 2 G: 2 INJECTION, SOLUTION INTRAVENOUS at 10:06

## 2022-06-15 RX ADMIN — FENTANYL CITRATE 100 MCG: 50 INJECTION INTRAMUSCULAR; INTRAVENOUS at 08:06

## 2022-06-15 RX ADMIN — Medication: at 02:06

## 2022-06-15 RX ADMIN — IOHEXOL 75 ML: 350 INJECTION, SOLUTION INTRAVENOUS at 10:06

## 2022-06-15 NOTE — ASSESSMENT & PLAN NOTE
6/15 Suspected multifactorial  Patient in with symptomatic anemia and signs of pneumonia and or possible acute volume overload

## 2022-06-15 NOTE — HPI
Sickle Cell Pain Crisis       Pt reports pain to back and chest and tried her home Oxycodone with no relief       Per ER- This  is a 26 y.o. female patient with a PMHx of sickle cell anemia and Hidradenitis suppurativa who presents to the Emergency Department for sickle cell pain crisis, onset this morning. Pt report chest pain and back pain. Symptoms are constant and moderate in severity. No mitigating or exacerbating factors reported. Associated sxs include fever (Tnow 100.9). Patient denies any chills, n/v/d, SOB, weakness, numbness, dizziness, headache, and all other sxs at this time. Prior Tx includes Oxycodone 5 mg, with no improvement of sxs. No further complaints or concerns at this time.     Patient was evaluated by ER and noted to have symptomatic anemia with signs of pneumonia and or possible volume overload and was placed in Observation.

## 2022-06-15 NOTE — CONSULTS
[facility] - Emergency Dept.  Hematology/Oncology  Consult Note    Patient Name: Elizabeth Franco  MRN: [id number]  Admission Date: 6/15/2022  Hospital Length of Stay: 0 days  Code Status: Full Code   Attending Provider: Chalino Meyers MD  Consulting Provider: Lynn Trent MD  Primary Care Physician: Elvira Nuñez MD  Principal Problem:Sickle cell crisis    Consults  Subjective:     HPI:  Ms. Storm is a 26-year-old woman with sickle cell disease presenting with acute pain in bilateral lower hip/buttocks and chest similar to prior sickle cell pain crises.  She notes rare pain crises about once per year at those times occasionally receiving blood transfusion.  She is noted to have not tolerated chelation therapy for iron overload.  She does not take medication for her sickle cell disease aside from folic acid.  She denies known history of thrombosis.  On presentation to the emergency room  vital signs notable for temperature 100.9°, desaturation to 78% on room air with improvement on nasal cannula, to kidney on tachycardia.  Lab work with progressive anemia to 6.5 hemoglobin baseline 9.9 and leukocytosis to 19, bilirubin elevated 7.6 prior baseline 2.3.  D-dimer 24, retic >23.  CTA with bibasilar pulmonary density.      No new subjective & objective note has been filed under this hospital service since the last note was generated.    Assessment/Plan:     * Sickle cell crisis with symptomatic anemia  Sickle cell pain crisis:  Presentation concerning for acute chest syndrome.  Typically rare pain crises once yearly with rare need for blood transfusion but has had acute chest and exchange transfusion in years past.  Most recent hemoglobin S quantification 53% baseline in absence of sickle cell pain crisis April 2022. Current presentation with fever, tachypnea, tachycardia, leukocytosis, elevated retic in bilirubin with progressive anemia to hemoglobin 6.5 from baseline 9.9.  Elevated D-dimer with CTA chest  negative for pulmonary embolism but did show bibasilar pulmonary density.    In ER can get initial simple txfusion 1uprbc while awaiting exchange transfusion. I discussed case with transfusion Medicine who agrees with exchange transfusion.  I have asked in ER to place a pheresis line - dialysis catheter such as a Trialysis or Brevia; consent by ER team please  Hemoglobin S quantification (53% in 4/2022)  Trend cbc, retic, bili  Empitic antibiotics  Would recommend lower extremity Doppler to rule out thrombosis given elevation in D-dimer.  CTA chest negative for pulmonary embolism.  If no evidence of thrombosis DVT prophylaxis would be recommended  Ivf prn for euvolemia  Supplemental O2  Analgesia per primary team/PCA prn          Thank you for your consult. I will follow-up with patient. Please contact us if you have any additional questions.    Lynn Trent MD  Hematology/Oncology  [facility] - Emergency Dept.

## 2022-06-15 NOTE — ASSESSMENT & PLAN NOTE
6/15 Telemetry  Patient ordered for 1 unit PRBCs transfusion  Hematology consulted  Dilaudid PCA  Trend anemia

## 2022-06-15 NOTE — ASSESSMENT & PLAN NOTE
-Troponin trivially elevated at 0.029  -Elevation secondary to demand ischemia from severe anemia, ? PNA  -Echo showed normal EF  -Recommend transfusion/pain control  -Patient also with mildly elevated BNP-gently diurese with 20 mg IV Lasix post transfusion and assess response

## 2022-06-15 NOTE — HPI
Ms. Franco is a 26 year old female patient whose current medical conditions include sickle cell anemia who presented to [facility] ED today due to chest pain and back pain that onset this morning. Associated symptoms included fever, weakness, and fatigue. Patient denied any associated SOB, palpitations, near syncope, or syncope. Initial workup revealed BNP of 206, troponin of 0.029, anemia (H/H 6.5/18.2), hypoxia (O2 sat initially 78%), and leukocytosis (WBC 19,000). CTA negative for PE however it did show bilateral ground glass opacities and patient was subsequently admitted for further evaluation and treatment. Cardiology consulted to assist with management. Patient seen and examined today, still with ongoing pain. Denies SOB, but wearing supplemental O2. No prior CV history. Echo reviewed, normal EF, trivial pericardial effusion.

## 2022-06-15 NOTE — SUBJECTIVE & OBJECTIVE
Oncology Treatment Plan:   [Could not find a treatment plan. This SmartLink may be configured incorrectly. Contact a  for help.]    Medications:  Continuous Infusions:   hydromorphone in 0.9 % NaCl 6 mg/30 ml       Scheduled Meds:   aspirin  325 mg Oral Once    [START ON 6/16/2022] azithromycin  500 mg Intravenous Q24H    [START ON 6/16/2022] cefTRIAXone (ROCEPHIN) IVPB  2 g Intravenous Q24H    enoxaparin  40 mg Subcutaneous Daily    folic acid  1 mg Oral Daily    magnesium hydroxide 400 mg/5 ml  30 mL Oral Once    polyethylene glycol  17 g Oral BID    senna-docusate 8.6-50 mg  1 tablet Oral BID     PRN Meds:sodium chloride, acetaminophen, acetaminophen, aluminum-magnesium hydroxide-simethicone, bisacodyL, naloxone, naloxone, ondansetron, simethicone, sodium chloride 0.9%, sodium chloride 0.9%     Review of patient's allergies indicates:  No Known Allergies     Past Medical History:   Diagnosis Date    Hidradenitis suppurativa     Nocturnal enuresis     Sickle cell anemia      No past surgical history on file.  Family History       Problem Relation (Age of Onset)    No Known Problems Mother, Father          Tobacco Use    Smoking status: Never Smoker    Smokeless tobacco: Never Used   Substance and Sexual Activity    Alcohol use: No     Alcohol/week: 0.0 standard drinks    Drug use: Never    Sexual activity: Not Currently       Review of Systems   Constitutional:  Positive for fever. Negative for fatigue.   HENT: Negative.     Respiratory:  Positive for shortness of breath.    Gastrointestinal: Negative.    Musculoskeletal:  Positive for arthralgias, back pain and myalgias. Negative for gait problem, joint swelling and neck pain.   Neurological: Negative.    Hematological: Negative.    Psychiatric/Behavioral: Negative.     Objective:     Vital Signs (Most Recent):  Temp: (!) 100.9 °F (38.3 °C) (06/15/22 0820)  Pulse: (!) 115 (06/15/22 1207)  Resp: (!) 26 (06/15/22 1207)  BP: 128/69 (06/15/22  1000)  SpO2: 95 % (06/15/22 1000)   Vital Signs (24h Range):  Temp:  [100.9 °F (38.3 °C)] 100.9 °F (38.3 °C)  Pulse:  [114-147] 115  Resp:  [10-26] 26  SpO2:  [78 %-97 %] 95 %  BP: (114-157)/(63-80) 128/69     Weight: 52 kg (114 lb 10.2 oz)  Body mass index is 20.97 kg/m².  Body surface area is 1.51 meters squared.    No intake or output data in the 24 hours ending 06/15/22 1237    Physical Exam  Vitals reviewed.   Constitutional:       General: She is not in acute distress.     Appearance: Normal appearance.   HENT:      Head: Normocephalic and atraumatic.      Nose: No congestion.      Mouth/Throat:      Mouth: Mucous membranes are moist.   Eyes:      General: Scleral icterus present.      Extraocular Movements: Extraocular movements intact.   Cardiovascular:      Rate and Rhythm: Tachycardia present.   Pulmonary:      Effort: Pulmonary effort is normal. No respiratory distress.   Chest:      Chest wall: Tenderness present.   Abdominal:      General: There is no distension.      Palpations: Abdomen is soft.   Musculoskeletal:         General: No swelling.      Cervical back: Neck supple.   Skin:     General: Skin is warm.      Coloration: Skin is not jaundiced.   Neurological:      General: No focal deficit present.      Mental Status: She is alert and oriented to person, place, and time.   Psychiatric:         Mood and Affect: Mood normal.         Behavior: Behavior normal.         Thought Content: Thought content normal.         Judgment: Judgment normal.       Significant Labs:   CBC:   Recent Labs   Lab 06/15/22  0834   WBC 19.64*   HGB 6.5*   HCT 18.2*      , CMP:   Recent Labs   Lab 06/15/22  0834   *   K 4.3      CO2 22*   *   BUN 8   CREATININE 0.6   CALCIUM 9.1   PROT 8.2   ALBUMIN 4.3   BILITOT 7.6*   ALKPHOS 127   *   ALT 33   ANIONGAP 10   EGFRNONAA >60   , Coagulation: No results for input(s): PT, INR, APTT in the last 48 hours., Haptoglobin: No results for input(s):  HAPTOGLOBIN in the last 48 hours., LDH: No results for input(s): LDHCSF, BFSOURCE in the last 48 hours., and Reticulocytes:   Recent Labs   Lab 06/15/22  0834   RETIC >23.0*       Diagnostic Results:  I have reviewed all pertinent imaging results/findings within the past 24 hours.

## 2022-06-15 NOTE — CONSULTS
[facility]  Cardiology  Consult Note    Patient Name: Elizabeth Franco  MRN: [id number]  Admission Date: 6/15/2022  Hospital Length of Stay: 0 days  Code Status: Full Code   Attending Provider: Chalino Meyers MD   Consulting Provider: Kyara Khoury PA-C  Primary Care Physician: Elvira Nuñez MD  Principal Problem:Sickle cell crisis    Patient information was obtained from patient, past medical records and ER records.     Inpatient consult to Cardiology  Consult performed by: Kyara Khoury PA-C  Consult ordered by: HAL Holley        Subjective:     Chief Complaint:  Back and chest pain    HPI:   Ms. Franco is a 26 year old female patient whose current medical conditions include sickle cell anemia who presented to [facility] ED today due to chest pain and back pain that onset this morning. Associated symptoms included fever, weakness, and fatigue. Patient denied any associated SOB, palpitations, near syncope, or syncope. Initial workup revealed BNP of 206, troponin of 0.029, anemia (H/H 6.5/18.2), hypoxia (O2 sat initially 78%), and leukocytosis (WBC 19,000). CTA negative for PE however it did show bilateral ground glass opacities and patient was subsequently admitted for further evaluation and treatment. Cardiology consulted to assist with management. Patient seen and examined today, still with ongoing pain. Denies SOB, but wearing supplemental O2. No prior CV history. Echo reviewed, normal EF, trivial pericardial effusion.             Past Medical History:   Diagnosis Date    Hidradenitis suppurativa     Nocturnal enuresis     Sickle cell anemia        No past surgical history on file.    Review of patient's allergies indicates:  No Known Allergies    No current facility-administered medications on file prior to encounter.     Current Outpatient Medications on File Prior to Encounter   Medication Sig    folic acid (FOLVITE) 1 MG tablet Take 1 tablet (1 mg total) by mouth once daily.     [DISCONTINUED] oxyCODONE (ROXICODONE) 5 MG immediate release tablet Take 1 tablet (5 mg total) by mouth every 4 (four) hours as needed for Pain.     Family History       Problem Relation (Age of Onset)    No Known Problems Mother, Father          Tobacco Use    Smoking status: Never Smoker    Smokeless tobacco: Never Used   Substance and Sexual Activity    Alcohol use: No     Alcohol/week: 0.0 standard drinks    Drug use: Never    Sexual activity: Not Currently     Review of Systems   Constitutional: Positive for fever and malaise/fatigue.   HENT: Negative.     Eyes: Negative.    Cardiovascular:  Positive for chest pain.   Respiratory: Negative.     Endocrine: Negative.    Hematologic/Lymphatic: Negative.    Skin: Negative.    Musculoskeletal:  Positive for back pain, joint pain and myalgias.   Gastrointestinal: Negative.    Genitourinary: Negative.    Neurological: Negative.    Psychiatric/Behavioral: Negative.     Objective:     Vital Signs (Most Recent):  Temp: 98.8 °F (37.1 °C) (06/15/22 1348)  Pulse: 110 (06/15/22 1532)  Resp: 20 (06/15/22 1413)  BP: 128/69 (06/15/22 1355)  SpO2: (!) 94 % (06/15/22 1355)   Vital Signs (24h Range):  Temp:  [98.8 °F (37.1 °C)-100.9 °F (38.3 °C)] 98.8 °F (37.1 °C)  Pulse:  [110-147] 110  Resp:  [10-26] 20  SpO2:  [78 %-97 %] 94 %  BP: (114-157)/(63-80) 128/69     Weight: 51.7 kg (114 lb)  Body mass index is 20.85 kg/m².    SpO2: (!) 94 %  O2 Device (Oxygen Therapy): nasal cannula      Intake/Output Summary (Last 24 hours) at 6/15/2022 1633  Last data filed at 6/15/2022 1532  Gross per 24 hour   Intake 2 ml   Output --   Net 2 ml       Lines/Drains/Airways       Peripheral Intravenous Line  Duration                  Peripheral IV - Single Lumen 06/15/22 0830 22 G Right Hand <1 day         Peripheral IV - Single Lumen 06/15/22 0831 22 G Left Hand <1 day                    Physical Exam  Vitals and nursing note reviewed.   Constitutional:       General: She is not in acute  distress.     Appearance: She is well-developed. She is ill-appearing. She is not diaphoretic.      Comments: On supplemental O2   HENT:      Head: Normocephalic and atraumatic.   Eyes:      General:         Right eye: No discharge.         Left eye: No discharge.      Pupils: Pupils are equal, round, and reactive to light.   Neck:      Thyroid: No thyromegaly.      Vascular: No JVD.      Trachea: No tracheal deviation.   Cardiovascular:      Rate and Rhythm: Regular rhythm. Tachycardia present.      Heart sounds: S1 normal and S2 normal. No murmur heard.    Friction rub present.   Pulmonary:      Effort: Pulmonary effort is normal. No respiratory distress.      Breath sounds: No wheezing.      Comments: Coarse BS at bases  Abdominal:      General: There is no distension.      Palpations: Abdomen is soft.      Tenderness: There is no rebound.   Musculoskeletal:      Cervical back: Neck supple.      Right lower leg: No edema.      Left lower leg: No edema.   Skin:     General: Skin is warm and dry.      Findings: No erythema.   Neurological:      General: No focal deficit present.      Mental Status: She is alert and oriented to person, place, and time.   Psychiatric:         Mood and Affect: Mood normal.         Behavior: Behavior normal.         Thought Content: Thought content normal.       Significant Labs: CMP   Recent Labs   Lab 06/15/22  0834   *   K 4.3      CO2 22*   *   BUN 8   CREATININE 0.6   CALCIUM 9.1   PROT 8.2   ALBUMIN 4.3   BILITOT 7.6*   ALKPHOS 127   *   ALT 33   ANIONGAP 10   ESTGFRAFRICA >60   EGFRNONAA >60   , CBC   Recent Labs   Lab 06/15/22  0834   WBC 19.64*   HGB 6.5*   HCT 18.2*      , INR No results for input(s): INR, PROTIME in the last 48 hours., Troponin   Recent Labs   Lab 06/15/22  0834   TROPONINI 0.029*   , and All pertinent lab results from the last 24 hours have been reviewed.    Significant Imaging: Echocardiogram: Transthoracic echo (TTE)  complete (Cupid Only):   Results for orders placed or performed during the hospital encounter of 06/15/22   Echo   Result Value Ref Range    BSA 1.5 m2    TDI SEPTAL 0.17 m/s    LV LATERAL E/E' RATIO 6.52 m/s    LV SEPTAL E/E' RATIO 8.06 m/s    IVC diameter 1.87 cm    Left Ventricular Outflow Tract Mean Velocity 1.090511321064826 cm/s    Left Ventricular Outflow Tract Mean Gradient 6.29 mmHg    TV mean gradient 17 mmHg    TDI LATERAL 0.21 m/s    LVIDd 3.90 3.5 - 6.0 cm    IVS 1.19 (A) 0.6 - 1.1 cm    Posterior Wall 0.99 0.6 - 1.1 cm    Ao root annulus 2.80 cm    LVIDs 2.47 2.1 - 4.0 cm    FS 37 28 - 44 %    STJ 2.48 cm    Ascending aorta 2.22 cm    LV mass 138.24 g    LA size 3.55 cm    Left Ventricle Relative Wall Thickness 0.51 cm    AV mean gradient 9 mmHg    AV valve area 1.87 cm2    AV Velocity Ratio 0.76     AV index (prosthetic) 0.65     MV valve area p 1/2 method 2.97 cm2    E/A ratio 1.30     Mean e' 0.19 m/s    E wave deceleration time 255.71702274355891 msec    IVRT 60.781006327325613 msec    LVOT diameter 1.91 cm    LVOT area 2.9 cm2    LVOT peak gurinder 1.49 m/s    LVOT peak VTI 24.60 cm    Ao peak gurinder 1.97 m/s    Ao VTI 37.6 cm    RVOT peak gurinder 1.28 m/s    RVOT peak VTI 24.2 cm    Mr max gurinder 3.88 m/s    LVOT stroke volume 70.45 cm3    AV peak gradient 16 mmHg    PV mean gradient 4.09 mmHg    E/E' ratio 7.21 m/s    MV Peak E Gurinder 1.37 m/s    TR Max Gurinder 2.20 m/s    MV stenosis pressure 1/2 time 74.944246105586916 ms    MV Peak A Gurinder 1.05 m/s    LV Systolic Volume 21.56 mL    LV Systolic Volume Index 14.3 mL/m2    LV Diastolic Volume 65.83 mL    LV Diastolic Volume Index 43.60 mL/m2    LV Mass Index 92 g/m2    RA Major Axis 3.83 cm    Left Atrium Minor Axis 4.64 cm    Left Atrium Major Axis 5.19 cm    Triscuspid Valve Regurgitation Peak Gradient 19 mmHg    Right Atrial Pressure (from IVC) 3 mmHg    EF 60 %    TV rest pulmonary artery pressure 22 mmHg    Narrative    · The left ventricle is normal in size with  concentric remodeling and   normal systolic function.  · The estimated ejection fraction is 60%.  · Normal left ventricular diastolic function.  · Normal right ventricular size with normal right ventricular systolic   function.  · Normal central venous pressure (3 mmHg).  · The estimated PA systolic pressure is 22 mmHg.  · Trivial posterior pericardial effusion.      , EKG: Reviewed, and X-Ray: CXR: X-Ray Chest 1 View (CXR): No results found for this visit on 06/15/22. and X-Ray Chest PA and Lateral (CXR): No results found for this visit on 06/15/22.    Assessment and Plan:   Patient who presents with sickle cell crisis and symptomatic anemia. Elevated troponin secondary to demand ischemia, EF normal on echo. Transfuse/pain control. On abx for suspected pneumonia. Recommend gentle IV diuresis. Assess response.     * Sickle cell crisis with symptomatic anemia  -Mgmt as per hospital medicine, transfuse/pain control    Elevated troponin and Elevated BNP  -Troponin trivially elevated at 0.029  -Elevation secondary to demand ischemia from severe anemia, ? PNA  -Echo showed normal EF  -Recommend transfusion/pain control  -Patient also with mildly elevated BNP-gently diurese with 20 mg IV Lasix post transfusion and assess response    Hypoxemia suspected multifactorial  -Mgmt as per hospital medicine    Pneumonia  -Mgmt as per hospital medicine  -ON abx    Severe sepsis  -Mgmt as per hospital medicine    Elevated bilirubin  -Mgmt as per hospital medicine        VTE Risk Mitigation (From admission, onward)         Ordered     enoxaparin injection 40 mg  Daily         06/15/22 1234     heparin (porcine) injection 4,000 Units  Once         06/15/22 1258     Place RISSA hose  Until discontinued         06/15/22 1234     IP VTE HIGH RISK PATIENT  Once         06/15/22 1234     Place sequential compression device  Until discontinued         06/15/22 1225                Thank you for your consult. I will follow-up with patient.  Please contact us if you have any additional questions.    Kyara Khoury PA-C  Cardiology   [facility]

## 2022-06-15 NOTE — ED PROVIDER NOTES
SCRIBE #1 NOTE: I, Nolberto Rivera, am scribing for, and in the presence of, Jonathan Yeung MD. I have scribed the entire note.      History      Chief Complaint   Patient presents with    Sickle Cell Pain Crisis     Pt reports pain to back and chest and tried her home Oxycodone with no relief       Review of patient's allergies indicates:  No Known Allergies     HPI   HPI    6/15/2022, 8:27 AM   History obtained from the patient and father      History of Present Illness: Elizabeth Franco is a 26 y.o. female patient with a PMHx of sickle cell anemia who presents to the Emergency Department for sickle cell pain crisis, onset this morning. Pt report chest pain and back pain. Symptoms are constant and moderate in severity. No mitigating or exacerbating factors reported. Associated sxs include fever (Tnow 100.9), SOB. Patient denies any chills, n/v/d, weakness, numbness, dizziness, headache, and all other sxs at this time. Prior Tx includes Oxycodone 5 mg, with no improvement of sxs. No further complaints or concerns at this time.     Arrival mode: Personal vehicle    PCP: Elvira Nuñez MD       Past Medical History:  Past Medical History:   Diagnosis Date    Hidradenitis suppurativa     Nocturnal enuresis     Sickle cell anemia        Past Surgical History:  No past surgical history on file.      Family History:  Family History   Problem Relation Age of Onset    No Known Problems Mother     No Known Problems Father        Social History:  Social History     Tobacco Use    Smoking status: Never Smoker    Smokeless tobacco: Never Used   Substance and Sexual Activity    Alcohol use: No     Alcohol/week: 0.0 standard drinks    Drug use: Never    Sexual activity: Not Currently       ROS   Review of Systems   Constitutional: Positive for fever (Tnow 100.9). Negative for chills.   HENT: Negative for sore throat.    Respiratory: Positive for shortness of breath.    Cardiovascular: Positive for chest pain.    Gastrointestinal: Negative for diarrhea, nausea and vomiting.   Genitourinary: Negative for dysuria.   Musculoskeletal: Positive for back pain.   Skin: Negative for rash.   Neurological: Negative for dizziness, weakness, light-headedness, numbness and headaches.   Hematological: Does not bruise/bleed easily.   All other systems reviewed and are negative.    Physical Exam      Initial Vitals [06/15/22 0820]   BP Pulse Resp Temp SpO2   114/68 (!) 145 10 (!) 100.9 °F (38.3 °C) (!) 78 %      MAP       --          Physical Exam  Nursing Notes and Vital Signs Reviewed.  Constitutional: Patient is in moderate distress. Well-developed and well-nourished.  Head: Atraumatic. Normocephalic.  Eyes: PERRL. EOM intact. Conjunctivae are not pale. No scleral icterus.  ENT: Mucous membranes are moist. Oropharynx is clear and symmetric.    Neck: Supple. Full ROM.   Cardiovascular: Tachycardic. Regular rhythm. No murmurs, rubs, or gallops. Distal pulses are 2+ and symmetric.  Pulmonary/Chest: No respiratory distress. Clear to auscultation bilaterally. No wheezing or rales.  Abdominal: Soft and non-distended.  There is no tenderness.  No rebound, guarding, or rigidity.   Musculoskeletal: Moves all extremities. No obvious deformities. No edema.  Skin: Warm and dry.  Neurological:  Alert, awake, and appropriate.  Normal speech.  No acute focal neurological deficits are appreciated.  Psychiatric: Normal affect. Good eye contact. Appropriate in content.    ED Course    Critical Care    Date/Time: 6/15/2022 11:18 AM  Performed by: Jonathan Yeung MD  Authorized by: Jonathan Yeung MD   Direct patient critical care time: 15 minutes  Additional history critical care time: 5 minutes  Ordering / reviewing critical care time: 10 minutes  Documentation critical care time: 5 minutes  Consulting other physicians critical care time: 10 minutes  Total critical care time (exclusive of procedural time) : 45 minutes  Critical care time was exclusive of  "separately billable procedures and treating other patients and teaching time.  Critical care was necessary to treat or prevent imminent or life-threatening deterioration of the following conditions: respiratory failure (Acute hypoxemic respiratory failure).  Critical care was time spent personally by me on the following activities: blood draw for specimens, development of treatment plan with patient or surrogate, discussions with consultants, interpretation of cardiac output measurements, evaluation of patient's response to treatment, examination of patient, obtaining history from patient or surrogate, ordering and performing treatments and interventions, ordering and review of laboratory studies, ordering and review of radiographic studies, pulse oximetry, re-evaluation of patient's condition and review of old charts.        ED Vital Signs:  Vitals:    06/15/22 0837 06/15/22 0840 06/15/22 0843 06/15/22 0915   BP:   138/71 132/63   Pulse:   (!) 117 (!) 121   Resp: (!) 25  (!) 26 (!) 22   Temp:       TempSrc:       SpO2:   97% 96%   Weight:  52 kg (114 lb 10.2 oz)     Height:        06/15/22 0930 06/15/22 1000 06/15/22 1207 06/15/22 1348   BP: 131/68 128/69  121/64   Pulse: (!) 115 (!) 114 (!) 115 (!) 111   Resp:  20 (!) 26 19   Temp:    98.8 °F (37.1 °C)   TempSrc:    Oral   SpO2: (!) 94% 95%  (!) 87%   Weight:       Height:        06/15/22 1355 06/15/22 1413 06/15/22 1416 06/15/22 1532   BP: 128/69      Pulse: (!) 115  (!) 114 110   Resp: 18 20     Temp:       TempSrc:       SpO2: (!) 94%      Weight: 51.7 kg (114 lb)      Height: 5' 2" (1.575 m)       06/15/22 1645 06/15/22 1655 06/15/22 1704   BP: 117/74 115/77 115/70   Pulse: 108 110 (!) 111   Resp: 20 20 (!) 24   Temp:      TempSrc:      SpO2:   100%   Weight:      Height:          Abnormal Lab Results:  Labs Reviewed   COMPREHENSIVE METABOLIC PANEL - Abnormal; Notable for the following components:       Result Value    Sodium 135 (*)     CO2 22 (*)     Glucose " 125 (*)     Total Bilirubin 7.6 (*)      (*)     All other components within normal limits    Narrative:     Release to patient->Immediate   CBC W/ AUTO DIFFERENTIAL - Abnormal; Notable for the following components:    WBC 19.64 (*)     RBC 2.13 (*)     Hemoglobin 6.5 (*)     Hematocrit 18.2 (*)     RDW 21.3 (*)     Immature Granulocytes 3.1 (*)     Gran # (ANC) 12.9 (*)     Immature Grans (Abs) 0.60 (*)     Mono # 2.9 (*)     nRBC 15 (*)     Lymph % 16.2 (*)     Sickle Cells Moderate (*)     All other components within normal limits    Narrative:     Release to patient->Immediate   TROPONIN I - Abnormal; Notable for the following components:    Troponin I 0.029 (*)     All other components within normal limits    Narrative:     Release to patient->Immediate   D DIMER, QUANTITATIVE - Abnormal; Notable for the following components:    D-Dimer 24.04 (*)     All other components within normal limits    Narrative:     Release to patient->Immediate   RETICULOCYTES - Abnormal; Notable for the following components:    Retic >23.0 (*)     All other components within normal limits    Narrative:     Release to patient->Immediate   URINALYSIS, REFLEX TO URINE CULTURE - Abnormal; Notable for the following components:    Occult Blood UA Trace (*)     Urobilinogen, UA 2.0-3.0 (*)     All other components within normal limits    Narrative:     Specimen Source->Urine   B-TYPE NATRIURETIC PEPTIDE - Abnormal; Notable for the following components:     (*)     All other components within normal limits    Narrative:     Release to patient->Immediate   ISTAT PROCEDURE - Abnormal; Notable for the following components:    POC PO2 113 (*)     All other components within normal limits   HIV 1 / 2 ANTIBODY    Narrative:     Release to patient->Immediate   HEPATITIS C ANTIBODY    Narrative:     Release to patient->Immediate   HEP C VIRUS HOLD SPECIMEN    Narrative:     Release to patient->Immediate   LACTIC ACID, PLASMA     Narrative:     Release to patient->Immediate   PREGNANCY TEST, URINE RAPID    Narrative:     Specimen Source->Urine   HEMOGLOBIN S QUANTITATION BY ELECTROPHORESIS   HEMOGLOBIN S QUANTITATION BY ELECTROPHORESIS   SARS-COV-2 RDRP GENE    Narrative:     This test utilizes isothermal nucleic acid amplification   technology to detect the SARS-CoV-2 RdRp nucleic acid segment.   The analytical sensitivity (limit of detection) is 125 genome   equivalents/mL.   A POSITIVE result implies infection with the SARS-CoV-2 virus;   the patient is presumed to be contagious.     A NEGATIVE result means that SARS-CoV-2 nucleic acids are not   present above the limit of detection. A NEGATIVE result should be   treated as presumptive. It does not rule out the possibility of   COVID-19 and should not be the sole basis for treatment decisions.   If COVID-19 is strongly suspected based on clinical and exposure   history, re-testing using an alternate molecular assay should be   considered.   This test is only for use under the Food and Drug   Administration s Emergency Use Authorization (EUA).   Commercial kits are provided by [employer].   Performance characteristics of the EUA have been independently   verified by [facility] Department of   Pathology and Laboratory Medicine.   _________________________________________________________________   The authorized Fact Sheet for Healthcare Providers and the authorized Fact   Sheet for Patients of the ID NOW COVID-19 are available on the FDA   website:     https://www.fda.gov/media/738959/download  https://www.fda.gov/media/755027/download          TYPE & SCREEN   PREPARE RBC SOFT   PREPARE RBC SOFT        All Lab Results:  Results for orders placed or performed during the hospital encounter of 06/15/22   HIV 1/2 Ag/Ab (4th Gen)   Result Value Ref Range    HIV 1/2 Ag/Ab Negative Negative   Hepatitis C Antibody   Result Value Ref Range    Hepatitis C Ab Negative Negative    HCV Virus Hold Specimen   Result Value Ref Range    HEP C Virus Hold Specimen Hold for HCV sendout    Comprehensive metabolic panel   Result Value Ref Range    Sodium 135 (L) 136 - 145 mmol/L    Potassium 4.3 3.5 - 5.1 mmol/L    Chloride 103 95 - 110 mmol/L    CO2 22 (L) 23 - 29 mmol/L    Glucose 125 (H) 70 - 110 mg/dL    BUN 8 6 - 20 mg/dL    Creatinine 0.6 0.5 - 1.4 mg/dL    Calcium 9.1 8.7 - 10.5 mg/dL    Total Protein 8.2 6.0 - 8.4 g/dL    Albumin 4.3 3.5 - 5.2 g/dL    Total Bilirubin 7.6 (H) 0.1 - 1.0 mg/dL    Alkaline Phosphatase 127 55 - 135 U/L     (H) 10 - 40 U/L    ALT 33 10 - 44 U/L    Anion Gap 10 8 - 16 mmol/L    eGFR if African American >60 >60 mL/min/1.73 m^2    eGFR if non African American >60 >60 mL/min/1.73 m^2   CBC auto differential   Result Value Ref Range    WBC 19.64 (H) 3.90 - 12.70 K/uL    RBC 2.13 (L) 4.00 - 5.40 M/uL    Hemoglobin 6.5 (L) 12.0 - 16.0 g/dL    Hematocrit 18.2 (LL) 37.0 - 48.5 %    MCV 85 82 - 98 fL    MCH 30.5 27.0 - 31.0 pg    MCHC 35.7 32.0 - 36.0 g/dL    RDW 21.3 (H) 11.5 - 14.5 %    Platelets 163 150 - 450 K/uL    MPV 10.8 9.2 - 12.9 fL    Immature Granulocytes 3.1 (H) 0.0 - 0.5 %    Gran # (ANC) 12.9 (H) 1.8 - 7.7 K/uL    Immature Grans (Abs) 0.60 (H) 0.00 - 0.04 K/uL    Lymph # 3.2 1.0 - 4.8 K/uL    Mono # 2.9 (H) 0.3 - 1.0 K/uL    Eos # 0.0 0.0 - 0.5 K/uL    Baso # 0.09 0.00 - 0.20 K/uL    nRBC 15 (A) 0 /100 WBC    Gran % 65.4 38.0 - 73.0 %    Lymph % 16.2 (L) 18.0 - 48.0 %    Mono % 14.7 4.0 - 15.0 %    Eosinophil % 0.1 0.0 - 8.0 %    Basophil % 0.5 0.0 - 1.9 %    Aniso Slight     Poik Moderate     Poly Moderate     Target Cells Occasional     Sickle Cells Moderate (A)     Differential Method Automated    Troponin I   Result Value Ref Range    Troponin I 0.029 (H) 0.000 - 0.026 ng/mL   D dimer, quantitative   Result Value Ref Range    D-Dimer 24.04 (H) <0.50 mg/L FEU   Reticulocytes   Result Value Ref Range    Retic >23.0 (H) 0.5 - 2.5 %   Lactic acid,  plasma   Result Value Ref Range    Lactate (Lactic Acid) 1.0 0.5 - 2.2 mmol/L   Urinalysis, Reflex to Urine Culture Urine, Clean Catch    Specimen: Urine   Result Value Ref Range    Specimen UA Urine, Clean Catch     Color, UA Yellow Yellow, Straw, Rachel    Appearance, UA Clear Clear    pH, UA 6.0 5.0 - 8.0    Specific Gravity, UA 1.010 1.005 - 1.030    Protein, UA Negative Negative    Glucose, UA Negative Negative    Ketones, UA Negative Negative    Bilirubin (UA) Negative Negative    Occult Blood UA Trace (A) Negative    Nitrite, UA Negative Negative    Urobilinogen, UA 2.0-3.0 (A) <2.0 EU/dL    Leukocytes, UA Negative Negative   Pregnancy, urine rapid   Result Value Ref Range    Preg Test, Ur Negative    Brain natriuretic peptide   Result Value Ref Range     (H) 0 - 99 pg/mL   POCT COVID-19 Rapid Screening   Result Value Ref Range    POC Rapid COVID Negative Negative     Acceptable Yes    Echo   Result Value Ref Range    BSA 1.5 m2    TDI SEPTAL 0.17 m/s    LV LATERAL E/E' RATIO 6.52 m/s    LV SEPTAL E/E' RATIO 8.06 m/s    IVC diameter 1.87 cm    Left Ventricular Outflow Tract Mean Velocity 1.216270751783063 cm/s    Left Ventricular Outflow Tract Mean Gradient 6.29 mmHg    TV mean gradient 17 mmHg    TDI LATERAL 0.21 m/s    LVIDd 3.90 3.5 - 6.0 cm    IVS 1.19 (A) 0.6 - 1.1 cm    Posterior Wall 0.99 0.6 - 1.1 cm    Ao root annulus 2.80 cm    LVIDs 2.47 2.1 - 4.0 cm    FS 37 28 - 44 %    STJ 2.48 cm    Ascending aorta 2.22 cm    LV mass 138.24 g    LA size 3.55 cm    Left Ventricle Relative Wall Thickness 0.51 cm    AV mean gradient 9 mmHg    AV valve area 1.87 cm2    AV Velocity Ratio 0.76     AV index (prosthetic) 0.65     MV valve area p 1/2 method 2.97 cm2    E/A ratio 1.30     Mean e' 0.19 m/s    E wave deceleration time 255.03765845419856 msec    IVRT 60.833079350468949 msec    LVOT diameter 1.91 cm    LVOT area 2.9 cm2    LVOT peak renay 1.49 m/s    LVOT peak VTI 24.60 cm    Ao peak renay  1.97 m/s    Ao VTI 37.6 cm    RVOT peak gurinder 1.28 m/s    RVOT peak VTI 24.2 cm    Mr max gurinder 3.88 m/s    LVOT stroke volume 70.45 cm3    AV peak gradient 16 mmHg    PV mean gradient 4.09 mmHg    E/E' ratio 7.21 m/s    MV Peak E Gurinder 1.37 m/s    TR Max Gurinder 2.20 m/s    MV stenosis pressure 1/2 time 74.475134358929849 ms    MV Peak A Gurinder 1.05 m/s    LV Systolic Volume 21.56 mL    LV Systolic Volume Index 14.3 mL/m2    LV Diastolic Volume 65.83 mL    LV Diastolic Volume Index 43.60 mL/m2    LV Mass Index 92 g/m2    RA Major Axis 3.83 cm    Left Atrium Minor Axis 4.64 cm    Left Atrium Major Axis 5.19 cm    Triscuspid Valve Regurgitation Peak Gradient 19 mmHg    Right Atrial Pressure (from IVC) 3 mmHg    EF 60 %    TV rest pulmonary artery pressure 22 mmHg   Type & Screen   Result Value Ref Range    Group & Rh B POS    ISTAT PROCEDURE   Result Value Ref Range    POC PH 7.360 7.35 - 7.45    POC PCO2 43.7 35 - 45 mmHg    POC PO2 113 (H) 80 - 100 mmHg    POC HCO3 24.7 24 - 28 mmol/L    POC BE -1 -2 to 2 mmol/L    POC SATURATED O2 98 95 - 100 %    Sample ARTERIAL     Site RR     Allens Test Pass     DelSys Nasal Can     Mode SPONT     Flow 3.5     FiO2 34      Imaging Results:  Imaging Results          CTA Chest Non-Coronary (PE Study) (Final result)  Result time 06/15/22 10:59:23    Final result by Adán King MD (06/15/22 10:59:23)                 Impression:      No evidence of pulmonary embolism.    Cardiomegaly.Interstitial and ground-glass opacity seen predominantly within the lung bases bilaterally which could reflect edema or less likely interstitial pneumonia.    All CT scans at this facility use dose modulation, iterative reconstruction and/or weight based dosing when appropriate to reduce radiation dose to as low as reasonably achievable.      Electronically signed by: Adán King MD  Date:    06/15/2022  Time:    10:59             Narrative:    EXAMINATION:  CTA CHEST NON CORONARY    CLINICAL  HISTORY:  Pulmonary embolism (PE) suspected, positive D-dimer;    TECHNIQUE:  The chest was surveyed from the apices through the posterior costophrenic angles after administration of 75 cc of Omni 350 contrast using pulmonary CTA technique.  Data was reconstructed for multiplanar images in axial, sagittal and coronal planes in for maximal intensity projection images in the axial plane.    COMPARISON:  04/03/2020    FINDINGS:  Base of Neck: No significant abnormality.    Airways: Patent.    Lungs: Interstitial and ground-glass opacity seen predominantly within the lung bases bilaterally which could reflect edema or less likely interstitial pneumonia.    Pleura: No pleural fluid.No pleural calcification.    Ara/Mediastinum: Shotty kavon enlargement.    Esophagus: Normal.    Heart/pericardium: Cardiomegaly.  No pericardial effusion or calcification.  No evidence of right heart strain.    Pulmonary vasculature: Pulmonary arteries distribute normally.  There are four pulmonary veins.    Aorta: Left-sided aortic arch with 3 arterial branches.  The aorta maintains normal caliber, contour and course. There is no calcification of the thoracic aorta.  There is  no coronary artery calcification.    Thoracic soft tissues: Normal. Both breasts are present.    Bones: No acute fracture.  Endplate degenerative changes within the thoracic spine can be seen the setting sickle cell disease and similar to prior.  No suspicious lytic or sclerotic lesion.    Upper Abdomen: Hepatomegaly.  Mild constipation.  Spleen is small in size which could reflect auto infarct from sickle cell disease.  Gallstones seen within a nondistended gallbladder.  Mild prominence of right renal collecting system without stones identified newly partially visualized.                               X-Ray Chest AP Portable (Final result)  Result time 06/15/22 10:15:26    Final result by Adán King MD (06/15/22 10:15:26)                 Impression:      See  above      Electronically signed by: Adán King MD  Date:    06/15/2022  Time:    10:15             Narrative:    EXAMINATION:  XR CHEST AP PORTABLE    CLINICAL HISTORY:  Chest pain;    FINDINGS:  Single view of the chest.  Comparison 04/07/2022.    Cardiac silhouette is normal.  Bilateral perihilar and lower lobe interstitial opacities could reflect interstitial edema or interstitial pneumonia.  No corrina consolidation.  No evidence of pleural effusion or pneumothorax.  Bones appear intact.  Low lung volumes limits evaluation                               The EKG was ordered, reviewed, and independently interpreted by the ED provider.  Interpretation time: 08:21  Rate: 123 BPM  Rhythm: sinus tachycardia  Interpretation: No acute ST changes. No STEMI.           The Emergency Provider reviewed the vital signs and test results, which are outlined above.    ED Discussion     10:45 AM: Discussed pt's case with Dr. Trent (Hem/Onc), who agrees with plan for transfusion (1 unit pRBCs) and recommends CTA, hgb S quant lab. Dr. Trent will discuss with Transfusion Medicine to arrange blood exchange.    11:40 AM: Discussed case with Elvira Hickman NP (Hospital Medicine). Dr. Meyers agrees with current care and management of pt and accepts admission.   Admitting Service: Hospital Medicine  Admitting Physician: Dr. Meyers  Admit to: Inpatient Tele    11:50 AM: Re-evaluated pt. I have discussed test results, shared treatment plan, and the need for admission with patient and family at bedside. Pt and family express understanding at this time and agree with all information. All questions answered. Pt and family have no further questions or concerns at this time. Pt is ready for admit.    12:50 PM: Dr. Trent (Hem/Onc) recommends consulting Interventional Radiology for trialysis or brevia catheter placement.    12:58 PM: Discussed pt's case with Dr. Larsen (Interventional Radiology), who will arrange for trialysis/brevia  catheter placement.         ED Medication(s):  Medications   0.9%  NaCl infusion (for blood administration) (has no administration in time range)   polyethylene glycol packet 17 g (has no administration in time range)   sodium chloride 0.9% flush 10 mL (has no administration in time range)   azithromycin 500 mg in dextrose 5 % 250 mL IVPB (ready to mix system) (has no administration in time range)   cefTRIAXone (ROCEPHIN) 2 g/50 mL D5W IVPB (has no administration in time range)   folic acid tablet 1 mg (1 mg Oral Given 6/15/22 1433)   sodium chloride 0.9% flush 10 mL (has no administration in time range)   naloxone injection 0.02 mg (has no administration in time range)   enoxaparin injection 40 mg (has no administration in time range)   acetaminophen tablet 650 mg (has no administration in time range)   senna-docusate 8.6-50 mg per tablet 1 tablet (has no administration in time range)   bisacodyL suppository 10 mg (has no administration in time range)   ondansetron injection 4 mg (has no administration in time range)   acetaminophen tablet 650 mg (has no administration in time range)   simethicone chewable tablet 80 mg (has no administration in time range)   aluminum-magnesium hydroxide-simethicone 200-200-20 mg/5 mL suspension 30 mL (has no administration in time range)   HYDROmorphone PCA syringe 6 mg/30 mL (0.2 mg/mL) NS ( Intravenous New Syringe/Bag 6/15/22 1413)   diphenhydrAMINE injection 25 mg (has no administration in time range)   heparin (porcine) injection 4,000 Units (has no administration in time range)   acetaminophen tablet 650 mg (has no administration in time range)   calcium gluconate 1 g in NS IVPB (premixed) (has no administration in time range)   furosemide injection 20 mg (has no administration in time range)   fentaNYL 50 mcg/mL injection 100 mcg (100 mcg Intravenous Given 6/15/22 0837)   ondansetron injection 4 mg (4 mg Intravenous Given 6/15/22 0836)   acetaminophen tablet 650 mg (650 mg  Oral Given 6/15/22 0835)   cefTRIAXone (ROCEPHIN) 2 g/50 mL D5W IVPB (0 g Intravenous Stopped 6/15/22 1101)   azithromycin 500 mg in dextrose 5 % 250 mL IVPB (ready to mix system) (0 mg Intravenous Stopped 6/15/22 1057)   iohexoL (OMNIPAQUE 350) injection 75 mL (75 mLs Intravenous Given 6/15/22 1040)   aspirin tablet 325 mg (325 mg Oral Given 6/15/22 1433)   magnesium hydroxide 400 mg/5 ml suspension 2,400 mg (2,400 mg Oral Given 6/15/22 1433)     Current Discharge Medication List              Medical Decision Making    Medical Decision Making:   Clinical Tests:   Lab Tests: Ordered and Reviewed  Radiological Study: Ordered and Reviewed  Medical Tests: Ordered and Reviewed           Scribe Attestation:   Scribe #1: I performed the above scribed service and the documentation accurately describes the services I performed. I attest to the accuracy of the note.    Attending:   Physician Attestation Statement for Scribe #1: I, Jonathan Yeung MD, personally performed the services described in this documentation, as scribed by Nolberto Rivera, in my presence, and it is both accurate and complete.          Clinical Impression       ICD-10-CM ICD-9-CM   1. Anemia, unspecified type  D64.9 285.9   2. Chest pain  R07.9 786.50   3. Sickle cell crisis  D57.00 282.62   4. Elevated troponin  R77.8 790.6   5. Lung infiltrate  R91.8 793.19   6. Sickle cell anemia with pain  D57.00 282.62   7. 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 Sickle cell anemia  D57.1 282.60       Disposition:   Disposition: Admitted  Condition: Fair         Jonathan Yeung MD  06/15/22 1834

## 2022-06-15 NOTE — CONSULTS
[facility] - Emergency Dept.  Hematology/Oncology  Consult Note    Patient Name: Elizabeth Franco  MRN: [id number]  Admission Date: 6/15/2022  Hospital Length of Stay: 0 days  Code Status: Full Code   Attending Provider: Chalino Meyers MD  Consulting Provider: Lynn Trent MD  Primary Care Physician: Elvira Nuñze MD  Principal Problem:Sickle cell crisis    Inpatient consult to Hematology/Oncology  Consult performed by: Lynn Trent MD  Consult ordered by: HAL Holley  Reason for consult: Sickle cell pain crisis  Assessment/Recommendations: See below        Subjective:     HPI:  Ms. Storm is a 26-year-old woman with sickle cell disease presenting with acute pain in bilateral lower hip/buttocks and chest similar to prior sickle cell pain crises.  She notes rare pain crises about once per year at those times occasionally receiving blood transfusion.  She is noted to have not tolerated chelation therapy for iron overload.  She does not take medication for her sickle cell disease aside from folic acid.  She denies known history of thrombosis.  On presentation to the emergency room  vital signs notable for temperature 100.9°, desaturation to 78% on room air with improvement on nasal cannula, to kidney on tachycardia.  Lab work with progressive anemia to 6.5 hemoglobin baseline 9.9 and leukocytosis to 19, bilirubin elevated 7.6 prior baseline 2.3.  D-dimer 24, retic >23.  CTA with bibasilar pulmonary density.      Oncology Treatment Plan:   [Could not find a treatment plan. This SmartLink may be configured incorrectly. Contact a  for help.]    Medications:  Continuous Infusions:   hydromorphone in 0.9 % NaCl 6 mg/30 ml       Scheduled Meds:   aspirin  325 mg Oral Once    [START ON 6/16/2022] azithromycin  500 mg Intravenous Q24H    [START ON 6/16/2022] cefTRIAXone (ROCEPHIN) IVPB  2 g Intravenous Q24H    enoxaparin  40 mg Subcutaneous Daily    folic acid  1 mg Oral Daily     magnesium hydroxide 400 mg/5 ml  30 mL Oral Once    polyethylene glycol  17 g Oral BID    senna-docusate 8.6-50 mg  1 tablet Oral BID     PRN Meds:sodium chloride, acetaminophen, acetaminophen, aluminum-magnesium hydroxide-simethicone, bisacodyL, naloxone, naloxone, ondansetron, simethicone, sodium chloride 0.9%, sodium chloride 0.9%     Review of patient's allergies indicates:  No Known Allergies     Past Medical History:   Diagnosis Date    Hidradenitis suppurativa     Nocturnal enuresis     Sickle cell anemia      No past surgical history on file.  Family History       Problem Relation (Age of Onset)    No Known Problems Mother, Father          Tobacco Use    Smoking status: Never Smoker    Smokeless tobacco: Never Used   Substance and Sexual Activity    Alcohol use: No     Alcohol/week: 0.0 standard drinks    Drug use: Never    Sexual activity: Not Currently       Review of Systems   Constitutional:  Positive for fever. Negative for fatigue.   HENT: Negative.     Respiratory:  Positive for shortness of breath.    Gastrointestinal: Negative.    Musculoskeletal:  Positive for arthralgias, back pain and myalgias. Negative for gait problem, joint swelling and neck pain.   Neurological: Negative.    Hematological: Negative.    Psychiatric/Behavioral: Negative.     Objective:     Vital Signs (Most Recent):  Temp: (!) 100.9 °F (38.3 °C) (06/15/22 0820)  Pulse: (!) 115 (06/15/22 1207)  Resp: (!) 26 (06/15/22 1207)  BP: 128/69 (06/15/22 1000)  SpO2: 95 % (06/15/22 1000)   Vital Signs (24h Range):  Temp:  [100.9 °F (38.3 °C)] 100.9 °F (38.3 °C)  Pulse:  [114-147] 115  Resp:  [10-26] 26  SpO2:  [78 %-97 %] 95 %  BP: (114-157)/(63-80) 128/69     Weight: 52 kg (114 lb 10.2 oz)  Body mass index is 20.97 kg/m².  Body surface area is 1.51 meters squared.    No intake or output data in the 24 hours ending 06/15/22 1237    Physical Exam  Vitals reviewed.   Constitutional:       General: She is not in acute  distress.     Appearance: Normal appearance.   HENT:      Head: Normocephalic and atraumatic.      Nose: No congestion.      Mouth/Throat:      Mouth: Mucous membranes are moist.   Eyes:      General: Scleral icterus present.      Extraocular Movements: Extraocular movements intact.   Cardiovascular:      Rate and Rhythm: Tachycardia present.   Pulmonary:      Effort: Pulmonary effort is normal. No respiratory distress.   Chest:      Chest wall: Tenderness present.   Abdominal:      General: There is no distension.      Palpations: Abdomen is soft.   Musculoskeletal:         General: No swelling.      Cervical back: Neck supple.   Skin:     General: Skin is warm.      Coloration: Skin is not jaundiced.   Neurological:      General: No focal deficit present.      Mental Status: She is alert and oriented to person, place, and time.   Psychiatric:         Mood and Affect: Mood normal.         Behavior: Behavior normal.         Thought Content: Thought content normal.         Judgment: Judgment normal.       Significant Labs:   CBC:   Recent Labs   Lab 06/15/22  0834   WBC 19.64*   HGB 6.5*   HCT 18.2*      , CMP:   Recent Labs   Lab 06/15/22  0834   *   K 4.3      CO2 22*   *   BUN 8   CREATININE 0.6   CALCIUM 9.1   PROT 8.2   ALBUMIN 4.3   BILITOT 7.6*   ALKPHOS 127   *   ALT 33   ANIONGAP 10   EGFRNONAA >60   , Coagulation: No results for input(s): PT, INR, APTT in the last 48 hours., Haptoglobin: No results for input(s): HAPTOGLOBIN in the last 48 hours., LDH: No results for input(s): LDHCSF, BFSOURCE in the last 48 hours., and Reticulocytes:   Recent Labs   Lab 06/15/22  0834   RETIC >23.0*       Diagnostic Results:  I have reviewed all pertinent imaging results/findings within the past 24 hours.    Assessment/Plan:     * Sickle cell crisis with symptomatic anemia  Sickle cell pain crisis:  Presentation concerning for acute chest syndrome.  Typically rare pain crises once yearly  with rare need for blood transfusion but has had acute chest and exchange transfusion in years past.  Most recent hemoglobin S quantification 53% baseline in absence of sickle cell pain crisis April 2022. Current presentation with fever, tachypnea, tachycardia, leukocytosis, elevated retic in bilirubin with progressive anemia to hemoglobin 6.5 from baseline 9.9.  Elevated D-dimer with CTA chest negative for pulmonary embolism but did show bibasilar pulmonary density.    In ER can get initial simple txfusion 1uprbc while awaiting exchange transfusion. I discussed case with transfusion Medicine who agrees with exchange transfusion.  I have asked in ER to place a pheresis line - dialysis catheter such as a Trialysis or Brevia; consent by ER team please  Hemoglobin S quantification (53% in 4/2022)  Trend cbc, retic, bili  Empitic antibiotics  Would recommend lower extremity Doppler to rule out thrombosis given elevation in D-dimer.  CTA chest negative for pulmonary embolism.  If no evidence of thrombosis DVT prophylaxis would be recommended  Ivf prn for euvolemia  Supplemental O2  Analgesia per primary team/PCA prn          Thank you for your consult. I will follow-up with patient. Please contact us if you have any additional questions.    Lynn Trent MD  Hematology/Oncology  [facility] - Emergency Dept.

## 2022-06-15 NOTE — SUBJECTIVE & OBJECTIVE
Past Medical History:   Diagnosis Date    Hidradenitis suppurativa     Nocturnal enuresis     Sickle cell anemia        No past surgical history on file.    Review of patient's allergies indicates:  No Known Allergies    No current facility-administered medications on file prior to encounter.     Current Outpatient Medications on File Prior to Encounter   Medication Sig    folic acid (FOLVITE) 1 MG tablet Take 1 tablet (1 mg total) by mouth once daily.    [DISCONTINUED] oxyCODONE (ROXICODONE) 5 MG immediate release tablet Take 1 tablet (5 mg total) by mouth every 4 (four) hours as needed for Pain.     Family History       Problem Relation (Age of Onset)    No Known Problems Mother, Father          Tobacco Use    Smoking status: Never Smoker    Smokeless tobacco: Never Used   Substance and Sexual Activity    Alcohol use: No     Alcohol/week: 0.0 standard drinks    Drug use: Never    Sexual activity: Not Currently     Review of Systems   Constitutional: Positive for fever and malaise/fatigue.   HENT: Negative.     Eyes: Negative.    Cardiovascular:  Positive for chest pain.   Respiratory: Negative.     Endocrine: Negative.    Hematologic/Lymphatic: Negative.    Skin: Negative.    Musculoskeletal:  Positive for back pain, joint pain and myalgias.   Gastrointestinal: Negative.    Genitourinary: Negative.    Neurological: Negative.    Psychiatric/Behavioral: Negative.     Objective:     Vital Signs (Most Recent):  Temp: 98.8 °F (37.1 °C) (06/15/22 1348)  Pulse: 110 (06/15/22 1532)  Resp: 20 (06/15/22 1413)  BP: 128/69 (06/15/22 1355)  SpO2: (!) 94 % (06/15/22 1355)   Vital Signs (24h Range):  Temp:  [98.8 °F (37.1 °C)-100.9 °F (38.3 °C)] 98.8 °F (37.1 °C)  Pulse:  [110-147] 110  Resp:  [10-26] 20  SpO2:  [78 %-97 %] 94 %  BP: (114-157)/(63-80) 128/69     Weight: 51.7 kg (114 lb)  Body mass index is 20.85 kg/m².    SpO2: (!) 94 %  O2 Device (Oxygen Therapy): nasal cannula      Intake/Output Summary (Last 24 hours) at  6/15/2022 1633  Last data filed at 6/15/2022 1532  Gross per 24 hour   Intake 2 ml   Output --   Net 2 ml       Lines/Drains/Airways       Peripheral Intravenous Line  Duration                  Peripheral IV - Single Lumen 06/15/22 0830 22 G Right Hand <1 day         Peripheral IV - Single Lumen 06/15/22 0831 22 G Left Hand <1 day                    Physical Exam  Vitals and nursing note reviewed.   Constitutional:       General: She is not in acute distress.     Appearance: She is well-developed. She is ill-appearing. She is not diaphoretic.      Comments: On supplemental O2   HENT:      Head: Normocephalic and atraumatic.   Eyes:      General:         Right eye: No discharge.         Left eye: No discharge.      Pupils: Pupils are equal, round, and reactive to light.   Neck:      Thyroid: No thyromegaly.      Vascular: No JVD.      Trachea: No tracheal deviation.   Cardiovascular:      Rate and Rhythm: Regular rhythm. Tachycardia present.      Heart sounds: S1 normal and S2 normal. No murmur heard.    Friction rub present.   Pulmonary:      Effort: Pulmonary effort is normal. No respiratory distress.      Breath sounds: No wheezing.      Comments: Coarse BS at bases  Abdominal:      General: There is no distension.      Palpations: Abdomen is soft.      Tenderness: There is no rebound.   Musculoskeletal:      Cervical back: Neck supple.      Right lower leg: No edema.      Left lower leg: No edema.   Skin:     General: Skin is warm and dry.      Findings: No erythema.   Neurological:      General: No focal deficit present.      Mental Status: She is alert and oriented to person, place, and time.   Psychiatric:         Mood and Affect: Mood normal.         Behavior: Behavior normal.         Thought Content: Thought content normal.       Significant Labs: CMP   Recent Labs   Lab 06/15/22  0834   *   K 4.3      CO2 22*   *   BUN 8   CREATININE 0.6   CALCIUM 9.1   PROT 8.2   ALBUMIN 4.3   BILITOT  7.6*   ALKPHOS 127   *   ALT 33   ANIONGAP 10   ESTGFRAFRICA >60   EGFRNONAA >60   , CBC   Recent Labs   Lab 06/15/22  0834   WBC 19.64*   HGB 6.5*   HCT 18.2*      , INR No results for input(s): INR, PROTIME in the last 48 hours., Troponin   Recent Labs   Lab 06/15/22  0834   TROPONINI 0.029*   , and All pertinent lab results from the last 24 hours have been reviewed.    Significant Imaging: Echocardiogram: Transthoracic echo (TTE) complete (Cupid Only):   Results for orders placed or performed during the hospital encounter of 06/15/22   Echo   Result Value Ref Range    BSA 1.5 m2    TDI SEPTAL 0.17 m/s    LV LATERAL E/E' RATIO 6.52 m/s    LV SEPTAL E/E' RATIO 8.06 m/s    IVC diameter 1.87 cm    Left Ventricular Outflow Tract Mean Velocity 1.634915250542345 cm/s    Left Ventricular Outflow Tract Mean Gradient 6.29 mmHg    TV mean gradient 17 mmHg    TDI LATERAL 0.21 m/s    LVIDd 3.90 3.5 - 6.0 cm    IVS 1.19 (A) 0.6 - 1.1 cm    Posterior Wall 0.99 0.6 - 1.1 cm    Ao root annulus 2.80 cm    LVIDs 2.47 2.1 - 4.0 cm    FS 37 28 - 44 %    STJ 2.48 cm    Ascending aorta 2.22 cm    LV mass 138.24 g    LA size 3.55 cm    Left Ventricle Relative Wall Thickness 0.51 cm    AV mean gradient 9 mmHg    AV valve area 1.87 cm2    AV Velocity Ratio 0.76     AV index (prosthetic) 0.65     MV valve area p 1/2 method 2.97 cm2    E/A ratio 1.30     Mean e' 0.19 m/s    E wave deceleration time 255.61373505154720 msec    IVRT 60.409854102826913 msec    LVOT diameter 1.91 cm    LVOT area 2.9 cm2    LVOT peak gurinder 1.49 m/s    LVOT peak VTI 24.60 cm    Ao peak gurinder 1.97 m/s    Ao VTI 37.6 cm    RVOT peak gurinder 1.28 m/s    RVOT peak VTI 24.2 cm    Mr max gurinder 3.88 m/s    LVOT stroke volume 70.45 cm3    AV peak gradient 16 mmHg    PV mean gradient 4.09 mmHg    E/E' ratio 7.21 m/s    MV Peak E Gurinder 1.37 m/s    TR Max Gurinder 2.20 m/s    MV stenosis pressure 1/2 time 74.196358052285887 ms    MV Peak A Gurinder 1.05 m/s    LV Systolic Volume  21.56 mL    LV Systolic Volume Index 14.3 mL/m2    LV Diastolic Volume 65.83 mL    LV Diastolic Volume Index 43.60 mL/m2    LV Mass Index 92 g/m2    RA Major Axis 3.83 cm    Left Atrium Minor Axis 4.64 cm    Left Atrium Major Axis 5.19 cm    Triscuspid Valve Regurgitation Peak Gradient 19 mmHg    Right Atrial Pressure (from IVC) 3 mmHg    EF 60 %    TV rest pulmonary artery pressure 22 mmHg    Narrative    · The left ventricle is normal in size with concentric remodeling and   normal systolic function.  · The estimated ejection fraction is 60%.  · Normal left ventricular diastolic function.  · Normal right ventricular size with normal right ventricular systolic   function.  · Normal central venous pressure (3 mmHg).  · The estimated PA systolic pressure is 22 mmHg.  · Trivial posterior pericardial effusion.      , EKG: Reviewed, and X-Ray: CXR: X-Ray Chest 1 View (CXR): No results found for this visit on 06/15/22. and X-Ray Chest PA and Lateral (CXR): No results found for this visit on 06/15/22.

## 2022-06-15 NOTE — ASSESSMENT & PLAN NOTE
Sickle cell pain crisis:  Presentation concerning for acute chest syndrome.  Typically rare pain crises once yearly with rare need for blood transfusion but has had acute chest and exchange transfusion in years past.  Most recent hemoglobin S quantification 53% baseline in absence of sickle cell pain crisis April 2022. Current presentation with fever, tachypnea, tachycardia, leukocytosis, elevated retic in bilirubin with progressive anemia to hemoglobin 6.5 from baseline 9.9.  Elevated D-dimer with CTA chest negative for pulmonary embolism but did show bibasilar pulmonary density.    In ER can get initial simple txfusion 1uprbc while awaiting exchange transfusion. I discussed case with transfusion Medicine who agrees with exchange transfusion.  I have asked in ER to place a pheresis line - dialysis catheter such as a Trialysis or Brevia; consent by ER team please  Hemoglobin S quantification (53% in 4/2022)  Trend cbc, retic, bili  Empitic antibiotics  Would recommend lower extremity Doppler to rule out thrombosis given elevation in D-dimer.  CTA chest negative for pulmonary embolism.  If no evidence of thrombosis DVT prophylaxis would be recommended  Ivf prn for euvolemia  Supplemental O2  Analgesia per primary team/PCA prn

## 2022-06-15 NOTE — SEDATION DOCUMENTATION
Patient to IR for urgent placement of vas cath, patient on table in supine position, CM in place.  Patient is very sleepy due to PCA pump, Dr. Larsen contacted patients Mom to consent over the phone.  Patient states understanding of procedure as well

## 2022-06-15 NOTE — ED NOTES
Upon entering the patient's room, she was sitting on the edge of the stretcher. She accidentally DC her iv in her left hand.

## 2022-06-15 NOTE — ASSESSMENT & PLAN NOTE
This patient does have evidence of infective focus  My overall impression is Severe  sepsis. Vital signs were reviewed and noted in progress note.  Antibiotics given-   Antibiotics (From admission, onward)            Start     Stop Route Frequency Ordered    06/16/22 1000  azithromycin 500 mg in dextrose 5 % 250 mL IVPB (ready to mix system)         -- IV Every 24 hours (non-standard times) 06/15/22 1226    06/16/22 0900  cefTRIAXone (ROCEPHIN) 2 g/50 mL D5W IVPB         -- IV Every 24 hours (non-standard times) 06/15/22 1226        Cultures were taken-   Microbiology Results (last 7 days)     Procedure Component Value Units Date/Time    Blood culture #1 **CANNOT BE ORDERED STAT** [[id number]] Collected: 06/15/22 0834    Order Status: Sent Specimen: Blood from Peripheral, Hand, Right Updated: 06/15/22 1524    Blood culture #2 **CANNOT BE ORDERED STAT** [[id number]] Collected: 06/15/22 0834    Order Status: Sent Specimen: Blood from Peripheral, Hand, Left Updated: 06/15/22 1524        Latest lactate reviewed, they are-  Recent Labs   Lab 06/15/22  0834   LACTATE 1.0     Organ- Hypoxemia, elevated liver enzymes     Source- pneumonia     Source control Achieved by- Iv ABx

## 2022-06-15 NOTE — H&P
[facility] Medicine  History & Physical    Patient Name: Elizabeth Franco  MRN: [id number]  Patient Class: IP- Inpatient  Admission Date: 6/15/2022  Attending Physician: Chalino Meyers MD   Primary Care Provider: Elvira Nuñez MD      Patient information was obtained from patient and ER records.     Subjective:     Principal Problem:Sickle cell crisis    Chief Complaint:   Chief Complaint   Patient presents with    Sickle Cell Pain Crisis     Pt reports pain to back and chest and tried her home Oxycodone with no relief        HPI:    Sickle Cell Pain Crisis       Pt reports pain to back and chest and tried her home Oxycodone with no relief       Per ER- This  is a 26 y.o. female patient with a PMHx of sickle cell anemia and Hidradenitis suppurativa who presents to the Emergency Department for sickle cell pain crisis, onset this morning. Pt report chest pain and back pain. Symptoms are constant and moderate in severity. No mitigating or exacerbating factors reported. Associated sxs include fever (Tnow 100.9). Patient denies any chills, n/v/d, SOB, weakness, numbness, dizziness, headache, and all other sxs at this time. Prior Tx includes Oxycodone 5 mg, with no improvement of sxs. No further complaints or concerns at this time.     Patient was evaluated by ER and noted to have symptomatic anemia with signs of pneumonia and or possible volume overload and was placed in Observation.             Past Medical History:   Diagnosis Date    Hidradenitis suppurativa     Nocturnal enuresis     Sickle cell anemia        No past surgical history on file.    Review of patient's allergies indicates:  No Known Allergies    No current facility-administered medications on file prior to encounter.     Current Outpatient Medications on File Prior to Encounter   Medication Sig    folic acid (FOLVITE) 1 MG tablet Take 1 tablet (1 mg total) by mouth once daily.    [DISCONTINUED] oxyCODONE (ROXICODONE) 5 MG  immediate release tablet Take 1 tablet (5 mg total) by mouth every 4 (four) hours as needed for Pain.     Family History       Problem Relation (Age of Onset)    No Known Problems Mother, Father          Tobacco Use    Smoking status: Never Smoker    Smokeless tobacco: Never Used   Substance and Sexual Activity    Alcohol use: No     Alcohol/week: 0.0 standard drinks    Drug use: Never    Sexual activity: Not Currently     Review of Systems   Constitutional:  Positive for activity change, appetite change, fatigue and fever.   HENT:  Negative for ear pain, facial swelling and hearing loss.    Eyes:  Negative for pain and redness.   Respiratory:  Positive for cough and shortness of breath.    Cardiovascular:  Positive for chest pain. Negative for leg swelling.   Gastrointestinal:  Negative for abdominal distention, diarrhea, nausea and vomiting.   Endocrine: Negative for polydipsia and polyphagia.   Genitourinary:  Negative for difficulty urinating, dysuria, flank pain and hematuria.   Musculoskeletal:  Positive for back pain. Negative for neck pain.   Skin:  Positive for pallor.   Allergic/Immunologic: Negative for food allergies.   Neurological:  Positive for weakness. Negative for facial asymmetry and speech difficulty.   Hematological:  Does not bruise/bleed easily.   Psychiatric/Behavioral:  Negative for agitation, behavioral problems, confusion, dysphoric mood and suicidal ideas. The patient is not nervous/anxious.    Objective:     Vital Signs (Most Recent):  Temp: 98.8 °F (37.1 °C) (06/15/22 1348)  Pulse: 110 (06/15/22 1532)  Resp: 20 (06/15/22 1413)  BP: 128/69 (06/15/22 1355)  SpO2: (!) 94 % (06/15/22 1355)   Vital Signs (24h Range):  Temp:  [98.8 °F (37.1 °C)-100.9 °F (38.3 °C)] 98.8 °F (37.1 °C)  Pulse:  [110-147] 110  Resp:  [10-26] 20  SpO2:  [78 %-97 %] 94 %  BP: (114-157)/(63-80) 128/69     Weight: 51.7 kg (114 lb)  Body mass index is 20.85 kg/m².    Physical Exam  Constitutional:       General:  She is not in acute distress.     Appearance: She is ill-appearing. She is not diaphoretic.   HENT:      Head: Normocephalic and atraumatic.      Mouth/Throat:      Mouth: Mucous membranes are dry.   Eyes:      General:         Right eye: No discharge.         Left eye: No discharge.      Extraocular Movements: Extraocular movements intact.      Conjunctiva/sclera: Conjunctivae normal.      Pupils: Pupils are equal, round, and reactive to light.   Cardiovascular:      Rate and Rhythm: Regular rhythm. Tachycardia present.      Pulses: Normal pulses.   Pulmonary:      Comments: BBS diminished  SOB with exertion  Abdominal:      General: Bowel sounds are normal. There is no distension.      Palpations: Abdomen is soft.      Tenderness: There is no abdominal tenderness. There is no guarding.   Genitourinary:     Comments: Not examined  Musculoskeletal:         General: Normal range of motion.      Cervical back: Normal range of motion and neck supple. No rigidity or tenderness.      Right lower leg: No edema.      Left lower leg: No edema.   Skin:     General: Skin is warm and dry.      Capillary Refill: Capillary refill takes less than 2 seconds.      Coloration: Skin is pale.   Neurological:      General: No focal deficit present.      Mental Status: She is alert and oriented to person, place, and time. Mental status is at baseline.      Cranial Nerves: No cranial nerve deficit.   Psychiatric:         Mood and Affect: Mood normal.         Behavior: Behavior normal.         Thought Content: Thought content normal.         Judgment: Judgment normal.         CRANIAL NERVES     CN III, IV, VI   Pupils are equal, round, and reactive to light.       Imaging Results              CTA Chest Non-Coronary (PE Study) (Final result)  Result time 06/15/22 10:59:23      Final result by Adán King MD (06/15/22 10:59:23)                   Impression:      No evidence of pulmonary embolism.    Cardiomegaly.Interstitial and  ground-glass opacity seen predominantly within the lung bases bilaterally which could reflect edema or less likely interstitial pneumonia.    All CT scans at this facility use dose modulation, iterative reconstruction and/or weight based dosing when appropriate to reduce radiation dose to as low as reasonably achievable.      Electronically signed by: Adán King MD  Date:    06/15/2022  Time:    10:59               Narrative:    EXAMINATION:  CTA CHEST NON CORONARY    CLINICAL HISTORY:  Pulmonary embolism (PE) suspected, positive D-dimer;    TECHNIQUE:  The chest was surveyed from the apices through the posterior costophrenic angles after administration of 75 cc of Omni 350 contrast using pulmonary CTA technique.  Data was reconstructed for multiplanar images in axial, sagittal and coronal planes in for maximal intensity projection images in the axial plane.    COMPARISON:  04/03/2020    FINDINGS:  Base of Neck: No significant abnormality.    Airways: Patent.    Lungs: Interstitial and ground-glass opacity seen predominantly within the lung bases bilaterally which could reflect edema or less likely interstitial pneumonia.    Pleura: No pleural fluid.No pleural calcification.    Ara/Mediastinum: Shotty kavon enlargement.    Esophagus: Normal.    Heart/pericardium: Cardiomegaly.  No pericardial effusion or calcification.  No evidence of right heart strain.    Pulmonary vasculature: Pulmonary arteries distribute normally.  There are four pulmonary veins.    Aorta: Left-sided aortic arch with 3 arterial branches.  The aorta maintains normal caliber, contour and course. There is no calcification of the thoracic aorta.  There is  no coronary artery calcification.    Thoracic soft tissues: Normal. Both breasts are present.    Bones: No acute fracture.  Endplate degenerative changes within the thoracic spine can be seen the setting sickle cell disease and similar to prior.  No suspicious lytic or sclerotic  lesion.    Upper Abdomen: Hepatomegaly.  Mild constipation.  Spleen is small in size which could reflect auto infarct from sickle cell disease.  Gallstones seen within a nondistended gallbladder.  Mild prominence of right renal collecting system without stones identified newly partially visualized.                                       X-Ray Chest AP Portable (Final result)  Result time 06/15/22 10:15:26      Final result by Adán King MD (06/15/22 10:15:26)                   Impression:      See above      Electronically signed by: Adán King MD  Date:    06/15/2022  Time:    10:15               Narrative:    EXAMINATION:  XR CHEST AP PORTABLE    CLINICAL HISTORY:  Chest pain;    FINDINGS:  Single view of the chest.  Comparison 04/07/2022.    Cardiac silhouette is normal.  Bilateral perihilar and lower lobe interstitial opacities could reflect interstitial edema or interstitial pneumonia.  No corrina consolidation.  No evidence of pleural effusion or pneumothorax.  Bones appear intact.  Low lung volumes limits evaluation                                      Results for orders placed or performed during the hospital encounter of 06/15/22   HIV 1/2 Ag/Ab (4th Gen)   Result Value Ref Range    HIV 1/2 Ag/Ab Negative Negative   Hepatitis C Antibody   Result Value Ref Range    Hepatitis C Ab Negative Negative   HCV Virus Hold Specimen   Result Value Ref Range    HEP C Virus Hold Specimen Hold for HCV sendout    Comprehensive metabolic panel   Result Value Ref Range    Sodium 135 (L) 136 - 145 mmol/L    Potassium 4.3 3.5 - 5.1 mmol/L    Chloride 103 95 - 110 mmol/L    CO2 22 (L) 23 - 29 mmol/L    Glucose 125 (H) 70 - 110 mg/dL    BUN 8 6 - 20 mg/dL    Creatinine 0.6 0.5 - 1.4 mg/dL    Calcium 9.1 8.7 - 10.5 mg/dL    Total Protein 8.2 6.0 - 8.4 g/dL    Albumin 4.3 3.5 - 5.2 g/dL    Total Bilirubin 7.6 (H) 0.1 - 1.0 mg/dL    Alkaline Phosphatase 127 55 - 135 U/L     (H) 10 - 40 U/L    ALT 33 10 - 44 U/L     Anion Gap 10 8 - 16 mmol/L    eGFR if African American >60 >60 mL/min/1.73 m^2    eGFR if non African American >60 >60 mL/min/1.73 m^2   CBC auto differential   Result Value Ref Range    WBC 19.64 (H) 3.90 - 12.70 K/uL    RBC 2.13 (L) 4.00 - 5.40 M/uL    Hemoglobin 6.5 (L) 12.0 - 16.0 g/dL    Hematocrit 18.2 (LL) 37.0 - 48.5 %    MCV 85 82 - 98 fL    MCH 30.5 27.0 - 31.0 pg    MCHC 35.7 32.0 - 36.0 g/dL    RDW 21.3 (H) 11.5 - 14.5 %    Platelets 163 150 - 450 K/uL    MPV 10.8 9.2 - 12.9 fL    Immature Granulocytes 3.1 (H) 0.0 - 0.5 %    Gran # (ANC) 12.9 (H) 1.8 - 7.7 K/uL    Immature Grans (Abs) 0.60 (H) 0.00 - 0.04 K/uL    Lymph # 3.2 1.0 - 4.8 K/uL    Mono # 2.9 (H) 0.3 - 1.0 K/uL    Eos # 0.0 0.0 - 0.5 K/uL    Baso # 0.09 0.00 - 0.20 K/uL    nRBC 15 (A) 0 /100 WBC    Gran % 65.4 38.0 - 73.0 %    Lymph % 16.2 (L) 18.0 - 48.0 %    Mono % 14.7 4.0 - 15.0 %    Eosinophil % 0.1 0.0 - 8.0 %    Basophil % 0.5 0.0 - 1.9 %    Aniso Slight     Poik Moderate     Poly Moderate     Target Cells Occasional     Sickle Cells Moderate (A)     Differential Method Automated    Troponin I   Result Value Ref Range    Troponin I 0.029 (H) 0.000 - 0.026 ng/mL   D dimer, quantitative   Result Value Ref Range    D-Dimer 24.04 (H) <0.50 mg/L FEU   Reticulocytes   Result Value Ref Range    Retic >23.0 (H) 0.5 - 2.5 %   Lactic acid, plasma   Result Value Ref Range    Lactate (Lactic Acid) 1.0 0.5 - 2.2 mmol/L   Urinalysis, Reflex to Urine Culture Urine, Clean Catch    Specimen: Urine   Result Value Ref Range    Specimen UA Urine, Clean Catch     Color, UA Yellow Yellow, Straw, Rachel    Appearance, UA Clear Clear    pH, UA 6.0 5.0 - 8.0    Specific Gravity, UA 1.010 1.005 - 1.030    Protein, UA Negative Negative    Glucose, UA Negative Negative    Ketones, UA Negative Negative    Bilirubin (UA) Negative Negative    Occult Blood UA Trace (A) Negative    Nitrite, UA Negative Negative    Urobilinogen, UA 2.0-3.0 (A) <2.0 EU/dL    Leukocytes,  UA Negative Negative   Pregnancy, urine rapid   Result Value Ref Range    Preg Test, Ur Negative    Brain natriuretic peptide   Result Value Ref Range     (H) 0 - 99 pg/mL   POCT COVID-19 Rapid Screening   Result Value Ref Range    POC Rapid COVID Negative Negative     Acceptable Yes    Echo   Result Value Ref Range    BSA 1.5 m2    TDI SEPTAL 0.17 m/s    LV LATERAL E/E' RATIO 6.52 m/s    LV SEPTAL E/E' RATIO 8.06 m/s    IVC diameter 1.87 cm    Left Ventricular Outflow Tract Mean Velocity 1.764720530165142 cm/s    Left Ventricular Outflow Tract Mean Gradient 6.29 mmHg    TV mean gradient 17 mmHg    TDI LATERAL 0.21 m/s    LVIDd 3.90 3.5 - 6.0 cm    IVS 1.19 (A) 0.6 - 1.1 cm    Posterior Wall 0.99 0.6 - 1.1 cm    Ao root annulus 2.80 cm    LVIDs 2.47 2.1 - 4.0 cm    FS 37 28 - 44 %    STJ 2.48 cm    Ascending aorta 2.22 cm    LV mass 138.24 g    LA size 3.55 cm    Left Ventricle Relative Wall Thickness 0.51 cm    AV mean gradient 9 mmHg    AV valve area 1.87 cm2    AV Velocity Ratio 0.76     AV index (prosthetic) 0.65     MV valve area p 1/2 method 2.97 cm2    E/A ratio 1.30     Mean e' 0.19 m/s    E wave deceleration time 255.92806071623372 msec    IVRT 60.166599990063986 msec    LVOT diameter 1.91 cm    LVOT area 2.9 cm2    LVOT peak gurinder 1.49 m/s    LVOT peak VTI 24.60 cm    Ao peak gurinder 1.97 m/s    Ao VTI 37.6 cm    RVOT peak gurinder 1.28 m/s    RVOT peak VTI 24.2 cm    Mr max gurinder 3.88 m/s    LVOT stroke volume 70.45 cm3    AV peak gradient 16 mmHg    PV mean gradient 4.09 mmHg    E/E' ratio 7.21 m/s    MV Peak E Gurinder 1.37 m/s    TR Max Gurinder 2.20 m/s    MV stenosis pressure 1/2 time 74.900524154561108 ms    MV Peak A Gurinder 1.05 m/s    LV Systolic Volume 21.56 mL    LV Systolic Volume Index 14.3 mL/m2    LV Diastolic Volume 65.83 mL    LV Diastolic Volume Index 43.60 mL/m2    LV Mass Index 92 g/m2    RA Major Axis 3.83 cm    Left Atrium Minor Axis 4.64 cm    Left Atrium Major Axis 5.19 cm    Triscuspid  Valve Regurgitation Peak Gradient 19 mmHg    Right Atrial Pressure (from IVC) 3 mmHg    EF 60 %    TV rest pulmonary artery pressure 22 mmHg   Type & Screen   Result Value Ref Range    Group & Rh B POS    ISTAT PROCEDURE   Result Value Ref Range    POC PH 7.360 7.35 - 7.45    POC PCO2 43.7 35 - 45 mmHg    POC PO2 113 (H) 80 - 100 mmHg    POC HCO3 24.7 24 - 28 mmol/L    POC BE -1 -2 to 2 mmol/L    POC SATURATED O2 98 95 - 100 %    Sample ARTERIAL     Site RR     Allens Test Pass     DelSys Nasal Can     Mode SPONT     Flow 3.5     FiO2 34          Assessment/Plan:     * Sickle cell crisis with symptomatic anemia  6/15 Telemetry  Patient ordered for 1 unit PRBCs transfusion  Hematology consulted  Dilaudid PCA  Trend anemia      Elevated troponin and Elevated BNP  6/15 Telemetry  ASA x 1 dose  Trend troponins  Check echocardiogram  Consult Cardiology      Elevated liver enzymes with elevated bilirubin  6/15 History chronically elevated bilirubin  Trend levels      Elevated d-dimer- Pulmonary emboli excluded  6/15 Elevated d-dimer noted- Pulmonary emboli excluded per CTA      Hypoxemia suspected multifactorial  6/15 Suspected multifactorial  Patient in with symptomatic anemia and signs of pneumonia and or possible acute volume overload      Pneumonia  6/15 Patient in with fever and signs of possible pneumonia and or volume overload  Continue Iv Abx for now      Gall bladder stones  6/15 Currently no signs of acute cholecystitis  Monitor closely for any changes      Severe sepsis  This patient does have evidence of infective focus  My overall impression is Severe  sepsis. Vital signs were reviewed and noted in progress note.  Antibiotics given-   Antibiotics (From admission, onward)            Start     Stop Route Frequency Ordered    06/16/22 1000  azithromycin 500 mg in dextrose 5 % 250 mL IVPB (ready to mix system)         -- IV Every 24 hours (non-standard times) 06/15/22 1226    06/16/22 0900  cefTRIAXone (ROCEPHIN)  2 g/50 mL D5W IVPB         -- IV Every 24 hours (non-standard times) 06/15/22 1226        Cultures were taken-   Microbiology Results (last 7 days)     Procedure Component Value Units Date/Time    Blood culture #1 **CANNOT BE ORDERED STAT** [[id number]] Collected: 06/15/22 0834    Order Status: Sent Specimen: Blood from Peripheral, Hand, Right Updated: 06/15/22 1524    Blood culture #2 **CANNOT BE ORDERED STAT** [[id number]] Collected: 06/15/22 0834    Order Status: Sent Specimen: Blood from Peripheral, Hand, Left Updated: 06/15/22 1524        Latest lactate reviewed, they are-  Recent Labs   Lab 06/15/22  0834   LACTATE 1.0     Organ- Hypoxemia, elevated liver enzymes     Source- pneumonia     Source control Achieved by- Iv ABx         Hepatomegaly  6/15 Noted      Constipation  6/15 MOM x 1 dose  Add bid miralax      Elevated bilirubin  6/15 Monitor        VTE Risk Mitigation (From admission, onward)         Ordered     enoxaparin injection 40 mg  Daily         06/15/22 1234     heparin (porcine) injection 4,000 Units  Once         06/15/22 1258     Place RISSA hose  Until discontinued         06/15/22 1234     IP VTE HIGH RISK PATIENT  Once         06/15/22 1234     Place sequential compression device  Until discontinued         06/15/22 1225                 Time spent seeing patient( greater than 1/2 spent in direct contact) : 78 minutes      HAL Healy  Department of Hospital Medicine   '[facility]

## 2022-06-15 NOTE — PROCEDURES
Interventional Radiology Post-Procedure Note    Pre Op Diagnosis: Sickle cell pain crisis    Post Op Diagnosis: Same    Procedure: Nontunneled central line placement    Procedure performed by: Octavia Larsen MD    Written Informed Consent Obtained: Yes    Specimen Removed: No    Estimated Blood Loss: Minimal    Findings:   Using realtime U/S guidance a 13 Fr nontunneled central venous dialysis catheter (Trialysis) was placed into the right internal jugular vein with tip of the catheter in the right atrium. The catheter was secured to the skin with nonabsorbable suture.    Catheter is ready for use.     Octavia Larsen MD  Interventional Radiology

## 2022-06-15 NOTE — HPI
Elizabeth Franco is a 26 y.o. female patient with a PMHx of sickle cell anemia who presented to the Emergency Department for sickle cell pain crisis, onset this morning. Pt report chest pain and back pain. Symptoms are constant and moderate in severity. No mitigating or exacerbating factors reported. Associated sxs include fever (Tnow 100.9). Patient denies any chills, n/v/d, SOB, weakness, numbness, dizziness, headache, and all other sxs at this time. Prior Tx includes Oxycodone 5 mg, with no improvement of sxs. No further complaints or concerns at this time.     Her O2 sats were in the 70's initially, and imaging studies show findings consistent with ACS. By history, she had ACS in [facility] about 4 years ago but she has not apparently required RBCx before. I have placed orders for RBCs (5 units) and we will plan for an RBCx when a line is placed, a consent obtained, and 5 units of appropriate RBCs are obtained.   Please let me know when the line and consent are ready ([phone])

## 2022-06-15 NOTE — Clinical Note
Shar Franco accompanied their child to the emergency department on 6/15/2022. They may return to work on 06/15/2022.      If you have any questions or concerns, please don't hesitate to call.      Christel PAUL RN

## 2022-06-15 NOTE — CONSULTS
[facility] - Emergency Dept.  Transfusion Medicine  Consult Note    Patient Name: Elizabeth Franco  MRN: [id number]  Admission Date: 6/15/2022  Hospital Length of Stay: 0 days  Attending Physician: Chalino Meyers MD  Primary Care Provider: Elvira Nuñez MD     Inpatient consult to [facility]  Consult performed by: Conrad Bernal MD  Consult ordered by: Lynn Trent MD        Subjective:     Principal Problem:Sickle cell crisis    History of Present Illness:  Elizabeth Franco is a 26 y.o. female patient with a PMHx of sickle cell anemia who presented to the Emergency Department for sickle cell pain crisis, onset this morning. Pt report chest pain and back pain. Symptoms are constant and moderate in severity. No mitigating or exacerbating factors reported. Associated sxs include fever (Tnow 100.9). Patient denies any chills, n/v/d, SOB, weakness, numbness, dizziness, headache, and all other sxs at this time. Prior Tx includes Oxycodone 5 mg, with no improvement of sxs. No further complaints or concerns at this time.     Her O2 sats were in the 70's initially, and imaging studies show findings consistent with ACS. By history, she had ACS in [facility] about 4 years ago but she has not apparently required RBCx before. I have placed orders for RBCs (5 units) and we will plan for an RBCx when a line is placed, a consent obtained, and 5 units of appropriate RBCs are obtained.   Please let me know when the line and consent are ready ([phone])         PMH and PSH reviewed 06/15/2022 and relevant items addressed in HPI.    Review of patient's allergies indicates:  No Known Allergies    All medications reviewed 06/15/2022 and ace inhibitors not identified.    Family History       Problem Relation (Age of Onset)    No Known Problems Mother, Father          Tobacco Use    Smoking status: Never Smoker    Smokeless tobacco: Never Used   Substance and Sexual Activity    Alcohol use: No      Alcohol/week: 0.0 standard drinks    Drug use: Never    Sexual activity: Not Currently     Review of Systems   Constitutional:  Positive for fever.   Cardiovascular:  Positive for chest pain.   Musculoskeletal:  Positive for back pain.   Objective:     Vital Signs (Most Recent):  Temp: (!) 100.9 °F (38.3 °C) (06/15/22 0820)  Pulse: (!) 115 (06/15/22 1207)  Resp: (!) 26 (06/15/22 1207)  BP: 128/69 (06/15/22 1000)  SpO2: 95 % (06/15/22 1000)   Vital Signs (24h Range):  Temp:  [100.9 °F (38.3 °C)] 100.9 °F (38.3 °C)  Pulse:  [114-147] 115  Resp:  [10-26] 26  SpO2:  [78 %-97 %] 95 %  BP: (114-157)/(63-80) 128/69     Weight: 52 kg (114 lb 10.2 oz)    Physical Exam  Vitals and nursing note reviewed.       Significant Labs: BMP:   Recent Labs   Lab 06/15/22  0834   *   *   K 4.3      CO2 22*   BUN 8   CREATININE 0.6   CALCIUM 9.1     CBC:   Recent Labs   Lab 06/15/22  0834   WBC 19.64*   HGB 6.5*   HCT 18.2*        Assessment/Plan:     Sickle cell disease with acute chest syndrome carries a Category II Grade IC indication for RBC exchange apheresis via the 2019 Journal of Clinical Apheresis Guidelines (Melinda SANTILLAN et al. Journal of Clinical Apheresis 2019; 34:171-354.) Preparations underway.    No new Assessment & Plan notes have been filed under this hospital service since the last note was generated.  Service: Transfusion Medicine      Jennyfer Bernal MD  Transfusion Medicine  [facility] - Emergency Dept.

## 2022-06-15 NOTE — SUBJECTIVE & OBJECTIVE
Past Medical History:   Diagnosis Date    Hidradenitis suppurativa     Nocturnal enuresis     Sickle cell anemia        No past surgical history on file.    Review of patient's allergies indicates:  No Known Allergies    No current facility-administered medications on file prior to encounter.     Current Outpatient Medications on File Prior to Encounter   Medication Sig    folic acid (FOLVITE) 1 MG tablet Take 1 tablet (1 mg total) by mouth once daily.    [DISCONTINUED] oxyCODONE (ROXICODONE) 5 MG immediate release tablet Take 1 tablet (5 mg total) by mouth every 4 (four) hours as needed for Pain.     Family History       Problem Relation (Age of Onset)    No Known Problems Mother, Father          Tobacco Use    Smoking status: Never Smoker    Smokeless tobacco: Never Used   Substance and Sexual Activity    Alcohol use: No     Alcohol/week: 0.0 standard drinks    Drug use: Never    Sexual activity: Not Currently     Review of Systems   Constitutional:  Positive for activity change, appetite change, fatigue and fever.   HENT:  Negative for ear pain, facial swelling and hearing loss.    Eyes:  Negative for pain and redness.   Respiratory:  Positive for cough and shortness of breath.    Cardiovascular:  Positive for chest pain. Negative for leg swelling.   Gastrointestinal:  Negative for abdominal distention, diarrhea, nausea and vomiting.   Endocrine: Negative for polydipsia and polyphagia.   Genitourinary:  Negative for difficulty urinating, dysuria, flank pain and hematuria.   Musculoskeletal:  Positive for back pain. Negative for neck pain.   Skin:  Positive for pallor.   Allergic/Immunologic: Negative for food allergies.   Neurological:  Positive for weakness. Negative for facial asymmetry and speech difficulty.   Hematological:  Does not bruise/bleed easily.   Psychiatric/Behavioral:  Negative for agitation, behavioral problems, confusion, dysphoric mood and suicidal ideas. The patient is not nervous/anxious.     Objective:     Vital Signs (Most Recent):  Temp: 98.8 °F (37.1 °C) (06/15/22 1348)  Pulse: 110 (06/15/22 1532)  Resp: 20 (06/15/22 1413)  BP: 128/69 (06/15/22 1355)  SpO2: (!) 94 % (06/15/22 1355)   Vital Signs (24h Range):  Temp:  [98.8 °F (37.1 °C)-100.9 °F (38.3 °C)] 98.8 °F (37.1 °C)  Pulse:  [110-147] 110  Resp:  [10-26] 20  SpO2:  [78 %-97 %] 94 %  BP: (114-157)/(63-80) 128/69     Weight: 51.7 kg (114 lb)  Body mass index is 20.85 kg/m².    Physical Exam  Constitutional:       General: She is not in acute distress.     Appearance: She is ill-appearing. She is not diaphoretic.   HENT:      Head: Normocephalic and atraumatic.      Mouth/Throat:      Mouth: Mucous membranes are dry.   Eyes:      General:         Right eye: No discharge.         Left eye: No discharge.      Extraocular Movements: Extraocular movements intact.      Conjunctiva/sclera: Conjunctivae normal.      Pupils: Pupils are equal, round, and reactive to light.   Cardiovascular:      Rate and Rhythm: Regular rhythm. Tachycardia present.      Pulses: Normal pulses.   Pulmonary:      Comments: BBS diminished  SOB with exertion  Abdominal:      General: Bowel sounds are normal. There is no distension.      Palpations: Abdomen is soft.      Tenderness: There is no abdominal tenderness. There is no guarding.   Genitourinary:     Comments: Not examined  Musculoskeletal:         General: Normal range of motion.      Cervical back: Normal range of motion and neck supple. No rigidity or tenderness.      Right lower leg: No edema.      Left lower leg: No edema.   Skin:     General: Skin is warm and dry.      Capillary Refill: Capillary refill takes less than 2 seconds.      Coloration: Skin is pale.   Neurological:      General: No focal deficit present.      Mental Status: She is alert and oriented to person, place, and time. Mental status is at baseline.      Cranial Nerves: No cranial nerve deficit.   Psychiatric:         Mood and Affect: Mood  normal.         Behavior: Behavior normal.         Thought Content: Thought content normal.         Judgment: Judgment normal.         CRANIAL NERVES     CN III, IV, VI   Pupils are equal, round, and reactive to light.       Imaging Results              CTA Chest Non-Coronary (PE Study) (Final result)  Result time 06/15/22 10:59:23      Final result by Adán King MD (06/15/22 10:59:23)                   Impression:      No evidence of pulmonary embolism.    Cardiomegaly.Interstitial and ground-glass opacity seen predominantly within the lung bases bilaterally which could reflect edema or less likely interstitial pneumonia.    All CT scans at this facility use dose modulation, iterative reconstruction and/or weight based dosing when appropriate to reduce radiation dose to as low as reasonably achievable.      Electronically signed by: Adán King MD  Date:    06/15/2022  Time:    10:59               Narrative:    EXAMINATION:  CTA CHEST NON CORONARY    CLINICAL HISTORY:  Pulmonary embolism (PE) suspected, positive D-dimer;    TECHNIQUE:  The chest was surveyed from the apices through the posterior costophrenic angles after administration of 75 cc of Omni 350 contrast using pulmonary CTA technique.  Data was reconstructed for multiplanar images in axial, sagittal and coronal planes in for maximal intensity projection images in the axial plane.    COMPARISON:  04/03/2020    FINDINGS:  Base of Neck: No significant abnormality.    Airways: Patent.    Lungs: Interstitial and ground-glass opacity seen predominantly within the lung bases bilaterally which could reflect edema or less likely interstitial pneumonia.    Pleura: No pleural fluid.No pleural calcification.    Ara/Mediastinum: Shotty kavon enlargement.    Esophagus: Normal.    Heart/pericardium: Cardiomegaly.  No pericardial effusion or calcification.  No evidence of right heart strain.    Pulmonary vasculature: Pulmonary arteries distribute normally.   There are four pulmonary veins.    Aorta: Left-sided aortic arch with 3 arterial branches.  The aorta maintains normal caliber, contour and course. There is no calcification of the thoracic aorta.  There is  no coronary artery calcification.    Thoracic soft tissues: Normal. Both breasts are present.    Bones: No acute fracture.  Endplate degenerative changes within the thoracic spine can be seen the setting sickle cell disease and similar to prior.  No suspicious lytic or sclerotic lesion.    Upper Abdomen: Hepatomegaly.  Mild constipation.  Spleen is small in size which could reflect auto infarct from sickle cell disease.  Gallstones seen within a nondistended gallbladder.  Mild prominence of right renal collecting system without stones identified newly partially visualized.                                       X-Ray Chest AP Portable (Final result)  Result time 06/15/22 10:15:26      Final result by Adán King MD (06/15/22 10:15:26)                   Impression:      See above      Electronically signed by: Adán King MD  Date:    06/15/2022  Time:    10:15               Narrative:    EXAMINATION:  XR CHEST AP PORTABLE    CLINICAL HISTORY:  Chest pain;    FINDINGS:  Single view of the chest.  Comparison 04/07/2022.    Cardiac silhouette is normal.  Bilateral perihilar and lower lobe interstitial opacities could reflect interstitial edema or interstitial pneumonia.  No corrina consolidation.  No evidence of pleural effusion or pneumothorax.  Bones appear intact.  Low lung volumes limits evaluation                                      Results for orders placed or performed during the hospital encounter of 06/15/22   HIV 1/2 Ag/Ab (4th Gen)   Result Value Ref Range    HIV 1/2 Ag/Ab Negative Negative   Hepatitis C Antibody   Result Value Ref Range    Hepatitis C Ab Negative Negative   HCV Virus Hold Specimen   Result Value Ref Range    HEP C Virus Hold Specimen Hold for HCV sendout    Comprehensive metabolic  panel   Result Value Ref Range    Sodium 135 (L) 136 - 145 mmol/L    Potassium 4.3 3.5 - 5.1 mmol/L    Chloride 103 95 - 110 mmol/L    CO2 22 (L) 23 - 29 mmol/L    Glucose 125 (H) 70 - 110 mg/dL    BUN 8 6 - 20 mg/dL    Creatinine 0.6 0.5 - 1.4 mg/dL    Calcium 9.1 8.7 - 10.5 mg/dL    Total Protein 8.2 6.0 - 8.4 g/dL    Albumin 4.3 3.5 - 5.2 g/dL    Total Bilirubin 7.6 (H) 0.1 - 1.0 mg/dL    Alkaline Phosphatase 127 55 - 135 U/L     (H) 10 - 40 U/L    ALT 33 10 - 44 U/L    Anion Gap 10 8 - 16 mmol/L    eGFR if African American >60 >60 mL/min/1.73 m^2    eGFR if non African American >60 >60 mL/min/1.73 m^2   CBC auto differential   Result Value Ref Range    WBC 19.64 (H) 3.90 - 12.70 K/uL    RBC 2.13 (L) 4.00 - 5.40 M/uL    Hemoglobin 6.5 (L) 12.0 - 16.0 g/dL    Hematocrit 18.2 (LL) 37.0 - 48.5 %    MCV 85 82 - 98 fL    MCH 30.5 27.0 - 31.0 pg    MCHC 35.7 32.0 - 36.0 g/dL    RDW 21.3 (H) 11.5 - 14.5 %    Platelets 163 150 - 450 K/uL    MPV 10.8 9.2 - 12.9 fL    Immature Granulocytes 3.1 (H) 0.0 - 0.5 %    Gran # (ANC) 12.9 (H) 1.8 - 7.7 K/uL    Immature Grans (Abs) 0.60 (H) 0.00 - 0.04 K/uL    Lymph # 3.2 1.0 - 4.8 K/uL    Mono # 2.9 (H) 0.3 - 1.0 K/uL    Eos # 0.0 0.0 - 0.5 K/uL    Baso # 0.09 0.00 - 0.20 K/uL    nRBC 15 (A) 0 /100 WBC    Gran % 65.4 38.0 - 73.0 %    Lymph % 16.2 (L) 18.0 - 48.0 %    Mono % 14.7 4.0 - 15.0 %    Eosinophil % 0.1 0.0 - 8.0 %    Basophil % 0.5 0.0 - 1.9 %    Aniso Slight     Poik Moderate     Poly Moderate     Target Cells Occasional     Sickle Cells Moderate (A)     Differential Method Automated    Troponin I   Result Value Ref Range    Troponin I 0.029 (H) 0.000 - 0.026 ng/mL   D dimer, quantitative   Result Value Ref Range    D-Dimer 24.04 (H) <0.50 mg/L FEU   Reticulocytes   Result Value Ref Range    Retic >23.0 (H) 0.5 - 2.5 %   Lactic acid, plasma   Result Value Ref Range    Lactate (Lactic Acid) 1.0 0.5 - 2.2 mmol/L   Urinalysis, Reflex to Urine Culture Urine, Clean  Catch    Specimen: Urine   Result Value Ref Range    Specimen UA Urine, Clean Catch     Color, UA Yellow Yellow, Straw, Rachel    Appearance, UA Clear Clear    pH, UA 6.0 5.0 - 8.0    Specific Gravity, UA 1.010 1.005 - 1.030    Protein, UA Negative Negative    Glucose, UA Negative Negative    Ketones, UA Negative Negative    Bilirubin (UA) Negative Negative    Occult Blood UA Trace (A) Negative    Nitrite, UA Negative Negative    Urobilinogen, UA 2.0-3.0 (A) <2.0 EU/dL    Leukocytes, UA Negative Negative   Pregnancy, urine rapid   Result Value Ref Range    Preg Test, Ur Negative    Brain natriuretic peptide   Result Value Ref Range     (H) 0 - 99 pg/mL   POCT COVID-19 Rapid Screening   Result Value Ref Range    POC Rapid COVID Negative Negative     Acceptable Yes    Echo   Result Value Ref Range    BSA 1.5 m2    TDI SEPTAL 0.17 m/s    LV LATERAL E/E' RATIO 6.52 m/s    LV SEPTAL E/E' RATIO 8.06 m/s    IVC diameter 1.87 cm    Left Ventricular Outflow Tract Mean Velocity 1.022385567609356 cm/s    Left Ventricular Outflow Tract Mean Gradient 6.29 mmHg    TV mean gradient 17 mmHg    TDI LATERAL 0.21 m/s    LVIDd 3.90 3.5 - 6.0 cm    IVS 1.19 (A) 0.6 - 1.1 cm    Posterior Wall 0.99 0.6 - 1.1 cm    Ao root annulus 2.80 cm    LVIDs 2.47 2.1 - 4.0 cm    FS 37 28 - 44 %    STJ 2.48 cm    Ascending aorta 2.22 cm    LV mass 138.24 g    LA size 3.55 cm    Left Ventricle Relative Wall Thickness 0.51 cm    AV mean gradient 9 mmHg    AV valve area 1.87 cm2    AV Velocity Ratio 0.76     AV index (prosthetic) 0.65     MV valve area p 1/2 method 2.97 cm2    E/A ratio 1.30     Mean e' 0.19 m/s    E wave deceleration time 255.34865997447092 msec    IVRT 60.261469987346582 msec    LVOT diameter 1.91 cm    LVOT area 2.9 cm2    LVOT peak renay 1.49 m/s    LVOT peak VTI 24.60 cm    Ao peak renay 1.97 m/s    Ao VTI 37.6 cm    RVOT peak renay 1.28 m/s    RVOT peak VTI 24.2 cm    Mr max renay 3.88 m/s    LVOT stroke volume 70.45  cm3    AV peak gradient 16 mmHg    PV mean gradient 4.09 mmHg    E/E' ratio 7.21 m/s    MV Peak E Gurinder 1.37 m/s    TR Max Gurinder 2.20 m/s    MV stenosis pressure 1/2 time 74.613150598896670 ms    MV Peak A Gurinder 1.05 m/s    LV Systolic Volume 21.56 mL    LV Systolic Volume Index 14.3 mL/m2    LV Diastolic Volume 65.83 mL    LV Diastolic Volume Index 43.60 mL/m2    LV Mass Index 92 g/m2    RA Major Axis 3.83 cm    Left Atrium Minor Axis 4.64 cm    Left Atrium Major Axis 5.19 cm    Triscuspid Valve Regurgitation Peak Gradient 19 mmHg    Right Atrial Pressure (from IVC) 3 mmHg    EF 60 %    TV rest pulmonary artery pressure 22 mmHg   Type & Screen   Result Value Ref Range    Group & Rh B POS    ISTAT PROCEDURE   Result Value Ref Range    POC PH 7.360 7.35 - 7.45    POC PCO2 43.7 35 - 45 mmHg    POC PO2 113 (H) 80 - 100 mmHg    POC HCO3 24.7 24 - 28 mmol/L    POC BE -1 -2 to 2 mmol/L    POC SATURATED O2 98 95 - 100 %    Sample ARTERIAL     Site RR     Allens Test Pass     DelSys Nasal Can     Mode SPONT     Flow 3.5     FiO2 34

## 2022-06-15 NOTE — PHARMACY MED REC
"Admission Medication History     The home medication history was taken by Branden Gutierrez.    You may go to "Admission" then "Reconcile Home Medications" tabs to review and/or act upon these items.      The home medication list has been updated by the Pharmacy department.    Please read ALL comments highlighted in yellow.    Please address this information as you see fit.     Feel free to contact us if you have any questions or require assistance.      The medications listed below were removed from the home medication list. Please reorder if appropriate:  Patient reports no longer taking the following medication(s):   OXYCODONE 5 MG IMMEDIATE RELEASE TABLET    Medications listed below were obtained from: Patient/family, Analytic software- [employer] and Patient's pharmacy  (Not in a hospital admission)      Potential issues to be addressed PRIOR TO DISCHARGE  Please discuss with the patient barriers to adherence with medication treatment plans  Patient requires education regarding drug therapies       Branden Gutierrez CPhT  Pharmacy Technician Specialist-Medication History  [facility] [phone]  Secure chat preferred.       Current Outpatient Medications on File Prior to Encounter   Medication Sig Dispense Refill Last Dose    folic acid (FOLVITE) 1 MG tablet Take 1 tablet (1 mg total) by mouth once daily. 30 tablet 0 More than a month at Unknown time    [DISCONTINUED] oxyCODONE (ROXICODONE) 5 MG immediate release tablet Take 1 tablet (5 mg total) by mouth every 4 (four) hours as needed for Pain. 30 tablet 0                              .          "

## 2022-06-15 NOTE — HPI
Ms. Storm is a 26-year-old woman with sickle cell disease presenting with acute pain in bilateral lower hip/buttocks and chest similar to prior sickle cell pain crises.  She notes rare pain crises about once per year at those times occasionally receiving blood transfusion.  She is noted to have not tolerated chelation therapy for iron overload.  She does not take medication for her sickle cell disease aside from folic acid.  She denies known history of thrombosis.  On presentation to the emergency room  vital signs notable for temperature 100.9°, desaturation to 78% on room air with improvement on nasal cannula, to kidney on tachycardia.  Lab work with progressive anemia to 6.5 hemoglobin baseline 9.9 and leukocytosis to 19, bilirubin elevated 7.6 prior baseline 2.3.  D-dimer 24, retic >23.  CTA with bibasilar pulmonary density.

## 2022-06-15 NOTE — PLAN OF CARE
VSS. Pt remained free from falls. Pain control with PCA pump. Continuous cardiac monitoring. Bed in lowest position. Call light in reach. Chart reviewed.

## 2022-06-15 NOTE — SUBJECTIVE & OBJECTIVE
PMH and PSH reviewed 06/15/2022 and relevant items addressed in HPI.    Review of patient's allergies indicates:  No Known Allergies    All medications reviewed 06/15/2022 and ace inhibitors not identified.    Family History       Problem Relation (Age of Onset)    No Known Problems Mother, Father          Tobacco Use    Smoking status: Never Smoker    Smokeless tobacco: Never Used   Substance and Sexual Activity    Alcohol use: No     Alcohol/week: 0.0 standard drinks    Drug use: Never    Sexual activity: Not Currently     Review of Systems   Constitutional:  Positive for fever.   Cardiovascular:  Positive for chest pain.   Musculoskeletal:  Positive for back pain.   Objective:     Vital Signs (Most Recent):  Temp: (!) 100.9 °F (38.3 °C) (06/15/22 0820)  Pulse: (!) 115 (06/15/22 1207)  Resp: (!) 26 (06/15/22 1207)  BP: 128/69 (06/15/22 1000)  SpO2: 95 % (06/15/22 1000)   Vital Signs (24h Range):  Temp:  [100.9 °F (38.3 °C)] 100.9 °F (38.3 °C)  Pulse:  [114-147] 115  Resp:  [10-26] 26  SpO2:  [78 %-97 %] 95 %  BP: (114-157)/(63-80) 128/69     Weight: 52 kg (114 lb 10.2 oz)    Physical Exam  Vitals and nursing note reviewed.       Significant Labs: BMP:   Recent Labs   Lab 06/15/22  0834   *   *   K 4.3      CO2 22*   BUN 8   CREATININE 0.6   CALCIUM 9.1     CBC:   Recent Labs   Lab 06/15/22  0834   WBC 19.64*   HGB 6.5*   HCT 18.2*

## 2022-06-15 NOTE — ASSESSMENT & PLAN NOTE
6/15 Patient in with fever and signs of possible pneumonia and or volume overload  Continue Iv Abx for now

## 2022-06-16 LAB
ABO + RH BLD: ABNORMAL
ALBUMIN SERPL BCP-MCNC: 3.7 G/DL (ref 3.5–5.2)
ALP SERPL-CCNC: 126 U/L (ref 55–135)
ALT SERPL W/O P-5'-P-CCNC: 34 U/L (ref 10–44)
ANION GAP SERPL CALC-SCNC: 9 MMOL/L (ref 8–16)
AST SERPL-CCNC: 80 U/L (ref 10–40)
BASOPHILS # BLD AUTO: 0.03 K/UL (ref 0–0.2)
BASOPHILS NFR BLD: 0.2 % (ref 0–1.9)
BILIRUB SERPL-MCNC: 8.3 MG/DL (ref 0.1–1)
BLD GP AB SCN CELLS X3 SERPL QL: ABNORMAL
BLOOD GROUP ANTIBODIES SERPL: NORMAL
BUN SERPL-MCNC: 9 MG/DL (ref 6–20)
CALCIUM SERPL-MCNC: 8.4 MG/DL (ref 8.7–10.5)
CHLORIDE SERPL-SCNC: 96 MMOL/L (ref 95–110)
CO2 SERPL-SCNC: 24 MMOL/L (ref 23–29)
CREAT SERPL-MCNC: 0.6 MG/DL (ref 0.5–1.4)
DIFFERENTIAL METHOD: ABNORMAL
EOSINOPHIL # BLD AUTO: 0 K/UL (ref 0–0.5)
EOSINOPHIL NFR BLD: 0.1 % (ref 0–8)
ERYTHROCYTE [DISTWIDTH] IN BLOOD BY AUTOMATED COUNT: 19.7 % (ref 11.5–14.5)
EST. GFR  (AFRICAN AMERICAN): >60 ML/MIN/1.73 M^2
EST. GFR  (NON AFRICAN AMERICAN): >60 ML/MIN/1.73 M^2
GLUCOSE SERPL-MCNC: 99 MG/DL (ref 70–110)
HCT VFR BLD AUTO: 17.4 % (ref 37–48.5)
HGB BLD-MCNC: 6.1 G/DL (ref 12–16)
IMM GRANULOCYTES # BLD AUTO: 0.19 K/UL (ref 0–0.04)
IMM GRANULOCYTES NFR BLD AUTO: 1.3 % (ref 0–0.5)
INR PPP: 1.5 (ref 0.8–1.2)
LYMPHOCYTES # BLD AUTO: 2.1 K/UL (ref 1–4.8)
LYMPHOCYTES NFR BLD: 14 % (ref 18–48)
MAGNESIUM SERPL-MCNC: 2.2 MG/DL (ref 1.6–2.6)
MCH RBC QN AUTO: 30.2 PG (ref 27–31)
MCHC RBC AUTO-ENTMCNC: 35.1 G/DL (ref 32–36)
MCV RBC AUTO: 86 FL (ref 82–98)
MONOCYTES # BLD AUTO: 1.6 K/UL (ref 0.3–1)
MONOCYTES NFR BLD: 10.5 % (ref 4–15)
NEUTROPHILS # BLD AUTO: 11.2 K/UL (ref 1.8–7.7)
NEUTROPHILS NFR BLD: 73.9 % (ref 38–73)
NRBC BLD-RTO: 23 /100 WBC
PLATELET # BLD AUTO: 167 K/UL (ref 150–450)
PMV BLD AUTO: 11.2 FL (ref 9.2–12.9)
POIKILOCYTOSIS BLD QL SMEAR: ABNORMAL
POLYCHROMASIA BLD QL SMEAR: ABNORMAL
POTASSIUM SERPL-SCNC: 3.8 MMOL/L (ref 3.5–5.1)
PROT SERPL-MCNC: 7 G/DL (ref 6–8.4)
PROTHROMBIN TIME: 15.5 SEC (ref 9–12.5)
RBC # BLD AUTO: 2.02 M/UL (ref 4–5.4)
SICKLE CELLS BLD QL SMEAR: ABNORMAL
SODIUM SERPL-SCNC: 129 MMOL/L (ref 136–145)
TARGETS BLD QL SMEAR: ABNORMAL
WBC # BLD AUTO: 15.09 K/UL (ref 3.9–12.7)

## 2022-06-16 PROCEDURE — 80053 COMPREHEN METABOLIC PANEL: CPT | Performed by: NURSE PRACTITIONER

## 2022-06-16 PROCEDURE — 99900035 HC TECH TIME PER 15 MIN (STAT)

## 2022-06-16 PROCEDURE — 99233 PR SUBSEQUENT HOSPITAL CARE,LEVL III: ICD-10-PCS | Mod: ,,, | Performed by: INTERNAL MEDICINE

## 2022-06-16 PROCEDURE — 63600175 PHARM REV CODE 636 W HCPCS: Performed by: NURSE PRACTITIONER

## 2022-06-16 PROCEDURE — 27000221 HC OXYGEN, UP TO 24 HOURS

## 2022-06-16 PROCEDURE — 36415 COLL VENOUS BLD VENIPUNCTURE: CPT | Performed by: NURSE PRACTITIONER

## 2022-06-16 PROCEDURE — 94761 N-INVAS EAR/PLS OXIMETRY MLT: CPT

## 2022-06-16 PROCEDURE — 21400001 HC TELEMETRY ROOM

## 2022-06-16 PROCEDURE — 83735 ASSAY OF MAGNESIUM: CPT | Performed by: NURSE PRACTITIONER

## 2022-06-16 PROCEDURE — 25000003 PHARM REV CODE 250: Performed by: NURSE PRACTITIONER

## 2022-06-16 PROCEDURE — 85610 PROTHROMBIN TIME: CPT | Performed by: NURSE PRACTITIONER

## 2022-06-16 PROCEDURE — 83036 HEMOGLOBIN GLYCOSYLATED A1C: CPT | Performed by: NURSE PRACTITIONER

## 2022-06-16 PROCEDURE — 25000003 PHARM REV CODE 250: Performed by: PATHOLOGY

## 2022-06-16 PROCEDURE — 99233 SBSQ HOSP IP/OBS HIGH 50: CPT | Mod: ,,, | Performed by: INTERNAL MEDICINE

## 2022-06-16 PROCEDURE — 85025 COMPLETE CBC W/AUTO DIFF WBC: CPT | Performed by: NURSE PRACTITIONER

## 2022-06-16 RX ADMIN — FOLIC ACID 1 MG: 1 TABLET ORAL at 10:06

## 2022-06-16 RX ADMIN — AZITHROMYCIN MONOHYDRATE 500 MG: 500 INJECTION, POWDER, LYOPHILIZED, FOR SOLUTION INTRAVENOUS at 03:06

## 2022-06-16 RX ADMIN — ACETAMINOPHEN 650 MG: 325 TABLET ORAL at 04:06

## 2022-06-16 RX ADMIN — ACETAMINOPHEN 650 MG: 325 TABLET ORAL at 05:06

## 2022-06-16 RX ADMIN — ENOXAPARIN SODIUM 40 MG: 40 INJECTION SUBCUTANEOUS at 04:06

## 2022-06-16 RX ADMIN — CALCIUM GLUCONATE 1 G: 20 INJECTION, SOLUTION INTRAVENOUS at 11:06

## 2022-06-16 RX ADMIN — POLYETHYLENE GLYCOL 3350 17 G: 17 POWDER, FOR SOLUTION ORAL at 10:06

## 2022-06-16 RX ADMIN — DOCUSATE SODIUM AND SENNOSIDES 1 TABLET: 8.6; 5 TABLET, FILM COATED ORAL at 10:06

## 2022-06-16 RX ADMIN — DOCUSATE SODIUM AND SENNOSIDES 1 TABLET: 8.6; 5 TABLET, FILM COATED ORAL at 09:06

## 2022-06-16 RX ADMIN — CEFTRIAXONE 2 G: 2 INJECTION, SOLUTION INTRAVENOUS at 01:06

## 2022-06-16 RX ADMIN — CALCIUM GLUCONATE 1 G: 20 INJECTION, SOLUTION INTRAVENOUS at 10:06

## 2022-06-16 NOTE — PROGRESS NOTES
[facility]– [place] [facility]  Progress Note    Patient Name: Elizabeth Franco  MRN: [id number]  Patient Class: IP- Inpatient   Admission Date: 6/15/2022  Length of Stay: 1 days  Attending Physician: Chalino Meyers MD  Primary Care Provider: Elvira Nuñez MD        Subjective:     Principal Problem:Sickle cell crisis        HPI:   Sickle Cell Pain Crisis       Pt reports pain to back and chest and tried her home Oxycodone with no relief       Per ER- This  is a 26 y.o. female patient with a PMHx of sickle cell anemia and Hidradenitis suppurativa who presents to the Emergency Department for sickle cell pain crisis, onset this morning. Pt report chest pain and back pain. Symptoms are constant and moderate in severity. No mitigating or exacerbating factors reported. Associated sxs include fever (Tnow 100.9). Patient denies any chills, n/v/d, SOB, weakness, numbness, dizziness, headache, and all other sxs at this time. Prior Tx includes Oxycodone 5 mg, with no improvement of sxs. No further complaints or concerns at this time.     Patient was evaluated by ER and noted to have symptomatic anemia with signs of pneumonia and or possible volume overload and was placed in Observation.             Overview/Hospital Course:  6/16: Pt states continued CP, SOB slightly improved from day before. Bleeding for catheter site, back to OR for suture placement. Blood type and screen with multiple antibodies requiring different facility check for compatible blood transfusion. Tmax 102F      Review of Systems   Constitutional:  Positive for activity change, appetite change, fatigue and fever.   HENT:  Negative for ear pain, facial swelling and hearing loss.    Eyes:  Negative for pain and redness.   Respiratory:  Positive for cough and shortness of breath.    Cardiovascular:  Positive for chest pain. Negative for leg swelling.   Gastrointestinal:  Negative for abdominal distention, diarrhea, nausea and vomiting.   Endocrine:  Negative for polydipsia and polyphagia.   Genitourinary:  Negative for difficulty urinating, dysuria, flank pain and hematuria.   Musculoskeletal:  Positive for back pain. Negative for neck pain.   Skin:  Positive for pallor.   Allergic/Immunologic: Negative for food allergies.   Neurological:  Positive for weakness. Negative for facial asymmetry and speech difficulty.   Hematological:  Does not bruise/bleed easily.   Psychiatric/Behavioral:  Negative for agitation, behavioral problems, confusion, dysphoric mood and suicidal ideas. The patient is not nervous/anxious.    Objective:     Vital Signs (Most Recent):  Temp: (!) 101.5 °F (38.6 °C) (06/16/22 1134)  Pulse: (!) 115 (06/16/22 1152)  Resp: 18 (06/16/22 1134)  BP: 131/63 (06/16/22 1134)  SpO2: (!) 94 % (06/16/22 1134)   Vital Signs (24h Range):  Temp:  [100.4 °F (38 °C)-103.2 °F (39.6 °C)] 101.5 °F (38.6 °C)  Pulse:  [108-129] 115  Resp:  [18-24] 18  SpO2:  [90 %-100 %] 94 %  BP: (108-131)/(53-77) 131/63     Weight: 52.6 kg (115 lb 15.4 oz)  Body mass index is 21.21 kg/m².    Intake/Output Summary (Last 24 hours) at 6/16/2022 1622  Last data filed at 6/16/2022 0656  Gross per 24 hour   Intake 10 ml   Output --   Net 10 ml      Physical Exam  Constitutional:       General: She is not in acute distress.     Appearance: She is ill-appearing. She is not diaphoretic.   HENT:      Head: Normocephalic and atraumatic.      Mouth/Throat:      Mouth: Mucous membranes are dry.   Eyes:      General:         Right eye: No discharge.         Left eye: No discharge.      Extraocular Movements: Extraocular movements intact.      Conjunctiva/sclera: Conjunctivae normal.      Pupils: Pupils are equal, round, and reactive to light.   Cardiovascular:      Rate and Rhythm: Regular rhythm. Tachycardia present.      Pulses: Normal pulses.   Pulmonary:      Comments: BBS diminished  SOB with exertion  Abdominal:      General: Bowel sounds are normal. There is no distension.       Palpations: Abdomen is soft.      Tenderness: There is no abdominal tenderness. There is no guarding.   Genitourinary:     Comments: Not examined  Musculoskeletal:         General: Normal range of motion.      Cervical back: Normal range of motion and neck supple. No rigidity or tenderness.      Right lower leg: No edema.      Left lower leg: No edema.   Skin:     General: Skin is warm and dry.      Capillary Refill: Capillary refill takes less than 2 seconds.      Coloration: Skin is pale.   Neurological:      General: No focal deficit present.      Mental Status: She is alert and oriented to person, place, and time. Mental status is at baseline.      Cranial Nerves: No cranial nerve deficit.   Psychiatric:         Mood and Affect: Mood normal.         Behavior: Behavior normal.         Thought Content: Thought content normal.         Judgment: Judgment normal.       Significant Labs: All pertinent labs within the past 24 hours have been reviewed.    Significant Imaging: I have reviewed all pertinent imaging results/findings within the past 24 hours.      Assessment/Plan:      * Sickle cell crisis with symptomatic anemia  6/15 Telemetry  Patient ordered for 1 unit PRBCs transfusion  Hematology consulted  Dilaudid PCA  Trend anemia    6/16:  Concern for ACS  Compatible blood pending for exchange transfusion    Elevated troponin and Elevated BNP  Troponins trend down      Elevated liver enzymes with elevated bilirubin  6/15 History chronically elevated bilirubin  Trend levels      Elevated d-dimer- Pulmonary emboli excluded  6/15 Elevated d-dimer noted- Pulmonary emboli excluded per CTA      Hypoxemia suspected multifactorial  6/15 Suspected multifactorial  Patient in with symptomatic anemia and signs of pneumonia and or possible acute volume overload      Pneumonia  6/15 Patient in with fever and signs of possible pneumonia and or volume overload  Continue Iv Abx for now      Gall bladder stones  6/15 Currently no  signs of acute cholecystitis  Monitor closely for any changes      Severe sepsis  This patient does have evidence of infective focus  My overall impression is Severe  sepsis. Vital signs were reviewed and noted in progress note.  Antibiotics given-   Antibiotics (From admission, onward)            Start     Stop Route Frequency Ordered    06/16/22 1000  azithromycin 500 mg in dextrose 5 % 250 mL IVPB (ready to mix system)         -- IV Every 24 hours (non-standard times) 06/15/22 1226    06/16/22 0900  cefTRIAXone (ROCEPHIN) 2 g/50 mL D5W IVPB         -- IV Every 24 hours (non-standard times) 06/15/22 1226        Cultures were taken-   Microbiology Results (last 7 days)     Procedure Component Value Units Date/Time    Blood culture #1 **CANNOT BE ORDERED STAT** [[id number]] Collected: 06/15/22 0834    Order Status: Sent Specimen: Blood from Peripheral, Hand, Right Updated: 06/15/22 1524    Blood culture #2 **CANNOT BE ORDERED STAT** [[id number]] Collected: 06/15/22 0834    Order Status: Sent Specimen: Blood from Peripheral, Hand, Left Updated: 06/15/22 1524        Latest lactate reviewed, they are-  Recent Labs   Lab 06/15/22  0834   LACTATE 1.0     Organ- Hypoxemia, elevated liver enzymes     Source- pneumonia     Source control Achieved by- Iv ABx         Hepatomegaly  6/15 Noted      Constipation  6/15 MOM x 1 dose  Add bid miralax      Elevated bilirubin  6/15 Monitor      VTE Risk Mitigation (From admission, onward)         Ordered     heparin (porcine) injection 4,000 Units  Once         06/15/22 1801     enoxaparin injection 40 mg  Daily         06/15/22 1234     Place RISSA hose  Until discontinued         06/15/22 1234     IP VTE HIGH RISK PATIENT  Once         06/15/22 1234     Place sequential compression device  Until discontinued         06/15/22 1225                Discharge Planning   FARIHA:      Code Status: Full Code   Is the patient medically ready for discharge?:     Reason for patient still in hospital  (select all that apply): Patient trending condition, Treatment and Consult recommendations  Discharge Plan A: Home with family            Chalino Meyers MD  Department of Hospital Medicine   '[facility] Surg

## 2022-06-16 NOTE — PLAN OF CARE
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]  Chest pain [R07.9]  Lung infiltrate [R91.8]  Anemia, unspecified type [D64.9]    Admission Date: 6/15/2022  Expected Discharge Date:     Discharge Barriers Identified: None    Payor: BLUE CROSS BLUE SHIELD / Plan: BCBS ALL OUT OF STATE / Product Type: PPO /     Extended Emergency Contact Information  Primary Emergency Contact: Shar Franco  Address: [address] [place] [address]            [place] [address]  Mobile Phone: [phone]  Relation: Father  Secondary Emergency Contact: Sadaf Tavarez   [place]  Mobile Phone: [phone]  Relation: Grandparent    Discharge Plan A: Home with family  Discharge Plan B: Home with family      [facility] - [address] [address]  Phone: [phone] Fax: [phone]    [id number] [address]  [address] [facility] [address] [facility] [address]  Phone: [phone] Fax: [phone]    [facility] #[id number] [address] [address]  Phone: [phone] Fax: [phone]  My Chart; Active  Swer spoke with pt's sister for initial assessment. Swer explained role of discharge planner. Pt lives with father and needed  assistance with ADLs prior to admission. Pt's family is help at home and will provide transportation at discharge. CM needs to be determined based on hospital progress.     SWer provided a transitional care folder, information on advanced directives, information on pharmacy bedside delivery, and discharge planning begins on admission with contact  information for any needs/questions.    Initial Assessment (most recent)     Adult Discharge Assessment - 06/16/22 1140        Discharge Assessment    Assessment Type Discharge Planning Assessment     Confirmed/corrected address, phone number and insurance Yes     Confirmed Demographics Correct on Facesheet     Source of Information patient     If unable to respond/provide information was family/caregiver contacted? Yes     Contact Name/Number Shar Franco (father) [phone]     Communicated FARIHA with patient/caregiver Date not available/Unable to determine     Reason For Admission sickle cell anemia     Lives With parent(s)     Facility Arrived From: home     Do you expect to return to your current living situation? Yes     Do you have help at home or someone to help you manage your care at home? Yes     Who are your caregiver(s) and their phone number(s)? family     Prior to hospitilization cognitive status: Alert/Oriented     Current cognitive status: Alert/Oriented     Walking or Climbing Stairs Difficulty none     Dressing/Bathing Difficulty none     Home Accessibility wheelchair accessible     Home Layout Able to live on 1st floor     Equipment Currently Used at Home none     Readmission within 30 days? No     Do you currently have service(s) that help you manage your care at home? No     Do you take prescription medications? No     Do you have prescription coverage? Yes     Do you have any problems affording any of your prescribed medications? No     Is the patient taking medications as prescribed? yes     Who is going to help you get home at discharge? family     How do you get to doctors appointments? family or friend will provide;car, drives self     Are you on dialysis? No     Discharge Plan A Home with family     Discharge Plan B Home with family     DME Needed Upon Discharge  none     Discharge Plan discussed with: Sibling     Discharge Barriers Identified None

## 2022-06-16 NOTE — HOSPITAL COURSE
6/16: Pt states continued CP, SOB slightly improved from day before. Bleeding for catheter site, back to OR for suture placement. Blood type and screen with multiple antibodies requiring different facility check for compatible blood transfusion. Tmax 102F    6/17- Exchange transfusion initiated today and pt tolerated well. Post exchange HbS was drawn with goal <30%. Pt is seen post exchange transfusion treatment and c/o generalized abd tenderness and no bowel movement since admission. (+) Flatus . On exam, active bowel sounds are present, however  severe generalized tenderness appreciated on palpation. CT abd/pelvis done today showed normal spleen, adrenal and liver, gas distended large bowel with large fecal ball within the cecum. Will try brown bomb enema.     Persistent fever with Tmax 103.1 and tachycardia noted. Antibiotic switched out to IV Levofloxacin due to rare complication of ceftriaxone-induced hyperhemolysis. Continue supplemental oxygen. No clinical signs of hypervolemia noted. Echo resulted LVEF 60%, CVP 3. Will try gentle hydration with careful monitoring of volume status.     6/18- Tmax 101.4 yesterday . C/O productive cough with yellow green sputum, chest pain with cough. SOB is better. On O2 at 3L/min. Back and leg pain are improving. Abdominal soreness is better after bowel movement with enema. S/P exchange transfusion yesterday. Hgb 7.7 today , , T.bili down to 3.1>8.3. Gentle hydration, IV Levofloxacin  and PCA pump continues.     6/19- Fever trended down. Tmax 100.2. Levaquin 750 mg daily continues . Remains intermittently on O2 at 2L/min. Pt reports subjective improvement of pain and ready to transition to oral analgesics. Hgb down to 6.9, Retic count down to 12.3, T.bili improved to 2.2, LDH down to 492. Spoke to Transfusion medicine and Hematology. 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 Will get ALYSSIA, IgG/C3.      6/20- ALYSSIA test is positive. 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 Dave uriarte and received first dose yesterday. Hgb 7.5 this morning . Pt became afebrile . O2 wean down to RA. Pain is overall better and off of PCA lump. At this point pt deemed improved and stable for discharge to home. Follow up with Dr. Nuñez (Oncology at [facility] )

## 2022-06-16 NOTE — CONSULTS
RD consulted for CHF education. Pt noted to have no history of HF and is tolerating cardiac diet.   *Please send consult if acute nutrition needs arise.      Iraida Del Angel MS, RD, LDN

## 2022-06-16 NOTE — ASSESSMENT & PLAN NOTE
6/15 Telemetry  Patient ordered for 1 unit PRBCs transfusion  Hematology consulted  Dilaudid PCA  Trend anemia    6/16:  Concern for ACS  Compatible blood pending for exchange transfusion

## 2022-06-16 NOTE — PLAN OF CARE
Pt remains free from injury/falls this shift. Safety precautions maintained. Pain pump in use. Diet tolerated well. VSS. No signs and symptoms of acute distress noted at this time. Chart reviewed, will continue to monitor.

## 2022-06-16 NOTE — NURSING
Patient has frequent bleeding to hemodialysis catheter insertion site. Dressing change x3. Pressure dressing placed and gauze was still saturated. On call team notified.

## 2022-06-16 NOTE — SUBJECTIVE & OBJECTIVE
Review of Systems   Constitutional:  Positive for activity change, appetite change, fatigue and fever.   HENT:  Negative for ear pain, facial swelling and hearing loss.    Eyes:  Negative for pain and redness.   Respiratory:  Positive for cough and shortness of breath.    Cardiovascular:  Positive for chest pain. Negative for leg swelling.   Gastrointestinal:  Negative for abdominal distention, diarrhea, nausea and vomiting.   Endocrine: Negative for polydipsia and polyphagia.   Genitourinary:  Negative for difficulty urinating, dysuria, flank pain and hematuria.   Musculoskeletal:  Positive for back pain. Negative for neck pain.   Skin:  Positive for pallor.   Allergic/Immunologic: Negative for food allergies.   Neurological:  Positive for weakness. Negative for facial asymmetry and speech difficulty.   Hematological:  Does not bruise/bleed easily.   Psychiatric/Behavioral:  Negative for agitation, behavioral problems, confusion, dysphoric mood and suicidal ideas. The patient is not nervous/anxious.    Objective:     Vital Signs (Most Recent):  Temp: (!) 101.5 °F (38.6 °C) (06/16/22 1134)  Pulse: (!) 115 (06/16/22 1152)  Resp: 18 (06/16/22 1134)  BP: 131/63 (06/16/22 1134)  SpO2: (!) 94 % (06/16/22 1134)   Vital Signs (24h Range):  Temp:  [100.4 °F (38 °C)-103.2 °F (39.6 °C)] 101.5 °F (38.6 °C)  Pulse:  [108-129] 115  Resp:  [18-24] 18  SpO2:  [90 %-100 %] 94 %  BP: (108-131)/(53-77) 131/63     Weight: 52.6 kg (115 lb 15.4 oz)  Body mass index is 21.21 kg/m².    Intake/Output Summary (Last 24 hours) at 6/16/2022 1622  Last data filed at 6/16/2022 0656  Gross per 24 hour   Intake 10 ml   Output --   Net 10 ml      Physical Exam  Constitutional:       General: She is not in acute distress.     Appearance: She is ill-appearing. She is not diaphoretic.   HENT:      Head: Normocephalic and atraumatic.      Mouth/Throat:      Mouth: Mucous membranes are dry.   Eyes:      General:         Right eye: No discharge.          Left eye: No discharge.      Extraocular Movements: Extraocular movements intact.      Conjunctiva/sclera: Conjunctivae normal.      Pupils: Pupils are equal, round, and reactive to light.   Cardiovascular:      Rate and Rhythm: Regular rhythm. Tachycardia present.      Pulses: Normal pulses.   Pulmonary:      Comments: BBS diminished  SOB with exertion  Abdominal:      General: Bowel sounds are normal. There is no distension.      Palpations: Abdomen is soft.      Tenderness: There is no abdominal tenderness. There is no guarding.   Genitourinary:     Comments: Not examined  Musculoskeletal:         General: Normal range of motion.      Cervical back: Normal range of motion and neck supple. No rigidity or tenderness.      Right lower leg: No edema.      Left lower leg: No edema.   Skin:     General: Skin is warm and dry.      Capillary Refill: Capillary refill takes less than 2 seconds.      Coloration: Skin is pale.   Neurological:      General: No focal deficit present.      Mental Status: She is alert and oriented to person, place, and time. Mental status is at baseline.      Cranial Nerves: No cranial nerve deficit.   Psychiatric:         Mood and Affect: Mood normal.         Behavior: Behavior normal.         Thought Content: Thought content normal.         Judgment: Judgment normal.       Significant Labs: All pertinent labs within the past 24 hours have been reviewed.    Significant Imaging: I have reviewed all pertinent imaging results/findings within the past 24 hours.

## 2022-06-17 PROBLEM — D57.01 ACUTE CHEST SYNDROME DUE TO SICKLE CELL CRISIS: Status: ACTIVE | Noted: 2022-06-15

## 2022-06-17 PROBLEM — R16.0 HEPATOMEGALY: Status: RESOLVED | Noted: 2022-06-15 | Resolved: 2022-06-17

## 2022-06-17 LAB
ALBUMIN SERPL BCP-MCNC: 3.4 G/DL (ref 3.5–5.2)
ALP SERPL-CCNC: 108 U/L (ref 55–135)
ALT SERPL W/O P-5'-P-CCNC: 34 U/L (ref 10–44)
ANION GAP SERPL CALC-SCNC: 10 MMOL/L (ref 8–16)
ANISOCYTOSIS BLD QL SMEAR: SLIGHT
AST SERPL-CCNC: 54 U/L (ref 10–40)
BASOPHILS # BLD AUTO: 0.02 K/UL (ref 0–0.2)
BASOPHILS NFR BLD: 0.1 % (ref 0–1.9)
BILIRUB DIRECT SERPL-MCNC: 1.9 MG/DL (ref 0.1–0.3)
BILIRUB SERPL-MCNC: 7.2 MG/DL (ref 0.1–1)
BLD PROD TYP BPU: NORMAL
BLOOD UNIT EXPIRATION DATE: NORMAL
BLOOD UNIT TYPE CODE: 7300
BLOOD UNIT TYPE: NORMAL
BUN SERPL-MCNC: 6 MG/DL (ref 6–20)
CALCIUM SERPL-MCNC: 8.3 MG/DL (ref 8.7–10.5)
CHLORIDE SERPL-SCNC: 97 MMOL/L (ref 95–110)
CO2 SERPL-SCNC: 24 MMOL/L (ref 23–29)
CODING SYSTEM: NORMAL
CREAT SERPL-MCNC: 0.5 MG/DL (ref 0.5–1.4)
DIFFERENTIAL METHOD: ABNORMAL
DISPENSE STATUS: NORMAL
EOSINOPHIL # BLD AUTO: 0 K/UL (ref 0–0.5)
EOSINOPHIL NFR BLD: 0.1 % (ref 0–8)
ERYTHROCYTE [DISTWIDTH] IN BLOOD BY AUTOMATED COUNT: 19.8 % (ref 11.5–14.5)
EST. GFR  (AFRICAN AMERICAN): >60 ML/MIN/1.73 M^2
EST. GFR  (NON AFRICAN AMERICAN): >60 ML/MIN/1.73 M^2
GLUCOSE SERPL-MCNC: 100 MG/DL (ref 70–110)
HCT VFR BLD AUTO: 16.2 % (ref 37–48.5)
HGB BLD-MCNC: 5.5 G/DL (ref 12–16)
HGB FRACT BLD ELPH-IMP: NORMAL
HGB S MFR BLD ELPH: 86.1 %
IMM GRANULOCYTES # BLD AUTO: 0.17 K/UL (ref 0–0.04)
IMM GRANULOCYTES NFR BLD AUTO: 1.2 % (ref 0–0.5)
LYMPHOCYTES # BLD AUTO: 2.1 K/UL (ref 1–4.8)
LYMPHOCYTES NFR BLD: 15 % (ref 18–48)
MCH RBC QN AUTO: 30.1 PG (ref 27–31)
MCHC RBC AUTO-ENTMCNC: 34 G/DL (ref 32–36)
MCV RBC AUTO: 89 FL (ref 82–98)
MONOCYTES # BLD AUTO: 1.6 K/UL (ref 0.3–1)
MONOCYTES NFR BLD: 11.9 % (ref 4–15)
NEUTROPHILS # BLD AUTO: 9.9 K/UL (ref 1.8–7.7)
NEUTROPHILS NFR BLD: 71.7 % (ref 38–73)
NRBC BLD-RTO: 16 /100 WBC
NUM UNITS TRANS PACKED RBC: NORMAL
PLATELET # BLD AUTO: 155 K/UL (ref 150–450)
PMV BLD AUTO: 12 FL (ref 9.2–12.9)
POIKILOCYTOSIS BLD QL SMEAR: ABNORMAL
POLYCHROMASIA BLD QL SMEAR: ABNORMAL
POTASSIUM SERPL-SCNC: 4.3 MMOL/L (ref 3.5–5.1)
PROT SERPL-MCNC: 6.6 G/DL (ref 6–8.4)
RBC # BLD AUTO: 1.83 M/UL (ref 4–5.4)
RETICS/RBC NFR AUTO: >23 % (ref 0.5–2.5)
SICKLE CELLS BLD QL SMEAR: ABNORMAL
SODIUM SERPL-SCNC: 131 MMOL/L (ref 136–145)
TARGETS BLD QL SMEAR: ABNORMAL
WBC # BLD AUTO: 13.74 K/UL (ref 3.9–12.7)

## 2022-06-17 PROCEDURE — 25000003 PHARM REV CODE 250: Performed by: INTERNAL MEDICINE

## 2022-06-17 PROCEDURE — 63600175 PHARM REV CODE 636 W HCPCS: Performed by: NURSE PRACTITIONER

## 2022-06-17 PROCEDURE — 99233 PR SUBSEQUENT HOSPITAL CARE,LEVL III: ICD-10-PCS | Mod: ,,, | Performed by: INTERNAL MEDICINE

## 2022-06-17 PROCEDURE — 36512 APHERESIS RBC: CPT | Mod: ,,, | Performed by: PATHOLOGY

## 2022-06-17 PROCEDURE — 36415 COLL VENOUS BLD VENIPUNCTURE: CPT | Performed by: PATHOLOGY

## 2022-06-17 PROCEDURE — 25000003 PHARM REV CODE 250: Performed by: PATHOLOGY

## 2022-06-17 PROCEDURE — 80048 BASIC METABOLIC PNL TOTAL CA: CPT | Performed by: STUDENT IN AN ORGANIZED HEALTH CARE EDUCATION/TRAINING PROGRAM

## 2022-06-17 PROCEDURE — 99900035 HC TECH TIME PER 15 MIN (STAT)

## 2022-06-17 PROCEDURE — 21400001 HC TELEMETRY ROOM

## 2022-06-17 PROCEDURE — P9016 RBC LEUKOCYTES REDUCED: HCPCS | Performed by: PATHOLOGY

## 2022-06-17 PROCEDURE — 63600175 PHARM REV CODE 636 W HCPCS: Performed by: INTERNAL MEDICINE

## 2022-06-17 PROCEDURE — 85045 AUTOMATED RETICULOCYTE COUNT: CPT | Performed by: INTERNAL MEDICINE

## 2022-06-17 PROCEDURE — 36415 COLL VENOUS BLD VENIPUNCTURE: CPT | Performed by: STUDENT IN AN ORGANIZED HEALTH CARE EDUCATION/TRAINING PROGRAM

## 2022-06-17 PROCEDURE — 30000890 MAYO MISCELLANEOUS TEST (REFLEX): Performed by: PATHOLOGY

## 2022-06-17 PROCEDURE — 80076 HEPATIC FUNCTION PANEL: CPT | Performed by: INTERNAL MEDICINE

## 2022-06-17 PROCEDURE — 85025 COMPLETE CBC W/AUTO DIFF WBC: CPT | Performed by: STUDENT IN AN ORGANIZED HEALTH CARE EDUCATION/TRAINING PROGRAM

## 2022-06-17 PROCEDURE — 83020 HEMOGLOBIN ELECTROPHORESIS: CPT | Performed by: PATHOLOGY

## 2022-06-17 PROCEDURE — 25000003 PHARM REV CODE 250: Performed by: NURSE PRACTITIONER

## 2022-06-17 PROCEDURE — 36512 PR THER APHERESIS,RED BLOOD CELLS: ICD-10-PCS | Mod: ,,, | Performed by: PATHOLOGY

## 2022-06-17 PROCEDURE — 94761 N-INVAS EAR/PLS OXIMETRY MLT: CPT

## 2022-06-17 PROCEDURE — 36415 COLL VENOUS BLD VENIPUNCTURE: CPT | Performed by: INTERNAL MEDICINE

## 2022-06-17 PROCEDURE — 99233 SBSQ HOSP IP/OBS HIGH 50: CPT | Mod: ,,, | Performed by: INTERNAL MEDICINE

## 2022-06-17 PROCEDURE — 36430 TRANSFUSION BLD/BLD COMPNT: CPT

## 2022-06-17 PROCEDURE — P9016 RBC LEUKOCYTES REDUCED: HCPCS | Performed by: STUDENT IN AN ORGANIZED HEALTH CARE EDUCATION/TRAINING PROGRAM

## 2022-06-17 PROCEDURE — 27000221 HC OXYGEN, UP TO 24 HOURS

## 2022-06-17 RX ORDER — DIPHENHYDRAMINE HYDROCHLORIDE 50 MG/ML
25 INJECTION INTRAMUSCULAR; INTRAVENOUS ONCE
Status: COMPLETED | OUTPATIENT
Start: 2022-06-17 | End: 2022-06-17

## 2022-06-17 RX ORDER — CALCIUM GLUCONATE 20 MG/ML
1 INJECTION, SOLUTION INTRAVENOUS ONCE
Status: COMPLETED | OUTPATIENT
Start: 2022-06-17 | End: 2022-06-17

## 2022-06-17 RX ORDER — PSEUDOEPHEDRINE/ACETAMINOPHEN 30MG-500MG
100 TABLET ORAL
Status: COMPLETED | OUTPATIENT
Start: 2022-06-17 | End: 2022-06-17

## 2022-06-17 RX ORDER — ACETAMINOPHEN 325 MG/1
650 TABLET ORAL 3 TIMES DAILY
Status: COMPLETED | OUTPATIENT
Start: 2022-06-17 | End: 2022-06-18

## 2022-06-17 RX ORDER — SODIUM CHLORIDE 9 MG/ML
INJECTION, SOLUTION INTRAVENOUS CONTINUOUS
Status: ACTIVE | OUTPATIENT
Start: 2022-06-17 | End: 2022-06-18

## 2022-06-17 RX ORDER — SYRING-NEEDL,DISP,INSUL,0.3 ML 29 G X1/2"
296 SYRINGE, EMPTY DISPOSABLE MISCELLANEOUS ONCE
Status: COMPLETED | OUTPATIENT
Start: 2022-06-17 | End: 2022-06-17

## 2022-06-17 RX ORDER — LACTULOSE 10 G/15ML
20 SOLUTION ORAL 2 TIMES DAILY
Status: DISCONTINUED | OUTPATIENT
Start: 2022-06-17 | End: 2022-06-19

## 2022-06-17 RX ORDER — LEVOFLOXACIN 5 MG/ML
750 INJECTION, SOLUTION INTRAVENOUS
Status: DISCONTINUED | OUTPATIENT
Start: 2022-06-17 | End: 2022-06-18

## 2022-06-17 RX ORDER — HYDROCODONE BITARTRATE AND ACETAMINOPHEN 500; 5 MG/1; MG/1
TABLET ORAL
Status: DISCONTINUED | OUTPATIENT
Start: 2022-06-17 | End: 2022-06-20 | Stop reason: HOSPADM

## 2022-06-17 RX ORDER — PANTOPRAZOLE SODIUM 40 MG/1
40 TABLET, DELAYED RELEASE ORAL 2 TIMES DAILY
Status: DISCONTINUED | OUTPATIENT
Start: 2022-06-17 | End: 2022-06-19

## 2022-06-17 RX ADMIN — SODIUM CHLORIDE: 0.9 INJECTION, SOLUTION INTRAVENOUS at 05:06

## 2022-06-17 RX ADMIN — ACETAMINOPHEN 650 MG: 325 TABLET ORAL at 09:06

## 2022-06-17 RX ADMIN — DOCUSATE SODIUM AND SENNOSIDES 1 TABLET: 8.6; 5 TABLET, FILM COATED ORAL at 09:06

## 2022-06-17 RX ADMIN — FOLIC ACID 1 MG: 1 TABLET ORAL at 09:06

## 2022-06-17 RX ADMIN — SODIUM CHLORIDE: 0.9 INJECTION, SOLUTION INTRAVENOUS at 03:06

## 2022-06-17 RX ADMIN — LEVOFLOXACIN 750 MG: 750 INJECTION, SOLUTION INTRAVENOUS at 10:06

## 2022-06-17 RX ADMIN — LACTULOSE 20 G: 20 SOLUTION ORAL at 05:06

## 2022-06-17 RX ADMIN — PANTOPRAZOLE SODIUM 40 MG: 40 TABLET, DELAYED RELEASE ORAL at 09:06

## 2022-06-17 RX ADMIN — POLYETHYLENE GLYCOL 3350 17 G: 17 POWDER, FOR SOLUTION ORAL at 09:06

## 2022-06-17 RX ADMIN — DIPHENHYDRAMINE HYDROCHLORIDE 25 MG: 50 INJECTION, SOLUTION INTRAMUSCULAR; INTRAVENOUS at 09:06

## 2022-06-17 RX ADMIN — CALCIUM GLUCONATE 1 G: 20 INJECTION, SOLUTION INTRAVENOUS at 10:06

## 2022-06-17 RX ADMIN — ACETAMINOPHEN 650 MG: 325 TABLET ORAL at 05:06

## 2022-06-17 RX ADMIN — PANTOPRAZOLE SODIUM 40 MG: 40 TABLET, DELAYED RELEASE ORAL at 03:06

## 2022-06-17 RX ADMIN — ACETAMINOPHEN 650 MG: 325 TABLET ORAL at 03:06

## 2022-06-17 RX ADMIN — Medication 100 ML: at 05:06

## 2022-06-17 RX ADMIN — MAGNESIUM CITRATE 296 ML: 1.75 LIQUID ORAL at 05:06

## 2022-06-17 RX ADMIN — ENOXAPARIN SODIUM 40 MG: 40 INJECTION SUBCUTANEOUS at 05:06

## 2022-06-17 RX ADMIN — SODIUM CHLORIDE 500 ML: 0.9 INJECTION, SOLUTION INTRAVENOUS at 05:06

## 2022-06-17 NOTE — SUBJECTIVE & OBJECTIVE
Interval History:   Exchange transfusion initiated today and pt tolerated well. Will try gentle hydration with careful monitoring of volume status.       Review of Systems   Constitutional:  Positive for activity change, appetite change and fatigue. Negative for fever.   HENT:  Negative for sore throat.    Eyes:  Negative for visual disturbance.   Respiratory:  Positive for cough and shortness of breath. Negative for chest tightness.    Cardiovascular:  Positive for chest pain. Negative for palpitations and leg swelling.   Gastrointestinal:  Positive for abdominal pain and constipation. Negative for abdominal distention, diarrhea, nausea and vomiting.   Endocrine: Negative for polyuria.   Genitourinary:  Negative for decreased urine volume, dysuria, flank pain, frequency and hematuria.   Musculoskeletal:  Positive for back pain (better). Negative for gait problem.        Leg pain- improved   Skin:  Negative for rash.   Neurological:  Negative for syncope, speech difficulty, weakness, light-headedness and headaches.   Psychiatric/Behavioral:  Negative for confusion, hallucinations and sleep disturbance.    Objective:     Vital Signs (Most Recent):  Temp: 100.1 °F (37.8 °C) (06/17/22 1139)  Pulse: 92 (06/17/22 1508)  Resp: 19 (06/17/22 1139)  BP: 113/71 (06/17/22 1139)  SpO2: 98 % (06/17/22 1139) Vital Signs (24h Range):  Temp:  [99.1 °F (37.3 °C)-103.1 °F (39.5 °C)] 100.1 °F (37.8 °C)  Pulse:  [] 92  Resp:  [16-19] 19  SpO2:  [87 %-98 %] 98 %  BP: (113-132)/(58-71) 113/71     Weight: 53.5 kg (117 lb 15.1 oz)  Body mass index is 21.57 kg/m².    Intake/Output Summary (Last 24 hours) at 6/17/2022 1552  Last data filed at 6/17/2022 1145  Gross per 24 hour   Intake 6019.19 ml   Output --   Net 6019.19 ml      Physical Exam  Constitutional:       General: She is not in acute distress.     Appearance: She is ill-appearing. She is not diaphoretic.   HENT:      Head: Normocephalic and atraumatic.      Mouth/Throat:       Mouth: Mucous membranes are dry.   Eyes:      General: Scleral icterus present.         Right eye: No discharge.         Left eye: No discharge.      Extraocular Movements: Extraocular movements intact.      Conjunctiva/sclera: Conjunctivae normal.      Pupils: Pupils are equal, round, and reactive to light.   Cardiovascular:      Rate and Rhythm: Regular rhythm. Tachycardia present.      Pulses: Normal pulses.   Pulmonary:      Comments: BBS diminished  SOB with exertion  Abdominal:      General: Bowel sounds are normal. There is no distension.      Palpations: Abdomen is soft.      Tenderness: There is abdominal tenderness (generalized tenderness). There is no guarding.   Genitourinary:     Comments: Not examined  Musculoskeletal:         General: Normal range of motion.      Cervical back: Normal range of motion and neck supple. No rigidity or tenderness.      Right lower leg: No edema.      Left lower leg: No edema.   Skin:     General: Skin is warm and dry.      Capillary Refill: Capillary refill takes less than 2 seconds.      Coloration: Skin is pale.   Neurological:      General: No focal deficit present.      Mental Status: She is alert and oriented to person, place, and time. Mental status is at baseline.      Cranial Nerves: No cranial nerve deficit.   Psychiatric:         Mood and Affect: Mood normal.         Behavior: Behavior normal.         Thought Content: Thought content normal.         Judgment: Judgment normal.       Significant Labs: All pertinent labs within the past 24 hours have been reviewed.  BMP:   Recent Labs   Lab 06/16/22  0559 06/17/22  0434   GLU 99 100   * 131*   K 3.8 4.3   CL 96 97   CO2 24 24   BUN 9 6   CREATININE 0.6 0.5   CALCIUM 8.4* 8.3*   MG 2.2  --      CBC:   Recent Labs   Lab 06/16/22 0559 06/17/22  0434   WBC 15.09* 13.74*   HGB 6.1* 5.5*   HCT 17.4* 16.2*    155     CMP:   Recent Labs   Lab 06/16/22  0559 06/17/22  0434   * 131*   K 3.8 4.3   CL 96 97    CO2 24 24   GLU 99 100   BUN 9 6   CREATININE 0.6 0.5   CALCIUM 8.4* 8.3*   PROT 7.0 6.6   ALBUMIN 3.7 3.4*   BILITOT 8.3* 7.2*   ALKPHOS 126 108   AST 80* 54*   ALT 34 34   ANIONGAP 9 10   EGFRNONAA >60 >60     Cardiac Markers: No results for input(s): CKMB, MYOGLOBIN, BNP, TROPISTAT in the last 48 hours.    Significant Imaging:

## 2022-06-17 NOTE — PROCEDURES
[facility]  Transfusion Medicine  Procedure Note    SUMMARY   Therapeutic Apheresis; Red Blood Cells    Date/Time: 6/17/2022 2:59 PM  Performed by: Edi He MD  Authorized by: Conrad Bernal MD         Date of Procedure: 6/17/2022     Procedure: Red Blood Cell Exchange    Provider: Edi He MD     Assisting Provider: None    Pre-Procedure Diagnosis: Sickle cell crisis    Post-Procedure Diagnosis: Sickle cell crisis    Follow-up Assessment: Elizabeth Franco is a 26 y.o. female patient with a PMHx of sickle cell anemia who presented to the Emergency Department for sickle cell pain crisis, onset this morning. Pt report chest pain and back pain. Symptoms are constant and moderate in severity. No mitigating or exacerbating factors reported. Associated sxs include fever (Tnow 100.9). Patient denies any chills, n/v/d, SOB, weakness, numbness, dizziness, headache, and all other sxs at this time. Prior Tx includes Oxycodone 5 mg, with no improvement of sxs. No further complaints or concerns at this time.      Her O2 sats were in the 70's initially, and imaging studies show findings consistent with ACS. By history, she had ACS in [facility] about 4 years ago but she has not apparently required RBCx before. I have placed orders for RBCs (5 units) and we will plan for an RBCx when a line is placed, a consent obtained, and 5 units of appropriate RBCs are obtained.     Interval History:  Today's procedure was well tolerated and without complications. Post-exchange HbS sample was drawn. Treatment goal is less than 30%.    Pertinent Laboratory Data:   Complete Blood Count:   Lab Results   Component Value Date    HGB 5.5 (LL) 06/17/2022    HCT 16.2 (LL) 06/17/2022     06/17/2022    WBC 13.74 (H) 06/17/2022     Lactate Dehydrogenase (LDH):   Lab Results   Component Value Date     (H) 04/07/2022     Comprehensive Metabolic Panel:   Lab Results   Component Value Date     (L) 06/17/2022     K 4.3 06/17/2022    CL 97 06/17/2022    CO2 24 06/17/2022     06/17/2022    BUN 6 06/17/2022    CREATININE 0.5 06/17/2022    CALCIUM 8.3 (L) 06/17/2022    PROT 6.6 06/17/2022    ALBUMIN 3.4 (L) 06/17/2022    BILITOT 7.2 (H) 06/17/2022    ALKPHOS 108 06/17/2022    AST 54 (H) 06/17/2022    ALT 34 06/17/2022    ANIONGAP 10 06/17/2022    EGFRNONAA >60 06/17/2022       Pertinent Medications:       Current Facility-Administered Medications:     0.9%  NaCl infusion (for blood administration), , Intravenous, Q24H PRN, Jonathan Yeung MD    0.9%  NaCl infusion (for blood administration), , Intravenous, Q24H PRN, Ryann Alaniz MD    0.9%  NaCl infusion, , Intravenous, Continuous, Ryann Alaniz MD, Last Rate: 100 mL/hr at 06/17/22 1510, New Bag at 06/17/22 1510    acetaminophen tablet 650 mg, 650 mg, Oral, Q4H PRN, Elvira Hickman, DANIELP    acetaminophen tablet 650 mg, 650 mg, Oral, Q8H PRN, Elvira Hickman, FNP, 650 mg at 06/17/22 0531    acetaminophen tablet 650 mg, 650 mg, Oral, TID, Ryann Alaniz MD, 650 mg at 06/17/22 1508    aluminum-magnesium hydroxide-simethicone 200-200-20 mg/5 mL suspension 30 mL, 30 mL, Oral, QID PRN, Elvira iHckman, DANIELP    bisacodyL suppository 10 mg, 10 mg, Rectal, Daily PRN, Elvira Hickman, DANIELP    enoxaparin injection 40 mg, 40 mg, Subcutaneous, Daily, Elvira Hickman, FNP, 40 mg at 06/16/22 1651    folic acid tablet 1 mg, 1 mg, Oral, Daily, DANIEL HolleyP, 1 mg at 06/17/22 0958    heparin (porcine) injection 4,000 Units, 4,000 Units, Intravenous, Once, Conrad Bernal MD    HYDROmorphone PCA syringe 6 mg/30 mL (0.2 mg/mL) NS, , Intravenous, Continuous, HAL Holley, New Syringe/Bag at 06/15/22 1413    levoFLOXacin 750 mg/150 mL IVPB 750 mg, 750 mg, Intravenous, Q24H, Ryann Alaniz MD, Stopped at 06/17/22 1132    naloxone injection 0.02 mg, 0.02 mg, Intravenous, PRN, HAL Holley    ondansetron injection 4 mg, 4 mg,  Intravenous, Q6H PRN, Elvira RSierra Domínguezkin, FNP    pantoprazole EC tablet 40 mg, 40 mg, Oral, BID, Ryann Alaniz MD, 40 mg at 06/17/22 1509    polyethylene glycol packet 17 g, 17 g, Oral, BID, Elvira R. Entrekin, FNP, 17 g at 06/17/22 0958    senna-docusate 8.6-50 mg per tablet 1 tablet, 1 tablet, Oral, BID, Elvira R. Entrekin, FNP, 1 tablet at 06/17/22 0958    simethicone chewable tablet 80 mg, 1 tablet, Oral, QID PRN, Elvira R. Entrekin, FNP    sodium chloride 0.9% flush 10 mL, 10 mL, Intravenous, PRN, Elvira R. Entrekin, FNP    sodium chloride 0.9% flush 10 mL, 10 mL, Intravenous, Q12H PRN, Elvira R. Catrachitakin, FNP    Review of patient's allergies indicates:  No Known Allergies    Anesthesia: None     Technical Procedures Used: Red Blood Cell Exchange: Approximately 6 units of packed red blood cells were exchanged.    Description of the Findings of the Procedure:     Please see Apheresis Nurse flowsheet for details.    The patient was evaluated and all clinical and laboratory data relevant to the treatment was reviewed, and a decision was made to proceed with the Apheresis procedure.    I was available to the clinical staff throughout the procedure.    Significant Surgical Tasks Conducted by the Assistant(s): Not applicable    Complications: None    Estimated Blood Loss (EBL): None    Implants: None     Specimens: None    Edi He M.D., M.S., [facility]  Medical Director  Section of Transfusion Medicine & Blood Donor Services  Department of Pathology and Laboratory Medicine  [facility]  [phone] (Blood Bank)  06/17/2022

## 2022-06-17 NOTE — ASSESSMENT & PLAN NOTE
Sickle cell pain crisis:  Presentation concerning for acute chest syndrome.  Typically rare pain crises once yearly with rare need for blood transfusion but has had acute chest and exchange transfusion in years past.  Most recent hemoglobin S quantification 53% baseline in absence of sickle cell pain crisis April 2022. Current presentation with fever, tachypnea, tachycardia, leukocytosis, elevated retic in bilirubin with progressive anemia to hemoglobin 6.5 from baseline 9.9.  Elevated D-dimer with CTA chest negative for pulmonary embolism but did show bibasilar pulmonary density.    In ER can get initial simple txfusion 1uprbc while awaiting exchange transfusion. I discussed case with transfusion Medicine who agrees with exchange transfusion.  I have asked in ER to place a pheresis line - dialysis catheter such as a Trialysis or Brevia; consent by ER team please  Hemoglobin S quantification (53% in 4/2022)  Trend cbc, retic, bili  Empitic antibiotics  Would recommend lower extremity Doppler to rule out thrombosis given elevation in D-dimer.  CTA chest negative for pulmonary embolism.  If no evidence of thrombosis DVT prophylaxis would be recommended  Ivf prn for euvolemia  Supplemental O2  Analgesia per primary team/PCA prn    06/16/2022  Plan for exchange transfusion this AM  Continue supportive care  Continue with previous recommendations--see above

## 2022-06-17 NOTE — ASSESSMENT & PLAN NOTE
6/15- Telemetry  Patient ordered for 1 unit PRBCs transfusion  Hematology consulted  Maria Alejandra KIM  Trend anemia    6/16:  Concern for ACS  Compatible blood pending for exchange transfusion  6/17-  Exchange transfusion initiated today and pt tolerated well. Post exchange HbS was drawn with goal <30%.  Supplemental oxygen   Gentle IV fluid with careful monitoring of volume status

## 2022-06-17 NOTE — ASSESSMENT & PLAN NOTE
This patient does have evidence of infective focus  My overall impression is Severe  sepsis. Vital signs were reviewed and noted in progress note.  Antibiotics given-   Antibiotics (From admission, onward)            Start     Stop Route Frequency Ordered    06/16/22 1000  azithromycin 500 mg in dextrose 5 % 250 mL IVPB (ready to mix system)         -- IV Every 24 hours (non-standard times) 06/15/22 1226    06/16/22 0900  cefTRIAXone (ROCEPHIN) 2 g/50 mL D5W IVPB         -- IV Every 24 hours (non-standard times) 06/15/22 1226        Cultures were taken-   Microbiology Results (last 7 days)     Procedure Component Value Units Date/Time    Blood culture #1 **CANNOT BE ORDERED STAT** [[id number]] Collected: 06/15/22 0834    Order Status: Sent Specimen: Blood from Peripheral, Hand, Right Updated: 06/15/22 1524    Blood culture #2 **CANNOT BE ORDERED STAT** [[id number]] Collected: 06/15/22 0834    Order Status: Sent Specimen: Blood from Peripheral, Hand, Left Updated: 06/15/22 1524        Latest lactate reviewed, they are-  Recent Labs   Lab 06/15/22  0834   LACTATE 1.0     Organ- Hypoxemia, elevated liver enzymes     Source- pneumonia     Source control Achieved by- Iv ABx   6/17-  Antibiotic switched out to IV Levofloxacin due to rare complication of ceftriaxone-induced hyperhemolysis.

## 2022-06-17 NOTE — SUBJECTIVE & OBJECTIVE
Interval History: Exchange transfusion in process.    Oncology Treatment Plan:   [Could not find a treatment plan. This SmartLink may be configured incorrectly. Contact a  for help.]    Medications:  Continuous Infusions:   sodium chloride 0.9% 100 mL/hr at 06/17/22 1725    hydromorphone in 0.9 % NaCl 6 mg/30 ml       Scheduled Meds:   acetaminophen  650 mg Oral TID    enoxaparin  40 mg Subcutaneous Daily    folic acid  1 mg Oral Daily    heparin (porcine)  4,000 Units Intravenous Once    lactulose  20 g Oral BID    levoFLOXacin  750 mg Intravenous Q24H    pantoprazole  40 mg Oral BID    polyethylene glycol  17 g Oral BID    sodium chloride 0.9%  500 mL Rectal Once     PRN Meds:sodium chloride, sodium chloride, acetaminophen, acetaminophen, aluminum-magnesium hydroxide-simethicone, bisacodyL, naloxone, ondansetron, simethicone, sodium chloride 0.9%, sodium chloride 0.9%     Review of Systems   Constitutional:  Positive for activity change, appetite change and fever. Negative for fatigue.   HENT: Negative.  Negative for ear pain, facial swelling and hearing loss.    Eyes:  Negative for pain and redness.   Respiratory:  Positive for cough and shortness of breath.    Cardiovascular:  Positive for chest pain. Negative for leg swelling.   Gastrointestinal: Negative.  Negative for abdominal distention, diarrhea, nausea and vomiting.   Endocrine: Negative for polydipsia and polyphagia.   Genitourinary:  Negative for difficulty urinating, dysuria, flank pain and hematuria.   Musculoskeletal:  Positive for arthralgias, back pain and myalgias. Negative for gait problem, joint swelling and neck pain.   Skin:  Positive for pallor.   Allergic/Immunologic: Negative for food allergies.   Neurological: Negative.  Negative for facial asymmetry and speech difficulty.   Hematological: Negative.  Does not bruise/bleed easily.   Psychiatric/Behavioral: Negative.  Negative for agitation, behavioral problems,  confusion, dysphoric mood and suicidal ideas. The patient is not nervous/anxious.    Objective:     Vital Signs (Most Recent):  Temp: 98 °F (36.7 °C) (06/17/22 1620)  Pulse: 95 (06/17/22 1620)  Resp: 18 (06/17/22 1620)  BP: 110/75 (06/17/22 1620)  SpO2: 98 % (06/17/22 1620) Vital Signs (24h Range):  Temp:  [98 °F (36.7 °C)-103.1 °F (39.5 °C)] 98 °F (36.7 °C)  Pulse:  [] 95  Resp:  [16-19] 18  SpO2:  [87 %-98 %] 98 %  BP: (110-132)/(58-75) 110/75     Weight: 53.5 kg (117 lb 15.1 oz)  Body mass index is 21.57 kg/m².  Body surface area is 1.53 meters squared.      Intake/Output Summary (Last 24 hours) at 6/17/2022 1756  Last data filed at 6/17/2022 1145  Gross per 24 hour   Intake 5637.5 ml   Output --   Net 5637.5 ml       Physical Exam  Constitutional:       General: She is not in acute distress.     Appearance: She is ill-appearing. She is not diaphoretic.   HENT:      Head: Normocephalic and atraumatic.      Mouth/Throat:      Mouth: Mucous membranes are dry.   Eyes:      General:         Right eye: No discharge.         Left eye: No discharge.      Extraocular Movements: Extraocular movements intact.      Conjunctiva/sclera: Conjunctivae normal.      Pupils: Pupils are equal, round, and reactive to light.   Cardiovascular:      Rate and Rhythm: Regular rhythm. Tachycardia present.      Pulses: Normal pulses.   Pulmonary:      Comments: BBS diminished  SOB with exertion  Abdominal:      General: Bowel sounds are normal. There is no distension.      Palpations: Abdomen is soft.      Tenderness: There is no abdominal tenderness. There is no guarding.   Genitourinary:     Comments: Not examined  Musculoskeletal:         General: Normal range of motion.      Cervical back: Normal range of motion and neck supple. No rigidity or tenderness.      Right lower leg: No edema.      Left lower leg: No edema.   Skin:     General: Skin is warm and dry.      Capillary Refill: Capillary refill takes less than 2 seconds.       Coloration: Skin is pale.   Neurological:      General: No focal deficit present.      Mental Status: She is alert and oriented to person, place, and time. Mental status is at baseline.      Cranial Nerves: No cranial nerve deficit.   Psychiatric:         Mood and Affect: Mood normal.         Behavior: Behavior normal.         Thought Content: Thought content normal.         Judgment: Judgment normal.       Significant Labs:   CBC:   Recent Labs   Lab 06/16/22  0559 06/17/22  0434   WBC 15.09* 13.74*   HGB 6.1* 5.5*   HCT 17.4* 16.2*    155   , CMP:   Recent Labs   Lab 06/16/22  0559 06/17/22  0434   * 131*   K 3.8 4.3   CL 96 97   CO2 24 24   GLU 99 100   BUN 9 6   CREATININE 0.6 0.5   CALCIUM 8.4* 8.3*   PROT 7.0 6.6   ALBUMIN 3.7 3.4*   BILITOT 8.3* 7.2*   ALKPHOS 126 108   AST 80* 54*   ALT 34 34   ANIONGAP 9 10   EGFRNONAA >60 >60   , LDH: No results for input(s): LDHCSF, BFSOURCE in the last 48 hours., and Reticulocytes:   Recent Labs   Lab 06/17/22  0434   RETIC >23.0*       Diagnostic Results:  I have reviewed all pertinent imaging results/findings within the past 24 hours.

## 2022-06-17 NOTE — ASSESSMENT & PLAN NOTE
Sickle cell pain crisis:  Presentation concerning for acute chest syndrome.  Typically rare pain crises once yearly with rare need for blood transfusion but has had acute chest and exchange transfusion in years past.  Most recent hemoglobin S quantification 53% baseline in absence of sickle cell pain crisis April 2022. Current presentation with fever, tachypnea, tachycardia, leukocytosis, elevated retic in bilirubin with progressive anemia to hemoglobin 6.5 from baseline 9.9.  Elevated D-dimer with CTA chest negative for pulmonary embolism but did show bibasilar pulmonary density.    In ER can get initial simple txfusion 1uprbc while awaiting exchange transfusion. I discussed case with transfusion Medicine who agrees with exchange transfusion.  I have asked in ER to place a pheresis line - dialysis catheter such as a Trialysis or Brevia; consent by ER team please  Hemoglobin S quantification (53% in 4/2022)  Trend cbc, retic, bili  Empitic antibiotics  Would recommend lower extremity Doppler to rule out thrombosis given elevation in D-dimer.  CTA chest negative for pulmonary embolism.  If no evidence of thrombosis DVT prophylaxis would be recommended  Ivf prn for euvolemia  Supplemental O2  Analgesia per primary team/PCA prn    06/16/2022  Plan for exchange transfusion this AM  Continue supportive care  Continue with previous recommendations--see above    06/17/2022  Exchange transfusion in process  Continue supportive care

## 2022-06-17 NOTE — PROGRESS NOTES
[facility]  Hematology/Oncology  Progress Note    Patient Name: Elizabeth Franco  Admission Date: 6/15/2022  Hospital Length of Stay: 2 days  Code Status: Full Code     Subjective:     HPI:  Ms. Storm is a 26-year-old woman with sickle cell disease presenting with acute pain in bilateral lower hip/buttocks and chest similar to prior sickle cell pain crises.  She notes rare pain crises about once per year at those times occasionally receiving blood transfusion.  She is noted to have not tolerated chelation therapy for iron overload.  She does not take medication for her sickle cell disease aside from folic acid.  She denies known history of thrombosis.  On presentation to the emergency room  vital signs notable for temperature 100.9°, desaturation to 78% on room air with improvement on nasal cannula, to kidney on tachycardia.  Lab work with progressive anemia to 6.5 hemoglobin baseline 9.9 and leukocytosis to 19, bilirubin elevated 7.6 prior baseline 2.3.  D-dimer 24, retic >23.  CTA with bibasilar pulmonary density.      Interval History: Exchange transfusion in process.    Oncology Treatment Plan:   [Could not find a treatment plan. This SmartLink may be configured incorrectly. Contact a  for help.]    Medications:  Continuous Infusions:   sodium chloride 0.9% 100 mL/hr at 06/17/22 1725    hydromorphone in 0.9 % NaCl 6 mg/30 ml       Scheduled Meds:   acetaminophen  650 mg Oral TID    enoxaparin  40 mg Subcutaneous Daily    folic acid  1 mg Oral Daily    heparin (porcine)  4,000 Units Intravenous Once    lactulose  20 g Oral BID    levoFLOXacin  750 mg Intravenous Q24H    pantoprazole  40 mg Oral BID    polyethylene glycol  17 g Oral BID    sodium chloride 0.9%  500 mL Rectal Once     PRN Meds:sodium chloride, sodium chloride, acetaminophen, acetaminophen, aluminum-magnesium hydroxide-simethicone, bisacodyL, naloxone, ondansetron, simethicone, sodium chloride 0.9%,  sodium chloride 0.9%     Review of Systems   Constitutional:  Positive for activity change, appetite change and fever. Negative for fatigue.   HENT: Negative.  Negative for ear pain, facial swelling and hearing loss.    Eyes:  Negative for pain and redness.   Respiratory:  Positive for cough and shortness of breath.    Cardiovascular:  Positive for chest pain. Negative for leg swelling.   Gastrointestinal: Negative.  Negative for abdominal distention, diarrhea, nausea and vomiting.   Endocrine: Negative for polydipsia and polyphagia.   Genitourinary:  Negative for difficulty urinating, dysuria, flank pain and hematuria.   Musculoskeletal:  Positive for arthralgias, back pain and myalgias. Negative for gait problem, joint swelling and neck pain.   Skin:  Positive for pallor.   Allergic/Immunologic: Negative for food allergies.   Neurological: Negative.  Negative for facial asymmetry and speech difficulty.   Hematological: Negative.  Does not bruise/bleed easily.   Psychiatric/Behavioral: Negative.  Negative for agitation, behavioral problems, confusion, dysphoric mood and suicidal ideas. The patient is not nervous/anxious.    Objective:     Vital Signs (Most Recent):  Temp: 98 °F (36.7 °C) (06/17/22 1620)  Pulse: 95 (06/17/22 1620)  Resp: 18 (06/17/22 1620)  BP: 110/75 (06/17/22 1620)  SpO2: 98 % (06/17/22 1620) Vital Signs (24h Range):  Temp:  [98 °F (36.7 °C)-103.1 °F (39.5 °C)] 98 °F (36.7 °C)  Pulse:  [] 95  Resp:  [16-19] 18  SpO2:  [87 %-98 %] 98 %  BP: (110-132)/(58-75) 110/75     Weight: 53.5 kg (117 lb 15.1 oz)  Body mass index is 21.57 kg/m².  Body surface area is 1.53 meters squared.      Intake/Output Summary (Last 24 hours) at 6/17/2022 1756  Last data filed at 6/17/2022 1145  Gross per 24 hour   Intake 5637.5 ml   Output --   Net 5637.5 ml       Physical Exam  Constitutional:       General: She is not in acute distress.     Appearance: She is ill-appearing. She is not diaphoretic.   HENT:       Head: Normocephalic and atraumatic.      Mouth/Throat:      Mouth: Mucous membranes are dry.   Eyes:      General:         Right eye: No discharge.         Left eye: No discharge.      Extraocular Movements: Extraocular movements intact.      Conjunctiva/sclera: Conjunctivae normal.      Pupils: Pupils are equal, round, and reactive to light.   Cardiovascular:      Rate and Rhythm: Regular rhythm. Tachycardia present.      Pulses: Normal pulses.   Pulmonary:      Comments: BBS diminished  SOB with exertion  Abdominal:      General: Bowel sounds are normal. There is no distension.      Palpations: Abdomen is soft.      Tenderness: There is no abdominal tenderness. There is no guarding.   Genitourinary:     Comments: Not examined  Musculoskeletal:         General: Normal range of motion.      Cervical back: Normal range of motion and neck supple. No rigidity or tenderness.      Right lower leg: No edema.      Left lower leg: No edema.   Skin:     General: Skin is warm and dry.      Capillary Refill: Capillary refill takes less than 2 seconds.      Coloration: Skin is pale.   Neurological:      General: No focal deficit present.      Mental Status: She is alert and oriented to person, place, and time. Mental status is at baseline.      Cranial Nerves: No cranial nerve deficit.   Psychiatric:         Mood and Affect: Mood normal.         Behavior: Behavior normal.         Thought Content: Thought content normal.         Judgment: Judgment normal.       Significant Labs:   CBC:   Recent Labs   Lab 06/16/22  0559 06/17/22  0434   WBC 15.09* 13.74*   HGB 6.1* 5.5*   HCT 17.4* 16.2*    155   , CMP:   Recent Labs   Lab 06/16/22  0559 06/17/22  0434   * 131*   K 3.8 4.3   CL 96 97   CO2 24 24   GLU 99 100   BUN 9 6   CREATININE 0.6 0.5   CALCIUM 8.4* 8.3*   PROT 7.0 6.6   ALBUMIN 3.7 3.4*   BILITOT 8.3* 7.2*   ALKPHOS 126 108   AST 80* 54*   ALT 34 34   ANIONGAP 9 10   EGFRNONAA >60 >60   , LDH: No results for  input(s): LDHCSF, BFSOURCE in the last 48 hours., and Reticulocytes:   Recent Labs   Lab 06/17/22  0434   RETIC >23.0*       Diagnostic Results:  I have reviewed all pertinent imaging results/findings within the past 24 hours.    Assessment/Plan:     * Acute chest syndrome due to sickle cell crisis  Sickle cell pain crisis:  Presentation concerning for acute chest syndrome.  Typically rare pain crises once yearly with rare need for blood transfusion but has had acute chest and exchange transfusion in years past.  Most recent hemoglobin S quantification 53% baseline in absence of sickle cell pain crisis April 2022. Current presentation with fever, tachypnea, tachycardia, leukocytosis, elevated retic in bilirubin with progressive anemia to hemoglobin 6.5 from baseline 9.9.  Elevated D-dimer with CTA chest negative for pulmonary embolism but did show bibasilar pulmonary density.    In ER can get initial simple txfusion 1uprbc while awaiting exchange transfusion. I discussed case with transfusion Medicine who agrees with exchange transfusion.  I have asked in ER to place a pheresis line - dialysis catheter such as a Trialysis or Brevia; consent by ER team please  Hemoglobin S quantification (53% in 4/2022)  Trend cbc, retic, bili  Empitic antibiotics  Would recommend lower extremity Doppler to rule out thrombosis given elevation in D-dimer.  CTA chest negative for pulmonary embolism.  If no evidence of thrombosis DVT prophylaxis would be recommended  Ivf prn for euvolemia  Supplemental O2  Analgesia per primary team/PCA prn    06/16/2022  Plan for exchange transfusion this AM  Continue supportive care  Continue with previous recommendations--see above    06/17/2022  Exchange transfusion in process  Continue supportive care            Thank you for your consult. I will follow-up with patient. Please contact us if you have any additional questions.     Patricia Owens NP  Hematology/Oncology  [facility]

## 2022-06-17 NOTE — PLAN OF CARE
Pt remains free from injury/falls this shift. Safety precautions maintained. Pain pump in use. Diet tolerated well but little to no appetite. VSS. No signs and symptoms of acute distress noted at this time. Chart reviewed, will continue to monitor.

## 2022-06-17 NOTE — SUBJECTIVE & OBJECTIVE
Interval History: No acute events overnight. Pt resting comfortably in bed. Family at the bedside.    Oncology Treatment Plan:   [Could not find a treatment plan. This SmartLink may be configured incorrectly. Contact a  for help.]    Medications:  Continuous Infusions:   hydromorphone in 0.9 % NaCl 6 mg/30 ml       Scheduled Meds:   azithromycin  500 mg Intravenous Q24H    cefTRIAXone (ROCEPHIN) IVPB  2 g Intravenous Q24H    diphenhydrAMINE  25 mg Intravenous Once    enoxaparin  40 mg Subcutaneous Daily    folic acid  1 mg Oral Daily    heparin (porcine)  4,000 Units Intravenous Once    polyethylene glycol  17 g Oral BID    senna-docusate 8.6-50 mg  1 tablet Oral BID     PRN Meds:sodium chloride, acetaminophen, acetaminophen, aluminum-magnesium hydroxide-simethicone, bisacodyL, naloxone, ondansetron, simethicone, sodium chloride 0.9%, sodium chloride 0.9%     Review of Systems   Constitutional:  Positive for activity change, appetite change and fever. Negative for fatigue.   HENT: Negative.  Negative for ear pain, facial swelling and hearing loss.    Eyes:  Negative for pain and redness.   Respiratory:  Positive for cough and shortness of breath.    Cardiovascular:  Positive for chest pain. Negative for leg swelling.   Gastrointestinal: Negative.  Negative for abdominal distention, diarrhea, nausea and vomiting.   Endocrine: Negative for polydipsia and polyphagia.   Genitourinary:  Negative for difficulty urinating, dysuria, flank pain and hematuria.   Musculoskeletal:  Positive for arthralgias, back pain and myalgias. Negative for gait problem, joint swelling and neck pain.   Skin:  Positive for pallor.   Allergic/Immunologic: Negative for food allergies.   Neurological: Negative.  Negative for facial asymmetry and speech difficulty.   Hematological: Negative.  Does not bruise/bleed easily.   Psychiatric/Behavioral: Negative.  Negative for agitation, behavioral problems, confusion, dysphoric  mood and suicidal ideas. The patient is not nervous/anxious.    Objective:     Vital Signs (Most Recent):  Temp: (!) 102.7 °F (39.3 °C) (06/16/22 1631)  Pulse: (!) 125 (06/16/22 1700)  Resp: 18 (06/16/22 1631)  BP: (!) 129/58 (06/16/22 1631)  SpO2: (!) 94 % (06/16/22 1631)   Vital Signs (24h Range):  Temp:  [100.4 °F (38 °C)-103.2 °F (39.6 °C)] 102.7 °F (39.3 °C)  Pulse:  [109-129] 125  Resp:  [18-24] 18  SpO2:  [90 %-96 %] 94 %  BP: (108-131)/(53-63) 129/58     Weight: 52.6 kg (115 lb 15.4 oz)  Body mass index is 21.21 kg/m².  Body surface area is 1.52 meters squared.      Intake/Output Summary (Last 24 hours) at 6/16/2022 1920  Last data filed at 6/16/2022 1743  Gross per 24 hour   Intake 389.19 ml   Output --   Net 389.19 ml       Physical Exam  Constitutional:       General: She is not in acute distress.     Appearance: She is ill-appearing. She is not diaphoretic.   HENT:      Head: Normocephalic and atraumatic.      Mouth/Throat:      Mouth: Mucous membranes are dry.   Eyes:      General:         Right eye: No discharge.         Left eye: No discharge.      Extraocular Movements: Extraocular movements intact.      Conjunctiva/sclera: Conjunctivae normal.      Pupils: Pupils are equal, round, and reactive to light.   Cardiovascular:      Rate and Rhythm: Regular rhythm. Tachycardia present.      Pulses: Normal pulses.   Pulmonary:      Comments: BBS diminished  SOB with exertion  Abdominal:      General: Bowel sounds are normal. There is no distension.      Palpations: Abdomen is soft.      Tenderness: There is no abdominal tenderness. There is no guarding.   Genitourinary:     Comments: Not examined  Musculoskeletal:         General: Normal range of motion.      Cervical back: Normal range of motion and neck supple. No rigidity or tenderness.      Right lower leg: No edema.      Left lower leg: No edema.   Skin:     General: Skin is warm and dry.      Capillary Refill: Capillary refill takes less than 2  seconds.      Coloration: Skin is pale.   Neurological:      General: No focal deficit present.      Mental Status: She is alert and oriented to person, place, and time. Mental status is at baseline.      Cranial Nerves: No cranial nerve deficit.   Psychiatric:         Mood and Affect: Mood normal.         Behavior: Behavior normal.         Thought Content: Thought content normal.         Judgment: Judgment normal.       Significant Labs:   CBC:   Recent Labs   Lab 06/15/22  0834 06/16/22  0559   WBC 19.64* 15.09*   HGB 6.5* 6.1*   HCT 18.2* 17.4*    167   , CMP:   Recent Labs   Lab 06/15/22  0834 06/16/22  0559   * 129*   K 4.3 3.8    96   CO2 22* 24   * 99   BUN 8 9   CREATININE 0.6 0.6   CALCIUM 9.1 8.4*   PROT 8.2 7.0   ALBUMIN 4.3 3.7   BILITOT 7.6* 8.3*   ALKPHOS 127 126   * 80*   ALT 33 34   ANIONGAP 10 9   EGFRNONAA >60 >60   , LDH: No results for input(s): LDHCSF, BFSOURCE in the last 48 hours., and Reticulocytes:   Recent Labs   Lab 06/15/22  0834   RETIC >23.0*       Diagnostic Results:  I have reviewed all pertinent imaging results/findings within the past 24 hours.

## 2022-06-17 NOTE — ASSESSMENT & PLAN NOTE
6/15 MOM x 1 dose  Add bid miralax  6/17-  Pt c/o severe gen abd tenderness   CT abd/pelvis showed gas distended large bowel with large fecal ball within the cecum. Will try brown bomb enema.

## 2022-06-17 NOTE — PROGRESS NOTES
[facility]  Progress Note    Patient Name: Elizabeth Franco  MRN: [id number]  Patient Class: IP- Inpatient   Admission Date: 6/15/2022  Length of Stay: 2 days  Attending Physician: Ryann Alaniz MD  Primary Care Provider: Elvira Nuñez MD        Subjective:     Principal Problem:Acute chest syndrome due to sickle cell crisis        HPI:   Sickle Cell Pain Crisis       Pt reports pain to back and chest and tried her home Oxycodone with no relief       Per ER- This  is a 26 y.o. female patient with a PMHx of sickle cell anemia and Hidradenitis suppurativa who presents to the Emergency Department for sickle cell pain crisis, onset this morning. Pt report chest pain and back pain. Symptoms are constant and moderate in severity. No mitigating or exacerbating factors reported. Associated sxs include fever (Tnow 100.9). Patient denies any chills, n/v/d, SOB, weakness, numbness, dizziness, headache, and all other sxs at this time. Prior Tx includes Oxycodone 5 mg, with no improvement of sxs. No further complaints or concerns at this time.     Patient was evaluated by ER and noted to have symptomatic anemia with signs of pneumonia and or possible volume overload and was placed in Observation.             Overview/Hospital Course:  6/16: Pt states continued CP, SOB slightly improved from day before. Bleeding for catheter site, back to OR for suture placement. Blood type and screen with multiple antibodies requiring different facility check for compatible blood transfusion. Tmax 102F    6/17- Exchange transfusion initiated today and pt tolerated well. Post exchange HbS was drawn with goal <30%. Pt is seen post exchange transfusion treatment and c/o generalized abd tenderness and no bowel movement since admission. (+) Flatus . On exam, active bowel sounds are present, however  severe generalized tenderness appreciated on palpation. CT abd/pelvis done today showed normal spleen, adrenal and liver,  gas distended large bowel with large fecal ball within the cecum. Will try brown bomb enema.     Persistent fever with Tmax 103.1 and tachycardia noted. Antibiotic switched out to IV Levofloxacin due to rare complication of ceftriaxone-induced hyperhemolysis. Continue supplemental oxygen. No clinical signs of hypervolemia noted. Echo resulted LVEF 60%, CVP 3. Will try gentle hydration with careful monitoring of volume status.       Interval History:   Exchange transfusion initiated today and pt tolerated well. Will try gentle hydration with careful monitoring of volume status.       Review of Systems   Constitutional:  Positive for activity change, appetite change and fatigue. Negative for fever.   HENT:  Negative for sore throat.    Eyes:  Negative for visual disturbance.   Respiratory:  Positive for cough and shortness of breath. Negative for chest tightness.    Cardiovascular:  Positive for chest pain. Negative for palpitations and leg swelling.   Gastrointestinal:  Positive for abdominal pain and constipation. Negative for abdominal distention, diarrhea, nausea and vomiting.   Endocrine: Negative for polyuria.   Genitourinary:  Negative for decreased urine volume, dysuria, flank pain, frequency and hematuria.   Musculoskeletal:  Positive for back pain (better). Negative for gait problem.        Leg pain- improved   Skin:  Negative for rash.   Neurological:  Negative for syncope, speech difficulty, weakness, light-headedness and headaches.   Psychiatric/Behavioral:  Negative for confusion, hallucinations and sleep disturbance.    Objective:     Vital Signs (Most Recent):  Temp: 100.1 °F (37.8 °C) (06/17/22 1139)  Pulse: 92 (06/17/22 1508)  Resp: 19 (06/17/22 1139)  BP: 113/71 (06/17/22 1139)  SpO2: 98 % (06/17/22 1139) Vital Signs (24h Range):  Temp:  [99.1 °F (37.3 °C)-103.1 °F (39.5 °C)] 100.1 °F (37.8 °C)  Pulse:  [] 92  Resp:  [16-19] 19  SpO2:  [87 %-98 %] 98 %  BP: (113-132)/(58-71) 113/71     Weight:  53.5 kg (117 lb 15.1 oz)  Body mass index is 21.57 kg/m².    Intake/Output Summary (Last 24 hours) at 6/17/2022 1552  Last data filed at 6/17/2022 1145  Gross per 24 hour   Intake 6019.19 ml   Output --   Net 6019.19 ml      Physical Exam  Constitutional:       General: She is not in acute distress.     Appearance: She is ill-appearing. She is not diaphoretic.   HENT:      Head: Normocephalic and atraumatic.      Mouth/Throat:      Mouth: Mucous membranes are dry.   Eyes:      General: Scleral icterus present.         Right eye: No discharge.         Left eye: No discharge.      Extraocular Movements: Extraocular movements intact.      Conjunctiva/sclera: Conjunctivae normal.      Pupils: Pupils are equal, round, and reactive to light.   Cardiovascular:      Rate and Rhythm: Regular rhythm. Tachycardia present.      Pulses: Normal pulses.   Pulmonary:      Comments: BBS diminished  SOB with exertion  Abdominal:      General: Bowel sounds are normal. There is no distension.      Palpations: Abdomen is soft.      Tenderness: There is abdominal tenderness (generalized tenderness). There is no guarding.   Genitourinary:     Comments: Not examined  Musculoskeletal:         General: Normal range of motion.      Cervical back: Normal range of motion and neck supple. No rigidity or tenderness.      Right lower leg: No edema.      Left lower leg: No edema.   Skin:     General: Skin is warm and dry.      Capillary Refill: Capillary refill takes less than 2 seconds.      Coloration: Skin is pale.   Neurological:      General: No focal deficit present.      Mental Status: She is alert and oriented to person, place, and time. Mental status is at baseline.      Cranial Nerves: No cranial nerve deficit.   Psychiatric:         Mood and Affect: Mood normal.         Behavior: Behavior normal.         Thought Content: Thought content normal.         Judgment: Judgment normal.       Significant Labs: All pertinent labs within the past  24 hours have been reviewed.  BMP:   Recent Labs   Lab 06/16/22  0559 06/17/22  0434   GLU 99 100   * 131*   K 3.8 4.3   CL 96 97   CO2 24 24   BUN 9 6   CREATININE 0.6 0.5   CALCIUM 8.4* 8.3*   MG 2.2  --      CBC:   Recent Labs   Lab 06/16/22  0559 06/17/22  0434   WBC 15.09* 13.74*   HGB 6.1* 5.5*   HCT 17.4* 16.2*    155     CMP:   Recent Labs   Lab 06/16/22  0559 06/17/22  0434   * 131*   K 3.8 4.3   CL 96 97   CO2 24 24   GLU 99 100   BUN 9 6   CREATININE 0.6 0.5   CALCIUM 8.4* 8.3*   PROT 7.0 6.6   ALBUMIN 3.7 3.4*   BILITOT 8.3* 7.2*   ALKPHOS 126 108   AST 80* 54*   ALT 34 34   ANIONGAP 9 10   EGFRNONAA >60 >60     Cardiac Markers: No results for input(s): CKMB, MYOGLOBIN, BNP, TROPISTAT in the last 48 hours.    Significant Imaging:       Assessment/Plan:      * Acute chest syndrome due to sickle cell crisis  6/15- Telemetry  Patient ordered for 1 unit PRBCs transfusion  Hematology consulted  Dilaudid PCA  Trend anemia    6/16:  Concern for ACS  Compatible blood pending for exchange transfusion  6/17-  Exchange transfusion initiated today and pt tolerated well. Post exchange HbS was drawn with goal <30%.  Supplemental oxygen   Gentle IV fluid with careful monitoring of volume status     Severe sepsis  This patient does have evidence of infective focus  My overall impression is Severe  sepsis. Vital signs were reviewed and noted in progress note.  Antibiotics given-   Antibiotics (From admission, onward)            Start     Stop Route Frequency Ordered    06/16/22 1000  azithromycin 500 mg in dextrose 5 % 250 mL IVPB (ready to mix system)         -- IV Every 24 hours (non-standard times) 06/15/22 1226    06/16/22 0900  cefTRIAXone (ROCEPHIN) 2 g/50 mL D5W IVPB         -- IV Every 24 hours (non-standard times) 06/15/22 1226        Cultures were taken-   Microbiology Results (last 7 days)     Procedure Component Value Units Date/Time    Blood culture #1 **CANNOT BE ORDERED STAT**  [[id number]] Collected: 06/15/22 0834    Order Status: Sent Specimen: Blood from Peripheral, Hand, Right Updated: 06/15/22 1524    Blood culture #2 **CANNOT BE ORDERED STAT** [[id number]] Collected: 06/15/22 0834    Order Status: Sent Specimen: Blood from Peripheral, Hand, Left Updated: 06/15/22 1524        Latest lactate reviewed, they are-  Recent Labs   Lab 06/15/22  0834   LACTATE 1.0     Organ- Hypoxemia, elevated liver enzymes     Source- pneumonia     Source control Achieved by- Iv ABx   6/17-  Antibiotic switched out to IV Levofloxacin due to rare complication of ceftriaxone-induced hyperhemolysis.      Constipation  6/15 MOM x 1 dose  Add bid miralax  6/17-  Pt c/o severe gen abd tenderness   CT abd/pelvis showed gas distended large bowel with large fecal ball within the cecum. Will try brown bomb enema.       Elevated bilirubin  6/15  Due to hemolysis in the setting of sickle cell crisis       Elevated troponin and Elevated BNP  Troponins trend down  Cardiology consult obtained     Elevated d-dimer- Pulmonary emboli excluded  6/15 Elevated d-dimer noted- Pulmonary emboli excluded per CTA  Venous U/S david LE without DVT      Hypoxemia suspected multifactorial  6/15 Suspected multifactorial  Patient in with symptomatic anemia and signs of pneumonia and or possible acute volume overload      Pneumonia  6/15 Patient in with fever and signs of possible pneumonia and or volume overload  Continue Iv Abx for now      Gall bladder stones  6/15 Currently no signs of acute cholecystitis  Monitor closely for any changes        VTE Risk Mitigation (From admission, onward)         Ordered     heparin (porcine) injection 4,000 Units  Once         06/15/22 1801     enoxaparin injection 40 mg  Daily         06/15/22 1234     Place RISSA hose  Until discontinued         06/15/22 1234     IP VTE HIGH RISK PATIENT  Once         06/15/22 1234     Place sequential compression device  Until discontinued         06/15/22 1225                 Discharge Planning   FARIHA:      Code Status: Full Code   Is the patient medically ready for discharge?:     Reason for patient still in hospital (select all that apply): Patient trending condition  Discharge Plan A: Home with family                  Ryann Alaniz MD  Department of Hospital Medicine   '[facility]

## 2022-06-17 NOTE — NURSING
Pt had blood transfusion exchange per [facility] nurse, LIBBY Griffiths. Exchange began at 1015 and completed at 1145, 6 units transfused. Complete report in pts chart.

## 2022-06-17 NOTE — PROGRESS NOTES
[facility]  Hematology/Oncology  Progress Note    Patient Name: Elizabeth Franco  Admission Date: 6/15/2022  Hospital Length of Stay: 1 days  Code Status: Full Code     Subjective:     HPI:  Ms. Storm is a 26-year-old woman with sickle cell disease presenting with acute pain in bilateral lower hip/buttocks and chest similar to prior sickle cell pain crises.  She notes rare pain crises about once per year at those times occasionally receiving blood transfusion.  She is noted to have not tolerated chelation therapy for iron overload.  She does not take medication for her sickle cell disease aside from folic acid.  She denies known history of thrombosis.  On presentation to the emergency room  vital signs notable for temperature 100.9°, desaturation to 78% on room air with improvement on nasal cannula, to kidney on tachycardia.  Lab work with progressive anemia to 6.5 hemoglobin baseline 9.9 and leukocytosis to 19, bilirubin elevated 7.6 prior baseline 2.3.  D-dimer 24, retic >23.  CTA with bibasilar pulmonary density.      Interval History: No acute events overnight. Pt resting comfortably in bed. Family at the bedside.    Oncology Treatment Plan:   [Could not find a treatment plan. This SmartLink may be configured incorrectly. Contact a  for help.]    Medications:  Continuous Infusions:   hydromorphone in 0.9 % NaCl 6 mg/30 ml       Scheduled Meds:   azithromycin  500 mg Intravenous Q24H    cefTRIAXone (ROCEPHIN) IVPB  2 g Intravenous Q24H    diphenhydrAMINE  25 mg Intravenous Once    enoxaparin  40 mg Subcutaneous Daily    folic acid  1 mg Oral Daily    heparin (porcine)  4,000 Units Intravenous Once    polyethylene glycol  17 g Oral BID    senna-docusate 8.6-50 mg  1 tablet Oral BID     PRN Meds:sodium chloride, acetaminophen, acetaminophen, aluminum-magnesium hydroxide-simethicone, bisacodyL, naloxone, ondansetron, simethicone, sodium chloride 0.9%, sodium chloride 0.9%      Review of Systems   Constitutional:  Positive for activity change, appetite change and fever. Negative for fatigue.   HENT: Negative.  Negative for ear pain, facial swelling and hearing loss.    Eyes:  Negative for pain and redness.   Respiratory:  Positive for cough and shortness of breath.    Cardiovascular:  Positive for chest pain. Negative for leg swelling.   Gastrointestinal: Negative.  Negative for abdominal distention, diarrhea, nausea and vomiting.   Endocrine: Negative for polydipsia and polyphagia.   Genitourinary:  Negative for difficulty urinating, dysuria, flank pain and hematuria.   Musculoskeletal:  Positive for arthralgias, back pain and myalgias. Negative for gait problem, joint swelling and neck pain.   Skin:  Positive for pallor.   Allergic/Immunologic: Negative for food allergies.   Neurological: Negative.  Negative for facial asymmetry and speech difficulty.   Hematological: Negative.  Does not bruise/bleed easily.   Psychiatric/Behavioral: Negative.  Negative for agitation, behavioral problems, confusion, dysphoric mood and suicidal ideas. The patient is not nervous/anxious.    Objective:     Vital Signs (Most Recent):  Temp: (!) 102.7 °F (39.3 °C) (06/16/22 1631)  Pulse: (!) 125 (06/16/22 1700)  Resp: 18 (06/16/22 1631)  BP: (!) 129/58 (06/16/22 1631)  SpO2: (!) 94 % (06/16/22 1631)   Vital Signs (24h Range):  Temp:  [100.4 °F (38 °C)-103.2 °F (39.6 °C)] 102.7 °F (39.3 °C)  Pulse:  [109-129] 125  Resp:  [18-24] 18  SpO2:  [90 %-96 %] 94 %  BP: (108-131)/(53-63) 129/58     Weight: 52.6 kg (115 lb 15.4 oz)  Body mass index is 21.21 kg/m².  Body surface area is 1.52 meters squared.      Intake/Output Summary (Last 24 hours) at 6/16/2022 1920  Last data filed at 6/16/2022 1743  Gross per 24 hour   Intake 389.19 ml   Output --   Net 389.19 ml       Physical Exam  Constitutional:       General: She is not in acute distress.     Appearance: She is ill-appearing. She is not diaphoretic.   HENT:       Head: Normocephalic and atraumatic.      Mouth/Throat:      Mouth: Mucous membranes are dry.   Eyes:      General:         Right eye: No discharge.         Left eye: No discharge.      Extraocular Movements: Extraocular movements intact.      Conjunctiva/sclera: Conjunctivae normal.      Pupils: Pupils are equal, round, and reactive to light.   Cardiovascular:      Rate and Rhythm: Regular rhythm. Tachycardia present.      Pulses: Normal pulses.   Pulmonary:      Comments: BBS diminished  SOB with exertion  Abdominal:      General: Bowel sounds are normal. There is no distension.      Palpations: Abdomen is soft.      Tenderness: There is no abdominal tenderness. There is no guarding.   Genitourinary:     Comments: Not examined  Musculoskeletal:         General: Normal range of motion.      Cervical back: Normal range of motion and neck supple. No rigidity or tenderness.      Right lower leg: No edema.      Left lower leg: No edema.   Skin:     General: Skin is warm and dry.      Capillary Refill: Capillary refill takes less than 2 seconds.      Coloration: Skin is pale.   Neurological:      General: No focal deficit present.      Mental Status: She is alert and oriented to person, place, and time. Mental status is at baseline.      Cranial Nerves: No cranial nerve deficit.   Psychiatric:         Mood and Affect: Mood normal.         Behavior: Behavior normal.         Thought Content: Thought content normal.         Judgment: Judgment normal.       Significant Labs:   CBC:   Recent Labs   Lab 06/15/22  0834 06/16/22  0559   WBC 19.64* 15.09*   HGB 6.5* 6.1*   HCT 18.2* 17.4*    167   , CMP:   Recent Labs   Lab 06/15/22  0834 06/16/22  0559   * 129*   K 4.3 3.8    96   CO2 22* 24   * 99   BUN 8 9   CREATININE 0.6 0.6   CALCIUM 9.1 8.4*   PROT 8.2 7.0   ALBUMIN 4.3 3.7   BILITOT 7.6* 8.3*   ALKPHOS 127 126   * 80*   ALT 33 34   ANIONGAP 10 9   EGFRNONAA >60 >60   , LDH: No results  for input(s): LDHCSF, BFSOURCE in the last 48 hours., and Reticulocytes:   Recent Labs   Lab 06/15/22  0834   RETIC >23.0*       Diagnostic Results:  I have reviewed all pertinent imaging results/findings within the past 24 hours.    Assessment/Plan:     * Sickle cell crisis with symptomatic anemia  Sickle cell pain crisis:  Presentation concerning for acute chest syndrome.  Typically rare pain crises once yearly with rare need for blood transfusion but has had acute chest and exchange transfusion in years past.  Most recent hemoglobin S quantification 53% baseline in absence of sickle cell pain crisis April 2022. Current presentation with fever, tachypnea, tachycardia, leukocytosis, elevated retic in bilirubin with progressive anemia to hemoglobin 6.5 from baseline 9.9.  Elevated D-dimer with CTA chest negative for pulmonary embolism but did show bibasilar pulmonary density.    In ER can get initial simple txfusion 1uprbc while awaiting exchange transfusion. I discussed case with transfusion Medicine who agrees with exchange transfusion.  I have asked in ER to place a pheresis line - dialysis catheter such as a Trialysis or Brevia; consent by ER team please  Hemoglobin S quantification (53% in 4/2022)  Trend cbc, retic, bili  Empitic antibiotics  Would recommend lower extremity Doppler to rule out thrombosis given elevation in D-dimer.  CTA chest negative for pulmonary embolism.  If no evidence of thrombosis DVT prophylaxis would be recommended  Ivf prn for euvolemia  Supplemental O2  Analgesia per primary team/PCA prn    06/16/2022  Plan for exchange transfusion this AM  Continue supportive care  Continue with previous recommendations--see above        Thank you for your consult. I will follow-up with patient. Please contact us if you have any additional questions.     Patricia Owens NP  Hematology/Oncology  [facility]

## 2022-06-18 LAB
ALBUMIN SERPL BCP-MCNC: 2.7 G/DL (ref 3.5–5.2)
ALP SERPL-CCNC: 74 U/L (ref 55–135)
ALT SERPL W/O P-5'-P-CCNC: 20 U/L (ref 10–44)
ANION GAP SERPL CALC-SCNC: 8 MMOL/L (ref 8–16)
AST SERPL-CCNC: 27 U/L (ref 10–40)
BASOPHILS # BLD AUTO: 0.04 K/UL (ref 0–0.2)
BASOPHILS NFR BLD: 0.4 % (ref 0–1.9)
BILIRUB SERPL-MCNC: 3.1 MG/DL (ref 0.1–1)
BNP SERPL-MCNC: <10 PG/ML (ref 0–99)
BUN SERPL-MCNC: 4 MG/DL (ref 6–20)
CALCIUM SERPL-MCNC: 7.5 MG/DL (ref 8.7–10.5)
CHLORIDE SERPL-SCNC: 101 MMOL/L (ref 95–110)
CO2 SERPL-SCNC: 24 MMOL/L (ref 23–29)
CREAT SERPL-MCNC: 0.5 MG/DL (ref 0.5–1.4)
DIFFERENTIAL METHOD: ABNORMAL
EOSINOPHIL # BLD AUTO: 0.1 K/UL (ref 0–0.5)
EOSINOPHIL NFR BLD: 0.6 % (ref 0–8)
ERYTHROCYTE [DISTWIDTH] IN BLOOD BY AUTOMATED COUNT: 16.8 % (ref 11.5–14.5)
EST. GFR  (AFRICAN AMERICAN): >60 ML/MIN/1.73 M^2
EST. GFR  (NON AFRICAN AMERICAN): >60 ML/MIN/1.73 M^2
GLUCOSE SERPL-MCNC: 116 MG/DL (ref 70–110)
HCT VFR BLD AUTO: 23.6 % (ref 37–48.5)
HGB BLD-MCNC: 7.7 G/DL (ref 12–16)
IMM GRANULOCYTES # BLD AUTO: 0.13 K/UL (ref 0–0.04)
IMM GRANULOCYTES NFR BLD AUTO: 1.3 % (ref 0–0.5)
LDH SERPL L TO P-CCNC: 612 U/L (ref 110–260)
LYMPHOCYTES # BLD AUTO: 2.1 K/UL (ref 1–4.8)
LYMPHOCYTES NFR BLD: 20.1 % (ref 18–48)
MAGNESIUM SERPL-MCNC: 1.9 MG/DL (ref 1.6–2.6)
MCH RBC QN AUTO: 29.3 PG (ref 27–31)
MCHC RBC AUTO-ENTMCNC: 32.6 G/DL (ref 32–36)
MCV RBC AUTO: 90 FL (ref 82–98)
MONOCYTES # BLD AUTO: 1 K/UL (ref 0.3–1)
MONOCYTES NFR BLD: 9.3 % (ref 4–15)
NEUTROPHILS # BLD AUTO: 7.1 K/UL (ref 1.8–7.7)
NEUTROPHILS NFR BLD: 68.3 % (ref 38–73)
NRBC BLD-RTO: 46 /100 WBC
PHOSPHATE SERPL-MCNC: 2 MG/DL (ref 2.7–4.5)
PLATELET # BLD AUTO: 131 K/UL (ref 150–450)
PMV BLD AUTO: 11.8 FL (ref 9.2–12.9)
POTASSIUM SERPL-SCNC: 3.5 MMOL/L (ref 3.5–5.1)
PROCALCITONIN SERPL IA-MCNC: 0.67 NG/ML
PROT SERPL-MCNC: 5.4 G/DL (ref 6–8.4)
RBC # BLD AUTO: 2.63 M/UL (ref 4–5.4)
SODIUM SERPL-SCNC: 133 MMOL/L (ref 136–145)
WBC # BLD AUTO: 10.32 K/UL (ref 3.9–12.7)

## 2022-06-18 PROCEDURE — 63600175 PHARM REV CODE 636 W HCPCS: Performed by: INTERNAL MEDICINE

## 2022-06-18 PROCEDURE — 36415 COLL VENOUS BLD VENIPUNCTURE: CPT | Performed by: INTERNAL MEDICINE

## 2022-06-18 PROCEDURE — 80053 COMPREHEN METABOLIC PANEL: CPT | Performed by: INTERNAL MEDICINE

## 2022-06-18 PROCEDURE — 21400001 HC TELEMETRY ROOM

## 2022-06-18 PROCEDURE — 99232 SBSQ HOSP IP/OBS MODERATE 35: CPT | Mod: ,,, | Performed by: INTERNAL MEDICINE

## 2022-06-18 PROCEDURE — 27000221 HC OXYGEN, UP TO 24 HOURS

## 2022-06-18 PROCEDURE — 63600175 PHARM REV CODE 636 W HCPCS: Performed by: NURSE PRACTITIONER

## 2022-06-18 PROCEDURE — 25000003 PHARM REV CODE 250: Performed by: NURSE PRACTITIONER

## 2022-06-18 PROCEDURE — 99232 PR SUBSEQUENT HOSPITAL CARE,LEVL II: ICD-10-PCS | Mod: ,,, | Performed by: INTERNAL MEDICINE

## 2022-06-18 PROCEDURE — 94761 N-INVAS EAR/PLS OXIMETRY MLT: CPT

## 2022-06-18 PROCEDURE — 83735 ASSAY OF MAGNESIUM: CPT | Performed by: INTERNAL MEDICINE

## 2022-06-18 PROCEDURE — 84145 PROCALCITONIN (PCT): CPT | Performed by: INTERNAL MEDICINE

## 2022-06-18 PROCEDURE — 25000003 PHARM REV CODE 250: Performed by: INTERNAL MEDICINE

## 2022-06-18 PROCEDURE — 84100 ASSAY OF PHOSPHORUS: CPT | Performed by: INTERNAL MEDICINE

## 2022-06-18 PROCEDURE — 85025 COMPLETE CBC W/AUTO DIFF WBC: CPT | Performed by: INTERNAL MEDICINE

## 2022-06-18 PROCEDURE — 83880 ASSAY OF NATRIURETIC PEPTIDE: CPT | Performed by: INTERNAL MEDICINE

## 2022-06-18 PROCEDURE — 83615 LACTATE (LD) (LDH) ENZYME: CPT | Performed by: INTERNAL MEDICINE

## 2022-06-18 PROCEDURE — 99900035 HC TECH TIME PER 15 MIN (STAT)

## 2022-06-18 RX ORDER — LEVOFLOXACIN 750 MG/1
750 TABLET ORAL DAILY
Status: DISCONTINUED | OUTPATIENT
Start: 2022-06-19 | End: 2022-06-20 | Stop reason: HOSPADM

## 2022-06-18 RX ORDER — BENZONATATE 100 MG/1
100 CAPSULE ORAL 3 TIMES DAILY PRN
Status: DISCONTINUED | OUTPATIENT
Start: 2022-06-18 | End: 2022-06-20 | Stop reason: HOSPADM

## 2022-06-18 RX ADMIN — LACTULOSE 20 G: 20 SOLUTION ORAL at 09:06

## 2022-06-18 RX ADMIN — POLYETHYLENE GLYCOL 3350 17 G: 17 POWDER, FOR SOLUTION ORAL at 09:06

## 2022-06-18 RX ADMIN — FOLIC ACID 1 MG: 1 TABLET ORAL at 09:06

## 2022-06-18 RX ADMIN — ACETAMINOPHEN 650 MG: 325 TABLET ORAL at 03:06

## 2022-06-18 RX ADMIN — ACETAMINOPHEN 650 MG: 325 TABLET ORAL at 05:06

## 2022-06-18 RX ADMIN — SODIUM CHLORIDE: 0.9 INJECTION, SOLUTION INTRAVENOUS at 04:06

## 2022-06-18 RX ADMIN — ENOXAPARIN SODIUM 40 MG: 40 INJECTION SUBCUTANEOUS at 05:06

## 2022-06-18 RX ADMIN — GUAIFENESIN AND DEXTROMETHORPHAN HYDROBROMIDE 1 TABLET: 600; 30 TABLET, EXTENDED RELEASE ORAL at 11:06

## 2022-06-18 RX ADMIN — PANTOPRAZOLE SODIUM 40 MG: 40 TABLET, DELAYED RELEASE ORAL at 09:06

## 2022-06-18 RX ADMIN — LEVOFLOXACIN 750 MG: 750 INJECTION, SOLUTION INTRAVENOUS at 09:06

## 2022-06-18 RX ADMIN — ACETAMINOPHEN 650 MG: 325 TABLET ORAL at 09:06

## 2022-06-18 RX ADMIN — Medication: at 04:06

## 2022-06-18 RX ADMIN — GUAIFENESIN AND DEXTROMETHORPHAN HYDROBROMIDE 1 TABLET: 600; 30 TABLET, EXTENDED RELEASE ORAL at 09:06

## 2022-06-18 NOTE — ASSESSMENT & PLAN NOTE
6/15 Patient  with fever and signs of possible pneumonia and Vs volume overload  Continue Iv Abx for now  Gentle diuresis trial   Monitor volume status

## 2022-06-18 NOTE — PLAN OF CARE
AAOx4. No signs of distress. Able to verbalize needs and follow commands. Calm and Cooperative. Report generalized pain 4/10. PCA pump effective. Vital signs stable. Sinus Rhythm on tele monitor. On 3L O2 via NC.  PIV is patent.  Pt continent of b/b. Requires assist x1. Pt remains free of falls. Meds given as prescribed.  POC reviewed with pt and pt verbalized understanding. Interventions implemented as appropriate. Pt able to turn self. Encouraged pt  to turn frequently. Educated on importance of adhering to POC after dc; verbalized understanding. Resting quietly in bed.   Bed low. Side rails up x2, wheels locked, call light within reach.

## 2022-06-18 NOTE — SUBJECTIVE & OBJECTIVE
Interval History:  Patient states that she feels a little bit better status post exchange transfusion    Oncology Treatment Plan:   [Could not find a treatment plan. This SmartLink may be configured incorrectly. Contact a  for help.]    Medications:  Continuous Infusions:   sodium chloride 0.9% 100 mL/hr at 06/18/22 0420    hydromorphone in 0.9 % NaCl 6 mg/30 ml       Scheduled Meds:   acetaminophen  650 mg Oral TID    enoxaparin  40 mg Subcutaneous Daily    folic acid  1 mg Oral Daily    heparin (porcine)  4,000 Units Intravenous Once    lactulose  20 g Oral BID    levoFLOXacin  750 mg Intravenous Q24H    pantoprazole  40 mg Oral BID    polyethylene glycol  17 g Oral BID     PRN Meds:sodium chloride, sodium chloride, acetaminophen, acetaminophen, aluminum-magnesium hydroxide-simethicone, bisacodyL, naloxone, ondansetron, simethicone, sodium chloride 0.9%, sodium chloride 0.9%     Review of Systems   Constitutional:  Positive for fatigue. Negative for activity change, appetite change, chills, diaphoresis, fever and unexpected weight change.   HENT:  Negative for congestion, dental problem, drooling, ear discharge, ear pain, facial swelling, hearing loss, mouth sores, nosebleeds, postnasal drip, rhinorrhea, sinus pressure, sneezing, sore throat, tinnitus, trouble swallowing and voice change.    Eyes:  Negative for photophobia, pain, discharge, redness, itching and visual disturbance.   Respiratory:  Negative for cough, choking, chest tightness, shortness of breath, wheezing and stridor.    Cardiovascular:  Positive for chest pain. Negative for palpitations and leg swelling.   Gastrointestinal:  Negative for abdominal distention, abdominal pain, anal bleeding, blood in stool, constipation, diarrhea, nausea, rectal pain and vomiting.   Endocrine: Negative for cold intolerance, heat intolerance, polydipsia, polyphagia and polyuria.   Genitourinary:  Negative for decreased urine volume, difficulty  urinating, dyspareunia, dysuria, enuresis, flank pain, frequency, genital sores, hematuria, menstrual problem, pelvic pain, urgency, vaginal bleeding, vaginal discharge and vaginal pain.   Musculoskeletal:  Positive for arthralgias, back pain and myalgias. Negative for gait problem, joint swelling, neck pain and neck stiffness.   Skin:  Negative for color change, pallor and rash.   Allergic/Immunologic: Negative for environmental allergies, food allergies and immunocompromised state.   Neurological:  Positive for weakness. Negative for dizziness, tremors, seizures, syncope, facial asymmetry, speech difficulty, light-headedness, numbness and headaches.   Hematological:  Negative for adenopathy. Does not bruise/bleed easily.   Psychiatric/Behavioral:  Positive for dysphoric mood. Negative for agitation, behavioral problems, confusion, decreased concentration, hallucinations, self-injury, sleep disturbance and suicidal ideas. The patient is nervous/anxious. The patient is not hyperactive.    Objective:     Vital Signs (Most Recent):  Temp: (!) 100.8 °F (38.2 °C) (06/18/22 0704)  Pulse: 109 (06/18/22 0704)  Resp: 18 (06/18/22 0704)  BP: 114/62 (06/18/22 0704)  SpO2: (!) 94 % (06/18/22 0704)   Vital Signs (24h Range):  Temp:  [98 °F (36.7 °C)-101.4 °F (38.6 °C)] 100.8 °F (38.2 °C)  Pulse:  [] 109  Resp:  [17-23] 18  SpO2:  [90 %-98 %] 94 %  BP: (110-129)/(60-75) 114/62     Weight: 52.7 kg (116 lb 2.9 oz)  Body mass index is 21.25 kg/m².  Body surface area is 1.52 meters squared.      Intake/Output Summary (Last 24 hours) at 6/18/2022 0933  Last data filed at 6/18/2022 0658  Gross per 24 hour   Intake 6004.56 ml   Output --   Net 6004.56 ml       Physical Exam    Significant Labs:   BMP:   Recent Labs   Lab 06/17/22  0434 06/18/22  0646    116*   * 133*   K 4.3 3.5   CL 97 101   CO2 24 24   BUN 6 4*   CREATININE 0.5 0.5   CALCIUM 8.3* 7.5*   MG  --  1.9   , CBC:   Recent Labs   Lab 06/17/22  8713  06/18/22 0646   WBC 13.74* 10.32   HGB 5.5* 7.7*   HCT 16.2* 23.6*    131*   , CMP:   Recent Labs   Lab 06/17/22 0434 06/18/22 0646   * 133*   K 4.3 3.5   CL 97 101   CO2 24 24    116*   BUN 6 4*   CREATININE 0.5 0.5   CALCIUM 8.3* 7.5*   PROT 6.6 5.4*   ALBUMIN 3.4* 2.7*   BILITOT 7.2* 3.1*   ALKPHOS 108 74   AST 54* 27   ALT 34 20   ANIONGAP 10 8   EGFRNONAA >60 >60   , Coagulation: No results for input(s): PT, INR, APTT in the last 48 hours., Haptoglobin: No results for input(s): HAPTOGLOBIN in the last 48 hours., Immunology: No results for input(s): SPEP, DANISHA, BETI, FREELAMBDALI in the last 48 hours., LDH: No results for input(s): LDHCSF, BFSOURCE in the last 48 hours., LFTs:   Recent Labs   Lab 06/17/22 0434 06/18/22 0646   ALT 34 20   AST 54* 27   ALKPHOS 108 74   BILITOT 7.2* 3.1*   PROT 6.6 5.4*   ALBUMIN 3.4* 2.7*   , Reticulocytes:   Recent Labs   Lab 06/17/22 0434   RETIC >23.0*   , Tumor Markers: No results for input(s): PSA, CEA, , AFPTM, [id number],  in the last 48 hours.    Invalid input(s): ALGTM, Uric Acid No results for input(s): URICACID in the last 48 hours., and Urine Studies: No results for input(s): COLORU, APPEARANCEUA, PHUR, SPECGRAV, PROTEINUA, GLUCUA, KETONESU, BILIRUBINUA, OCCULTUA, NITRITE, UROBILINOGEN, LEUKOCYTESUR, RBCUA, WBCUA, BACTERIA, SQUAMEPITHEL, HYALINECASTS in the last 48 hours.    Invalid input(s): WRIGHTSUR    Diagnostic Results:  I have reviewed all pertinent imaging results/findings within the past 24 hours.

## 2022-06-18 NOTE — ASSESSMENT & PLAN NOTE
This patient does have evidence of infective focus  My overall impression is Severe  sepsis. Vital signs were reviewed and noted in progress note.  Antibiotics given-   Antibiotics (From admission, onward)            Start     Stop Route Frequency Ordered    06/16/22 1000  azithromycin 500 mg in dextrose 5 % 250 mL IVPB (ready to mix system)         -- IV Every 24 hours (non-standard times) 06/15/22 1226    06/16/22 0900  cefTRIAXone (ROCEPHIN) 2 g/50 mL D5W IVPB         -- IV Every 24 hours (non-standard times) 06/15/22 1226        Cultures were taken-   Microbiology Results (last 7 days)     Procedure Component Value Units Date/Time    Blood culture #1 **CANNOT BE ORDERED STAT** [[id number]] Collected: 06/15/22 0834    Order Status: Sent Specimen: Blood from Peripheral, Hand, Right Updated: 06/15/22 1524    Blood culture #2 **CANNOT BE ORDERED STAT** [[id number]] Collected: 06/15/22 0834    Order Status: Sent Specimen: Blood from Peripheral, Hand, Left Updated: 06/15/22 1524        Latest lactate reviewed, they are-  Recent Labs   Lab 06/15/22  0834   LACTATE 1.0     Organ- Hypoxemia, elevated liver enzymes     Source- pneumonia     Source control Achieved by- Iv ABx   6/17-  Antibiotic switched out to IV Levofloxacin due to rare complication of ceftriaxone-induced hyperhemolysis.

## 2022-06-18 NOTE — SUBJECTIVE & OBJECTIVE
Interval History:   S/P exchange transfusion yesterday. Hgb 7.7 today , , T.bili down to 3.1>8.3. Gentle hydration, IV Levofloxacin  and PCA pump continues.       Review of Systems   Constitutional:  Positive for activity change, appetite change and fatigue. Negative for fever.   HENT:  Negative for sore throat.    Eyes:  Negative for visual disturbance.   Respiratory:  Positive for cough and shortness of breath (improved). Negative for chest tightness.    Cardiovascular:  Positive for leg swelling (currently improved). Negative for chest pain and palpitations.   Gastrointestinal:  Positive for abdominal pain (currently improved) and constipation. Negative for abdominal distention, diarrhea, nausea and vomiting.   Endocrine: Negative for polyuria.   Genitourinary:  Negative for decreased urine volume, dysuria, flank pain, frequency and hematuria.   Musculoskeletal:  Positive for back pain (currently improved). Negative for gait problem.   Skin:  Negative for rash.   Neurological:  Negative for syncope, speech difficulty, weakness, light-headedness and headaches.   Psychiatric/Behavioral:  Negative for confusion, hallucinations and sleep disturbance.    Objective:     Vital Signs (Most Recent):  Temp: 98 °F (36.7 °C) (06/18/22 1149)  Pulse: 106 (06/18/22 1149)  Resp: 18 (06/18/22 1149)  BP: 115/60 (06/18/22 1149)  SpO2: 97 % (06/18/22 1149) Vital Signs (24h Range):  Temp:  [98 °F (36.7 °C)-101.4 °F (38.6 °C)] 98 °F (36.7 °C)  Pulse:  [] 106  Resp:  [17-23] 18  SpO2:  [90 %-98 %] 97 %  BP: (110-129)/(60-75) 115/60     Weight: 52.7 kg (116 lb 2.9 oz)  Body mass index is 21.25 kg/m².    Intake/Output Summary (Last 24 hours) at 6/18/2022 1216  Last data filed at 6/18/2022 0658  Gross per 24 hour   Intake 374.56 ml   Output --   Net 374.56 ml      Physical Exam  Constitutional:       General: She is not in acute distress.     Appearance: She is well-developed. She is not diaphoretic.   HENT:      Head:  Normocephalic and atraumatic.      Mouth/Throat:      Pharynx: No oropharyngeal exudate.   Eyes:      General: Scleral icterus present.      Conjunctiva/sclera: Conjunctivae normal.      Pupils: Pupils are equal, round, and reactive to light.   Neck:      Thyroid: No thyromegaly.      Vascular: No JVD.   Cardiovascular:      Rate and Rhythm: Normal rate and regular rhythm.      Heart sounds: Normal heart sounds. No murmur heard.  Pulmonary:      Effort: Pulmonary effort is normal. No respiratory distress.      Breath sounds: Normal breath sounds. No wheezing or rales.   Chest:      Chest wall: No tenderness.   Abdominal:      General: Bowel sounds are normal. There is no distension.      Palpations: Abdomen is soft.      Tenderness: There is no abdominal tenderness. There is no guarding or rebound.   Musculoskeletal:         General: Normal range of motion.      Cervical back: Normal range of motion and neck supple.   Lymphadenopathy:      Cervical: No cervical adenopathy.   Skin:     General: Skin is warm and dry.      Findings: No rash.   Neurological:      Mental Status: She is alert and oriented to person, place, and time.      Cranial Nerves: No cranial nerve deficit.      Sensory: No sensory deficit.      Deep Tendon Reflexes: Reflexes normal.       Significant Labs: All pertinent labs within the past 24 hours have been reviewed.  BMP:   Recent Labs   Lab 06/18/22  0646   *   *   K 3.5      CO2 24   BUN 4*   CREATININE 0.5   CALCIUM 7.5*   MG 1.9     CBC:   Recent Labs   Lab 06/17/22  0434 06/18/22  0646   WBC 13.74* 10.32   HGB 5.5* 7.7*   HCT 16.2* 23.6*    131*     CMP:   Recent Labs   Lab 06/17/22  0434 06/18/22  0646   * 133*   K 4.3 3.5   CL 97 101   CO2 24 24    116*   BUN 6 4*   CREATININE 0.5 0.5   CALCIUM 8.3* 7.5*   PROT 6.6 5.4*   ALBUMIN 3.4* 2.7*   BILITOT 7.2* 3.1*   ALKPHOS 108 74   AST 54* 27   ALT 34 20   ANIONGAP 10 8   EGFRNONAA >60 >60        Significant Imaging:

## 2022-06-18 NOTE — ASSESSMENT & PLAN NOTE
6/15- Telemetry  Patient ordered for 1 unit PRBCs transfusion  Hematology consulted  Dilaudid PCA  Trend anemia    6/16:  Concern for ACS  Compatible blood pending for exchange transfusion  6/17-  Exchange transfusion initiated today and pt tolerated well. Post exchange HbS was drawn with goal <30%.  Supplemental oxygen   Gentle IV fluid with careful monitoring of volume status   6/18-  Hgb 7.7 today   Gentle hydration, IV antibiotic and PCA pump continues

## 2022-06-18 NOTE — ASSESSMENT & PLAN NOTE
6/15 MOM x 1 dose  Add bid miralax  6/17-  Pt c/o severe gen abd tenderness   CT abd/pelvis showed gas distended large bowel with large fecal ball within the cecum. Will try brown bomb enema.   6/18-  Good bowel movement per pt after enema

## 2022-06-18 NOTE — ASSESSMENT & PLAN NOTE
6/15  Suspected multifactorial  Patient in with symptomatic anemia, signs of pneumonia, Acute chest syndrome  and or possible acute volume overload  Treat underlying cause   Monitor clinical course

## 2022-06-18 NOTE — ASSESSMENT & PLAN NOTE
Sickle cell pain crisis:  Presentation concerning for acute chest syndrome.  Typically rare pain crises once yearly with rare need for blood transfusion but has had acute chest and exchange transfusion in years past.  Most recent hemoglobin S quantification 53% baseline in absence of sickle cell pain crisis April 2022. Current presentation with fever, tachypnea, tachycardia, leukocytosis, elevated retic in bilirubin with progressive anemia to hemoglobin 6.5 from baseline 9.9.  Elevated D-dimer with CTA chest negative for pulmonary embolism but did show bibasilar pulmonary density.    In ER can get initial simple txfusion 1uprbc while awaiting exchange transfusion. I discussed case with transfusion Medicine who agrees with exchange transfusion.  I have asked in ER to place a pheresis line - dialysis catheter such as a Trialysis or Brevia; consent by ER team please  Hemoglobin S quantification (53% in 4/2022)  Trend cbc, retic, bili  Empitic antibiotics  Would recommend lower extremity Doppler to rule out thrombosis given elevation in D-dimer.  CTA chest negative for pulmonary embolism.  If no evidence of thrombosis DVT prophylaxis would be recommended  Ivf prn for euvolemia  Supplemental O2  Analgesia per primary team/PCA prn    06/16/2022  Plan for exchange transfusion this AM  Continue supportive care  Continue with previous recommendations--see above    06/17/2022  Exchange transfusion in process  Continue supportive care     06/18/2022.  Patient completed exchange transfusion.  She states she feels somewhat better she states that her cough is improved.  Hemoglobin is stable hopefully will patient's condition will improve over the next 24-48 hours status post exchange transfusion

## 2022-06-18 NOTE — PROGRESS NOTES
[facility] Medicine  Progress Note    Patient Name: Elizabeth Franco  MRN: [id number]  Patient Class: IP- Inpatient   Admission Date: 6/15/2022  Length of Stay: 3 days  Attending Physician: Ryann Alaniz MD  Primary Care Provider: Elvira Nuñez MD        Subjective:     Principal Problem:Acute chest syndrome due to sickle cell crisis        HPI:   Sickle Cell Pain Crisis       Pt reports pain to back and chest and tried her home Oxycodone with no relief       Per ER- This  is a 26 y.o. female patient with a PMHx of sickle cell anemia and Hidradenitis suppurativa who presents to the Emergency Department for sickle cell pain crisis, onset this morning. Pt report chest pain and back pain. Symptoms are constant and moderate in severity. No mitigating or exacerbating factors reported. Associated sxs include fever (Tnow 100.9). Patient denies any chills, n/v/d, SOB, weakness, numbness, dizziness, headache, and all other sxs at this time. Prior Tx includes Oxycodone 5 mg, with no improvement of sxs. No further complaints or concerns at this time.     Patient was evaluated by ER and noted to have symptomatic anemia with signs of pneumonia and or possible volume overload and was placed in Observation.             Overview/Hospital Course:  6/16: Pt states continued CP, SOB slightly improved from day before. Bleeding for catheter site, back to OR for suture placement. Blood type and screen with multiple antibodies requiring different facility check for compatible blood transfusion. Tmax 102F    6/17- Exchange transfusion initiated today and pt tolerated well. Post exchange HbS was drawn with goal <30%. Pt is seen post exchange transfusion treatment and c/o generalized abd tenderness and no bowel movement since admission. (+) Flatus . On exam, active bowel sounds are present, however  severe generalized tenderness appreciated on palpation. CT abd/pelvis done today showed normal spleen, adrenal and liver,  gas distended large bowel with large fecal ball within the cecum. Will try brown bomb enema.     Persistent fever with Tmax 103.1 and tachycardia noted. Antibiotic switched out to IV Levofloxacin due to rare complication of ceftriaxone-induced hyperhemolysis. Continue supplemental oxygen. No clinical signs of hypervolemia noted. Echo resulted LVEF 60%, CVP 3. Will try gentle hydration with careful monitoring of volume status.     6/18- Tmax 101.4 yesterday . C/O productive cough with yellow green sputum, chest pain with cough. SOB is better. On O2 at 3L/min. Back and leg pain are improving. Abdominal soreness is better after bowel movement with enema. S/P exchange transfusion yesterday. Hgb 7.7 today , , T.bili down to 3.1>8.3. Gentle hydration, IV Levofloxacin  and PCA pump continues.       Interval History:   S/P exchange transfusion yesterday. Hgb 7.7 today , , T.bili down to 3.1>8.3. Gentle hydration, IV Levofloxacin  and PCA pump continues.       Review of Systems   Constitutional:  Positive for activity change, appetite change and fatigue. Negative for fever.   HENT:  Negative for sore throat.    Eyes:  Negative for visual disturbance.   Respiratory:  Positive for cough and shortness of breath (improved). Negative for chest tightness.    Cardiovascular:  Positive for leg swelling (currently improved). Negative for chest pain and palpitations.   Gastrointestinal:  Positive for abdominal pain (currently improved) and constipation. Negative for abdominal distention, diarrhea, nausea and vomiting.   Endocrine: Negative for polyuria.   Genitourinary:  Negative for decreased urine volume, dysuria, flank pain, frequency and hematuria.   Musculoskeletal:  Positive for back pain (currently improved). Negative for gait problem.   Skin:  Negative for rash.   Neurological:  Negative for syncope, speech difficulty, weakness, light-headedness and headaches.   Psychiatric/Behavioral:  Negative for confusion,  hallucinations and sleep disturbance.    Objective:     Vital Signs (Most Recent):  Temp: 98 °F (36.7 °C) (06/18/22 1149)  Pulse: 106 (06/18/22 1149)  Resp: 18 (06/18/22 1149)  BP: 115/60 (06/18/22 1149)  SpO2: 97 % (06/18/22 1149) Vital Signs (24h Range):  Temp:  [98 °F (36.7 °C)-101.4 °F (38.6 °C)] 98 °F (36.7 °C)  Pulse:  [] 106  Resp:  [17-23] 18  SpO2:  [90 %-98 %] 97 %  BP: (110-129)/(60-75) 115/60     Weight: 52.7 kg (116 lb 2.9 oz)  Body mass index is 21.25 kg/m².    Intake/Output Summary (Last 24 hours) at 6/18/2022 1216  Last data filed at 6/18/2022 0658  Gross per 24 hour   Intake 374.56 ml   Output --   Net 374.56 ml      Physical Exam  Constitutional:       General: She is not in acute distress.     Appearance: She is well-developed. She is not diaphoretic.   HENT:      Head: Normocephalic and atraumatic.      Mouth/Throat:      Pharynx: No oropharyngeal exudate.   Eyes:      General: Scleral icterus present.      Conjunctiva/sclera: Conjunctivae normal.      Pupils: Pupils are equal, round, and reactive to light.   Neck:      Thyroid: No thyromegaly.      Vascular: No JVD.   Cardiovascular:      Rate and Rhythm: Normal rate and regular rhythm.      Heart sounds: Normal heart sounds. No murmur heard.  Pulmonary:      Effort: Pulmonary effort is normal. No respiratory distress.      Breath sounds: Normal breath sounds. No wheezing or rales.   Chest:      Chest wall: No tenderness.   Abdominal:      General: Bowel sounds are normal. There is no distension.      Palpations: Abdomen is soft.      Tenderness: There is no abdominal tenderness. There is no guarding or rebound.   Musculoskeletal:         General: Normal range of motion.      Cervical back: Normal range of motion and neck supple.   Lymphadenopathy:      Cervical: No cervical adenopathy.   Skin:     General: Skin is warm and dry.      Findings: No rash.   Neurological:      Mental Status: She is alert and oriented to person, place, and  time.      Cranial Nerves: No cranial nerve deficit.      Sensory: No sensory deficit.      Deep Tendon Reflexes: Reflexes normal.       Significant Labs: All pertinent labs within the past 24 hours have been reviewed.  BMP:   Recent Labs   Lab 06/18/22  0646   *   *   K 3.5      CO2 24   BUN 4*   CREATININE 0.5   CALCIUM 7.5*   MG 1.9     CBC:   Recent Labs   Lab 06/17/22  0434 06/18/22  0646   WBC 13.74* 10.32   HGB 5.5* 7.7*   HCT 16.2* 23.6*    131*     CMP:   Recent Labs   Lab 06/17/22  0434 06/18/22  0646   * 133*   K 4.3 3.5   CL 97 101   CO2 24 24    116*   BUN 6 4*   CREATININE 0.5 0.5   CALCIUM 8.3* 7.5*   PROT 6.6 5.4*   ALBUMIN 3.4* 2.7*   BILITOT 7.2* 3.1*   ALKPHOS 108 74   AST 54* 27   ALT 34 20   ANIONGAP 10 8   EGFRNONAA >60 >60       Significant Imaging:       Assessment/Plan:      * Acute chest syndrome due to sickle cell crisis  6/15- Telemetry  Patient ordered for 1 unit PRBCs transfusion  Hematology consulted  Dilaudid PCA  Trend anemia    6/16:  Concern for ACS  Compatible blood pending for exchange transfusion  6/17-  Exchange transfusion initiated today and pt tolerated well. Post exchange HbS was drawn with goal <30%.  Supplemental oxygen   Gentle IV fluid with careful monitoring of volume status   6/18-  Hgb 7.7 today   Gentle hydration, IV antibiotic and PCA pump continues     Severe sepsis  This patient does have evidence of infective focus  My overall impression is Severe  sepsis. Vital signs were reviewed and noted in progress note.  Antibiotics given-   Antibiotics (From admission, onward)            Start     Stop Route Frequency Ordered    06/16/22 1000  azithromycin 500 mg in dextrose 5 % 250 mL IVPB (ready to mix system)         -- IV Every 24 hours (non-standard times) 06/15/22 1226    06/16/22 0900  cefTRIAXone (ROCEPHIN) 2 g/50 mL D5W IVPB         -- IV Every 24 hours (non-standard times) 06/15/22 1226        Cultures were taken-    Microbiology Results (last 7 days)     Procedure Component Value Units Date/Time    Blood culture #1 **CANNOT BE ORDERED STAT** [[id number]] Collected: 06/15/22 0834    Order Status: Sent Specimen: Blood from Peripheral, Hand, Right Updated: 06/15/22 1524    Blood culture #2 **CANNOT BE ORDERED STAT** [[id number]] Collected: 06/15/22 0834    Order Status: Sent Specimen: Blood from Peripheral, Hand, Left Updated: 06/15/22 1524        Latest lactate reviewed, they are-  Recent Labs   Lab 06/15/22  0834   LACTATE 1.0     Organ- Hypoxemia, elevated liver enzymes     Source- pneumonia     Source control Achieved by- Iv ABx   6/17-  Antibiotic switched out to IV Levofloxacin due to rare complication of ceftriaxone-induced hyperhemolysis.      Pneumonia  6/15 Patient  with fever and signs of possible pneumonia and Vs volume overload  Continue Iv Abx for now  Gentle diuresis trial   Monitor volume status     Elevated bilirubin  6/15  Due to hemolysis in the setting of sickle cell crisis   6/18-  Improving       Constipation  6/15 MOM x 1 dose  Add bid miralax  6/17-  Pt c/o severe gen abd tenderness   CT abd/pelvis showed gas distended large bowel with large fecal ball within the cecum. Will try brown bomb enema.   6/18-  Good bowel movement per pt after enema       Gall bladder stones  6/15 Currently no signs of acute cholecystitis  Monitor closely for any changes.  Gall stones likely related to sickle cell disease       Hypoxemia suspected multifactorial  6/15  Suspected multifactorial  Patient in with symptomatic anemia, signs of pneumonia, Acute chest syndrome  and or possible acute volume overload  Treat underlying cause   Monitor clinical course       Elevated d-dimer- Pulmonary emboli excluded  6/15 Elevated d-dimer noted- Pulmonary emboli excluded per CTA  Venous U/S david LE without DVT      Elevated troponin and Elevated BNP  Troponins trend down  Cardiology consult obtained   BNP trended down   Monitor volume  status       VTE Risk Mitigation (From admission, onward)         Ordered     heparin (porcine) injection 4,000 Units  Once         06/15/22 1801     enoxaparin injection 40 mg  Daily         06/15/22 1234     Place RISSA hose  Until discontinued         06/15/22 1234     IP VTE HIGH RISK PATIENT  Once         06/15/22 1234     Place sequential compression device  Until discontinued         06/15/22 1225                Discharge Planning   FARIHA:      Code Status: Full Code   Is the patient medically ready for discharge?:     Reason for patient still in hospital (select all that apply): Patient trending condition  Discharge Plan A: Home with family                  Ryann Alaniz MD  Department of Hospital Medicine   [facility]

## 2022-06-18 NOTE — ASSESSMENT & PLAN NOTE
6/15 Currently no signs of acute cholecystitis  Monitor closely for any changes.  Gall stones likely related to sickle cell disease

## 2022-06-19 PROBLEM — D59.11: Status: ACTIVE | Noted: 2022-06-19

## 2022-06-19 LAB
ALBUMIN SERPL BCP-MCNC: 2.6 G/DL (ref 3.5–5.2)
ALP SERPL-CCNC: 71 U/L (ref 55–135)
ALT SERPL W/O P-5'-P-CCNC: 20 U/L (ref 10–44)
ANION GAP SERPL CALC-SCNC: 6 MMOL/L (ref 8–16)
ANISOCYTOSIS BLD QL SMEAR: ABNORMAL
AST SERPL-CCNC: 24 U/L (ref 10–40)
BASOPHILS # BLD AUTO: 0.07 K/UL (ref 0–0.2)
BASOPHILS NFR BLD: 0.7 % (ref 0–1.9)
BILIRUB SERPL-MCNC: 2.2 MG/DL (ref 0.1–1)
BLD PROD TYP BPU: NORMAL
BLOOD UNIT EXPIRATION DATE: NORMAL
BLOOD UNIT TYPE CODE: 1700
BLOOD UNIT TYPE CODE: 5100
BLOOD UNIT TYPE CODE: 7300
BLOOD UNIT TYPE CODE: 7300
BLOOD UNIT TYPE: NORMAL
BUN SERPL-MCNC: 3 MG/DL (ref 6–20)
C3 SERPL-MCNC: 95 MG/DL (ref 50–180)
CALCIUM SERPL-MCNC: 8.2 MG/DL (ref 8.7–10.5)
CHLORIDE SERPL-SCNC: 104 MMOL/L (ref 95–110)
CO2 SERPL-SCNC: 28 MMOL/L (ref 23–29)
CODING SYSTEM: NORMAL
CREAT SERPL-MCNC: 0.5 MG/DL (ref 0.5–1.4)
DAT IGG-SP REAG RBC-IMP: NORMAL
DIFFERENTIAL METHOD: ABNORMAL
DISPENSE STATUS: NORMAL
EOSINOPHIL # BLD AUTO: 0.4 K/UL (ref 0–0.5)
EOSINOPHIL NFR BLD: 4.3 % (ref 0–8)
ERYTHROCYTE [DISTWIDTH] IN BLOOD BY AUTOMATED COUNT: 18.1 % (ref 11.5–14.5)
EST. GFR  (AFRICAN AMERICAN): >60 ML/MIN/1.73 M^2
EST. GFR  (NON AFRICAN AMERICAN): >60 ML/MIN/1.73 M^2
GLUCOSE SERPL-MCNC: 96 MG/DL (ref 70–110)
HAPTOGLOB SERPL-MCNC: 34 MG/DL (ref 30–250)
HCT VFR BLD AUTO: 21.1 % (ref 37–48.5)
HGB BLD-MCNC: 6.9 G/DL (ref 12–16)
IGG SERPL-MCNC: 1111 MG/DL (ref 650–1600)
IMM GRANULOCYTES # BLD AUTO: 0.13 K/UL (ref 0–0.04)
IMM GRANULOCYTES NFR BLD AUTO: 1.4 % (ref 0–0.5)
LDH SERPL L TO P-CCNC: 492 U/L (ref 110–260)
LYMPHOCYTES # BLD AUTO: 2.4 K/UL (ref 1–4.8)
LYMPHOCYTES NFR BLD: 25.1 % (ref 18–48)
MAGNESIUM SERPL-MCNC: 2.1 MG/DL (ref 1.6–2.6)
MCH RBC QN AUTO: 29.4 PG (ref 27–31)
MCHC RBC AUTO-ENTMCNC: 32.7 G/DL (ref 32–36)
MCV RBC AUTO: 90 FL (ref 82–98)
MONOCYTES # BLD AUTO: 1.1 K/UL (ref 0.3–1)
MONOCYTES NFR BLD: 11.3 % (ref 4–15)
NEUTROPHILS # BLD AUTO: 5.4 K/UL (ref 1.8–7.7)
NEUTROPHILS NFR BLD: 57.2 % (ref 38–73)
NRBC BLD-RTO: 84 /100 WBC
NUM UNITS TRANS PACKED RBC: NORMAL
PHOSPHATE SERPL-MCNC: 2.9 MG/DL (ref 2.7–4.5)
PLATELET # BLD AUTO: 157 K/UL (ref 150–450)
PMV BLD AUTO: 10.6 FL (ref 9.2–12.9)
POIKILOCYTOSIS BLD QL SMEAR: ABNORMAL
POLYCHROMASIA BLD QL SMEAR: ABNORMAL
POTASSIUM SERPL-SCNC: 3.2 MMOL/L (ref 3.5–5.1)
PROT SERPL-MCNC: 5.8 G/DL (ref 6–8.4)
RBC # BLD AUTO: 2.35 M/UL (ref 4–5.4)
RETICS/RBC NFR AUTO: 12.3 % (ref 0.5–2.5)
SCHISTOCYTES BLD QL SMEAR: ABNORMAL
SICKLE CELLS BLD QL SMEAR: ABNORMAL
SMUDGE CELLS BLD QL SMEAR: PRESENT
SODIUM SERPL-SCNC: 138 MMOL/L (ref 136–145)
TARGETS BLD QL SMEAR: ABNORMAL
WBC # BLD AUTO: 9.43 K/UL (ref 3.9–12.7)

## 2022-06-19 PROCEDURE — 83010 ASSAY OF HAPTOGLOBIN QUANT: CPT | Performed by: INTERNAL MEDICINE

## 2022-06-19 PROCEDURE — 99232 SBSQ HOSP IP/OBS MODERATE 35: CPT | Mod: ,,, | Performed by: INTERNAL MEDICINE

## 2022-06-19 PROCEDURE — 27000221 HC OXYGEN, UP TO 24 HOURS

## 2022-06-19 PROCEDURE — 83615 LACTATE (LD) (LDH) ENZYME: CPT | Performed by: INTERNAL MEDICINE

## 2022-06-19 PROCEDURE — 85045 AUTOMATED RETICULOCYTE COUNT: CPT | Performed by: INTERNAL MEDICINE

## 2022-06-19 PROCEDURE — 80053 COMPREHEN METABOLIC PANEL: CPT | Performed by: INTERNAL MEDICINE

## 2022-06-19 PROCEDURE — 83735 ASSAY OF MAGNESIUM: CPT | Performed by: INTERNAL MEDICINE

## 2022-06-19 PROCEDURE — 99900035 HC TECH TIME PER 15 MIN (STAT)

## 2022-06-19 PROCEDURE — 99232 PR SUBSEQUENT HOSPITAL CARE,LEVL II: ICD-10-PCS | Mod: ,,, | Performed by: INTERNAL MEDICINE

## 2022-06-19 PROCEDURE — 86160 COMPLEMENT ANTIGEN: CPT | Performed by: INTERNAL MEDICINE

## 2022-06-19 PROCEDURE — 25000003 PHARM REV CODE 250: Performed by: INTERNAL MEDICINE

## 2022-06-19 PROCEDURE — 63600175 PHARM REV CODE 636 W HCPCS: Performed by: INTERNAL MEDICINE

## 2022-06-19 PROCEDURE — 84100 ASSAY OF PHOSPHORUS: CPT | Performed by: INTERNAL MEDICINE

## 2022-06-19 PROCEDURE — 25000003 PHARM REV CODE 250: Performed by: NURSE PRACTITIONER

## 2022-06-19 PROCEDURE — 21400001 HC TELEMETRY ROOM

## 2022-06-19 PROCEDURE — 86880 COOMBS TEST DIRECT: CPT | Performed by: INTERNAL MEDICINE

## 2022-06-19 PROCEDURE — 36415 COLL VENOUS BLD VENIPUNCTURE: CPT | Performed by: INTERNAL MEDICINE

## 2022-06-19 PROCEDURE — 85025 COMPLETE CBC W/AUTO DIFF WBC: CPT | Performed by: INTERNAL MEDICINE

## 2022-06-19 PROCEDURE — 94761 N-INVAS EAR/PLS OXIMETRY MLT: CPT

## 2022-06-19 PROCEDURE — 82784 ASSAY IGA/IGD/IGG/IGM EACH: CPT | Performed by: INTERNAL MEDICINE

## 2022-06-19 RX ORDER — OXYCODONE AND ACETAMINOPHEN 10; 325 MG/1; MG/1
1 TABLET ORAL EVERY 6 HOURS PRN
Status: DISCONTINUED | OUTPATIENT
Start: 2022-06-19 | End: 2022-06-20 | Stop reason: HOSPADM

## 2022-06-19 RX ORDER — PANTOPRAZOLE SODIUM 40 MG/1
40 TABLET, DELAYED RELEASE ORAL DAILY
Status: DISCONTINUED | OUTPATIENT
Start: 2022-06-20 | End: 2022-06-20 | Stop reason: HOSPADM

## 2022-06-19 RX ORDER — PREDNISONE 50 MG/1
50 TABLET ORAL DAILY
Status: DISCONTINUED | OUTPATIENT
Start: 2022-06-19 | End: 2022-06-20 | Stop reason: HOSPADM

## 2022-06-19 RX ORDER — POLYETHYLENE GLYCOL 3350 17 G/17G
17 POWDER, FOR SOLUTION ORAL DAILY
Status: DISCONTINUED | OUTPATIENT
Start: 2022-06-20 | End: 2022-06-20 | Stop reason: HOSPADM

## 2022-06-19 RX ORDER — POTASSIUM CHLORIDE 20 MEQ/1
40 TABLET, EXTENDED RELEASE ORAL ONCE
Status: COMPLETED | OUTPATIENT
Start: 2022-06-19 | End: 2022-06-19

## 2022-06-19 RX ORDER — OXYCODONE AND ACETAMINOPHEN 7.5; 325 MG/1; MG/1
1 TABLET ORAL EVERY 6 HOURS PRN
Status: DISCONTINUED | OUTPATIENT
Start: 2022-06-19 | End: 2022-06-20 | Stop reason: HOSPADM

## 2022-06-19 RX ADMIN — PREDNISONE 50 MG: 50 TABLET ORAL at 06:06

## 2022-06-19 RX ADMIN — POTASSIUM CHLORIDE 40 MEQ: 1500 TABLET, EXTENDED RELEASE ORAL at 09:06

## 2022-06-19 RX ADMIN — GUAIFENESIN AND DEXTROMETHORPHAN HYDROBROMIDE 1 TABLET: 600; 30 TABLET, EXTENDED RELEASE ORAL at 09:06

## 2022-06-19 RX ADMIN — FOLIC ACID 1 MG: 1 TABLET ORAL at 09:06

## 2022-06-19 RX ADMIN — OXYCODONE AND ACETAMINOPHEN 1 TABLET: 10; 325 TABLET ORAL at 11:06

## 2022-06-19 RX ADMIN — PANTOPRAZOLE SODIUM 40 MG: 40 TABLET, DELAYED RELEASE ORAL at 09:06

## 2022-06-19 RX ADMIN — POLYETHYLENE GLYCOL 3350 17 G: 17 POWDER, FOR SOLUTION ORAL at 09:06

## 2022-06-19 RX ADMIN — LEVOFLOXACIN 750 MG: 750 TABLET, FILM COATED ORAL at 09:06

## 2022-06-19 NOTE — ASSESSMENT & PLAN NOTE
Sickle cell pain crisis:  Presentation concerning for acute chest syndrome.  Typically rare pain crises once yearly with rare need for blood transfusion but has had acute chest and exchange transfusion in years past.  Most recent hemoglobin S quantification 53% baseline in absence of sickle cell pain crisis April 2022. Current presentation with fever, tachypnea, tachycardia, leukocytosis, elevated retic in bilirubin with progressive anemia to hemoglobin 6.5 from baseline 9.9.  Elevated D-dimer with CTA chest negative for pulmonary embolism but did show bibasilar pulmonary density.    In ER can get initial simple txfusion 1uprbc while awaiting exchange transfusion. I discussed case with transfusion Medicine who agrees with exchange transfusion.  I have asked in ER to place a pheresis line - dialysis catheter such as a Trialysis or Brevia; consent by ER team please  Hemoglobin S quantification (53% in 4/2022)  Trend cbc, retic, bili  Empitic antibiotics  Would recommend lower extremity Doppler to rule out thrombosis given elevation in D-dimer.  CTA chest negative for pulmonary embolism.  If no evidence of thrombosis DVT prophylaxis would be recommended  Ivf prn for euvolemia  Supplemental O2  Analgesia per primary team/PCA prn    06/16/2022  Plan for exchange transfusion this AM  Continue supportive care  Continue with previous recommendations--see above    06/17/2022  Exchange transfusion in process  Continue supportive care     06/18/2022.  Patient completed exchange transfusion.  She states she feels somewhat better she states that her cough is improved.  Hemoglobin is stable hopefully will patient's condition will improve over the next 24-48 hours status post exchange transfusion  06/19/2022 patient appears to be stronger patient states that she feels better decreasing pain level 5/10 today reports at this time she also states that her pain level at home 0/10 will continue to treat hopefully after exchange  transfusion patient will be feeling better and can be discharged in next 48-72 hours

## 2022-06-19 NOTE — PROGRESS NOTES
[facility]  Hematology/Oncology  Progress Note    Patient Name: Elizabeth Franco  Admission Date: 6/15/2022  Hospital Length of Stay: 4 days  Code Status: Full Code     Subjective:     HPI:  Ms. Storm is a 26-year-old woman with sickle cell disease presenting with acute pain in bilateral lower hip/buttocks and chest similar to prior sickle cell pain crises.  She notes rare pain crises about once per year at those times occasionally receiving blood transfusion.  She is noted to have not tolerated chelation therapy for iron overload.  She does not take medication for her sickle cell disease aside from folic acid.  She denies known history of thrombosis.  On presentation to the emergency room  vital signs notable for temperature 100.9°, desaturation to 78% on room air with improvement on nasal cannula, to kidney on tachycardia.  Lab work with progressive anemia to 6.5 hemoglobin baseline 9.9 and leukocytosis to 19, bilirubin elevated 7.6 prior baseline 2.3.  D-dimer 24, retic >23.  CTA with bibasilar pulmonary density.      Interval History:  Patient reports feeling better pain level 5/10 today she states that her pain level at home is usually 0/10    Oncology Treatment Plan:   [Could not find a treatment plan. This SmartLink may be configured incorrectly. Contact a  for help.]    Medications:  Continuous Infusions:   hydromorphone in 0.9 % NaCl 6 mg/30 ml       Scheduled Meds:   dextromethorphan-guaiFENesin  mg  1 tablet Oral BID    enoxaparin  40 mg Subcutaneous Daily    folic acid  1 mg Oral Daily    heparin (porcine)  4,000 Units Intravenous Once    lactulose  20 g Oral BID    levoFLOXacin  750 mg Oral Daily    pantoprazole  40 mg Oral BID    polyethylene glycol  17 g Oral BID     PRN Meds:sodium chloride, sodium chloride, acetaminophen, acetaminophen, aluminum-magnesium hydroxide-simethicone, benzonatate, bisacodyL, naloxone, ondansetron, simethicone, sodium  chloride 0.9%, sodium chloride 0.9%     Review of Systems   Constitutional:  Positive for fatigue. Negative for activity change, appetite change, chills, diaphoresis, fever and unexpected weight change.   HENT:  Negative for congestion, dental problem, drooling, ear discharge, ear pain, facial swelling, hearing loss, mouth sores, nosebleeds, postnasal drip, rhinorrhea, sinus pressure, sneezing, sore throat, tinnitus, trouble swallowing and voice change.    Eyes:  Negative for photophobia, pain, discharge, redness, itching and visual disturbance.   Respiratory:  Negative for cough, choking, chest tightness, shortness of breath, wheezing and stridor.    Cardiovascular:  Positive for chest pain. Negative for palpitations and leg swelling.   Gastrointestinal:  Negative for abdominal distention, abdominal pain, anal bleeding, blood in stool, constipation, diarrhea, nausea, rectal pain and vomiting.   Endocrine: Negative for cold intolerance, heat intolerance, polydipsia, polyphagia and polyuria.   Genitourinary:  Negative for decreased urine volume, difficulty urinating, dyspareunia, dysuria, enuresis, flank pain, frequency, genital sores, hematuria, menstrual problem, pelvic pain, urgency, vaginal bleeding, vaginal discharge and vaginal pain.   Musculoskeletal:  Positive for arthralgias, back pain, gait problem and myalgias. Negative for joint swelling, neck pain and neck stiffness.   Skin:  Negative for color change, pallor and rash.   Allergic/Immunologic: Negative for environmental allergies, food allergies and immunocompromised state.   Neurological:  Positive for weakness. Negative for dizziness, tremors, seizures, syncope, facial asymmetry, speech difficulty, light-headedness, numbness and headaches.   Hematological:  Negative for adenopathy. Does not bruise/bleed easily.   Psychiatric/Behavioral:  Positive for dysphoric mood. Negative for agitation, behavioral problems, confusion, decreased concentration,  hallucinations, self-injury, sleep disturbance and suicidal ideas. The patient is nervous/anxious. The patient is not hyperactive.    Objective:     Vital Signs (Most Recent):  Temp: 98.4 °F (36.9 °C) (06/19/22 0733)  Pulse: 107 (06/19/22 0733)  Resp: (!) 22 (06/19/22 0754)  BP: 117/64 (06/19/22 0733)  SpO2: 95 % (06/19/22 0754)   Vital Signs (24h Range):  Temp:  [97.2 °F (36.2 °C)-100.1 °F (37.8 °C)] 98.4 °F (36.9 °C)  Pulse:  [] 107  Resp:  [16-22] 22  SpO2:  [88 %-97 %] 95 %  BP: (114-129)/(60-76) 117/64     Weight: 52.7 kg (116 lb 2.9 oz)  Body mass index is 21.25 kg/m².  Body surface area is 1.52 meters squared.      Intake/Output Summary (Last 24 hours) at 6/19/2022 0825  Last data filed at 6/19/2022 0704  Gross per 24 hour   Intake 872.16 ml   Output --   Net 872.16 ml       Physical Exam  Vitals reviewed.   Constitutional:       General: She is not in acute distress.     Appearance: She is well-developed. She is ill-appearing. She is not diaphoretic.   HENT:      Head: Normocephalic and atraumatic.      Right Ear: External ear normal.      Left Ear: External ear normal.      Nose: Nose normal.      Right Sinus: No maxillary sinus tenderness or frontal sinus tenderness.      Left Sinus: No maxillary sinus tenderness or frontal sinus tenderness.      Mouth/Throat:      Pharynx: No oropharyngeal exudate.   Eyes:      General: Lids are normal. No scleral icterus.        Right eye: No discharge.         Left eye: No discharge.      Conjunctiva/sclera: Conjunctivae normal.      Right eye: Right conjunctiva is not injected. No hemorrhage.     Left eye: Left conjunctiva is not injected. No hemorrhage.     Pupils: Pupils are equal, round, and reactive to light.   Neck:      Thyroid: No thyromegaly.      Vascular: No JVD.      Trachea: No tracheal deviation.   Cardiovascular:      Rate and Rhythm: Normal rate.   Pulmonary:      Effort: Pulmonary effort is normal. No respiratory distress.      Breath sounds: No  stridor.   Chest:      Chest wall: No tenderness.   Breasts:      Right: No supraclavicular adenopathy.      Left: No supraclavicular adenopathy.     Abdominal:      General: Bowel sounds are normal. There is no distension.      Palpations: Abdomen is soft. There is no hepatomegaly, splenomegaly or mass.      Tenderness: There is no abdominal tenderness. There is no rebound.   Musculoskeletal:         General: No tenderness. Normal range of motion.      Cervical back: Normal range of motion and neck supple.   Lymphadenopathy:      Cervical: No cervical adenopathy.      Upper Body:      Right upper body: No supraclavicular adenopathy.      Left upper body: No supraclavicular adenopathy.   Skin:     General: Skin is dry.      Findings: No erythema or rash.   Neurological:      Mental Status: She is alert and oriented to person, place, and time.      Cranial Nerves: No cranial nerve deficit.      Motor: Weakness present.      Coordination: Coordination abnormal.      Gait: Gait abnormal.   Psychiatric:         Behavior: Behavior normal.         Thought Content: Thought content normal.         Judgment: Judgment normal.       Significant Labs:   BMP:   Recent Labs   Lab 06/18/22  0646 06/19/22  0543   * 96   * 138   K 3.5 3.2*    104   CO2 24 28   BUN 4* 3*   CREATININE 0.5 0.5   CALCIUM 7.5* 8.2*   MG 1.9 2.1   , CBC:   Recent Labs   Lab 06/18/22 0646 06/19/22  0543   WBC 10.32 9.43   HGB 7.7* 6.9*   HCT 23.6* 21.1*   * 157   , CMP:   Recent Labs   Lab 06/18/22  0646 06/19/22  0543   * 138   K 3.5 3.2*    104   CO2 24 28   * 96   BUN 4* 3*   CREATININE 0.5 0.5   CALCIUM 7.5* 8.2*   PROT 5.4* 5.8*   ALBUMIN 2.7* 2.6*   BILITOT 3.1* 2.2*   ALKPHOS 74 71   AST 27 24   ALT 20 20   ANIONGAP 8 6*   EGFRNONAA >60 >60   , Coagulation: No results for input(s): PT, INR, APTT in the last 48 hours., Haptoglobin: No results for input(s): HAPTOGLOBIN in the last 48 hours., Immunology: No  results for input(s): SPEP, DANISHA, BETI, FREELAMBDALI in the last 48 hours., LDH: No results for input(s): LDHCSF, BFSOURCE in the last 48 hours., LFTs:   Recent Labs   Lab 06/18/22  0646 06/19/22  0543   ALT 20 20   AST 27 24   ALKPHOS 74 71   BILITOT 3.1* 2.2*   PROT 5.4* 5.8*   ALBUMIN 2.7* 2.6*   , Reticulocytes:   Recent Labs   Lab 06/19/22  0543   RETIC 12.3*   , Tumor Markers: No results for input(s): PSA, CEA, , AFPTM, [id number],  in the last 48 hours.    Invalid input(s): ALGTM, and Uric Acid No results for input(s): URICACID in the last 48 hours.    Diagnostic Results:  I have reviewed all pertinent imaging results/findings within the past 24 hours.    Assessment/Plan:     * Acute chest syndrome due to sickle cell crisis  Sickle cell pain crisis:  Presentation concerning for acute chest syndrome.  Typically rare pain crises once yearly with rare need for blood transfusion but has had acute chest and exchange transfusion in years past.  Most recent hemoglobin S quantification 53% baseline in absence of sickle cell pain crisis April 2022. Current presentation with fever, tachypnea, tachycardia, leukocytosis, elevated retic in bilirubin with progressive anemia to hemoglobin 6.5 from baseline 9.9.  Elevated D-dimer with CTA chest negative for pulmonary embolism but did show bibasilar pulmonary density.    In ER can get initial simple txfusion 1uprbc while awaiting exchange transfusion. I discussed case with transfusion Medicine who agrees with exchange transfusion.  I have asked in ER to place a pheresis line - dialysis catheter such as a Trialysis or Brevia; consent by ER team please  Hemoglobin S quantification (53% in 4/2022)  Trend cbc, retic, bili  Empitic antibiotics  Would recommend lower extremity Doppler to rule out thrombosis given elevation in D-dimer.  CTA chest negative for pulmonary embolism.  If no evidence of thrombosis DVT prophylaxis would be recommended  Ivf prn for  euvolemia  Supplemental O2  Analgesia per primary team/PCA prn    06/16/2022  Plan for exchange transfusion this AM  Continue supportive care  Continue with previous recommendations--see above    06/17/2022  Exchange transfusion in process  Continue supportive care     06/18/2022.  Patient completed exchange transfusion.  She states she feels somewhat better she states that her cough is improved.  Hemoglobin is stable hopefully will patient's condition will improve over the next 24-48 hours status post exchange transfusion  06/19/2022 patient appears to be stronger patient states that she feels better decreasing pain level 5/10 today reports at this time she also states that her pain level at home 0/10 will continue to treat hopefully after exchange transfusion patient will be feeling better and can be discharged in next 48-72 hours        Thank you for your consult. I will follow-up with patient. Please contact us if you have any additional questions.     Faheem Acosta MD  Hematology/Oncology  [facility]

## 2022-06-19 NOTE — SUBJECTIVE & OBJECTIVE
Interval History:  Patient reports feeling better pain level 5/10 today she states that her pain level at home is usually 0/10    Oncology Treatment Plan:   [Could not find a treatment plan. This SmartLink may be configured incorrectly. Contact a  for help.]    Medications:  Continuous Infusions:   hydromorphone in 0.9 % NaCl 6 mg/30 ml       Scheduled Meds:   dextromethorphan-guaiFENesin  mg  1 tablet Oral BID    enoxaparin  40 mg Subcutaneous Daily    folic acid  1 mg Oral Daily    heparin (porcine)  4,000 Units Intravenous Once    lactulose  20 g Oral BID    levoFLOXacin  750 mg Oral Daily    pantoprazole  40 mg Oral BID    polyethylene glycol  17 g Oral BID     PRN Meds:sodium chloride, sodium chloride, acetaminophen, acetaminophen, aluminum-magnesium hydroxide-simethicone, benzonatate, bisacodyL, naloxone, ondansetron, simethicone, sodium chloride 0.9%, sodium chloride 0.9%     Review of Systems   Constitutional:  Positive for fatigue. Negative for activity change, appetite change, chills, diaphoresis, fever and unexpected weight change.   HENT:  Negative for congestion, dental problem, drooling, ear discharge, ear pain, facial swelling, hearing loss, mouth sores, nosebleeds, postnasal drip, rhinorrhea, sinus pressure, sneezing, sore throat, tinnitus, trouble swallowing and voice change.    Eyes:  Negative for photophobia, pain, discharge, redness, itching and visual disturbance.   Respiratory:  Negative for cough, choking, chest tightness, shortness of breath, wheezing and stridor.    Cardiovascular:  Positive for chest pain. Negative for palpitations and leg swelling.   Gastrointestinal:  Negative for abdominal distention, abdominal pain, anal bleeding, blood in stool, constipation, diarrhea, nausea, rectal pain and vomiting.   Endocrine: Negative for cold intolerance, heat intolerance, polydipsia, polyphagia and polyuria.   Genitourinary:  Negative for decreased urine volume,  difficulty urinating, dyspareunia, dysuria, enuresis, flank pain, frequency, genital sores, hematuria, menstrual problem, pelvic pain, urgency, vaginal bleeding, vaginal discharge and vaginal pain.   Musculoskeletal:  Positive for arthralgias, back pain, gait problem and myalgias. Negative for joint swelling, neck pain and neck stiffness.   Skin:  Negative for color change, pallor and rash.   Allergic/Immunologic: Negative for environmental allergies, food allergies and immunocompromised state.   Neurological:  Positive for weakness. Negative for dizziness, tremors, seizures, syncope, facial asymmetry, speech difficulty, light-headedness, numbness and headaches.   Hematological:  Negative for adenopathy. Does not bruise/bleed easily.   Psychiatric/Behavioral:  Positive for dysphoric mood. Negative for agitation, behavioral problems, confusion, decreased concentration, hallucinations, self-injury, sleep disturbance and suicidal ideas. The patient is nervous/anxious. The patient is not hyperactive.    Objective:     Vital Signs (Most Recent):  Temp: 98.4 °F (36.9 °C) (06/19/22 0733)  Pulse: 107 (06/19/22 0733)  Resp: (!) 22 (06/19/22 0754)  BP: 117/64 (06/19/22 0733)  SpO2: 95 % (06/19/22 0754)   Vital Signs (24h Range):  Temp:  [97.2 °F (36.2 °C)-100.1 °F (37.8 °C)] 98.4 °F (36.9 °C)  Pulse:  [] 107  Resp:  [16-22] 22  SpO2:  [88 %-97 %] 95 %  BP: (114-129)/(60-76) 117/64     Weight: 52.7 kg (116 lb 2.9 oz)  Body mass index is 21.25 kg/m².  Body surface area is 1.52 meters squared.      Intake/Output Summary (Last 24 hours) at 6/19/2022 0825  Last data filed at 6/19/2022 0704  Gross per 24 hour   Intake 872.16 ml   Output --   Net 872.16 ml       Physical Exam  Vitals reviewed.   Constitutional:       General: She is not in acute distress.     Appearance: She is well-developed. She is ill-appearing. She is not diaphoretic.   HENT:      Head: Normocephalic and atraumatic.      Right Ear: External ear normal.       Left Ear: External ear normal.      Nose: Nose normal.      Right Sinus: No maxillary sinus tenderness or frontal sinus tenderness.      Left Sinus: No maxillary sinus tenderness or frontal sinus tenderness.      Mouth/Throat:      Pharynx: No oropharyngeal exudate.   Eyes:      General: Lids are normal. No scleral icterus.        Right eye: No discharge.         Left eye: No discharge.      Conjunctiva/sclera: Conjunctivae normal.      Right eye: Right conjunctiva is not injected. No hemorrhage.     Left eye: Left conjunctiva is not injected. No hemorrhage.     Pupils: Pupils are equal, round, and reactive to light.   Neck:      Thyroid: No thyromegaly.      Vascular: No JVD.      Trachea: No tracheal deviation.   Cardiovascular:      Rate and Rhythm: Normal rate.   Pulmonary:      Effort: Pulmonary effort is normal. No respiratory distress.      Breath sounds: No stridor.   Chest:      Chest wall: No tenderness.   Breasts:      Right: No supraclavicular adenopathy.      Left: No supraclavicular adenopathy.     Abdominal:      General: Bowel sounds are normal. There is no distension.      Palpations: Abdomen is soft. There is no hepatomegaly, splenomegaly or mass.      Tenderness: There is no abdominal tenderness. There is no rebound.   Musculoskeletal:         General: No tenderness. Normal range of motion.      Cervical back: Normal range of motion and neck supple.   Lymphadenopathy:      Cervical: No cervical adenopathy.      Upper Body:      Right upper body: No supraclavicular adenopathy.      Left upper body: No supraclavicular adenopathy.   Skin:     General: Skin is dry.      Findings: No erythema or rash.   Neurological:      Mental Status: She is alert and oriented to person, place, and time.      Cranial Nerves: No cranial nerve deficit.      Motor: Weakness present.      Coordination: Coordination abnormal.      Gait: Gait abnormal.   Psychiatric:         Behavior: Behavior normal.         Thought  Content: Thought content normal.         Judgment: Judgment normal.       Significant Labs:   BMP:   Recent Labs   Lab 06/18/22  0646 06/19/22  0543   * 96   * 138   K 3.5 3.2*    104   CO2 24 28   BUN 4* 3*   CREATININE 0.5 0.5   CALCIUM 7.5* 8.2*   MG 1.9 2.1   , CBC:   Recent Labs   Lab 06/18/22 0646 06/19/22  0543   WBC 10.32 9.43   HGB 7.7* 6.9*   HCT 23.6* 21.1*   * 157   , CMP:   Recent Labs   Lab 06/18/22  0646 06/19/22  0543   * 138   K 3.5 3.2*    104   CO2 24 28   * 96   BUN 4* 3*   CREATININE 0.5 0.5   CALCIUM 7.5* 8.2*   PROT 5.4* 5.8*   ALBUMIN 2.7* 2.6*   BILITOT 3.1* 2.2*   ALKPHOS 74 71   AST 27 24   ALT 20 20   ANIONGAP 8 6*   EGFRNONAA >60 >60   , Coagulation: No results for input(s): PT, INR, APTT in the last 48 hours., Haptoglobin: No results for input(s): HAPTOGLOBIN in the last 48 hours., Immunology: No results for input(s): SPEP, DANISHA, BETI, FREELAMBDALI in the last 48 hours., LDH: No results for input(s): LDHCSF, BFSOURCE in the last 48 hours., LFTs:   Recent Labs   Lab 06/18/22  0646 06/19/22  0543   ALT 20 20   AST 27 24   ALKPHOS 74 71   BILITOT 3.1* 2.2*   PROT 5.4* 5.8*   ALBUMIN 2.7* 2.6*   , Reticulocytes:   Recent Labs   Lab 06/19/22  0543   RETIC 12.3*   , Tumor Markers: No results for input(s): PSA, CEA, , AFPTM, QG6285,  in the last 48 hours.    Invalid input(s): ALGTM, and Uric Acid No results for input(s): URICACID in the last 48 hours.    Diagnostic Results:  I have reviewed all pertinent imaging results/findings within the past 24 hours.   30-Jun-2018 00:28

## 2022-06-19 NOTE — ASSESSMENT & PLAN NOTE
This patient does have evidence of infective focus  My overall impression is Severe  sepsis. Vital signs were reviewed and noted in progress note.  Antibiotics given-   Antibiotics (From admission, onward)            Start     Stop Route Frequency Ordered    06/16/22 1000  azithromycin 500 mg in dextrose 5 % 250 mL IVPB (ready to mix system)         -- IV Every 24 hours (non-standard times) 06/15/22 1226    06/16/22 0900  cefTRIAXone (ROCEPHIN) 2 g/50 mL D5W IVPB         -- IV Every 24 hours (non-standard times) 06/15/22 1226        Cultures were taken-   Microbiology Results (last 7 days)     Procedure Component Value Units Date/Time    Blood culture #1 **CANNOT BE ORDERED STAT** [[id number]] Collected: 06/15/22 0834    Order Status: Sent Specimen: Blood from Peripheral, Hand, Right Updated: 06/15/22 1524    Blood culture #2 **CANNOT BE ORDERED STAT** [[id number]] Collected: 06/15/22 0834    Order Status: Sent Specimen: Blood from Peripheral, Hand, Left Updated: 06/15/22 1524        Latest lactate reviewed, they are-  Recent Labs   Lab 06/15/22  0834   LACTATE 1.0     Organ- Hypoxemia, elevated liver enzymes     Source- pneumonia     Source control Achieved by- Iv ABx   6/17-  Antibiotic switched out to IV Levofloxacin due to rare complication of ceftriaxone-induced hyperhemolysis.

## 2022-06-19 NOTE — PLAN OF CARE
Pt remains free from injury/falls this shift. Safety precautions maintained. Pain managed with prn meds. Diet tolerated well. VSS. No signs and symptoms of acute distress noted at this time. Chart reviewed, will continue to monitor.

## 2022-06-19 NOTE — SUBJECTIVE & OBJECTIVE
Interval History:   Hgb down to 6.9 today. Spoke to Transfusion medicine and Hematology. 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 Will get ALYSSIA, IgG/C3.       Review of Systems   Constitutional:  Positive for activity change, appetite change and fatigue. Negative for fever.   HENT:  Negative for sore throat.    Eyes:  Negative for visual disturbance.   Respiratory:  Positive for cough and shortness of breath (improved). Negative for chest tightness.    Cardiovascular:  Positive for leg swelling (currently improved). Negative for chest pain and palpitations.   Gastrointestinal:  Positive for constipation. Negative for abdominal distention, abdominal pain (currently improved), diarrhea, nausea and vomiting.   Endocrine: Negative for polyuria.   Genitourinary:  Negative for decreased urine volume, dysuria, flank pain, frequency and hematuria.   Musculoskeletal:  Positive for back pain (currently improved). Negative for gait problem.   Skin:  Negative for rash.   Neurological:  Negative for syncope, speech difficulty, weakness, light-headedness and headaches.   Psychiatric/Behavioral:  Negative for confusion, hallucinations and sleep disturbance.    Objective:     Vital Signs (Most Recent):  Temp: 99 °F (37.2 °C) (06/19/22 1540)  Pulse: 98 (06/19/22 1540)  Resp: 18 (06/19/22 1540)  BP: 116/69 (06/19/22 1540)  SpO2: 95 % (06/19/22 1540) Vital Signs (24h Range):  Temp:  [97.2 °F (36.2 °C)-100.1 °F (37.8 °C)] 99 °F (37.2 °C)  Pulse:  [] 98  Resp:  [14-22] 18  SpO2:  [82 %-96 %] 95 %  BP: (114-124)/(63-76) 116/69     Weight: 52.7 kg (116 lb 2.9 oz)  Body mass index is 21.25 kg/m².    Intake/Output Summary (Last 24 hours) at 6/19/2022 1728  Last data filed at 6/19/2022 1212  Gross per 24 hour   Intake 513.16 ml   Output --   Net 513.16 ml      Physical Exam  Constitutional:       General: She is not in acute distress.     Appearance: She is  well-developed. She is not diaphoretic.   HENT:      Head: Normocephalic and atraumatic.      Mouth/Throat:      Pharynx: No oropharyngeal exudate.   Eyes:      General: No scleral icterus.     Conjunctiva/sclera: Conjunctivae normal.      Pupils: Pupils are equal, round, and reactive to light.   Neck:      Thyroid: No thyromegaly.      Vascular: No JVD.   Cardiovascular:      Rate and Rhythm: Normal rate and regular rhythm.      Heart sounds: Normal heart sounds. No murmur heard.  Pulmonary:      Effort: Pulmonary effort is normal. No respiratory distress.      Breath sounds: Examination of the right-lower field reveals rales. Examination of the left-lower field reveals rales. Rales present. No wheezing.   Chest:      Chest wall: No tenderness.   Abdominal:      General: Bowel sounds are normal. There is no distension.      Palpations: Abdomen is soft.      Tenderness: There is no abdominal tenderness. There is no guarding or rebound.   Musculoskeletal:         General: Normal range of motion.      Cervical back: Normal range of motion and neck supple.   Lymphadenopathy:      Cervical: No cervical adenopathy.   Skin:     General: Skin is warm and dry.      Findings: No rash.   Neurological:      Mental Status: She is alert and oriented to person, place, and time.      Cranial Nerves: No cranial nerve deficit.      Sensory: No sensory deficit.      Deep Tendon Reflexes: Reflexes normal.       Significant Labs: All pertinent labs within the past 24 hours have been reviewed.  BMP:   Recent Labs   Lab 06/19/22  0543   GLU 96      K 3.2*      CO2 28   BUN 3*   CREATININE 0.5   CALCIUM 8.2*   MG 2.1     CBC:   Recent Labs   Lab 06/18/22  0646 06/19/22  0543   WBC 10.32 9.43   HGB 7.7* 6.9*   HCT 23.6* 21.1*   * 157     CMP:   Recent Labs   Lab 06/18/22  0646 06/19/22  0543   * 138   K 3.5 3.2*    104   CO2 24 28   * 96   BUN 4* 3*   CREATININE 0.5 0.5   CALCIUM 7.5* 8.2*   PROT 5.4*  5.8*   ALBUMIN 2.7* 2.6*   BILITOT 3.1* 2.2*   ALKPHOS 74 71   AST 27 24   ALT 20 20   ANIONGAP 8 6*   EGFRNONAA >60 >60       Significant Imaging:

## 2022-06-19 NOTE — ASSESSMENT & PLAN NOTE
6/15- Telemetry  Patient ordered for 1 unit PRBCs transfusion  Hematology consulted  Dilaudid PCA  Trend anemia    6/16:  Concern for ACS  Compatible blood pending for exchange transfusion  6/17-  Exchange transfusion initiated today and pt tolerated well. Post exchange HbS was drawn with goal <30%.  Supplemental oxygen   Gentle IV fluid with careful monitoring of volume status   6/18-  Hgb 7.7 today   Gentle hydration, IV antibiotic and PCA pump continues   6/19-  Fever trended down  Pain intensity improved   PCA pump transitioned to oral analgesics   Hgb down to 6.9 today   Spoke to Transfusion medicine and Hematology and suggested no indication of transfusion at this time   Pt is noted to have positive warm autoagglutinin antibody suggestive of warm antibody AIHA

## 2022-06-19 NOTE — PROGRESS NOTES
[facility] Medicine  Progress Note    Patient Name: Elizabeth Franco  MRN: [id number]  Patient Class: IP- Inpatient   Admission Date: 6/15/2022  Length of Stay: 4 days  Attending Physician: Ryann Alaniz MD  Primary Care Provider: Elvira Nuñez MD        Subjective:     Principal Problem:Acute chest syndrome due to sickle cell crisis        HPI:   Sickle Cell Pain Crisis       Pt reports pain to back and chest and tried her home Oxycodone with no relief       Per ER- This  is a 26 y.o. female patient with a PMHx of sickle cell anemia and Hidradenitis suppurativa who presents to the Emergency Department for sickle cell pain crisis, onset this morning. Pt report chest pain and back pain. Symptoms are constant and moderate in severity. No mitigating or exacerbating factors reported. Associated sxs include fever (Tnow 100.9). Patient denies any chills, n/v/d, SOB, weakness, numbness, dizziness, headache, and all other sxs at this time. Prior Tx includes Oxycodone 5 mg, with no improvement of sxs. No further complaints or concerns at this time.     Patient was evaluated by ER and noted to have symptomatic anemia with signs of pneumonia and or possible volume overload and was placed in Observation.             Overview/Hospital Course:  6/16: Pt states continued CP, SOB slightly improved from day before. Bleeding for catheter site, back to OR for suture placement. Blood type and screen with multiple antibodies requiring different facility check for compatible blood transfusion. Tmax 102F    6/17- Exchange transfusion initiated today and pt tolerated well. Post exchange HbS was drawn with goal <30%. Pt is seen post exchange transfusion treatment and c/o generalized abd tenderness and no bowel movement since admission. (+) Flatus . On exam, active bowel sounds are present, however  severe generalized tenderness appreciated on palpation. CT abd/pelvis done today showed normal spleen, adrenal and liver,  gas distended large bowel with large fecal ball within the cecum. Will try brown bomb enema.     Persistent fever with Tmax 103.1 and tachycardia noted. Antibiotic switched out to IV Levofloxacin due to rare complication of ceftriaxone-induced hyperhemolysis. Continue supplemental oxygen. No clinical signs of hypervolemia noted. Echo resulted LVEF 60%, CVP 3. Will try gentle hydration with careful monitoring of volume status.     6/18- Tmax 101.4 yesterday . C/O productive cough with yellow green sputum, chest pain with cough. SOB is better. On O2 at 3L/min. Back and leg pain are improving. Abdominal soreness is better after bowel movement with enema. S/P exchange transfusion yesterday. Hgb 7.7 today , , T.bili down to 3.1>8.3. Gentle hydration, IV Levofloxacin  and PCA pump continues.     6/19- Fever trended down. Tmax 100.2. Levaquin 750 mg daily continues . Remains intermittently on O2 at 2L/min. Pt reports subjective improvement of pain and ready to transition to oral analgesics. Hgb down to 6.9, Retic count down to 12.3, T.bili improved to 2.2, LDH down to 492. Spoke to Transfusion medicine and Hematology. 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 Will get ALYSSIA, IgG/C3.       Interval History:   Hgb down to 6.9 today. Spoke to Transfusion medicine and Hematology. 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 Will get ALYSSIA, IgG/C3.       Review of Systems   Constitutional:  Positive for activity change, appetite change and fatigue. Negative for fever.   HENT:  Negative for sore throat.    Eyes:  Negative for visual disturbance.   Respiratory:  Positive for cough and shortness of breath (improved). Negative for chest tightness.    Cardiovascular:  Positive for leg swelling (currently improved). Negative for chest pain and palpitations.   Gastrointestinal:  Positive for  constipation. Negative for abdominal distention, abdominal pain (currently improved), diarrhea, nausea and vomiting.   Endocrine: Negative for polyuria.   Genitourinary:  Negative for decreased urine volume, dysuria, flank pain, frequency and hematuria.   Musculoskeletal:  Positive for back pain (currently improved). Negative for gait problem.   Skin:  Negative for rash.   Neurological:  Negative for syncope, speech difficulty, weakness, light-headedness and headaches.   Psychiatric/Behavioral:  Negative for confusion, hallucinations and sleep disturbance.    Objective:     Vital Signs (Most Recent):  Temp: 99 °F (37.2 °C) (06/19/22 1540)  Pulse: 98 (06/19/22 1540)  Resp: 18 (06/19/22 1540)  BP: 116/69 (06/19/22 1540)  SpO2: 95 % (06/19/22 1540) Vital Signs (24h Range):  Temp:  [97.2 °F (36.2 °C)-100.1 °F (37.8 °C)] 99 °F (37.2 °C)  Pulse:  [] 98  Resp:  [14-22] 18  SpO2:  [82 %-96 %] 95 %  BP: (114-124)/(63-76) 116/69     Weight: 52.7 kg (116 lb 2.9 oz)  Body mass index is 21.25 kg/m².    Intake/Output Summary (Last 24 hours) at 6/19/2022 1728  Last data filed at 6/19/2022 1212  Gross per 24 hour   Intake 513.16 ml   Output --   Net 513.16 ml      Physical Exam  Constitutional:       General: She is not in acute distress.     Appearance: She is well-developed. She is not diaphoretic.   HENT:      Head: Normocephalic and atraumatic.      Mouth/Throat:      Pharynx: No oropharyngeal exudate.   Eyes:      General: No scleral icterus.     Conjunctiva/sclera: Conjunctivae normal.      Pupils: Pupils are equal, round, and reactive to light.   Neck:      Thyroid: No thyromegaly.      Vascular: No JVD.   Cardiovascular:      Rate and Rhythm: Normal rate and regular rhythm.      Heart sounds: Normal heart sounds. No murmur heard.  Pulmonary:      Effort: Pulmonary effort is normal. No respiratory distress.      Breath sounds: Examination of the right-lower field reveals rales. Examination of the left-lower field  reveals rales. Rales present. No wheezing.   Chest:      Chest wall: No tenderness.   Abdominal:      General: Bowel sounds are normal. There is no distension.      Palpations: Abdomen is soft.      Tenderness: There is no abdominal tenderness. There is no guarding or rebound.   Musculoskeletal:         General: Normal range of motion.      Cervical back: Normal range of motion and neck supple.   Lymphadenopathy:      Cervical: No cervical adenopathy.   Skin:     General: Skin is warm and dry.      Findings: No rash.   Neurological:      Mental Status: She is alert and oriented to person, place, and time.      Cranial Nerves: No cranial nerve deficit.      Sensory: No sensory deficit.      Deep Tendon Reflexes: Reflexes normal.       Significant Labs: All pertinent labs within the past 24 hours have been reviewed.  BMP:   Recent Labs   Lab 06/19/22  0543   GLU 96      K 3.2*      CO2 28   BUN 3*   CREATININE 0.5   CALCIUM 8.2*   MG 2.1     CBC:   Recent Labs   Lab 06/18/22  0646 06/19/22  0543   WBC 10.32 9.43   HGB 7.7* 6.9*   HCT 23.6* 21.1*   * 157     CMP:   Recent Labs   Lab 06/18/22  0646 06/19/22  0543   * 138   K 3.5 3.2*    104   CO2 24 28   * 96   BUN 4* 3*   CREATININE 0.5 0.5   CALCIUM 7.5* 8.2*   PROT 5.4* 5.8*   ALBUMIN 2.7* 2.6*   BILITOT 3.1* 2.2*   ALKPHOS 74 71   AST 27 24   ALT 20 20   ANIONGAP 8 6*   EGFRNONAA >60 >60       Significant Imaging:       Assessment/Plan:      * Acute chest syndrome due to sickle cell crisis  6/15- Telemetry  Patient ordered for 1 unit PRBCs transfusion  Hematology consulted  Dilaudid PCA  Trend anemia    6/16:  Concern for ACS  Compatible blood pending for exchange transfusion  6/17-  Exchange transfusion initiated today and pt tolerated well. Post exchange HbS was drawn with goal <30%.  Supplemental oxygen   Gentle IV fluid with careful monitoring of volume status   6/18-  Hgb 7.7 today   Gentle hydration, IV antibiotic and PCA  pump continues   6/19-  Fever trended down  Pain intensity improved   PCA pump transitioned to oral analgesics   Hgb down to 6.9 today   Spoke to Transfusion medicine and Hematology and suggested no indication of transfusion at this time   Pt is noted to have positive warm autoagglutinin antibody suggestive of warm antibody AIHA      Warm antibody Autoimmune hemolytic anemia  - spoke to Dr. Acosta and suggested to start prednisone at 1mg/kg       Pneumonia  6/15 Patient  with fever and signs of possible pneumonia and Vs volume overload  Continue Iv Abx for now  Gentle diuresis trial   Monitor volume status     Severe sepsis  This patient does have evidence of infective focus  My overall impression is Severe  sepsis. Vital signs were reviewed and noted in progress note.  Antibiotics given-   Antibiotics (From admission, onward)            Start     Stop Route Frequency Ordered    06/16/22 1000  azithromycin 500 mg in dextrose 5 % 250 mL IVPB (ready to mix system)         -- IV Every 24 hours (non-standard times) 06/15/22 1226    06/16/22 0900  cefTRIAXone (ROCEPHIN) 2 g/50 mL D5W IVPB         -- IV Every 24 hours (non-standard times) 06/15/22 1226        Cultures were taken-   Microbiology Results (last 7 days)     Procedure Component Value Units Date/Time    Blood culture #1 **CANNOT BE ORDERED STAT** [[id number]] Collected: 06/15/22 0834    Order Status: Sent Specimen: Blood from Peripheral, Hand, Right Updated: 06/15/22 1524    Blood culture #2 **CANNOT BE ORDERED STAT** [[id number]] Collected: 06/15/22 0834    Order Status: Sent Specimen: Blood from Peripheral, Hand, Left Updated: 06/15/22 1524        Latest lactate reviewed, they are-  Recent Labs   Lab 06/15/22  0834   LACTATE 1.0     Organ- Hypoxemia, elevated liver enzymes     Source- pneumonia     Source control Achieved by- Iv ABx   6/17-  Antibiotic switched out to IV Levofloxacin due to rare complication of ceftriaxone-induced hyperhemolysis.      Elevated  bilirubin  6/15  Due to hemolysis in the setting of sickle cell crisis   6/18-  Improving       Constipation  6/15 MOM x 1 dose  Add bid miralax  6/17-  Pt c/o severe gen abd tenderness   CT abd/pelvis showed gas distended large bowel with large fecal ball within the cecum. Will try brown bomb enema.   6/18-  Good bowel movement per pt after enema       Gall bladder stones  6/15 Currently no signs of acute cholecystitis  Monitor closely for any changes.  Gall stones likely related to sickle cell disease       Hypoxemia suspected multifactorial  6/15  Suspected multifactorial  Patient in with symptomatic anemia, signs of pneumonia, Acute chest syndrome  and or possible acute volume overload  Treat underlying cause   Monitor clinical course       Elevated d-dimer- Pulmonary emboli excluded  6/15 Elevated d-dimer noted- Pulmonary emboli excluded per CTA  Venous U/S david LE without DVT      Elevated troponin and Elevated BNP  Troponins trend down  Cardiology consult obtained   BNP trended down   Monitor volume status       VTE Risk Mitigation (From admission, onward)         Ordered     heparin (porcine) injection 4,000 Units  Once         06/15/22 1801     enoxaparin injection 40 mg  Daily         06/15/22 1234     Place RISSA hose  Until discontinued         06/15/22 1234     IP VTE HIGH RISK PATIENT  Once         06/15/22 1234     Place sequential compression device  Until discontinued         06/15/22 1225                Discharge Planning   FARIHA:      Code Status: Full Code   Is the patient medically ready for discharge?:     Reason for patient still in hospital (select all that apply): Patient trending condition  Discharge Plan A: Home with family                  Ryann Alaniz MD  Department of Hospital Medicine   [facility]

## 2022-06-20 ENCOUNTER — TELEPHONE (OUTPATIENT)
Dept: HEMATOLOGY/ONCOLOGY | Facility: CLINIC | Age: 27
End: 2022-06-20
Payer: COMMERCIAL

## 2022-06-20 VITALS
WEIGHT: 115.75 LBS | HEIGHT: 62 IN | HEART RATE: 76 BPM | BODY MASS INDEX: 21.3 KG/M2 | SYSTOLIC BLOOD PRESSURE: 115 MMHG | TEMPERATURE: 98 F | DIASTOLIC BLOOD PRESSURE: 68 MMHG | RESPIRATION RATE: 18 BRPM | OXYGEN SATURATION: 96 %

## 2022-06-20 DIAGNOSIS — D57.09 SICKLE CELL DISEASE WITH CRISIS AND OTHER COMPLICATION: Primary | ICD-10-CM

## 2022-06-20 LAB
ALBUMIN SERPL BCP-MCNC: 2.9 G/DL (ref 3.5–5.2)
ALP SERPL-CCNC: 79 U/L (ref 55–135)
ALT SERPL W/O P-5'-P-CCNC: 19 U/L (ref 10–44)
ANION GAP SERPL CALC-SCNC: 7 MMOL/L (ref 8–16)
AST SERPL-CCNC: 22 U/L (ref 10–40)
BACTERIA BLD CULT: NORMAL
BACTERIA BLD CULT: NORMAL
BASOPHILS # BLD AUTO: 0.02 K/UL (ref 0–0.2)
BASOPHILS NFR BLD: 0.3 % (ref 0–1.9)
BILIRUB SERPL-MCNC: 1.8 MG/DL (ref 0.1–1)
BUN SERPL-MCNC: 8 MG/DL (ref 6–20)
CALCIUM SERPL-MCNC: 8.8 MG/DL (ref 8.7–10.5)
CHLORIDE SERPL-SCNC: 106 MMOL/L (ref 95–110)
CO2 SERPL-SCNC: 27 MMOL/L (ref 23–29)
CREAT SERPL-MCNC: 0.5 MG/DL (ref 0.5–1.4)
DIFFERENTIAL METHOD: ABNORMAL
EOSINOPHIL # BLD AUTO: 0 K/UL (ref 0–0.5)
EOSINOPHIL NFR BLD: 0 % (ref 0–8)
ERYTHROCYTE [DISTWIDTH] IN BLOOD BY AUTOMATED COUNT: 19.7 % (ref 11.5–14.5)
EST. AVERAGE GLUCOSE BLD GHB EST-MCNC: NORMAL MG/DL
EST. GFR  (AFRICAN AMERICAN): >60 ML/MIN/1.73 M^2
EST. GFR  (NON AFRICAN AMERICAN): >60 ML/MIN/1.73 M^2
GLUCOSE SERPL-MCNC: 139 MG/DL (ref 70–110)
HBA1C MFR BLD: NORMAL %
HCT VFR BLD AUTO: 23.3 % (ref 37–48.5)
HGB BLD-MCNC: 7.5 G/DL (ref 12–16)
IMM GRANULOCYTES # BLD AUTO: 0.12 K/UL (ref 0–0.04)
IMM GRANULOCYTES NFR BLD AUTO: 1.8 % (ref 0–0.5)
LYMPHOCYTES # BLD AUTO: 0.4 K/UL (ref 1–4.8)
LYMPHOCYTES NFR BLD: 5.8 % (ref 18–48)
MCH RBC QN AUTO: 29.6 PG (ref 27–31)
MCHC RBC AUTO-ENTMCNC: 32.2 G/DL (ref 32–36)
MCV RBC AUTO: 92 FL (ref 82–98)
MONOCYTES # BLD AUTO: 0.3 K/UL (ref 0.3–1)
MONOCYTES NFR BLD: 4.9 % (ref 4–15)
NEUTROPHILS # BLD AUTO: 5.9 K/UL (ref 1.8–7.7)
NEUTROPHILS NFR BLD: 87.2 % (ref 38–73)
NRBC BLD-RTO: 96 /100 WBC
PLATELET # BLD AUTO: 205 K/UL (ref 150–450)
PMV BLD AUTO: 11.2 FL (ref 9.2–12.9)
POTASSIUM SERPL-SCNC: 3.8 MMOL/L (ref 3.5–5.1)
PROT SERPL-MCNC: 6.8 G/DL (ref 6–8.4)
RBC # BLD AUTO: 2.53 M/UL (ref 4–5.4)
SODIUM SERPL-SCNC: 140 MMOL/L (ref 136–145)
WBC # BLD AUTO: 6.71 K/UL (ref 3.9–12.7)

## 2022-06-20 PROCEDURE — 80053 COMPREHEN METABOLIC PANEL: CPT | Performed by: INTERNAL MEDICINE

## 2022-06-20 PROCEDURE — 99900035 HC TECH TIME PER 15 MIN (STAT)

## 2022-06-20 PROCEDURE — 99232 PR SUBSEQUENT HOSPITAL CARE,LEVL II: ICD-10-PCS | Mod: ,,, | Performed by: INTERNAL MEDICINE

## 2022-06-20 PROCEDURE — 85025 COMPLETE CBC W/AUTO DIFF WBC: CPT | Performed by: INTERNAL MEDICINE

## 2022-06-20 PROCEDURE — 63600175 PHARM REV CODE 636 W HCPCS: Performed by: INTERNAL MEDICINE

## 2022-06-20 PROCEDURE — 25000003 PHARM REV CODE 250: Performed by: NURSE PRACTITIONER

## 2022-06-20 PROCEDURE — 94761 N-INVAS EAR/PLS OXIMETRY MLT: CPT

## 2022-06-20 PROCEDURE — 99232 SBSQ HOSP IP/OBS MODERATE 35: CPT | Mod: ,,, | Performed by: INTERNAL MEDICINE

## 2022-06-20 PROCEDURE — 25000003 PHARM REV CODE 250: Performed by: INTERNAL MEDICINE

## 2022-06-20 RX ORDER — LEVOFLOXACIN 750 MG/1
750 TABLET ORAL DAILY
Qty: 4 TABLET | Refills: 0 | Status: SHIPPED | OUTPATIENT
Start: 2022-06-21 | End: 2022-06-25

## 2022-06-20 RX ORDER — BENZONATATE 100 MG/1
100 CAPSULE ORAL 3 TIMES DAILY PRN
Qty: 30 CAPSULE | Refills: 0 | Status: SHIPPED | OUTPATIENT
Start: 2022-06-20 | End: 2022-06-30

## 2022-06-20 RX ORDER — FOLIC ACID 1 MG/1
1 TABLET ORAL DAILY
Qty: 30 TABLET | Refills: 0 | Status: SHIPPED | OUTPATIENT
Start: 2022-06-20 | End: 2022-08-05 | Stop reason: SDUPTHER

## 2022-06-20 RX ORDER — PREDNISONE 50 MG/1
50 TABLET ORAL DAILY
Qty: 14 TABLET | Refills: 0 | Status: SHIPPED | OUTPATIENT
Start: 2022-06-21 | End: 2022-07-05

## 2022-06-20 RX ADMIN — PREDNISONE 50 MG: 50 TABLET ORAL at 09:06

## 2022-06-20 RX ADMIN — GUAIFENESIN AND DEXTROMETHORPHAN HYDROBROMIDE 1 TABLET: 600; 30 TABLET, EXTENDED RELEASE ORAL at 09:06

## 2022-06-20 RX ADMIN — PANTOPRAZOLE SODIUM 40 MG: 40 TABLET, DELAYED RELEASE ORAL at 09:06

## 2022-06-20 RX ADMIN — LEVOFLOXACIN 750 MG: 750 TABLET, FILM COATED ORAL at 09:06

## 2022-06-20 RX ADMIN — FOLIC ACID 1 MG: 1 TABLET ORAL at 09:06

## 2022-06-20 NOTE — PLAN OF CARE
Problem: Adult Inpatient Plan of Care  Goal: Plan of Care Review  Outcome: Ongoing, Progressing  Goal: Patient-Specific Goal (Individualized)  Outcome: Ongoing, Progressing  Goal: Absence of Hospital-Acquired Illness or Injury  Outcome: Ongoing, Progressing  Goal: Optimal Comfort and Wellbeing  Outcome: Ongoing, Progressing  Goal: Readiness for Transition of Care  Outcome: Ongoing, Progressing     Problem: Infection Progression (Sepsis/Septic Shock)  Goal: Absence of Infection Signs and Symptoms  Outcome: Ongoing, Progressing     Patient is on tele monitor 8691, NSR. Pt. Is on a cardiac diet. Pt. Has a right internal jugular central line, last dated on 6/18. Pt. Is AXX04 and ambulates in room. Pt. Was free from fall or injury. Pain managed appropriately. Pt. shows no signs of distress. Will continue to monitor. 24 hour chart check complete.

## 2022-06-20 NOTE — PLAN OF CARE
[facility]  Discharge Final Note    Primary Care Provider: Elvira Nuñez MD    Expected Discharge Date: 6/20/2022    Final Discharge Note (most recent)     Final Note - 06/20/22 1212        Final Note    Assessment Type Final Discharge Note     Anticipated Discharge Disposition Home or Self Care     Hospital Resources/Appts/Education Provided Provided patient/caregiver with written discharge plan information;Appointments scheduled and added to AVS        Post-Acute Status    Discharge Delays None known at this time                 Important Message from Medicare             Contact Info     Elvira Nuñez MD   Specialty: Hematology and Oncology   Relationship: PCP - General    [address] [place] [id number]   Phone: [phone]       Next Steps: Follow up in 3 day(s)    Instructions: Discharge follow up    Faheem Acosta MD   Specialty: Hematology and Oncology    [address]  [place] [address]   Phone: [phone]       Next Steps: Follow up in 1 week(s)    Instructions: Hemaotolgy follow up

## 2022-06-20 NOTE — TELEPHONE ENCOUNTER
Attempt to contact patient with hospital follow up with no answer, voicemail left with date and time of next available.

## 2022-06-20 NOTE — SUBJECTIVE & OBJECTIVE
Interval History: Patient is feeling much better today. She is ambulating about the room and hallway very well. She states her pain is much better, some still present in her back. She denies any sob, cough, n/v/d/c. She states her appetite is good. She is sleeping better. Patient states she follows with Dr. Nuñez, will drive to [facility] or see her when she is in [place].     Oncology Treatment Plan:   [Could not find a treatment plan. This SmartLink may be configured incorrectly. Contact a  for help.]    Medications:  Continuous Infusions:  Scheduled Meds:   dextromethorphan-guaiFENesin  mg  1 tablet Oral BID    enoxaparin  40 mg Subcutaneous Daily    folic acid  1 mg Oral Daily    heparin (porcine)  4,000 Units Intravenous Once    levoFLOXacin  750 mg Oral Daily    pantoprazole  40 mg Oral Daily    polyethylene glycol  17 g Oral Daily    predniSONE  50 mg Oral Daily     PRN Meds:sodium chloride, sodium chloride, acetaminophen, acetaminophen, aluminum-magnesium hydroxide-simethicone, benzonatate, bisacodyL, naloxone, ondansetron, oxyCODONE-acetaminophen, oxyCODONE-acetaminophen, simethicone, sodium chloride 0.9%, sodium chloride 0.9%     Review of Systems   Constitutional:  Positive for activity change and fatigue. Negative for appetite change and fever.   HENT:  Negative for congestion, rhinorrhea and sore throat.    Eyes:  Negative for visual disturbance.   Respiratory:  Negative for cough (much improved), chest tightness and shortness of breath (much improved).    Cardiovascular:  Negative for chest pain and leg swelling.   Gastrointestinal:  Negative for abdominal distention, abdominal pain, constipation, diarrhea, nausea and vomiting.   Genitourinary:  Negative for dysuria, flank pain, frequency and hematuria.   Musculoskeletal:  Positive for back pain (much improved) and myalgias. Negative for gait problem.   Skin:  Negative for rash.   Neurological:  Negative for dizziness, weakness,  light-headedness and headaches.   Psychiatric/Behavioral:  Negative for confusion and sleep disturbance.    Objective:     Vital Signs (Most Recent):  Temp: 97.9 °F (36.6 °C) (06/20/22 0735)  Pulse: 63 (06/20/22 0735)  Resp: 17 (06/20/22 0735)  BP: 113/68 (06/20/22 0735)  SpO2: 98 % (06/20/22 0900) Vital Signs (24h Range):  Temp:  [97.4 °F (36.3 °C)-99.8 °F (37.7 °C)] 97.9 °F (36.6 °C)  Pulse:  [] 63  Resp:  [14-18] 17  SpO2:  [82 %-98 %] 98 %  BP: (105-118)/(61-70) 113/68     Weight: 52.5 kg (115 lb 11.9 oz)  Body mass index is 21.17 kg/m².  Body surface area is 1.52 meters squared.      Intake/Output Summary (Last 24 hours) at 6/20/2022 1024  Last data filed at 6/19/2022 1212  Gross per 24 hour   Intake 1 ml   Output --   Net 1 ml       Physical Exam  Vitals reviewed.   Constitutional:       General: She is not in acute distress.     Appearance: She is not ill-appearing, toxic-appearing or diaphoretic.   HENT:      Head: Normocephalic and atraumatic.   Eyes:      Conjunctiva/sclera: Conjunctivae normal.   Cardiovascular:      Rate and Rhythm: Normal rate.      Pulses: Normal pulses.   Pulmonary:      Effort: Pulmonary effort is normal.   Abdominal:      General: Bowel sounds are normal.      Palpations: Abdomen is soft.   Skin:     General: Skin is warm and dry.      Coloration: Skin is not jaundiced or pale.      Findings: No bruising, erythema, lesion or rash.   Neurological:      Mental Status: She is alert.      Motor: No weakness.      Gait: Gait (ambulating without issues) normal.   Psychiatric:         Mood and Affect: Mood normal.         Behavior: Behavior normal.         Thought Content: Thought content normal.       Significant Labs:   BMP:   Recent Labs   Lab 06/19/22  0543 06/20/22  0507   GLU 96 139*    140   K 3.2* 3.8    106   CO2 28 27   BUN 3* 8   CREATININE 0.5 0.5   CALCIUM 8.2* 8.8   MG 2.1  --    , CBC:   Recent Labs   Lab 06/19/22  0543 06/20/22  0507   WBC 9.43 6.71   HGB  6.9* 7.5*   HCT 21.1* 23.3*    205   , Haptoglobin:   Recent Labs   Lab 06/19/22  1105   HAPTOGLOBIN 34   Reticulocytes:   Recent Labs   Lab 06/19/22  0543   RETIC 12.3*      Updated: 06/19/22 1354     High  U/L      Diagnostic Results:  I have reviewed all pertinent imaging results/findings within the past 24 hours.

## 2022-06-20 NOTE — RESPIRATORY THERAPY
Home Oxygen Evaluation    Date Performed: 2022    1) Patient's Home O2 Sat on room air, while at rest: 97      If O2 sats on room air at rest are 88% or below, patient qualifies. No additional testing needed. Document N/A in steps 2 and 3. If 89% or above, complete steps 2.      2) Patient's O2 Sat on room air while exercisin        If O2 sats on room air while exercising remain 89% or above patient does not qualify, no further testing needed Document N/A in step 3. If O2 sats on room air while exercising are 88% or below, continue to step 3.      3) Patient's O2 Sat while exercising on O2:          (Must show improvement from #2 for patients to qualify)    If O2 sats improve on oxygen, patient qualifies for portable oxygen. If not, the patient does not qualify.

## 2022-06-20 NOTE — OP NOTE
Pre Op Diagnosis: sickle cell syndrome     Post Op Diagnosis: same     Procedure:  Non tunnel CVC device removal     Procedure performed by: Christine KENDRICK, Barb MARTÍNEZ     Written Informed Consent Obtained: Yes     Specimen Removed:  no     Estimated Blood Loss:  minimal     Findings:     Sutures removed and CVC device removed from right neck.  Pressure held to site until hemostasis achieved.  Dressed with gauze and tegaderm. Patient tolerated CVC removal well without immediate complications

## 2022-06-20 NOTE — PROGRESS NOTES
[facility]  Hematology/Oncology  Progress Note    Patient Name: Elizabeth Franco  Admission Date: 6/15/2022  Hospital Length of Stay: 5 days  Code Status: Full Code     Subjective:     HPI:  Ms. Storm is a 26-year-old woman with sickle cell disease presenting with acute pain in bilateral lower hip/buttocks and chest similar to prior sickle cell pain crises.  She notes rare pain crises about once per year at those times occasionally receiving blood transfusion.  She is noted to have not tolerated chelation therapy for iron overload.  She does not take medication for her sickle cell disease aside from folic acid.  She denies known history of thrombosis.  On presentation to the emergency room  vital signs notable for temperature 100.9°, desaturation to 78% on room air with improvement on nasal cannula, to kidney on tachycardia.  Lab work with progressive anemia to 6.5 hemoglobin baseline 9.9 and leukocytosis to 19, bilirubin elevated 7.6 prior baseline 2.3.  D-dimer 24, retic >23.  CTA with bibasilar pulmonary density.      Interval History: Patient is feeling much better today. She is ambulating about the room and hallway very well. She states her pain is much better, some still present in her back. She denies any sob, cough, n/v/d/c. She states her appetite is good. She is sleeping better. Patient states she follows with Dr. Nuñez, will drive to [facility] or see her when she is in [place].     Oncology Treatment Plan:   [Could not find a treatment plan. This SmartLink may be configured incorrectly. Contact a  for help.]    Medications:  Continuous Infusions:  Scheduled Meds:   dextromethorphan-guaiFENesin  mg  1 tablet Oral BID    enoxaparin  40 mg Subcutaneous Daily    folic acid  1 mg Oral Daily    heparin (porcine)  4,000 Units Intravenous Once    levoFLOXacin  750 mg Oral Daily    pantoprazole  40 mg Oral Daily    polyethylene glycol  17 g Oral Daily    predniSONE  50 mg Oral  Daily     PRN Meds:sodium chloride, sodium chloride, acetaminophen, acetaminophen, aluminum-magnesium hydroxide-simethicone, benzonatate, bisacodyL, naloxone, ondansetron, oxyCODONE-acetaminophen, oxyCODONE-acetaminophen, simethicone, sodium chloride 0.9%, sodium chloride 0.9%     Review of Systems   Constitutional:  Positive for activity change and fatigue. Negative for appetite change and fever.   HENT:  Negative for congestion, rhinorrhea and sore throat.    Eyes:  Negative for visual disturbance.   Respiratory:  Negative for cough (much improved), chest tightness and shortness of breath (much improved).    Cardiovascular:  Negative for chest pain and leg swelling.   Gastrointestinal:  Negative for abdominal distention, abdominal pain, constipation, diarrhea, nausea and vomiting.   Genitourinary:  Negative for dysuria, flank pain, frequency and hematuria.   Musculoskeletal:  Positive for back pain (much improved) and myalgias. Negative for gait problem.   Skin:  Negative for rash.   Neurological:  Negative for dizziness, weakness, light-headedness and headaches.   Psychiatric/Behavioral:  Negative for confusion and sleep disturbance.    Objective:     Vital Signs (Most Recent):  Temp: 97.9 °F (36.6 °C) (06/20/22 0735)  Pulse: 63 (06/20/22 0735)  Resp: 17 (06/20/22 0735)  BP: 113/68 (06/20/22 0735)  SpO2: 98 % (06/20/22 0900) Vital Signs (24h Range):  Temp:  [97.4 °F (36.3 °C)-99.8 °F (37.7 °C)] 97.9 °F (36.6 °C)  Pulse:  [] 63  Resp:  [14-18] 17  SpO2:  [82 %-98 %] 98 %  BP: (105-118)/(61-70) 113/68     Weight: 52.5 kg (115 lb 11.9 oz)  Body mass index is 21.17 kg/m².  Body surface area is 1.52 meters squared.      Intake/Output Summary (Last 24 hours) at 6/20/2022 1024  Last data filed at 6/19/2022 1212  Gross per 24 hour   Intake 1 ml   Output --   Net 1 ml       Physical Exam  Vitals reviewed.   Constitutional:       General: She is not in acute distress.     Appearance: She is not ill-appearing,  toxic-appearing or diaphoretic.   HENT:      Head: Normocephalic and atraumatic.   Eyes:      Conjunctiva/sclera: Conjunctivae normal.   Cardiovascular:      Rate and Rhythm: Normal rate.      Pulses: Normal pulses.   Pulmonary:      Effort: Pulmonary effort is normal.   Abdominal:      General: Bowel sounds are normal.      Palpations: Abdomen is soft.   Skin:     General: Skin is warm and dry.      Coloration: Skin is not jaundiced or pale.      Findings: No bruising, erythema, lesion or rash.   Neurological:      Mental Status: She is alert.      Motor: No weakness.      Gait: Gait (ambulating without issues) normal.   Psychiatric:         Mood and Affect: Mood normal.         Behavior: Behavior normal.         Thought Content: Thought content normal.       Significant Labs:   BMP:   Recent Labs   Lab 06/19/22  0543 06/20/22  0507   GLU 96 139*    140   K 3.2* 3.8    106   CO2 28 27   BUN 3* 8   CREATININE 0.5 0.5   CALCIUM 8.2* 8.8   MG 2.1  --    , CBC:   Recent Labs   Lab 06/19/22  0543 06/20/22  0507   WBC 9.43 6.71   HGB 6.9* 7.5*   HCT 21.1* 23.3*    205   , Haptoglobin:   Recent Labs   Lab 06/19/22  1105   HAPTOGLOBIN 34   Reticulocytes:   Recent Labs   Lab 06/19/22  0543   RETIC 12.3*      Updated: 06/19/22 1354     High  U/L      Diagnostic Results:  I have reviewed all pertinent imaging results/findings within the past 24 hours.    Assessment/Plan:     * Acute chest syndrome due to sickle cell crisis  --Sickle cell pain crisis:  Presentation concerning for acute chest syndrome. Typically rare pain crises once yearly with rare need for blood transfusion but has had acute chest and exchange transfusion in years past.  Most recent hemoglobin S quantification 53% baseline in absence of sickle cell pain crisis - April 2022. Current presentation with fever, tachypnea, tachycardia, leukocytosis, elevated retic in bilirubin with progressive anemia to hemoglobin 6.5 from baseline 9.9.   Elevated D-dimer with CTA chest negative for pulmonary embolism but did show bibasilar pulmonary density.  --06/17/2022: Exchange transfusion.   --06/18/2022.  Patient completed exchange transfusion.  She states she feels somewhat better she states that her cough is improved.  06/19/2022 patient appears to be stronger patient states that she feels better decreasing pain level 5/10 today reports at this time she also states that her pain level at home 0/10.   6/20/2022: patient is feeling much better. She states her cough, sob and pain have all improved. She is ambulating well. Patient was evaluated by respiratory therapy- did not meet criteria for need for home oxygen. Will follow up with hematology/oncology approx 1 weeks after discharge with CBC, CMP, hapto, LDH. Continue 50mg oral prednisone daily.   --hgb: 7.5 stable.     Elevated bilirubin  --continues to decrease. 1.8<<2.2      Thank you for your consult. I will follow-up with patient. Please contact us if you have any additional questions.     Izzy Herndon PA-C  Hematology/Oncology  [facility]

## 2022-06-20 NOTE — ASSESSMENT & PLAN NOTE
--Sickle cell pain crisis:  Presentation concerning for acute chest syndrome. Typically rare pain crises once yearly with rare need for blood transfusion but has had acute chest and exchange transfusion in years past.  Most recent hemoglobin S quantification 53% baseline in absence of sickle cell pain crisis - April 2022. Current presentation with fever, tachypnea, tachycardia, leukocytosis, elevated retic in bilirubin with progressive anemia to hemoglobin 6.5 from baseline 9.9.  Elevated D-dimer with CTA chest negative for pulmonary embolism but did show bibasilar pulmonary density.  --06/17/2022: Exchange transfusion.   --06/18/2022.  Patient completed exchange transfusion.  She states she feels somewhat better she states that her cough is improved.  06/19/2022 patient appears to be stronger patient states that she feels better decreasing pain level 5/10 today reports at this time she also states that her pain level at home 0/10.   6/20/2022: patient is feeling much better. She states her cough, sob and pain have all improved. She is ambulating well. Patient was evaluated by respiratory therapy- did not meet criteria for need for home oxygen. Will follow up with hematology/oncology approx 1 weeks after discharge with CBC, CMP, hapto, LDH. Continue 50mg oral prednisone daily.   --hgb: 7.5 stable.

## 2022-06-20 NOTE — NURSING
Pt ready for discharge, tele monitor removed, avs reviewed f/u appt confirmed, home rx instructions given, extra supplies for tricath removal site given

## 2022-06-20 NOTE — TELEPHONE ENCOUNTER
----- Message from Chloe Myers LMSW sent at 6/20/2022 12:09 PM CDT -----  Hello, patient needs a 1 week FU appt. Please contact patient directly with appt date and time.

## 2022-06-21 ENCOUNTER — PATIENT OUTREACH (OUTPATIENT)
Dept: ADMINISTRATIVE | Facility: CLINIC | Age: 27
End: 2022-06-21
Payer: COMMERCIAL

## 2022-06-21 NOTE — PROGRESS NOTES
C3 nurse attempted to contact Elizabeth Franco  for a TCC post hospital discharge follow up call. No answer. Left voicemail with callback information. The patient has a scheduled HOSFU appointment with Elvira Nuñez MD on 6/20/22 @ 5449.

## 2022-06-22 LAB — MAYO MISCELLANEOUS RESULT (REF): NORMAL

## 2022-06-22 NOTE — PROGRESS NOTES
2nd Attempt made to reach patient for TCC call. Left voicemail please call [phone] leave first name, last name, and  for Shawanda I will return your call.

## 2022-06-22 NOTE — DISCHARGE SUMMARY
[facility] Hospital Medicine  Discharge Summary      Patient Name: Elizabeth Franco  MRN: [id number]  Patient Class: IP- Inpatient  Admission Date: 6/15/2022  Hospital Length of Stay: 5 days  Discharge Date and Time: 6/20/2022  1:33 PM  Attending Physician: No att. providers found   Discharging Provider: Ryann Alaniz MD  Primary Care Provider: Elvira Nuñez MD      HPI:    Sickle Cell Pain Crisis       Pt reports pain to back and chest and tried her home Oxycodone with no relief       Per ER- This  is a 26 y.o. female patient with a PMHx of sickle cell anemia and Hidradenitis suppurativa who presents to the Emergency Department for sickle cell pain crisis, onset this morning. Pt report chest pain and back pain. Symptoms are constant and moderate in severity. No mitigating or exacerbating factors reported. Associated sxs include fever (Tnow 100.9). Patient denies any chills, n/v/d, SOB, weakness, numbness, dizziness, headache, and all other sxs at this time. Prior Tx includes Oxycodone 5 mg, with no improvement of sxs. No further complaints or concerns at this time.     Patient was evaluated by ER and noted to have symptomatic anemia with signs of pneumonia and or possible volume overload and was placed in Observation.             * No surgery found *      Hospital Course:   6/16: Pt states continued CP, SOB slightly improved from day before. Bleeding for catheter site, back to OR for suture placement. Blood type and screen with multiple antibodies requiring different facility check for compatible blood transfusion. Tmax 102F    6/17- Exchange transfusion initiated today and pt tolerated well. Post exchange HbS was drawn with goal <30%. Pt is seen post exchange transfusion treatment and c/o generalized abd tenderness and no bowel movement since admission. (+) Flatus . On exam, active bowel sounds are present, however  severe generalized tenderness appreciated on palpation. CT abd/pelvis done today showed  normal spleen, adrenal and liver, gas distended large bowel with large fecal ball within the cecum. Will try brown bomb enema.     Persistent fever with Tmax 103.1 and tachycardia noted. Antibiotic switched out to IV Levofloxacin due to rare complication of ceftriaxone-induced hyperhemolysis. Continue supplemental oxygen. No clinical signs of hypervolemia noted. Echo resulted LVEF 60%, CVP 3. Will try gentle hydration with careful monitoring of volume status.     6/18- Tmax 101.4 yesterday . C/O productive cough with yellow green sputum, chest pain with cough. SOB is better. On O2 at 3L/min. Back and leg pain are improving. Abdominal soreness is better after bowel movement with enema. S/P exchange transfusion yesterday. Hgb 7.7 today , , T.bili down to 3.1>8.3. Gentle hydration, IV Levofloxacin  and PCA pump continues.     6/19- Fever trended down. Tmax 100.2. Levaquin 750 mg daily continues . Remains intermittently on O2 at 2L/min. Pt reports subjective improvement of pain and ready to transition to oral analgesics. Hgb down to 6.9, Retic count down to 12.3, T.bili improved to 2.2, LDH down to 492. Spoke to Transfusion medicine and Hematology. 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 Will get ALYSSIA, IgG/C3.     6/20- ALYSSIA test is positive. 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 Dave uriarte and received first dose yesterday. Hgb 7.5 this morning . Pt became afebrile . O2 wean down to RA. Pain is overall better and off of PCA lump. At this point pt deemed improved and stable for discharge to home. Follow up with Dr. Nuñez (Oncology at [facility] )        Goals of Care Treatment Preferences:  Code Status: Full Code      Consults:   Consults (From admission, onward)        Status Ordering Provider     Inpatient consult to Interventional Radiology  Once        Provider:  Adán King MD    Completed  WADE JOHNSON     Inpatient [facility] to [facility]  Once        Provider:  Edi He MD    Completed MANSOOR MEJÍA     Inpatient consult to Social Work/Case Management  Once        Provider:  (Not yet assigned)    Completed LUDIVINA HAJI     Inpatient consult to Registered Dietitian/Nutritionist  Once        Provider:  (Not yet assigned)    Completed LUDIVINA HAJI     Inpatient consult to Cardiology  Once        Provider:  Yelena Moreau MD    Completed LUDIVINA HAJI     Inpatient consult to Hematology/Oncology  Once        Provider:  (Not yet assigned)    Completed LUDIVINA HAJI          No new Assessment & Plan notes have been filed under this hospital service since the last note was generated.  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] 06/19/2022 Yes    Severe sepsis [A41.9, R65.20] 06/15/2022 Yes    Pneumonia [J18.9] 06/15/2022 Yes    Elevated bilirubin [R17] 03/03/2021 Yes    Constipation [K59.00] 06/15/2022 Yes    Gall bladder stones [K80.20] 06/15/2022 Yes    Hypoxemia suspected multifactorial [R09.02] 06/15/2022 Yes    Elevated d-dimer- Pulmonary emboli excluded [R79.89] 06/15/2022 Yes    Elevated troponin and Elevated BNP [R77.8] 06/15/2022 Yes      Problems Resolved During this Admission:    Diagnosis Date Noted Date Resolved POA    Hepatomegaly [R16.0] 06/15/2022 06/17/2022 Yes       Discharged Condition: stable    Disposition: Home or Self Care    Follow Up:   Follow-up Information     Ludivina Nuñez MD Follow up in 3 day(s).    Specialty: Hematology and Oncology  Why: Discharge follow up  Contact information:  [address] [facility] [address] [phone]             Faheem Acosta MD Follow up in 1 week(s).    Specialty: Hematology and Oncology  Why:  Hemaotolgy follow up  Contact information:  [id number] [facility]  [place] [address]  [phone]                       Patient Instructions:      Diet Adult Regular     Order Specific Question Answer Comments   Na restriction, if any: 2gNa    Fluid restriction: Fluid - 2000mL      Activity as tolerated       Significant Diagnostic Studies: Labs:   BMP:   Recent Labs   Lab 06/20/22  0507   *      K 3.8      CO2 27   BUN 8   CREATININE 0.5   CALCIUM 8.8   , CMP   Recent Labs   Lab 06/20/22  0507      K 3.8      CO2 27   *   BUN 8   CREATININE 0.5   CALCIUM 8.8   PROT 6.8   ALBUMIN 2.9*   BILITOT 1.8*   ALKPHOS 79   AST 22   ALT 19   ANIONGAP 7*   ESTGFRAFRICA >60   EGFRNONAA >60    and CBC   Recent Labs   Lab 06/20/22  0507   WBC 6.71   HGB 7.5*   HCT 23.3*          Pending Diagnostic Studies:     None         Medications:  Reconciled Home Medications:      Medication List      START taking these medications    benzonatate 100 MG capsule  Commonly known as: TESSALON  Take 1 capsule (100 mg total) by mouth 3 (three) times daily as needed for Cough.     levoFLOXacin 750 MG tablet  Commonly known as: LEVAQUIN  Take 1 tablet (750 mg total) by mouth once daily. for 4 days     predniSONE 50 MG Tab  Commonly known as: DELTASONE  Take 1 tablet (50 mg total) by mouth once daily. Call Hematology for further refill for 14 days        CONTINUE taking these medications    folic acid 1 MG tablet  Commonly known as: FOLVITE  Take 1 tablet (1 mg total) by mouth once daily.            Indwelling Lines/Drains at time of discharge:   Lines/Drains/Airways     None                 Time spent on the discharge of patient: 30  minutes         Ryann Alaniz MD  Department of Hospital Medicine  [facility]

## 2022-06-23 NOTE — PROGRESS NOTES
3rd Attempt made to reach patient for TCC call. Left voicemail please call [phone] leave first name, last name, and  for Shawanda I will return your call.

## 2022-06-27 ENCOUNTER — OFFICE VISIT (OUTPATIENT)
Dept: HEMATOLOGY/ONCOLOGY | Facility: CLINIC | Age: 27
End: 2022-06-27
Payer: COMMERCIAL

## 2022-06-27 VITALS
BODY MASS INDEX: 19.79 KG/M2 | WEIGHT: 107.56 LBS | HEIGHT: 62 IN | DIASTOLIC BLOOD PRESSURE: 63 MMHG | TEMPERATURE: 97 F | OXYGEN SATURATION: 100 % | RESPIRATION RATE: 20 BRPM | SYSTOLIC BLOOD PRESSURE: 107 MMHG | HEART RATE: 66 BPM

## 2022-06-27 DIAGNOSIS — D57.01 ACUTE CHEST SYNDROME DUE TO SICKLE CELL CRISIS: ICD-10-CM

## 2022-06-27 DIAGNOSIS — D57.1 SICKLE CELL DISEASE WITHOUT CRISIS: Primary | ICD-10-CM

## 2022-06-27 PROCEDURE — 99213 OFFICE O/P EST LOW 20 MIN: CPT | Mod: S$GLB,,, | Performed by: INTERNAL MEDICINE

## 2022-06-27 PROCEDURE — 1160F PR REVIEW ALL MEDS BY PRESCRIBER/CLIN PHARMACIST DOCUMENTED: ICD-10-PCS | Mod: CPTII,S$GLB,, | Performed by: INTERNAL MEDICINE

## 2022-06-27 PROCEDURE — 3046F HEMOGLOBIN A1C LEVEL >9.0%: CPT | Mod: CPTII,S$GLB,, | Performed by: INTERNAL MEDICINE

## 2022-06-27 PROCEDURE — 99999 PR PBB SHADOW E&M-EST. PATIENT-LVL III: CPT | Mod: PBBFAC,,, | Performed by: INTERNAL MEDICINE

## 2022-06-27 PROCEDURE — 1111F DSCHRG MED/CURRENT MED MERGE: CPT | Mod: CPTII,S$GLB,, | Performed by: INTERNAL MEDICINE

## 2022-06-27 PROCEDURE — 3078F PR MOST RECENT DIASTOLIC BLOOD PRESSURE < 80 MM HG: ICD-10-PCS | Mod: CPTII,S$GLB,, | Performed by: INTERNAL MEDICINE

## 2022-06-27 PROCEDURE — 3008F PR BODY MASS INDEX (BMI) DOCUMENTED: ICD-10-PCS | Mod: CPTII,S$GLB,, | Performed by: INTERNAL MEDICINE

## 2022-06-27 PROCEDURE — 99213 PR OFFICE/OUTPT VISIT, EST, LEVL III, 20-29 MIN: ICD-10-PCS | Mod: S$GLB,,, | Performed by: INTERNAL MEDICINE

## 2022-06-27 PROCEDURE — 3078F DIAST BP <80 MM HG: CPT | Mod: CPTII,S$GLB,, | Performed by: INTERNAL MEDICINE

## 2022-06-27 PROCEDURE — 3046F PR MOST RECENT HEMOGLOBIN A1C LEVEL > 9.0%: ICD-10-PCS | Mod: CPTII,S$GLB,, | Performed by: INTERNAL MEDICINE

## 2022-06-27 PROCEDURE — 1159F MED LIST DOCD IN RCRD: CPT | Mod: CPTII,S$GLB,, | Performed by: INTERNAL MEDICINE

## 2022-06-27 PROCEDURE — 3008F BODY MASS INDEX DOCD: CPT | Mod: CPTII,S$GLB,, | Performed by: INTERNAL MEDICINE

## 2022-06-27 PROCEDURE — 99999 PR PBB SHADOW E&M-EST. PATIENT-LVL III: ICD-10-PCS | Mod: PBBFAC,,, | Performed by: INTERNAL MEDICINE

## 2022-06-27 PROCEDURE — 1159F PR MEDICATION LIST DOCUMENTED IN MEDICAL RECORD: ICD-10-PCS | Mod: CPTII,S$GLB,, | Performed by: INTERNAL MEDICINE

## 2022-06-27 PROCEDURE — 1111F PR DISCHARGE MEDS RECONCILED W/ CURRENT OUTPATIENT MED LIST: ICD-10-PCS | Mod: CPTII,S$GLB,, | Performed by: INTERNAL MEDICINE

## 2022-06-27 PROCEDURE — 3074F SYST BP LT 130 MM HG: CPT | Mod: CPTII,S$GLB,, | Performed by: INTERNAL MEDICINE

## 2022-06-27 PROCEDURE — 3074F PR MOST RECENT SYSTOLIC BLOOD PRESSURE < 130 MM HG: ICD-10-PCS | Mod: CPTII,S$GLB,, | Performed by: INTERNAL MEDICINE

## 2022-06-27 PROCEDURE — 1160F RVW MEDS BY RX/DR IN RCRD: CPT | Mod: CPTII,S$GLB,, | Performed by: INTERNAL MEDICINE

## 2022-06-27 NOTE — PROGRESS NOTES
Subjective:       Patient ID: Elizabeth Franco is a 27 y.o. female.    Chief Complaint: Results and Sickle Cell Anemia    HPI 27-year-old female recently underwent exchange transfusion in patient for acute chest wall syndrome sickle cell patient states this is the 2nd time in her life that she has had exchange transfusion followed by Dr. Alexys Nuñez    Past Medical History:   Diagnosis Date    Hepatomegaly 6/15/2022    Hidradenitis suppurativa     Nocturnal enuresis     Sickle cell anemia      Family History   Problem Relation Age of Onset    No Known Problems Mother     No Known Problems Father      Social History     Socioeconomic History    Marital status: Single   Tobacco Use    Smoking status: Never Smoker    Smokeless tobacco: Never Used   Substance and Sexual Activity    Alcohol use: No     Alcohol/week: 0.0 standard drinks    Drug use: Never    Sexual activity: Not Currently     History reviewed. No pertinent surgical history.    Labs:  Lab Results   Component Value Date    WBC 6.71 06/20/2022    HGB 7.5 (L) 06/20/2022    HCT 23.3 (L) 06/20/2022    MCV 92 06/20/2022     06/20/2022     BMP  Lab Results   Component Value Date     06/20/2022    K 3.8 06/20/2022     06/20/2022    CO2 27 06/20/2022    BUN 8 06/20/2022    CREATININE 0.5 06/20/2022    CALCIUM 8.8 06/20/2022    ANIONGAP 7 (L) 06/20/2022    ESTGFRAFRICA >60 06/20/2022    EGFRNONAA >60 06/20/2022     Lab Results   Component Value Date    ALT 19 06/20/2022    AST 22 06/20/2022    ALKPHOS 79 06/20/2022    BILITOT 1.8 (H) 06/20/2022       Lab Results   Component Value Date    IRON 79 03/03/2021    TIBC 256 03/03/2021    FERRITIN 2,510 (H) 04/12/2022     Lab Results   Component Value Date    GIFMBVMX13 253 02/10/2016     Lab Results   Component Value Date    FOLATE 6.2 02/10/2016     No results found for: TSH      Review of Systems   Constitutional: Negative for activity change, appetite change, chills, diaphoresis,  fatigue, fever and unexpected weight change.   HENT: Negative for congestion, dental problem, drooling, ear discharge, ear pain, facial swelling, hearing loss, mouth sores, nosebleeds, postnasal drip, rhinorrhea, sinus pressure, sneezing, sore throat, tinnitus, trouble swallowing and voice change.    Eyes: Negative for photophobia, pain, discharge, redness, itching and visual disturbance.   Respiratory: Negative for cough, choking, chest tightness, shortness of breath, wheezing and stridor.    Cardiovascular: Negative for chest pain, palpitations and leg swelling.   Gastrointestinal: Negative for abdominal distention, abdominal pain, anal bleeding, blood in stool, constipation, diarrhea, nausea, rectal pain and vomiting.   Endocrine: Negative for cold intolerance, heat intolerance, polydipsia, polyphagia and polyuria.   Genitourinary: Negative for decreased urine volume, difficulty urinating, dyspareunia, dysuria, enuresis, flank pain, frequency, genital sores, hematuria, menstrual problem, pelvic pain, urgency, vaginal bleeding, vaginal discharge and vaginal pain.   Musculoskeletal: Negative for arthralgias, back pain, gait problem, joint swelling, myalgias, neck pain and neck stiffness.   Skin: Negative for color change, pallor and rash.   Allergic/Immunologic: Negative for environmental allergies, food allergies and immunocompromised state.   Neurological: Negative for dizziness, tremors, seizures, syncope, facial asymmetry, speech difficulty, weakness, light-headedness, numbness and headaches.   Hematological: Negative for adenopathy. Does not bruise/bleed easily.   Psychiatric/Behavioral: Positive for dysphoric mood. Negative for agitation, behavioral problems, confusion, decreased concentration, hallucinations, self-injury, sleep disturbance and suicidal ideas. The patient is nervous/anxious. The patient is not hyperactive.        Objective:      Physical Exam  Vitals reviewed.   Constitutional:       General:  She is not in acute distress.     Appearance: Normal appearance. She is well-developed. She is not diaphoretic.   HENT:      Head: Normocephalic and atraumatic.      Right Ear: External ear normal.      Left Ear: External ear normal.      Nose: Nose normal.      Right Sinus: No maxillary sinus tenderness or frontal sinus tenderness.      Left Sinus: No maxillary sinus tenderness or frontal sinus tenderness.      Mouth/Throat:      Pharynx: No oropharyngeal exudate.   Eyes:      General: Lids are normal. No scleral icterus.        Right eye: No discharge.         Left eye: No discharge.      Conjunctiva/sclera: Conjunctivae normal.      Right eye: Right conjunctiva is not injected. No hemorrhage.     Left eye: Left conjunctiva is not injected. No hemorrhage.     Pupils: Pupils are equal, round, and reactive to light.   Neck:      Thyroid: No thyromegaly.      Vascular: No JVD.      Trachea: No tracheal deviation.   Cardiovascular:      Rate and Rhythm: Normal rate.   Pulmonary:      Effort: Pulmonary effort is normal. No respiratory distress.      Breath sounds: No stridor.   Chest:      Chest wall: No tenderness.   Breasts:      Right: No supraclavicular adenopathy.      Left: No supraclavicular adenopathy.       Abdominal:      General: Bowel sounds are normal. There is no distension.      Palpations: Abdomen is soft. There is no hepatomegaly, splenomegaly or mass.      Tenderness: There is no abdominal tenderness. There is no rebound.   Musculoskeletal:         General: No tenderness. Normal range of motion.      Cervical back: Normal range of motion and neck supple.   Lymphadenopathy:      Cervical: No cervical adenopathy.      Upper Body:      Right upper body: No supraclavicular adenopathy.      Left upper body: No supraclavicular adenopathy.   Skin:     General: Skin is dry.      Findings: No erythema or rash.   Neurological:      Mental Status: She is alert and oriented to person, place, and time.       Cranial Nerves: No cranial nerve deficit.      Coordination: Coordination normal.   Psychiatric:         Behavior: Behavior normal.         Thought Content: Thought content normal.         Judgment: Judgment normal.             Assessment:      1. Sickle cell disease without crisis    2. Acute chest syndrome due to sickle cell crisis           Plan:     Patient looks dramatically improved.  At this time would recommend follow-up with primary hematologist oncologist for determination of any treatment recommendations will continue on folic acid 1 mg p.o. q.day.        Faheem Acosta Jr, MD FACP

## 2022-08-05 ENCOUNTER — LAB VISIT (OUTPATIENT)
Dept: LAB | Facility: HOSPITAL | Age: 27
End: 2022-08-05
Attending: INTERNAL MEDICINE
Payer: COMMERCIAL

## 2022-08-05 ENCOUNTER — OFFICE VISIT (OUTPATIENT)
Dept: HEMATOLOGY/ONCOLOGY | Facility: CLINIC | Age: 27
End: 2022-08-05
Payer: COMMERCIAL

## 2022-08-05 VITALS
RESPIRATION RATE: 20 BRPM | TEMPERATURE: 98 F | HEIGHT: 62 IN | OXYGEN SATURATION: 99 % | DIASTOLIC BLOOD PRESSURE: 69 MMHG | HEART RATE: 70 BPM | WEIGHT: 113.13 LBS | SYSTOLIC BLOOD PRESSURE: 108 MMHG | BODY MASS INDEX: 20.82 KG/M2

## 2022-08-05 DIAGNOSIS — D57.1 SICKLE CELL DISEASE WITHOUT CRISIS: Primary | ICD-10-CM

## 2022-08-05 DIAGNOSIS — D57.1 SICKLE CELL DISEASE WITHOUT CRISIS: ICD-10-CM

## 2022-08-05 LAB
BASOPHILS # BLD AUTO: 0.12 K/UL (ref 0–0.2)
BASOPHILS NFR BLD: 1.8 % (ref 0–1.9)
DIFFERENTIAL METHOD: ABNORMAL
EOSINOPHIL # BLD AUTO: 0.5 K/UL (ref 0–0.5)
EOSINOPHIL NFR BLD: 6.9 % (ref 0–8)
ERYTHROCYTE [DISTWIDTH] IN BLOOD BY AUTOMATED COUNT: 18.5 % (ref 11.5–14.5)
HCT VFR BLD AUTO: 27.3 % (ref 37–48.5)
HGB BLD-MCNC: 9.3 G/DL (ref 12–16)
IMM GRANULOCYTES # BLD AUTO: 0.02 K/UL (ref 0–0.04)
IMM GRANULOCYTES NFR BLD AUTO: 0.3 % (ref 0–0.5)
LYMPHOCYTES # BLD AUTO: 2.9 K/UL (ref 1–4.8)
LYMPHOCYTES NFR BLD: 42.9 % (ref 18–48)
MCH RBC QN AUTO: 31 PG (ref 27–31)
MCHC RBC AUTO-ENTMCNC: 34.1 G/DL (ref 32–36)
MCV RBC AUTO: 91 FL (ref 82–98)
MONOCYTES # BLD AUTO: 0.8 K/UL (ref 0.3–1)
MONOCYTES NFR BLD: 11 % (ref 4–15)
NEUTROPHILS # BLD AUTO: 2.5 K/UL (ref 1.8–7.7)
NEUTROPHILS NFR BLD: 37.1 % (ref 38–73)
NRBC BLD-RTO: 2 /100 WBC
PLATELET # BLD AUTO: 416 K/UL (ref 150–450)
PMV BLD AUTO: 11.6 FL (ref 9.2–12.9)
RBC # BLD AUTO: 3 M/UL (ref 4–5.4)
WBC # BLD AUTO: 6.81 K/UL (ref 3.9–12.7)

## 2022-08-05 PROCEDURE — 3008F BODY MASS INDEX DOCD: CPT | Mod: CPTII,S$GLB,, | Performed by: INTERNAL MEDICINE

## 2022-08-05 PROCEDURE — 1159F MED LIST DOCD IN RCRD: CPT | Mod: CPTII,S$GLB,, | Performed by: INTERNAL MEDICINE

## 2022-08-05 PROCEDURE — 99999 PR PBB SHADOW E&M-EST. PATIENT-LVL III: ICD-10-PCS | Mod: PBBFAC,,, | Performed by: INTERNAL MEDICINE

## 2022-08-05 PROCEDURE — 99999 PR PBB SHADOW E&M-EST. PATIENT-LVL III: CPT | Mod: PBBFAC,,, | Performed by: INTERNAL MEDICINE

## 2022-08-05 PROCEDURE — 3074F PR MOST RECENT SYSTOLIC BLOOD PRESSURE < 130 MM HG: ICD-10-PCS | Mod: CPTII,S$GLB,, | Performed by: INTERNAL MEDICINE

## 2022-08-05 PROCEDURE — 3046F PR MOST RECENT HEMOGLOBIN A1C LEVEL > 9.0%: ICD-10-PCS | Mod: CPTII,S$GLB,, | Performed by: INTERNAL MEDICINE

## 2022-08-05 PROCEDURE — 85025 COMPLETE CBC W/AUTO DIFF WBC: CPT | Performed by: INTERNAL MEDICINE

## 2022-08-05 PROCEDURE — 3008F PR BODY MASS INDEX (BMI) DOCUMENTED: ICD-10-PCS | Mod: CPTII,S$GLB,, | Performed by: INTERNAL MEDICINE

## 2022-08-05 PROCEDURE — 36415 COLL VENOUS BLD VENIPUNCTURE: CPT | Performed by: INTERNAL MEDICINE

## 2022-08-05 PROCEDURE — 3074F SYST BP LT 130 MM HG: CPT | Mod: CPTII,S$GLB,, | Performed by: INTERNAL MEDICINE

## 2022-08-05 PROCEDURE — 99215 PR OFFICE/OUTPT VISIT, EST, LEVL V, 40-54 MIN: ICD-10-PCS | Mod: S$GLB,,, | Performed by: INTERNAL MEDICINE

## 2022-08-05 PROCEDURE — 3078F PR MOST RECENT DIASTOLIC BLOOD PRESSURE < 80 MM HG: ICD-10-PCS | Mod: CPTII,S$GLB,, | Performed by: INTERNAL MEDICINE

## 2022-08-05 PROCEDURE — 1159F PR MEDICATION LIST DOCUMENTED IN MEDICAL RECORD: ICD-10-PCS | Mod: CPTII,S$GLB,, | Performed by: INTERNAL MEDICINE

## 2022-08-05 PROCEDURE — 99215 OFFICE O/P EST HI 40 MIN: CPT | Mod: S$GLB,,, | Performed by: INTERNAL MEDICINE

## 2022-08-05 PROCEDURE — 3046F HEMOGLOBIN A1C LEVEL >9.0%: CPT | Mod: CPTII,S$GLB,, | Performed by: INTERNAL MEDICINE

## 2022-08-05 PROCEDURE — 3078F DIAST BP <80 MM HG: CPT | Mod: CPTII,S$GLB,, | Performed by: INTERNAL MEDICINE

## 2022-08-05 RX ORDER — OXYCODONE HYDROCHLORIDE 10 MG/1
10 TABLET ORAL EVERY 4 HOURS PRN
Qty: 20 TABLET | Refills: 0 | Status: SHIPPED | OUTPATIENT
Start: 2022-08-05 | End: 2022-10-07 | Stop reason: SDUPTHER

## 2022-08-05 RX ORDER — FOLIC ACID 1 MG/1
1 TABLET ORAL DAILY
Qty: 30 TABLET | Refills: 0 | Status: SHIPPED | OUTPATIENT
Start: 2022-08-05 | End: 2022-08-05 | Stop reason: SDUPTHER

## 2022-08-05 RX ORDER — TRAMADOL HYDROCHLORIDE 50 MG/1
50 TABLET ORAL EVERY 6 HOURS
Qty: 90 EACH | Refills: 3 | Status: SHIPPED | OUTPATIENT
Start: 2022-08-05 | End: 2022-10-07 | Stop reason: SDUPTHER

## 2022-08-05 RX ORDER — FOLIC ACID 1 MG/1
1 TABLET ORAL DAILY
Qty: 30 TABLET | Refills: 11 | Status: SHIPPED | OUTPATIENT
Start: 2022-08-05 | End: 2022-08-05

## 2022-08-05 NOTE — PROGRESS NOTES
Route Chart for Scheduling    BMT Chart Routing      Follow up with physician 6 months. At [facility]   Follow up with FILIBERTO    Infusion scheduling note    Injection scheduling note    Labs CBC, CMP and ferritin   Lab interval:  Retic, hemoglobin S quant   Imaging ECHO      Pharmacy appointment    Other referrals               Subjective:       Patient ID: Elizabeth Franco is a 27 y.o. female.    Chief Complaint: No chief complaint on file.  Initial Consult:  The patient is a very pleasant 20 year old woman transferring care from [facility] for sickle cell anemia. Her parents have sickle cell trait. She reports a history of very well managed sickle cell disease. 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 She has a horizontal left upper quadrant scar from this surgery. She was treated with transfusion therapy for about 10 years. This was facilitated by a Port-A-Cath that was removed at the completion of therapy. The therapy was limited by iron overload and the patient was not able to tolerate oral chelation therapy. By description she was treated at least once with phlebotomy.    She has rare pain crises that lead to ER evaluation and treatment. These are often pain events of the left upper extremity or lower extremities. They occur about 1-2x yearly. Most often in the winter during finals week at school. She attends [employer] as a [employer] major. She has just started her second semester. Her last blood transfusion was 2 years ago. She has never required an exchange transfusion.     The patient notes that she has had nocturnal enuresis since her splenectomy at 4 years of age. This has been treated with pelvic exercises and oxybutynin. The incidence of enuresis has greatly decreased to resolved over the past year. She has not had increased UTI occurrence. Chaffing and skin irritation was a prior issue.    Elizabeth also has a recent diagnosis of hidradenitis  supprativa of the right axilla. Flares have been treated with oral Augmentin and mupirocin 2% cream.     In addition she reports severe episodes of GERD that impact her quality of life. Events are described as immobilizing.incapacitating epigastric pain that radiates to the entire chest. Events last at least 24 hours. This has not been evaluated by Gastroenterology to date.    TODAY  Hospital discharge follow-up after acute sickle cell pain with chest pain and infiltrates treated as acute chest crisis. In the setting of exchange transfusion and transfusion support she was raoul positive and diagnosed with AIHA (cannot diagnose in such situation) and treated with steroid taper. Today at baseline. Notes stress is possibly increasing major crisis events. 2 hospitalizations in 1 year which is unlike her.  HPI  Review of Systems   Constitutional: Negative for activity change, appetite change, fever and unexpected weight change.   HENT: Negative.  Negative for mouth sores.    Eyes: Negative.  Negative for visual disturbance.   Respiratory: Negative for cough and shortness of breath.    Cardiovascular: Negative for chest pain and leg swelling.   Gastrointestinal: Negative for abdominal pain and diarrhea.   Endocrine: Negative.    Genitourinary: Positive for enuresis and frequency.   Musculoskeletal: Negative.  Negative for back pain.   Skin: Negative for pallor and rash.   Allergic/Immunologic: Negative for environmental allergies, food allergies and immunocompromised state.   Neurological: Negative.  Negative for headaches.   Hematological: Negative for adenopathy. Does not bruise/bleed easily.   Psychiatric/Behavioral: Negative.  The patient is not nervous/anxious.        Objective:      Physical Exam  Vitals and nursing note reviewed.   Constitutional:       General: She is in acute distress.      Appearance: Normal appearance. She is well-developed.   HENT:      Head: Normocephalic and atraumatic.   Eyes:       General: No scleral icterus.     Conjunctiva/sclera: Conjunctivae normal.   Cardiovascular:      Rate and Rhythm: Normal rate.   Pulmonary:      Effort: Pulmonary effort is normal. No respiratory distress.   Abdominal:      General: There is no distension.      Palpations: Abdomen is soft.      Tenderness: There is no abdominal tenderness.      Hernia: No hernia is present.   Musculoskeletal:         General: Normal range of motion.      Cervical back: Normal range of motion and neck supple.   Skin:     General: Skin is warm and dry.   Neurological:      Mental Status: She is alert and oriented to person, place, and time.      Cranial Nerves: No cranial nerve deficit.   Psychiatric:         Behavior: Behavior normal.         Assessment:       No diagnosis found.    Plan:       1. Update CBC today  2. Patient plans to seek out counseling for stress, supported by her insurance  3. Refill folate, tramadol, and Oxycodone    Return visit in 6 months with CBC, CMP, Retic, ferritin, and hemoglobin S quant

## 2022-10-07 ENCOUNTER — PATIENT MESSAGE (OUTPATIENT)
Dept: HEMATOLOGY/ONCOLOGY | Facility: CLINIC | Age: 27
End: 2022-10-07
Payer: COMMERCIAL

## 2022-10-07 ENCOUNTER — TELEPHONE (OUTPATIENT)
Dept: HEMATOLOGY/ONCOLOGY | Facility: CLINIC | Age: 27
End: 2022-10-07
Payer: COMMERCIAL

## 2022-10-07 NOTE — TELEPHONE ENCOUNTER
"----- Message from Adriane Roque sent at 10/7/2022  3:20 PM CDT -----  Regarding: Refills  Contact: Elizabeth  Consult/Advisory:       Name Of Caller: Elizabeth      Contact Preference?:[phone]         Does patient feel the need to be seen today? No      What is the nature of the call?:Pt is having trouble to get refills on Rx's. oxyCODONE (ROXICODONE) 10 mg Tab immediate release tablet  traMADoL (ULTRAM) 50 mg tablet       [id number], [place] [address] Phone# [phone].      Additional Notes:  "Thank you for all that you do for our patients'"      "

## 2022-10-07 NOTE — TELEPHONE ENCOUNTER
Spoke with pharmacy and the are out of stock of medication. Confirmed with patient she wants it sent to pharmacy in [place]

## 2022-10-22 NOTE — PHYSICIAN QUERY
DOSE ADJUSTMENT MADE PER P/T PROTOCOL    INDICATION:  Pneumonia (HAP)    PREVIOUS ORDER:  Zosyn 3.375gm ivpb q8h (extended infusion over 240 minutes) for 7 days    Estimated Creatinine Clearance: 106 mL/min (A) (based on SCr of 0.7 mg/dL (L)). Recent Labs     10/22/22  0905   BUN 18   CREATININE 0.7*   PLT 85*     NEW ADJUSTED ORDER:  Zosyn 4.5gm ivpb q6h (each dose infused over 30 minutes) for 7 days  (Pt. Also set to receive vancomycin therapy q12h with one peripheral line for iv access)    Artur Isaac.  Sheila Ramirez [facility]  10/22/2022 12:59 PM PT Name: Elizabeth Franco  MR #: [id number]     Documentation Clarification      CDS: Rayne Morel RN         Contact information:[email or web address] or (cell) [phone]    This form is a permanent document in the medical record.     Query Date: April 11, 2022    By submitting this query, we are merely seeking further clarification of documentation. Please utilize your independent clinical judgment when addressing the question(s) below.    The Medical Record reflects the following:    Clinical Findings Location in Medical Records   Sepsis  This patient does have evidence of infective focus  My overall impression is sepsis.   Source- skin    Source control Achieved by- IV abx   Pt spike fever overnight  CXR negative   HM PN 4/7   Cellulitis of extremity  Continue IV rocephin and doxycycline   Blood cx remain negative   Monitor fever trend   Repeat RLE US negative for DVT   HM PN 4/7 04/02 08:45 04/03  06:59 04/04  04:37 04/05  03:42 04/06  03:50 04/07  05:11 04/08  05:05   WBC 17.64 (H) 8.48 11.44 12.41 10.12 10.17 9.49      04/05  09:14   Lactate 1.8    Procal 0.07       Lab Results   Blood Cxs NGTD Micro 4/5 4/6 @ 1227: Temp 98.5, HR 84, RR 18, /56, SpO2 100%  4/7 @ 1237: Temp 100.1, , RR 18, /72, SpO2 97%  4/8 @ 0745: Temp 98.1, HR 82, RR 17, /58, SpO2 97%   Vital Signs                                                                                Provider, please clarify the diagnosis of Sepsis:      [   ] Diagnosis is confirmed and additional clinical support/decision-making indicators for the diagnosis include (please specify):________________     [   ] Above stated diagnosis is not confirmed and/or it has been ruled out     [   ] Above stated diagnosis is not confirmed and/or it has been ruled out, other diagnosis ruled in (please specify):_______________     [  x ] Other clarification (please specify): _No sepsis . RLE cellulitis __________________     [  ] Clinically  undetermined

## 2022-11-02 NOTE — ASSESSMENT & PLAN NOTE
6/15 Elevated d-dimer noted- Pulmonary emboli excluded per CTA     Rivaroxaban/Xarelto - Compliance/Rivaroxaban/Xarelto - Dietary Advice/Rivaroxaban/Xarelto - Follow up monitoring/Rivaroxaban/Xarelto - Potential for adverse drug reactions and interactions

## 2023-04-04 ENCOUNTER — PATIENT MESSAGE (OUTPATIENT)
Dept: RESEARCH | Facility: HOSPITAL | Age: 28
End: 2023-04-04
Payer: COMMERCIAL

## 2023-05-02 DIAGNOSIS — D57.1 SICKLE CELL DISEASE WITHOUT CRISIS: Primary | ICD-10-CM

## 2023-05-03 ENCOUNTER — OFFICE VISIT (OUTPATIENT)
Dept: OPHTHALMOLOGY | Facility: CLINIC | Age: 28
End: 2023-05-03
Payer: COMMERCIAL

## 2023-05-03 DIAGNOSIS — D57.1 SICKLE CELL DISEASE WITHOUT CRISIS: Primary | ICD-10-CM

## 2023-05-03 PROCEDURE — 99999 PR PBB SHADOW E&M-EST. PATIENT-LVL II: CPT | Mod: PBBFAC,,, | Performed by: OPTOMETRIST

## 2023-05-03 PROCEDURE — 1159F MED LIST DOCD IN RCRD: CPT | Mod: CPTII,S$GLB,, | Performed by: OPTOMETRIST

## 2023-05-03 PROCEDURE — 92004 COMPRE OPH EXAM NEW PT 1/>: CPT | Mod: S$GLB,,, | Performed by: OPTOMETRIST

## 2023-05-03 PROCEDURE — 99999 PR PBB SHADOW E&M-EST. PATIENT-LVL II: ICD-10-PCS | Mod: PBBFAC,,, | Performed by: OPTOMETRIST

## 2023-05-03 PROCEDURE — 92015 DETERMINE REFRACTIVE STATE: CPT | Mod: S$GLB,,, | Performed by: OPTOMETRIST

## 2023-05-03 PROCEDURE — 92004 PR EYE EXAM, NEW PATIENT,COMPREHESV: ICD-10-PCS | Mod: S$GLB,,, | Performed by: OPTOMETRIST

## 2023-05-03 PROCEDURE — 1159F PR MEDICATION LIST DOCUMENTED IN MEDICAL RECORD: ICD-10-PCS | Mod: CPTII,S$GLB,, | Performed by: OPTOMETRIST

## 2023-05-03 PROCEDURE — 92015 PR REFRACTION: ICD-10-PCS | Mod: S$GLB,,, | Performed by: OPTOMETRIST

## 2023-05-03 NOTE — PROGRESS NOTES
HPI    New Patient   Last eye exam around 7 years ago.    Patient states no visual complaints.   No ocular pain/discomfort.   Does not wear glasses or contacts.    Last edited by Alessandra Landin on 5/3/2023  2:27 PM.            Assessment /Plan     For exam results, see Encounter Report.    Sickle cell disease without crisis      No sickle cell retinopathy present.    No Rx for glasses.  RTC 1 year  Discussed above and answered questions.

## 2023-05-10 ENCOUNTER — LAB VISIT (OUTPATIENT)
Dept: LAB | Facility: HOSPITAL | Age: 28
End: 2023-05-10
Attending: INTERNAL MEDICINE
Payer: COMMERCIAL

## 2023-05-10 DIAGNOSIS — D57.09 SICKLE CELL DISEASE WITH CRISIS AND OTHER COMPLICATION: ICD-10-CM

## 2023-05-10 DIAGNOSIS — D57.1 SICKLE CELL DISEASE WITHOUT CRISIS: ICD-10-CM

## 2023-05-10 LAB
ALBUMIN SERPL BCP-MCNC: 4.7 G/DL (ref 3.5–5.2)
ALP SERPL-CCNC: 98 U/L (ref 55–135)
ALT SERPL W/O P-5'-P-CCNC: 41 U/L (ref 10–44)
ANION GAP SERPL CALC-SCNC: 9 MMOL/L (ref 8–16)
ANISOCYTOSIS BLD QL SMEAR: ABNORMAL
AST SERPL-CCNC: 57 U/L (ref 10–40)
BASOPHILS # BLD AUTO: 0.09 K/UL (ref 0–0.2)
BASOPHILS NFR BLD: 1.1 % (ref 0–1.9)
BILIRUB SERPL-MCNC: 4.5 MG/DL (ref 0.1–1)
BUN SERPL-MCNC: 9 MG/DL (ref 6–20)
CALCIUM SERPL-MCNC: 9.2 MG/DL (ref 8.7–10.5)
CHLORIDE SERPL-SCNC: 110 MMOL/L (ref 95–110)
CO2 SERPL-SCNC: 20 MMOL/L (ref 23–29)
CREAT SERPL-MCNC: 0.6 MG/DL (ref 0.5–1.4)
DIFFERENTIAL METHOD: ABNORMAL
EOSINOPHIL # BLD AUTO: 0.8 K/UL (ref 0–0.5)
EOSINOPHIL NFR BLD: 9.6 % (ref 0–8)
ERYTHROCYTE [DISTWIDTH] IN BLOOD BY AUTOMATED COUNT: 22.8 % (ref 11.5–14.5)
EST. GFR  (NO RACE VARIABLE): >60 ML/MIN/1.73 M^2
FERRITIN SERPL-MCNC: 1803 NG/ML (ref 20–300)
GLUCOSE SERPL-MCNC: 81 MG/DL (ref 70–110)
HCT VFR BLD AUTO: 22.8 % (ref 37–48.5)
HGB BLD-MCNC: 8.2 G/DL (ref 12–16)
HYPOCHROMIA BLD QL SMEAR: ABNORMAL
IMM GRANULOCYTES # BLD AUTO: 0.03 K/UL (ref 0–0.04)
IMM GRANULOCYTES NFR BLD AUTO: 0.4 % (ref 0–0.5)
LYMPHOCYTES # BLD AUTO: 3.7 K/UL (ref 1–4.8)
LYMPHOCYTES NFR BLD: 44 % (ref 18–48)
MCH RBC QN AUTO: 32.7 PG (ref 27–31)
MCHC RBC AUTO-ENTMCNC: 36 G/DL (ref 32–36)
MCV RBC AUTO: 91 FL (ref 82–98)
MONOCYTES # BLD AUTO: 0.7 K/UL (ref 0.3–1)
MONOCYTES NFR BLD: 8.8 % (ref 4–15)
NEUTROPHILS # BLD AUTO: 3.1 K/UL (ref 1.8–7.7)
NEUTROPHILS NFR BLD: 36.1 % (ref 38–73)
NRBC BLD-RTO: 1 /100 WBC
OVALOCYTES BLD QL SMEAR: ABNORMAL
PLATELET # BLD AUTO: 263 K/UL (ref 150–450)
PMV BLD AUTO: 10.4 FL (ref 9.2–12.9)
POIKILOCYTOSIS BLD QL SMEAR: ABNORMAL
POLYCHROMASIA BLD QL SMEAR: ABNORMAL
POTASSIUM SERPL-SCNC: 4.9 MMOL/L (ref 3.5–5.1)
PROT SERPL-MCNC: 8.7 G/DL (ref 6–8.4)
RBC # BLD AUTO: 2.51 M/UL (ref 4–5.4)
RETICS/RBC NFR AUTO: >23 % (ref 0.5–2.5)
SICKLE CELLS BLD QL SMEAR: ABNORMAL
SODIUM SERPL-SCNC: 139 MMOL/L (ref 136–145)
TARGETS BLD QL SMEAR: ABNORMAL
WBC # BLD AUTO: 8.43 K/UL (ref 3.9–12.7)

## 2023-05-10 PROCEDURE — 85025 COMPLETE CBC W/AUTO DIFF WBC: CPT | Performed by: INTERNAL MEDICINE

## 2023-05-10 PROCEDURE — 36415 COLL VENOUS BLD VENIPUNCTURE: CPT | Performed by: INTERNAL MEDICINE

## 2023-05-10 PROCEDURE — 85045 AUTOMATED RETICULOCYTE COUNT: CPT | Performed by: INTERNAL MEDICINE

## 2023-05-10 PROCEDURE — 83010 ASSAY OF HAPTOGLOBIN QUANT: CPT | Performed by: INTERNAL MEDICINE

## 2023-05-10 PROCEDURE — 82728 ASSAY OF FERRITIN: CPT | Performed by: INTERNAL MEDICINE

## 2023-05-10 PROCEDURE — 80053 COMPREHEN METABOLIC PANEL: CPT | Performed by: INTERNAL MEDICINE

## 2023-05-10 PROCEDURE — 83020 HEMOGLOBIN ELECTROPHORESIS: CPT | Performed by: INTERNAL MEDICINE

## 2023-05-11 LAB
HAPTOGLOB SERPL-MCNC: <10 MG/DL (ref 30–250)
HGB FRACT BLD ELPH-IMP: NORMAL
HGB S MFR BLD ELPH: 89 %

## 2023-05-15 ENCOUNTER — OFFICE VISIT (OUTPATIENT)
Dept: HEMATOLOGY/ONCOLOGY | Facility: CLINIC | Age: 28
End: 2023-05-15
Payer: COMMERCIAL

## 2023-05-15 DIAGNOSIS — D57.1 SICKLE CELL DISEASE WITHOUT CRISIS: Primary | ICD-10-CM

## 2023-05-15 DIAGNOSIS — D57.09 SICKLE CELL DISEASE WITH CRISIS AND OTHER COMPLICATION: ICD-10-CM

## 2023-05-15 PROCEDURE — 1160F PR REVIEW ALL MEDS BY PRESCRIBER/CLIN PHARMACIST DOCUMENTED: ICD-10-PCS | Mod: CPTII,95,, | Performed by: INTERNAL MEDICINE

## 2023-05-15 PROCEDURE — 99214 PR OFFICE/OUTPT VISIT, EST, LEVL IV, 30-39 MIN: ICD-10-PCS | Mod: 95,,, | Performed by: INTERNAL MEDICINE

## 2023-05-15 PROCEDURE — 1159F PR MEDICATION LIST DOCUMENTED IN MEDICAL RECORD: ICD-10-PCS | Mod: CPTII,95,, | Performed by: INTERNAL MEDICINE

## 2023-05-15 PROCEDURE — 1159F MED LIST DOCD IN RCRD: CPT | Mod: CPTII,95,, | Performed by: INTERNAL MEDICINE

## 2023-05-15 PROCEDURE — 1160F RVW MEDS BY RX/DR IN RCRD: CPT | Mod: CPTII,95,, | Performed by: INTERNAL MEDICINE

## 2023-05-15 PROCEDURE — 99214 OFFICE O/P EST MOD 30 MIN: CPT | Mod: 95,,, | Performed by: INTERNAL MEDICINE

## 2023-05-15 RX ORDER — HYDROXYUREA 500 MG/1
500 CAPSULE ORAL DAILY
Qty: 30 CAPSULE | Refills: 5 | Status: SHIPPED | OUTPATIENT
Start: 2023-05-15 | End: 2024-05-14

## 2023-05-15 NOTE — PROGRESS NOTES
Route Chart for Scheduling    BMT Chart Routing      Follow up with physician 4 months. virtual or [place]   Follow up with FILIBERTO    Provider visit type    Infusion scheduling note    Injection scheduling note    Labs CBC, CMP, ferritin and folate   Scheduling:  Preferred lab:  Lab interval:  and hemoglobin S 1 week before visit   Imaging    Pharmacy appointment    Other referrals             The patient location is: home  The chief complaint leading to consultation is: sickle cell anemia    Visit type: audiovisual    Face to Face time with patient: 15  30 minutes of total time spent on the encounter, which includes face to face time and non-face to face time preparing to see the patient (eg, review of tests), Obtaining and/or reviewing separately obtained history, Documenting clinical information in the electronic or other health record, Independently interpreting results (not separately reported) and communicating results to the patient/family/caregiver, or Care coordination (not separately reported).         Each patient to whom he or she provides medical services by telemedicine is:  (1) informed of the relationship between the physician and patient and the respective role of any other health care provider with respect to management of the patient; and (2) notified that he or she may decline to receive medical services by telemedicine and may withdraw from such care at any time.    Notes:      Subjective:       Patient ID: Elizabeth Franco is a 27 y.o. female.    Chief Complaint: No chief complaint on file.  Initial Consult:  The patient is a very pleasant 20 year old woman transferring care from [facility] for sickle cell anemia. Her parents have sickle cell trait. She reports a history of very well managed sickle cell disease. 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 She has a horizontal left upper quadrant scar from this surgery. She was treated with transfusion therapy for about  10 years. This was facilitated by a Port-A-Cath that was removed at the completion of therapy. The therapy was limited by iron overload and the patient was not able to tolerate oral chelation therapy. By description she was treated at least once with phlebotomy.    She has rare pain crises that lead to ER evaluation and treatment. These are often pain events of the left upper extremity or lower extremities. They occur about 1-2x yearly. Most often in the winter during finals week at school. She attends [employer] as a [employer] major. She has just started her second semester. Her last blood transfusion was 2 years ago. She has never required an exchange transfusion.     The patient notes that she has had nocturnal enuresis since her splenectomy at 4 years of age. This has been treated with pelvic exercises and oxybutynin. The incidence of enuresis has greatly decreased to resolved over the past year. She has not had increased UTI occurrence. Chaffing and skin irritation was a prior issue.    Elizabeth also has a recent diagnosis of hidradenitis supprativa of the right axilla. Flares have been treated with oral Augmentin and mupirocin 2% cream.     In addition she reports severe episodes of GERD that impact her quality of life. Events are described as immobilizing.incapacitating epigastric pain that radiates to the entire chest. Events last at least 24 hours. This has not been evaluated by Gastroenterology to date.    TODAY  Virtual follow-up for sickle cell anemia. Labs show significant sickle cell activity with reticulocytosis, HgB S of 89%, and bilirubin elevated. Reports fatigue. Still largely able to manage pain events at home. Still managing mental health and depression. Currently working but applied for disability. Completed eye exam. ECHO 5/2022 normal and no evidence of pulmonary hypertension.       HPI  Review of Systems   Constitutional:  Negative for activity change, appetite change, fever  and unexpected weight change.   HENT: Negative.  Negative for mouth sores.    Eyes: Negative.  Negative for visual disturbance.   Respiratory:  Negative for cough and shortness of breath.    Cardiovascular:  Negative for chest pain and leg swelling.   Gastrointestinal:  Negative for abdominal pain and diarrhea.   Endocrine: Negative.    Genitourinary:  Positive for enuresis and frequency.   Musculoskeletal: Negative.  Negative for back pain.   Skin:  Negative for pallor and rash.   Allergic/Immunologic: Negative for environmental allergies, food allergies and immunocompromised state.   Neurological: Negative.  Negative for headaches.   Hematological:  Negative for adenopathy. Does not bruise/bleed easily.   Psychiatric/Behavioral: Negative.  The patient is not nervous/anxious.      Objective:      Physical Exam  Vitals and nursing note reviewed.   Constitutional:       General: She is in acute distress.      Appearance: Normal appearance. She is well-developed.   HENT:      Head: Normocephalic and atraumatic.   Eyes:      General: No scleral icterus.     Conjunctiva/sclera: Conjunctivae normal.   Cardiovascular:      Rate and Rhythm: Normal rate.   Pulmonary:      Effort: Pulmonary effort is normal. No respiratory distress.   Abdominal:      General: There is no distension.      Palpations: Abdomen is soft.      Tenderness: There is no abdominal tenderness.      Hernia: No hernia is present.   Musculoskeletal:         General: Normal range of motion.      Cervical back: Normal range of motion and neck supple.   Skin:     General: Skin is warm and dry.   Neurological:      Mental Status: She is alert and oriented to person, place, and time.      Cranial Nerves: No cranial nerve deficit.   Psychiatric:         Behavior: Behavior normal.       Assessment:       No diagnosis found.    Plan:       1. Discussed restarted hydrea to improve Hgb %. Will start hydrea 500mg daily. Recheck labs in 4 months with return  visit

## 2023-06-12 DIAGNOSIS — D57.1 SICKLE CELL DISEASE WITHOUT CRISIS: ICD-10-CM

## 2023-06-12 RX ORDER — FOLIC ACID 1 MG/1
1 TABLET ORAL DAILY
Qty: 90 TABLET | Refills: 11 | Status: SHIPPED | OUTPATIENT
Start: 2023-06-12 | End: 2023-08-29 | Stop reason: SDUPTHER

## 2023-06-12 RX ORDER — OXYCODONE HYDROCHLORIDE 10 MG/1
10 TABLET ORAL EVERY 4 HOURS PRN
Qty: 20 TABLET | Refills: 0 | Status: SHIPPED | OUTPATIENT
Start: 2023-06-12

## 2023-06-12 RX ORDER — TRAMADOL HYDROCHLORIDE 50 MG/1
50 TABLET ORAL EVERY 8 HOURS PRN
Qty: 90 EACH | Refills: 3 | Status: SHIPPED | OUTPATIENT
Start: 2023-06-12

## 2023-06-26 ENCOUNTER — PATIENT MESSAGE (OUTPATIENT)
Dept: HEMATOLOGY/ONCOLOGY | Facility: CLINIC | Age: 28
End: 2023-06-26

## 2023-06-27 ENCOUNTER — PATIENT MESSAGE (OUTPATIENT)
Dept: HEMATOLOGY/ONCOLOGY | Facility: CLINIC | Age: 28
End: 2023-06-27

## 2023-08-07 ENCOUNTER — TELEPHONE (OUTPATIENT)
Dept: HEMATOLOGY/ONCOLOGY | Facility: CLINIC | Age: 28
End: 2023-08-07
Payer: MEDICAID

## 2023-08-07 NOTE — TELEPHONE ENCOUNTER
"----- Message from Deysi Smith sent at 8/7/2023  3:56 PM CDT -----  Regarding: Pt advice  Contact: Pt     Pt requesting call back in regards to upcoming appts.  Please call and adv @       Confirmed contact below:   Contact Name: Elizabeth Franco  Phone Number: [phone]               Additional Notes:  "Thank you for all that you do for our patients"                                           "

## 2023-08-25 ENCOUNTER — LAB VISIT (OUTPATIENT)
Dept: LAB | Facility: HOSPITAL | Age: 28
End: 2023-08-25
Attending: INTERNAL MEDICINE
Payer: MEDICAID

## 2023-08-25 DIAGNOSIS — D57.1 SICKLE CELL DISEASE WITHOUT CRISIS: ICD-10-CM

## 2023-08-25 LAB
ALBUMIN SERPL BCP-MCNC: 4.4 G/DL (ref 3.5–5.2)
ALP SERPL-CCNC: 76 U/L (ref 55–135)
ALT SERPL W/O P-5'-P-CCNC: 26 U/L (ref 10–44)
ANION GAP SERPL CALC-SCNC: 10 MMOL/L (ref 8–16)
ANISOCYTOSIS BLD QL SMEAR: SLIGHT
AST SERPL-CCNC: 33 U/L (ref 10–40)
BASOPHILS # BLD AUTO: 0.12 K/UL (ref 0–0.2)
BASOPHILS NFR BLD: 1.4 % (ref 0–1.9)
BILIRUB SERPL-MCNC: 3.9 MG/DL (ref 0.1–1)
BUN SERPL-MCNC: 8 MG/DL (ref 6–20)
CALCIUM SERPL-MCNC: 8.8 MG/DL (ref 8.7–10.5)
CHLORIDE SERPL-SCNC: 109 MMOL/L (ref 95–110)
CO2 SERPL-SCNC: 19 MMOL/L (ref 23–29)
CREAT SERPL-MCNC: 0.6 MG/DL (ref 0.5–1.4)
DIFFERENTIAL METHOD: ABNORMAL
EOSINOPHIL # BLD AUTO: 0.8 K/UL (ref 0–0.5)
EOSINOPHIL NFR BLD: 8.8 % (ref 0–8)
ERYTHROCYTE [DISTWIDTH] IN BLOOD BY AUTOMATED COUNT: 20.3 % (ref 11.5–14.5)
EST. GFR  (NO RACE VARIABLE): >60 ML/MIN/1.73 M^2
FERRITIN SERPL-MCNC: 1176 NG/ML (ref 20–300)
FOLATE SERPL-MCNC: 11.8 NG/ML (ref 4–24)
GIANT PLATELETS BLD QL SMEAR: PRESENT
GLUCOSE SERPL-MCNC: 96 MG/DL (ref 70–110)
HCT VFR BLD AUTO: 23 % (ref 37–48.5)
HGB BLD-MCNC: 8.3 G/DL (ref 12–16)
IMM GRANULOCYTES # BLD AUTO: 0.03 K/UL (ref 0–0.04)
IMM GRANULOCYTES NFR BLD AUTO: 0.3 % (ref 0–0.5)
LYMPHOCYTES # BLD AUTO: 3.4 K/UL (ref 1–4.8)
LYMPHOCYTES NFR BLD: 39.1 % (ref 18–48)
MCH RBC QN AUTO: 31.9 PG (ref 27–31)
MCHC RBC AUTO-ENTMCNC: 36.1 G/DL (ref 32–36)
MCV RBC AUTO: 89 FL (ref 82–98)
MONOCYTES # BLD AUTO: 1 K/UL (ref 0.3–1)
MONOCYTES NFR BLD: 11.1 % (ref 4–15)
NEUTROPHILS # BLD AUTO: 3.4 K/UL (ref 1.8–7.7)
NEUTROPHILS NFR BLD: 39.3 % (ref 38–73)
NRBC BLD-RTO: 2 /100 WBC
OVALOCYTES BLD QL SMEAR: ABNORMAL
PLATELET # BLD AUTO: 362 K/UL (ref 150–450)
PLATELET BLD QL SMEAR: ABNORMAL
PMV BLD AUTO: 10.9 FL (ref 9.2–12.9)
POIKILOCYTOSIS BLD QL SMEAR: ABNORMAL
POLYCHROMASIA BLD QL SMEAR: ABNORMAL
POTASSIUM SERPL-SCNC: 3.9 MMOL/L (ref 3.5–5.1)
PROT SERPL-MCNC: 7.9 G/DL (ref 6–8.4)
RBC # BLD AUTO: 2.6 M/UL (ref 4–5.4)
SICKLE CELLS BLD QL SMEAR: ABNORMAL
SODIUM SERPL-SCNC: 138 MMOL/L (ref 136–145)
TARGETS BLD QL SMEAR: ABNORMAL
WBC # BLD AUTO: 8.73 K/UL (ref 3.9–12.7)

## 2023-08-25 PROCEDURE — 80053 COMPREHEN METABOLIC PANEL: CPT | Performed by: INTERNAL MEDICINE

## 2023-08-25 PROCEDURE — 85025 COMPLETE CBC W/AUTO DIFF WBC: CPT | Performed by: INTERNAL MEDICINE

## 2023-08-25 PROCEDURE — 82728 ASSAY OF FERRITIN: CPT | Performed by: INTERNAL MEDICINE

## 2023-08-25 PROCEDURE — 83020 HEMOGLOBIN ELECTROPHORESIS: CPT | Performed by: INTERNAL MEDICINE

## 2023-08-25 PROCEDURE — 82746 ASSAY OF FOLIC ACID SERUM: CPT | Performed by: INTERNAL MEDICINE

## 2023-08-25 PROCEDURE — 36415 COLL VENOUS BLD VENIPUNCTURE: CPT | Performed by: INTERNAL MEDICINE

## 2023-08-28 ENCOUNTER — TELEPHONE (OUTPATIENT)
Dept: HEMATOLOGY/ONCOLOGY | Facility: CLINIC | Age: 28
End: 2023-08-28
Payer: MEDICAID

## 2023-08-29 ENCOUNTER — OFFICE VISIT (OUTPATIENT)
Dept: HEMATOLOGY/ONCOLOGY | Facility: CLINIC | Age: 28
End: 2023-08-29
Payer: MEDICAID

## 2023-08-29 DIAGNOSIS — D57.1 SICKLE CELL DISEASE WITHOUT CRISIS: Primary | ICD-10-CM

## 2023-08-29 PROCEDURE — 99214 OFFICE O/P EST MOD 30 MIN: CPT | Mod: 95,,, | Performed by: INTERNAL MEDICINE

## 2023-08-29 PROCEDURE — 99214 PR OFFICE/OUTPT VISIT, EST, LEVL IV, 30-39 MIN: ICD-10-PCS | Mod: 95,,, | Performed by: INTERNAL MEDICINE

## 2023-08-29 RX ORDER — FOLIC ACID 1 MG/1
1 TABLET ORAL DAILY
Qty: 90 TABLET | Refills: 11 | Status: SHIPPED | OUTPATIENT
Start: 2023-08-29

## 2023-08-29 NOTE — PROGRESS NOTES
Route Chart for Scheduling    BMT Chart Routing      Follow up with physician 6 months.   Follow up with FILIBERTO    Provider visit type    Infusion scheduling note    Injection scheduling note    Labs CBC, CMP, ferritin and folate   Scheduling:  Preferred lab:  Lab interval:  Hgb S with next visit   Imaging    Pharmacy appointment    Other referrals               The patient location is: home  The chief complaint leading to consultation is: sickle cell anemia    Visit type: audiovisual    Face to Face time with patient: 15  30 minutes of total time spent on the encounter, which includes face to face time and non-face to face time preparing to see the patient (eg, review of tests), Obtaining and/or reviewing separately obtained history, Documenting clinical information in the electronic or other health record, Independently interpreting results (not separately reported) and communicating results to the patient/family/caregiver, or Care coordination (not separately reported).         Each patient to whom he or she provides medical services by telemedicine is:  (1) informed of the relationship between the physician and patient and the respective role of any other health care provider with respect to management of the patient; and (2) notified that he or she may decline to receive medical services by telemedicine and may withdraw from such care at any time.    Notes:      Subjective:       Patient ID: Elizabeth Franco is a 28 y.o. female.    Chief Complaint: No chief complaint on file.  Initial Consult:  The patient is a very pleasant 20 year old woman transferring care from [facility] for sickle cell anemia. Her parents have sickle cell trait. She reports a history of very well managed sickle cell disease. 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 She has a horizontal left upper quadrant scar from this surgery. She was treated with transfusion therapy for about 10 years. This was facilitated by  a Port-A-Cath that was removed at the completion of therapy. The therapy was limited by iron overload and the patient was not able to tolerate oral chelation therapy. By description she was treated at least once with phlebotomy.    She has rare pain crises that lead to ER evaluation and treatment. These are often pain events of the left upper extremity or lower extremities. They occur about 1-2x yearly. Most often in the winter during finals week at school. She attends [employer] as a [employer] major. She has just started her second semester. Her last blood transfusion was 2 years ago. She has never required an exchange transfusion.     The patient notes that she has had nocturnal enuresis since her splenectomy at 4 years of age. This has been treated with pelvic exercises and oxybutynin. The incidence of enuresis has greatly decreased to resolved over the past year. She has not had increased UTI occurrence. Chaffing and skin irritation was a prior issue.    Elizabeth also has a recent diagnosis of hidradenitis supprativa of the right axilla. Flares have been treated with oral Augmentin and mupirocin 2% cream.     In addition she reports severe episodes of GERD that impact her quality of life. Events are described as immobilizing.incapacitating epigastric pain that radiates to the entire chest. Events last at least 24 hours. This has not been evaluated by Gastroenterology to date.    TODAY  Virtual follow-up for sickle cell anemia. Reports minimal pain events since last visit in May- 2 events of increased pain she was able to manage at home. Staying inside and hydrated with extreme heat this summer. Discussed role of oxygen therapy during severe pain event, but minimal role of oxygen while in steady state. Discussed increased ferritin level, but improvement between labs likely because no recent need for blood transfusions. Needs refill of folic acid. .      HPI  Review of Systems   Constitutional:   Negative for activity change, appetite change, fever and unexpected weight change.   HENT: Negative.  Negative for mouth sores.    Eyes: Negative.  Negative for visual disturbance.   Respiratory:  Negative for cough and shortness of breath.    Cardiovascular:  Negative for chest pain and leg swelling.   Gastrointestinal:  Negative for abdominal pain and diarrhea.   Endocrine: Negative.    Genitourinary:  Positive for enuresis and frequency.   Musculoskeletal: Negative.  Negative for back pain.   Skin:  Negative for pallor and rash.   Allergic/Immunologic: Negative for environmental allergies, food allergies and immunocompromised state.   Neurological: Negative.  Negative for headaches.   Hematological:  Negative for adenopathy. Does not bruise/bleed easily.   Psychiatric/Behavioral: Negative.  The patient is not nervous/anxious.        Objective:      Physical Exam  Vitals and nursing note reviewed.   Constitutional:       General: She is in acute distress.      Appearance: Normal appearance. She is well-developed.   HENT:      Head: Normocephalic and atraumatic.   Eyes:      General: No scleral icterus.     Conjunctiva/sclera: Conjunctivae normal.   Cardiovascular:      Rate and Rhythm: Normal rate.   Pulmonary:      Effort: Pulmonary effort is normal. No respiratory distress.   Abdominal:      General: There is no distension.      Palpations: Abdomen is soft.      Tenderness: There is no abdominal tenderness.      Hernia: No hernia is present.   Musculoskeletal:         General: Normal range of motion.      Cervical back: Normal range of motion and neck supple.   Skin:     General: Skin is warm and dry.   Neurological:      Mental Status: She is alert and oriented to person, place, and time.      Cranial Nerves: No cranial nerve deficit.   Psychiatric:         Behavior: Behavior normal.         Assessment:       No diagnosis found.    Plan:       1. Refill folate  2. Return visit in 6 months    ECHO completed  2022  Annual eye Exam

## 2023-08-30 LAB
HGB FRACT BLD ELPH-IMP: NORMAL
HGB S MFR BLD ELPH: 90 %

## 2023-12-26 ENCOUNTER — TELEPHONE (OUTPATIENT)
Dept: HEMATOLOGY/ONCOLOGY | Facility: CLINIC | Age: 28
End: 2023-12-26
Payer: MEDICAID

## 2024-02-22 PROBLEM — Z90.49 HISTORY OF APPENDECTOMY: Status: ACTIVE | Noted: 2024-02-22

## 2024-02-22 PROBLEM — Z90.81 HISTORY OF SPLENECTOMY: Status: ACTIVE | Noted: 2024-02-22

## 2024-03-04 ENCOUNTER — PATIENT MESSAGE (OUTPATIENT)
Dept: HEMATOLOGY/ONCOLOGY | Facility: CLINIC | Age: 29
End: 2024-03-04
Payer: MEDICAID

## 2024-03-06 NOTE — TELEPHONE ENCOUNTER
----- Message from Vivian Tan sent at 3/5/2024  1:49 PM CST -----  Regarding: Dr. Dobibns is requesting a call back from Dr. Nuñez  Type: Patient Call Back    Who called:Dr. Dobbins     What is the request in detail: Dr. Dobbins is requesting a call back from Dr. Nuñez in regards to the patient. She stated that it is for the patient's sickle cell management. She can be reached at the number below.     Can the clinic reply by [facility]?no     Would the patient rather a call back or a response via [facility]? Call back     Best call back number: [phone]

## 2024-03-07 ENCOUNTER — LAB VISIT (OUTPATIENT)
Dept: LAB | Facility: HOSPITAL | Age: 29
End: 2024-03-07
Attending: INTERNAL MEDICINE
Payer: MEDICAID

## 2024-03-07 DIAGNOSIS — D57.1 SICKLE CELL DISEASE WITHOUT CRISIS: ICD-10-CM

## 2024-03-07 LAB
ALBUMIN SERPL BCP-MCNC: 4.2 G/DL (ref 3.5–5.2)
ALP SERPL-CCNC: 72 U/L (ref 55–135)
ALT SERPL W/O P-5'-P-CCNC: 39 U/L (ref 10–44)
ANION GAP SERPL CALC-SCNC: 6 MMOL/L (ref 8–16)
ANISOCYTOSIS BLD QL SMEAR: ABNORMAL
AST SERPL-CCNC: 42 U/L (ref 10–40)
BASOPHILS # BLD AUTO: 0.08 K/UL (ref 0–0.2)
BASOPHILS NFR BLD: 0.9 % (ref 0–1.9)
BILIRUB SERPL-MCNC: 3.4 MG/DL (ref 0.1–1)
BUN SERPL-MCNC: 5 MG/DL (ref 6–20)
CALCIUM SERPL-MCNC: 8.9 MG/DL (ref 8.7–10.5)
CHLORIDE SERPL-SCNC: 108 MMOL/L (ref 95–110)
CO2 SERPL-SCNC: 24 MMOL/L (ref 23–29)
CREAT SERPL-MCNC: 0.6 MG/DL (ref 0.5–1.4)
DIFFERENTIAL METHOD BLD: ABNORMAL
EOSINOPHIL # BLD AUTO: 0.4 K/UL (ref 0–0.5)
EOSINOPHIL NFR BLD: 4.1 % (ref 0–8)
ERYTHROCYTE [DISTWIDTH] IN BLOOD BY AUTOMATED COUNT: 17.2 % (ref 11.5–14.5)
EST. GFR  (NO RACE VARIABLE): >60 ML/MIN/1.73 M^2
FERRITIN SERPL-MCNC: 1866 NG/ML (ref 20–300)
FOLATE SERPL-MCNC: 14.5 NG/ML (ref 4–24)
GLUCOSE SERPL-MCNC: 76 MG/DL (ref 70–110)
HCT VFR BLD AUTO: 21.1 % (ref 37–48.5)
HGB BLD-MCNC: 7.6 G/DL (ref 12–16)
HYPOCHROMIA BLD QL SMEAR: ABNORMAL
IMM GRANULOCYTES # BLD AUTO: 0.04 K/UL (ref 0–0.04)
IMM GRANULOCYTES NFR BLD AUTO: 0.4 % (ref 0–0.5)
LYMPHOCYTES # BLD AUTO: 2.9 K/UL (ref 1–4.8)
LYMPHOCYTES NFR BLD: 32 % (ref 18–48)
MCH RBC QN AUTO: 31.7 PG (ref 27–31)
MCHC RBC AUTO-ENTMCNC: 36 G/DL (ref 32–36)
MCV RBC AUTO: 88 FL (ref 82–98)
MONOCYTES # BLD AUTO: 1.1 K/UL (ref 0.3–1)
MONOCYTES NFR BLD: 12 % (ref 4–15)
NEUTROPHILS # BLD AUTO: 4.5 K/UL (ref 1.8–7.7)
NEUTROPHILS NFR BLD: 50.6 % (ref 38–73)
NRBC BLD-RTO: 2 /100 WBC
OVALOCYTES BLD QL SMEAR: ABNORMAL
PLATELET # BLD AUTO: 320 K/UL (ref 150–450)
PMV BLD AUTO: 11.6 FL (ref 9.2–12.9)
POIKILOCYTOSIS BLD QL SMEAR: ABNORMAL
POLYCHROMASIA BLD QL SMEAR: ABNORMAL
POTASSIUM SERPL-SCNC: 3.8 MMOL/L (ref 3.5–5.1)
PROT SERPL-MCNC: 7.6 G/DL (ref 6–8.4)
RBC # BLD AUTO: 2.4 M/UL (ref 4–5.4)
SICKLE CELLS BLD QL SMEAR: ABNORMAL
SODIUM SERPL-SCNC: 138 MMOL/L (ref 136–145)
TARGETS BLD QL SMEAR: ABNORMAL
WBC # BLD AUTO: 8.92 K/UL (ref 3.9–12.7)

## 2024-03-07 PROCEDURE — 80053 COMPREHEN METABOLIC PANEL: CPT | Performed by: INTERNAL MEDICINE

## 2024-03-07 PROCEDURE — 85025 COMPLETE CBC W/AUTO DIFF WBC: CPT | Performed by: INTERNAL MEDICINE

## 2024-03-07 PROCEDURE — 82746 ASSAY OF FOLIC ACID SERUM: CPT | Performed by: INTERNAL MEDICINE

## 2024-03-07 PROCEDURE — 83020 HEMOGLOBIN ELECTROPHORESIS: CPT | Performed by: INTERNAL MEDICINE

## 2024-03-07 PROCEDURE — 82728 ASSAY OF FERRITIN: CPT | Performed by: INTERNAL MEDICINE

## 2024-03-07 PROCEDURE — 36415 COLL VENOUS BLD VENIPUNCTURE: CPT | Performed by: INTERNAL MEDICINE

## 2024-03-11 ENCOUNTER — OFFICE VISIT (OUTPATIENT)
Dept: HEMATOLOGY/ONCOLOGY | Facility: CLINIC | Age: 29
End: 2024-03-11
Payer: MEDICAID

## 2024-03-11 DIAGNOSIS — D57.1 SICKLE CELL DISEASE WITHOUT CRISIS: Primary | ICD-10-CM

## 2024-03-11 LAB
HGB FRACT BLD ELPH-IMP: NORMAL
HGB S MFR BLD ELPH: 87.1 %

## 2024-03-11 PROCEDURE — 99214 OFFICE O/P EST MOD 30 MIN: CPT | Mod: 95,,, | Performed by: INTERNAL MEDICINE

## 2024-03-11 NOTE — PROGRESS NOTES
Route Chart for Scheduling    BMT Chart Routing      Follow up with physician 3 months. virtual   Follow up with FILIBERTO    Provider visit type    Infusion scheduling note    Injection scheduling note    Labs CBC, ferritin and folate   Scheduling:  Preferred lab:  Lab interval:  and  Hgb S 1 week before appt   Imaging    Pharmacy appointment    Other referrals                   The patient location is: home  The chief complaint leading to consultation is: sickle cell anemia    Visit type: audiovisual    Face to Face time with patient: 15  30 minutes of total time spent on the encounter, which includes face to face time and non-face to face time preparing to see the patient (eg, review of tests), Obtaining and/or reviewing separately obtained history, Documenting clinical information in the electronic or other health record, Independently interpreting results (not separately reported) and communicating results to the patient/family/caregiver, or Care coordination (not separately reported).         Each patient to whom he or she provides medical services by telemedicine is:  (1) informed of the relationship between the physician and patient and the respective role of any other health care provider with respect to management of the patient; and (2) notified that he or she may decline to receive medical services by telemedicine and may withdraw from such care at any time.    Notes:      Subjective:       Patient ID: Elizabeth Franco is a 28 y.o. female.    Chief Complaint: No chief complaint on file.  Initial Consult:  The patient is a very pleasant 20 year old woman transferring care from [facility] for sickle cell anemia. Her parents have sickle cell trait. She reports a history of very well managed sickle cell disease. 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 She has a horizontal left upper quadrant scar from this surgery. She was treated with transfusion therapy for about 10 years. This was  facilitated by a Port-A-Cath that was removed at the completion of therapy. The therapy was limited by iron overload and the patient was not able to tolerate oral chelation therapy. By description she was treated at least once with phlebotomy.    She has rare pain crises that lead to ER evaluation and treatment. These are often pain events of the left upper extremity or lower extremities. They occur about 1-2x yearly. Most often in the winter during finals week at school. She attends [employer] as a [employer] major. She has just started her second semester. Her last blood transfusion was 2 years ago. She has never required an exchange transfusion.     The patient notes that she has had nocturnal enuresis since her splenectomy at 4 years of age. This has been treated with pelvic exercises and oxybutynin. The incidence of enuresis has greatly decreased to resolved over the past year. She has not had increased UTI occurrence. Chaffing and skin irritation was a prior issue.    Elizabeth also has a recent diagnosis of hidradenitis supprativa of the right axilla. Flares have been treated with oral Augmentin and mupirocin 2% cream.     In addition she reports severe episodes of GERD that impact her quality of life. Events are described as immobilizing.incapacitating epigastric pain that radiates to the entire chest. Events last at least 24 hours. This has not been evaluated by Gastroenterology to date.    TODAY  Virtual follow-up for sickle cell anemia. Interval event of new pregnancy, first trimester. No compounded complications with her sickle cell anemia. No crises requiring acute care. Discussed no need for iron replacement. She is actually iron overloaded. Discussed plan for Hgb S measurement in second and third trimester. Possible exchange transfusion prior to delivery.      HPI  Review of Systems   Constitutional:  Negative for activity change, appetite change, fever and unexpected weight change.    HENT: Negative.  Negative for mouth sores.    Eyes: Negative.  Negative for visual disturbance.   Respiratory:  Negative for cough and shortness of breath.    Cardiovascular:  Negative for chest pain and leg swelling.   Gastrointestinal:  Negative for abdominal pain and diarrhea.   Endocrine: Negative.    Genitourinary:  Positive for enuresis and frequency.   Musculoskeletal: Negative.  Negative for back pain.   Skin:  Negative for pallor and rash.   Allergic/Immunologic: Negative for environmental allergies, food allergies and immunocompromised state.   Neurological: Negative.  Negative for headaches.   Hematological:  Negative for adenopathy. Does not bruise/bleed easily.   Psychiatric/Behavioral: Negative.  The patient is not nervous/anxious.        Objective:      Physical Exam  Vitals and nursing note reviewed.   Constitutional:       General: She is in acute distress.      Appearance: Normal appearance. She is well-developed.   HENT:      Head: Normocephalic and atraumatic.   Eyes:      General: No scleral icterus.     Conjunctiva/sclera: Conjunctivae normal.   Cardiovascular:      Rate and Rhythm: Normal rate.   Pulmonary:      Effort: Pulmonary effort is normal. No respiratory distress.   Abdominal:      General: There is no distension.      Palpations: Abdomen is soft.      Tenderness: There is no abdominal tenderness.      Hernia: No hernia is present.   Musculoskeletal:         General: Normal range of motion.      Cervical back: Normal range of motion and neck supple.   Skin:     General: Skin is warm and dry.   Neurological:      Mental Status: She is alert and oriented to person, place, and time.      Cranial Nerves: No cranial nerve deficit.   Psychiatric:         Behavior: Behavior normal.         Assessment:       No diagnosis found.    Plan:       Return visit in 3 months with CBC, ferritin, folate, Hgb S

## 2024-03-27 ENCOUNTER — PATIENT MESSAGE (OUTPATIENT)
Dept: HEMATOLOGY/ONCOLOGY | Facility: CLINIC | Age: 29
End: 2024-03-27
Payer: MEDICAID

## 2024-04-07 DIAGNOSIS — D57.1 SICKLE CELL DISEASE WITHOUT CRISIS: ICD-10-CM

## 2024-04-08 RX ORDER — FOLIC ACID 1 MG/1
1 TABLET ORAL DAILY
Qty: 90 TABLET | Refills: 11 | Status: SHIPPED | OUTPATIENT
Start: 2024-04-08 | End: 2024-04-12

## 2024-04-15 ENCOUNTER — PATIENT MESSAGE (OUTPATIENT)
Dept: HEMATOLOGY/ONCOLOGY | Facility: CLINIC | Age: 29
End: 2024-04-15
Payer: MEDICAID

## 2024-04-15 DIAGNOSIS — D57.09 SICKLE CELL DISEASE WITH CRISIS AND OTHER COMPLICATION: ICD-10-CM

## 2024-04-15 DIAGNOSIS — M25.519 SHOULDER PAIN, UNSPECIFIED CHRONICITY, UNSPECIFIED LATERALITY: ICD-10-CM

## 2024-04-15 DIAGNOSIS — D57.1 SICKLE CELL DISEASE WITHOUT CRISIS: Primary | ICD-10-CM

## 2024-04-17 ENCOUNTER — CLINICAL SUPPORT (OUTPATIENT)
Dept: REHABILITATION | Facility: HOSPITAL | Age: 29
End: 2024-04-17
Payer: MEDICAID

## 2024-04-17 DIAGNOSIS — M25.519 SHOULDER PAIN, UNSPECIFIED CHRONICITY, UNSPECIFIED LATERALITY: ICD-10-CM

## 2024-04-17 DIAGNOSIS — D57.09 SICKLE CELL DISEASE WITH CRISIS AND OTHER COMPLICATION: ICD-10-CM

## 2024-04-17 DIAGNOSIS — M25.521 RIGHT ELBOW PAIN: Primary | ICD-10-CM

## 2024-04-17 PROCEDURE — 97165 OT EVAL LOW COMPLEX 30 MIN: CPT | Mod: PO

## 2024-04-17 NOTE — PROGRESS NOTES
[facility]  Occupational Therapy Initial Evaluation     Name: Elizabeth Evansmings  Clinic Number: [id number]    Therapy Diagnosis:   Encounter Diagnoses   Name Primary?    Sickle cell disease with crisis and other complication     Shoulder pain, unspecified chronicity, unspecified laterality     Right elbow pain Yes     Physician: Selam Sandoval NP    Physician Orders: Eval and Treat  Medical Diagnosis: D57.09 (ICD-10-CM) - Sickle cell disease with crisis and other complication M25.519   (ICD-10-CM) - Shoulder pain, unspecified chronicity, unspecified laterality  Surgical Procedure and Date: N/a,  Evaluation Date: 4/17/2024  Insurance Authorization Period Expiration: 4/15/2024 - 4/15/2025   Plan of Care Certification Period: 12 weeks; 7/10/24  Date of Return to MD: BARBIED  Visit # / Visits authorized: 1 / 1  FOTO: 0/ 3    Precautions:  Sickle cell; pregnancy     Time In: 9:00  Time Out: 9:45  Total Billable Time: 45 minutes    Subjective     Date of Onset: 3/27/2024 - 4/2/2024 sickle cell crisis     History of Current Condition/Mechanism of Injury: Elizabeth reports: Pt reports history of sickle cell episode recently that has since caused pain and aching with intermittent shooting/ sharp pain that often begins at the elbow.       Involved Side: R arm   Dominant Side: Right    Mechanism of Injury: sickle cell pain crisis/episode   Surgical Procedure:   Imaging: none n/a    Prior Therapy: none    Pain:  Functional Pain Scale Rating 0-10:   5/10 on average    9/10 at worst  Location: elbow  Description: Tight, Sharp, and Shooting  Aggravating Factors: worse at night  Easing Factors:     Occupation:  n/a  Working presently: unemployed      Functional Limitations/Social History:    Previous functional status includes: Independent with all ADLs. She tries to do most activities independently, requires moderate assistance when sickle cell/pain episodes occur.     Current Functional  Status   Home/Living environment: lives with their family          Limitation of Functional Status as follows:   ADLs/IADLs:     - Feeding: I    - Bathing: requires assistance    - Dressing: requires some assistance  - Grooming: requires some assistance    - Home Management: requires some assistance           Patient's Goals for Therapy: see if there is a good remedy.     Past Medical History/Physical Systems Review:   Elizabeth Franco  has a past medical history of Hepatomegaly, Hidradenitis suppurativa, Nocturnal enuresis, and Sickle cell anemia.    Elizabeth Franco  has no past surgical history on file.    Elizabeth has a current medication list which includes the following prescription(s): aspirin, folic acid, hydroxyurea, oxycodone, prenatal vit 87-iron-folic-dha, and tramadol.    Review of patient's allergies indicates:  No Known Allergies     Objective     Observation/Appearance:  tightness and hesitation with ROM.       Elbow and Wrist ROM. Measured in degrees.   4/17/2024 4/17/2024    Left Right   Supination/Pronation 80/83 78/82   Wrist Ext/Flex 70/65 68/65   Elbow ext/flex -10/145 -3/150        Strength (Dyanmometer) and Pinch Strength (Pinch Gauge)  Measured in pounds and psi. Average of three trials.   4/17/2024 4/17/2024    Right  Left    Rung II 45, 43 40, 45   Key Pinch 13 14   3pt Pinch 10 12   2pt Pinch         Patient Education and Home Exercises      Education provided:   -role of OT, goals for OT, scheduling/cancellations, insurance limitations with patient.  -Additional Education provided: mechanism of injury    Written Home Exercises Provided: yes.  Exercises were reviewed and Elizabeth was able to demonstrate them prior to the end of the session.    Elizabeth demonstrated good  understanding of the education provided.     Pt was advised to perform these exercises free of pain, and to stop performing them if pain occurs.    See EMR under Patient Instructions for exercises provided  4/17/2024.    Assessment     Elizabeth Franco is a 28 y.o. female referred to outpatient occupational therapy and presents with a medical diagnosis of D57.09 (ICD-10-CM) - Sickle cell disease with crisis and other complication M25.519 (ICD-10-CM) - Shoulder pain, unspecified chronicity, unspecified laterality.    Following medical record review it is determined that pt will benefit from occupational therapy services in order to maximize pain free and/or functional use of right upper extrimity. The following goals were discussed with the patient and patient is in agreement with them as to be addressed in the treatment plan. The patient's rehab potential is Good.     Anticipated barriers to occupational therapy: sickle cell disease    Plan of care discussed with patient: Yes  Patient's spiritual, cultural and educational needs considered and patient is agreeable to the plan of care and goals as stated below:     Medical Necessity is demonstrated by the following  Occupational Profile/History  Co-morbidities and personal factors that may impact the plan of care [x] LOW: Brief chart review  [] MODERATE: Expanded chart review   [] HIGH: Extensive chart review    Moderate / High Support Documentation: -     Examination  Performance deficits relating to physical, cognitive or psychosocial skills that result in activity limitations and/or participation restrictions  [x] LOW: addressing 1-3 Performance deficits  [] MODERATE: 3-5 Performance deficits  [] HIGH: 5+ Performance deficits (please support below)    Moderate / High Support Documentation:    Physical:  Joint Mobility  Muscle Power/Strength  Muscle Endurance  Pain    Cognitive:  No Deficits    Psychosocial:    No Deficits     Treatment Options [x] LOW: Limited options  [] MODERATE: Several options  [] HIGH: Multiple options      Decision Making/ Complexity Score: low       The following goals were discussed with the patient and patient is in agreement with them  as to be addressed in the treatment plan.     Goals:   Short Term Goals: (in 3 weeks)  1) Patient will be independent in HEP to manage pain after sickle cell crisis.   2) Decrease pain in RUE to no more than 5/10 worst in ADL/IADL's    Long Term Goals: (in 6 weeks)  1) Decrease pain in R elbow to no more than 2/10 worst in ADL/IADL's  2) Increase AROM of R elbow extension to -5 for increased functioning in ADL/IADL's      Plan   Certification Period/Plan of care expiration: 4/17/2024 to 7/10/2024.    Outpatient Occupational Therapy 1 times weekly for 6 weeks to include the following interventions: Paraffin, Fluidotherapy, Manual therapy/joint mobilizations, Modalities for pain management, US 3 mhz, Therapeutic exercises/activities., Iontophoresis with 2.0 cc Dexamethasone, Strengthening, Orthotic Fabrication/Fit/Training, Edema Control, Scar Management, Wound Care, Electrical Modalities, Joint Protection, and Energy Conservation.    Desiree Christensen OT        Physician's Signature: _________________________________________ Date: ________________

## 2024-04-17 NOTE — PATIENT INSTRUCTIONS
[facility], OCCUPATIONAL THERAPY  HOME EXERCISE PROGRAM       AROM: Elbow Flexion / Extension        Bend and straighten elbow in 3 different positions:   thumb up, palm up, palm down.      Towel glides:  If tolerated well you can progress to glides on the wall.           Therapist: DEVYI Marie

## 2024-04-25 ENCOUNTER — CLINICAL SUPPORT (OUTPATIENT)
Dept: REHABILITATION | Facility: HOSPITAL | Age: 29
End: 2024-04-25
Payer: MEDICAID

## 2024-04-25 DIAGNOSIS — M25.521 RIGHT ELBOW PAIN: Primary | ICD-10-CM

## 2024-04-25 PROCEDURE — 97530 THERAPEUTIC ACTIVITIES: CPT | Mod: PO

## 2024-04-25 PROCEDURE — 97140 MANUAL THERAPY 1/> REGIONS: CPT | Mod: PO

## 2024-04-25 PROCEDURE — 97110 THERAPEUTIC EXERCISES: CPT | Mod: PO

## 2024-04-25 NOTE — PROGRESS NOTES
[facility]  Occupational Therapy Treatment Note     Date: 4/25/2024  Name: Elizabeth Evansmings  Clinic Number: [id number]    Therapy Diagnosis:   Encounter Diagnosis   Name Primary?    Right elbow pain Yes     Physician: Selam Sandoval NP     Physician Orders: Eval and Treat  Medical Diagnosis: D57.09 (ICD-10-CM) - Sickle cell disease with crisis and other complication M25.519   (ICD-10-CM) - Shoulder pain, unspecified chronicity, unspecified laterality  Surgical Procedure and Date: N/a,  Evaluation Date: 4/17/2024  Insurance Authorization Period Expiration: 4/15/2024 - 4/15/2025   Plan of Care Certification Period: 12 weeks; 7/10/24  Date of Return to MD: BETHANY  Visit # / Visits authorized: 1 / 1  FOTO: 0/ 3     Precautions:  Sickle cell; pregnancy      Time In: 10:30  Time Out: 11:15  Total Billable Time: 45 minutes        Subjective     Patient reports: decreased shooting pain through elbow; continued tenderness.   She was compliant with home exercise program given last session.       Pain: 5/10  Location: right elbow      Objective     Objective Measures updated at progress report unless specified.  Observation/Appearance:  tightness and hesitation with ROM.         Elbow and Wrist ROM. Measured in degrees.    4/17/2024 4/17/2024     Left Right   Supination/Pronation 80/83 78/82   Wrist Ext/Flex 70/65 68/65   Elbow ext/flex -10/145 -3/150          Strength (Dyanmometer) and Pinch Strength (Pinch Gauge)  Measured in pounds and psi. Average of three trials.    4/17/2024 4/17/2024     Right  Left    Rung II 45, 43 40, 45   Key Pinch 13 14   3pt Pinch 10 12   2pt Pinch           Treatment     Elizabeth received the treatments listed below:      therapeutic exercises to develop ROM for 10 minutes including:  -education on exercise for sickle cell  -PROM elbow flexion/ext  - ktape; Web cuts over medial elbow for decreased edema.      manual therapy techniques: Soft tissue Mobilization  were applied to the: medial elbow for 5 minutes, including:  IASTM to medial elbow    hot pack for 8 minutes to R elbow.      Patient Education and Home Exercises     Education provided:   - HEP  - Progress towards goals     Written Home Exercises Provided: Patient instructed to cont prior HEP.  Exercises were reviewed and Elizabeth was able to demonstrate them prior to the end of the session.  Elizabeth demonstrated fair  understanding of the home exercise program provided. See electronic medical record under Patient Instructions for exercises provided during therapy sessions.       Assessment     Pt educated on joint protection and healing process. Light ROM performed with mild pain reported after. Ktape applied to assist in displacing edema. Plan to progress as tolerated.    Elizbaeth is progressing well towards her goals and there are no updates to goals at this time. Pt prognosis is Good.     Patient will continue to benefit from skilled outpatient occupational therapy to address the deficits listed in the problem list on initial evaluation provide patient/family education and to maximize patient's level of independence in the home and community environment.     Patient's spiritual, cultural and educational needs considered and patient agreeable to plan of care and goals.    Anticipated barriers to occupational therapy: comorbidity    Goals:  Goals:   Short Term Goals: (in 3 weeks)  1) Patient will be independent in HEP to manage pain after sickle cell crisis.   2) Decrease pain in RUE to no more than 5/10 worst in ADL/IADL's     Long Term Goals: (in 6 weeks)  1) Decrease pain in R elbow to no more than 2/10 worst in ADL/IADL's  2) Increase AROM of R elbow extension to -5 for increased functioning in ADL/IADL's    Plan     Updates/Grading for next session: reassess next session.     Desiree Christensen OT   4/25/2024

## 2024-05-02 ENCOUNTER — CLINICAL SUPPORT (OUTPATIENT)
Dept: REHABILITATION | Facility: HOSPITAL | Age: 29
End: 2024-05-02
Payer: MEDICAID

## 2024-05-02 ENCOUNTER — PATIENT MESSAGE (OUTPATIENT)
Dept: REHABILITATION | Facility: HOSPITAL | Age: 29
End: 2024-05-02

## 2024-05-02 DIAGNOSIS — M25.521 RIGHT ELBOW PAIN: Primary | ICD-10-CM

## 2024-05-02 PROCEDURE — 97530 THERAPEUTIC ACTIVITIES: CPT | Mod: PO

## 2024-05-02 NOTE — PROGRESS NOTES
[facility]  Occupational Therapy Treatment and Discharge Note     Date: 5/2/2024  Name: Elizabeth Evansmings  Clinic Number: [id number]    Therapy Diagnosis:   Encounter Diagnosis   Name Primary?    Right elbow pain Yes     Physician: Selam Sandoval NP     Physician Orders: Eval and Treat  Medical Diagnosis: D57.09 (ICD-10-CM) - Sickle cell disease with crisis and other complication M25.519   (ICD-10-CM) - Shoulder pain, unspecified chronicity, unspecified laterality  Surgical Procedure and Date: N/a,  Evaluation Date: 4/17/2024  Insurance Authorization Period Expiration: 4/15/2024 - 4/15/2025   Plan of Care Certification Period: 12 weeks; 7/10/24  Date of Return to MD: BETHANY  Visit # / Visits authorized: 1 / 1  FOTO: 0/ 3     Precautions:  Sickle cell; pregnancy      Time In: 2:15  Time Out: 3:00  Total Billable Time: 45 minutes        Subjective     Patient reports: Minimal pain in elbow today. Currently experiencing a pain crisis in B knees.   She was compliant with home exercise program given last session.       Pain: 5/10  Location: right elbow      Objective     Objective Measures updated at progress report unless specified.  Observation/Appearance:  tightness and hesitation with ROM.         Elbow and Wrist ROM. Measured in degrees.    4/17/2024 4/17/2024 5/2/2024     Left Right Right   Supination/Pronation 80/83 78/82 80/82   Wrist Ext/Flex 70/65 68/65 69/65   Elbow ext/flex 10/145 3/150 4/145          Strength (Dyanmometer) and Pinch Strength (Pinch Gauge)  Measured in pounds and psi. Average of three trials.    4/17/2024 4/17/2024 5/2/2024     Right  Left  Right   Rung II 45, 43 40, 45 55,53   Samuel Pinch 13 14    3pt Pinch 10 12    2pt Pinch            Treatment     Elizabeth received the treatments listed below:      therapeutic exercises to develop ROM for 10 minutes including:  -education on exercise for sickle cell  -PROM elbow flexion/ext  - ktape; Web cuts over medial  elbow for decreased edema.      manual therapy techniques: Soft tissue Mobilization were applied to the: medial elbow for 5 minutes, including:  IASTM to medial elbow    hot pack for 8 minutes to R elbow.      Patient Education and Home Exercises     Education provided:   - HEP  - Progress towards goals     Written Home Exercises Provided: Patient instructed to cont prior HEP.  Exercises were reviewed and Elizabeth was able to demonstrate them prior to the end of the session.  Elizabeth demonstrated fair  understanding of the home exercise program provided. See electronic medical record under Patient Instructions for exercises provided during therapy sessions.       Assessment     Pt neptali's improved ROM and strength at this time with significant improvements reported in pain and swelling. She tolerates weight bearing well with min increased pain reported. Discussion and education on maintaining light exercise before, during, and after a pain crisis. Resources provided for increasing activity with caution for those with Sickle cell. She has met all goals at this time and is comfortable and confident in HEP performance. Please re consult as necessary.        Anticipated barriers to occupational therapy: comorbidity    Goals:   Short Term Goals: (in 3 weeks)  1) Patient will be independent in HEP to manage pain after sickle cell crisis. MET  2) Decrease pain in RUE to no more than 5/10 worst in ADL/IADL's MET     Long Term Goals: (in 6 weeks)  1) Decrease pain in R elbow to no more than 2/10 worst in ADL/IADL's MET  2) Increase AROM of R elbow extension to -5 for increased functioning in ADL/IADL's MET    Plan     Updates/Grading for next session: reassess next session.     Desiree Christensen, OT   5/2/2024

## 2024-05-06 ENCOUNTER — HOSPITAL ENCOUNTER (OUTPATIENT)
Dept: RADIOLOGY | Facility: HOSPITAL | Age: 29
Discharge: HOME | End: 2024-05-06
Attending: INTERNAL MEDICINE
Payer: MEDICAID

## 2024-05-06 ENCOUNTER — PATIENT MESSAGE (OUTPATIENT)
Dept: HEMATOLOGY/ONCOLOGY | Facility: CLINIC | Age: 29
End: 2024-05-06
Payer: MEDICAID

## 2024-05-06 DIAGNOSIS — R52 PAIN: ICD-10-CM

## 2024-05-06 DIAGNOSIS — R60.9 SWELLING: ICD-10-CM

## 2024-05-06 DIAGNOSIS — R60.9 SWELLING: Primary | ICD-10-CM

## 2024-05-06 DIAGNOSIS — I82.491 ACUTE DEEP VEIN THROMBOSIS (DVT) OF OTHER SPECIFIED VEIN OF RIGHT LOWER EXTREMITY: ICD-10-CM

## 2024-05-06 PROCEDURE — 93971 EXTREMITY STUDY: CPT | Mod: 26,RT,, | Performed by: RADIOLOGY

## 2024-05-06 PROCEDURE — 93971 EXTREMITY STUDY: CPT | Mod: TC,RT

## 2024-05-31 ENCOUNTER — CLINICAL SUPPORT (OUTPATIENT)
Dept: OBSTETRICS AND GYNECOLOGY | Facility: CLINIC | Age: 29
End: 2024-05-31
Payer: MEDICAID

## 2024-05-31 DIAGNOSIS — Z71.9 COUNSELED BY NURSE: Primary | ICD-10-CM

## 2024-05-31 NOTE — PROGRESS NOTES
Spoke with patient for a total of 30 minutes.  Updated chart to reflect up to date patient demographics.  Medication, pharmacy, and family history updated.  Patient was guided through expectations of OB/GYN care throughout history of pregnancy. Appointment for transfer of care scheduled

## 2024-06-11 ENCOUNTER — LAB VISIT (OUTPATIENT)
Dept: LAB | Facility: HOSPITAL | Age: 29
End: 2024-06-11
Attending: INTERNAL MEDICINE
Payer: MEDICAID

## 2024-06-11 DIAGNOSIS — D57.1 SICKLE CELL DISEASE WITHOUT CRISIS: ICD-10-CM

## 2024-06-11 LAB
BASOPHILS # BLD AUTO: 0.09 K/UL (ref 0–0.2)
BASOPHILS NFR BLD: 0.7 % (ref 0–1.9)
DIFFERENTIAL METHOD BLD: ABNORMAL
EOSINOPHIL # BLD AUTO: 0.9 K/UL (ref 0–0.5)
EOSINOPHIL NFR BLD: 7 % (ref 0–8)
ERYTHROCYTE [DISTWIDTH] IN BLOOD BY AUTOMATED COUNT: 22 % (ref 11.5–14.5)
FERRITIN SERPL-MCNC: 1227 NG/ML (ref 20–300)
FOLATE SERPL-MCNC: 15.2 NG/ML (ref 4–24)
HCT VFR BLD AUTO: 24.1 % (ref 37–48.5)
HGB BLD-MCNC: 8.5 G/DL (ref 12–16)
IMM GRANULOCYTES # BLD AUTO: 0.17 K/UL (ref 0–0.04)
IMM GRANULOCYTES NFR BLD AUTO: 1.3 % (ref 0–0.5)
LYMPHOCYTES # BLD AUTO: 2.2 K/UL (ref 1–4.8)
LYMPHOCYTES NFR BLD: 16.5 % (ref 18–48)
MCH RBC QN AUTO: 34.3 PG (ref 27–31)
MCHC RBC AUTO-ENTMCNC: 35.3 G/DL (ref 32–36)
MCV RBC AUTO: 97 FL (ref 82–98)
MONOCYTES # BLD AUTO: 1.2 K/UL (ref 0.3–1)
MONOCYTES NFR BLD: 8.8 % (ref 4–15)
NEUTROPHILS # BLD AUTO: 8.9 K/UL (ref 1.8–7.7)
NEUTROPHILS NFR BLD: 65.7 % (ref 38–73)
NRBC BLD-RTO: 12 /100 WBC
PLATELET # BLD AUTO: 343 K/UL (ref 150–450)
PMV BLD AUTO: 11.3 FL (ref 9.2–12.9)
RBC # BLD AUTO: 2.48 M/UL (ref 4–5.4)
WBC # BLD AUTO: 13.48 K/UL (ref 3.9–12.7)

## 2024-06-11 PROCEDURE — 82728 ASSAY OF FERRITIN: CPT | Performed by: INTERNAL MEDICINE

## 2024-06-11 PROCEDURE — 82746 ASSAY OF FOLIC ACID SERUM: CPT | Performed by: INTERNAL MEDICINE

## 2024-06-11 PROCEDURE — 83020 HEMOGLOBIN ELECTROPHORESIS: CPT | Performed by: INTERNAL MEDICINE

## 2024-06-11 PROCEDURE — 85025 COMPLETE CBC W/AUTO DIFF WBC: CPT | Performed by: INTERNAL MEDICINE

## 2024-06-11 PROCEDURE — 36415 COLL VENOUS BLD VENIPUNCTURE: CPT | Performed by: INTERNAL MEDICINE

## 2024-06-12 LAB
HGB FRACT BLD ELPH-IMP: NORMAL
HGB S MFR BLD ELPH: 88.1 %

## 2024-06-14 ENCOUNTER — INITIAL PRENATAL (OUTPATIENT)
Dept: OBSTETRICS AND GYNECOLOGY | Facility: CLINIC | Age: 29
End: 2024-06-14
Payer: MEDICAID

## 2024-06-14 VITALS — BODY MASS INDEX: 23.39 KG/M2 | WEIGHT: 127.88 LBS

## 2024-06-14 DIAGNOSIS — D57.1 SICKLE CELL ANEMIA OF MOTHER DURING PREGNANCY: ICD-10-CM

## 2024-06-14 DIAGNOSIS — O99.019 SICKLE CELL ANEMIA OF MOTHER DURING PREGNANCY: ICD-10-CM

## 2024-06-14 DIAGNOSIS — O09.92 SUPERVISION OF HIGH RISK PREGNANCY IN SECOND TRIMESTER: Primary | ICD-10-CM

## 2024-06-14 PROCEDURE — 99999 PR PBB SHADOW E&M-EST. PATIENT-LVL II: CPT | Mod: PBBFAC,,, | Performed by: OBSTETRICS & GYNECOLOGY

## 2024-06-14 PROCEDURE — 99212 OFFICE O/P EST SF 10 MIN: CPT | Mod: PBBFAC,TH | Performed by: OBSTETRICS & GYNECOLOGY

## 2024-06-14 PROCEDURE — 87086 URINE CULTURE/COLONY COUNT: CPT | Performed by: OBSTETRICS & GYNECOLOGY

## 2024-06-14 PROCEDURE — 99213 OFFICE O/P EST LOW 20 MIN: CPT | Mod: TH,S$PBB,, | Performed by: OBSTETRICS & GYNECOLOGY

## 2024-06-15 ENCOUNTER — PATIENT MESSAGE (OUTPATIENT)
Dept: OBSTETRICS AND GYNECOLOGY | Facility: HOSPITAL | Age: 29
End: 2024-06-15
Payer: MEDICAID

## 2024-06-15 LAB — BACTERIA UR CULT: NO GROWTH

## 2024-06-15 NOTE — PROGRESS NOTES
Good FM, no LOF, Ctx, VB. Pt is a [place] from [place]. Has sickle cell disease. Has seen MFM there, has been seeing heme onc Dr. Nuñez, plans to switch to sickle cell clinic soon for follow up. Taking PNV and folic acid with ASA. FOB states he does not have sickle cell trait or disease. She has already been admitted this pregnancy with pain crises - typically feels back and joint pain when this happens. Tramadol and oxy usually works for her pain. Has these at home when she occasionally needs them. Has had acute chest in the past also. Denies any pain crises symptoms today. Urine culture today and third trimester,, will get p/c ratio next time. Glucose and CBC next visit. Plan for CBC monthly. Plan for PNT 2 x weekly starting 32 weeks and growth US q 4 weeks throughout pregnancy. Referral placed for US and to see MFM here. Maternal EKG and echo ordered. She in unsure about epidural, would like freedom to move around room in labor. Discussed need to have continuous monitoring in labor due to high risk pregnancy. All questions answered.     Given precautions.

## 2024-06-27 ENCOUNTER — PROCEDURE VISIT (OUTPATIENT)
Dept: MATERNAL FETAL MEDICINE | Facility: CLINIC | Age: 29
End: 2024-06-27
Payer: MEDICAID

## 2024-06-27 ENCOUNTER — PATIENT MESSAGE (OUTPATIENT)
Dept: OTHER | Facility: OTHER | Age: 29
End: 2024-06-27
Payer: MEDICAID

## 2024-06-27 ENCOUNTER — OFFICE VISIT (OUTPATIENT)
Dept: MATERNAL FETAL MEDICINE | Facility: CLINIC | Age: 29
End: 2024-06-27
Attending: OBSTETRICS & GYNECOLOGY
Payer: MEDICAID

## 2024-06-27 VITALS
BODY MASS INDEX: 23.35 KG/M2 | WEIGHT: 126.88 LBS | SYSTOLIC BLOOD PRESSURE: 111 MMHG | HEIGHT: 62 IN | HEART RATE: 85 BPM | DIASTOLIC BLOOD PRESSURE: 67 MMHG

## 2024-06-27 DIAGNOSIS — O99.019 SICKLE CELL ANEMIA OF MOTHER DURING PREGNANCY: Primary | ICD-10-CM

## 2024-06-27 DIAGNOSIS — O99.019 SICKLE CELL ANEMIA OF MOTHER DURING PREGNANCY: ICD-10-CM

## 2024-06-27 DIAGNOSIS — D57.1 SICKLE CELL ANEMIA OF MOTHER DURING PREGNANCY: ICD-10-CM

## 2024-06-27 DIAGNOSIS — D57.1 SICKLE CELL ANEMIA OF MOTHER DURING PREGNANCY: Primary | ICD-10-CM

## 2024-06-27 DIAGNOSIS — Z36.2 ENCOUNTER FOR FOLLOW-UP ULTRASOUND OF FETAL ANATOMY: Primary | ICD-10-CM

## 2024-06-27 DIAGNOSIS — O09.92 SUPERVISION OF HIGH RISK PREGNANCY IN SECOND TRIMESTER: ICD-10-CM

## 2024-06-27 DIAGNOSIS — Z36.89 ENCOUNTER FOR ULTRASOUND TO CHECK FETAL GROWTH: ICD-10-CM

## 2024-06-27 PROCEDURE — 99999 PR PBB SHADOW E&M-EST. PATIENT-LVL III: CPT | Mod: PBBFAC,,, | Performed by: OBSTETRICS & GYNECOLOGY

## 2024-06-27 PROCEDURE — 3074F SYST BP LT 130 MM HG: CPT | Mod: CPTII,,, | Performed by: OBSTETRICS & GYNECOLOGY

## 2024-06-27 PROCEDURE — 76805 OB US >/= 14 WKS SNGL FETUS: CPT | Mod: 26,S$PBB,, | Performed by: OBSTETRICS & GYNECOLOGY

## 2024-06-27 PROCEDURE — 1159F MED LIST DOCD IN RCRD: CPT | Mod: CPTII,,, | Performed by: OBSTETRICS & GYNECOLOGY

## 2024-06-27 PROCEDURE — 99214 OFFICE O/P EST MOD 30 MIN: CPT | Mod: S$PBB,TH,, | Performed by: OBSTETRICS & GYNECOLOGY

## 2024-06-27 PROCEDURE — 3078F DIAST BP <80 MM HG: CPT | Mod: CPTII,,, | Performed by: OBSTETRICS & GYNECOLOGY

## 2024-06-27 PROCEDURE — 1160F RVW MEDS BY RX/DR IN RCRD: CPT | Mod: CPTII,,, | Performed by: OBSTETRICS & GYNECOLOGY

## 2024-06-27 PROCEDURE — 99213 OFFICE O/P EST LOW 20 MIN: CPT | Mod: PBBFAC,TH | Performed by: OBSTETRICS & GYNECOLOGY

## 2024-06-27 PROCEDURE — 3008F BODY MASS INDEX DOCD: CPT | Mod: CPTII,,, | Performed by: OBSTETRICS & GYNECOLOGY

## 2024-06-27 NOTE — PROGRESS NOTES
"MATERNAL-FETAL MEDICINE   CONSULT NOTE    Provider requesting consultation: Dr. J Jeansonne    SUBJECTIVE:     Ms. Elizabeth Franco is a 29 y.o.  female with IUP at 26w0d who is seen in consultation by M for evaluation and management of:  Problem   Sickle Cell Anemia of Mother During Pregnancy     Medication List with Changes/Refills   Current Medications    ALBUTEROL SULFATE (PROAIR RESPICLICK) 90 MCG/ACTUATION INHALER    Inhale 1-2 puffs into the lungs every 4 (four) hours as needed for Wheezing. Rescue    ASPIRIN (ECOTRIN) 81 MG EC TABLET    Take 1 tablet (81 mg total) by mouth once daily.    FOLIC ACID (FOLVITE) 1 MG TABLET    Take 4 tablets (4 mg total) by mouth once daily.    OXYCODONE (ROXICODONE) 10 MG TAB IMMEDIATE RELEASE TABLET    Take 1 tablet (10 mg total) by mouth every 4 (four) hours as needed for Pain.    PRENATAL VIT 87-IRON-FOLIC-DHA (PRENATE MINI, FERR ASP GLYCIN,) 18-1-350 MG CAP    Take 1 tablet by mouth once daily. for 270 doses    TRAMADOL (ULTRAM) 50 MG TABLET    Take 1 tablet (50 mg total) by mouth every 8 (eight) hours as needed for Pain.       Review of patient's allergies indicates:  No Known Allergies    PMH:  Past Medical History:   Diagnosis Date    Hepatomegaly 6/15/2022    Hidradenitis suppurativa     Nocturnal enuresis     Sickle cell anemia        PObHx:  OB History    Para Term  AB Living   1             SAB IAB Ectopic Multiple Live Births                  # Outcome Date GA Lbr Jason/2nd Weight Sex Type Anes PTL Lv   1 Current                PSH:No past surgical history on file.    Family history:family history includes No Known Problems in her father and mother.    Social history: reports that she has never smoked. She has never used smokeless tobacco. She reports that she does not drink alcohol and does not use drugs.    Objective:   /67 (BP Location: Left arm, Patient Position: Sitting, BP Method: Small (Automatic))   Pulse 85   Ht 5' 2" (1.575 m)   " Wt 57.6 kg (126 lb 14 oz)   LMP 2023   BMI 23.21 kg/m²     Ultrasound performed. See viewpoint for full ultrasound report.  Normal findings on today's detailed exam, although incomplete. AGA.     Significant labs/imaging:  See below.     ASSESSMENT/PLAN:     29 y.o.  female with IUP at 26w0d     Sickle cell anemia of mother during pregnancy  Overall not too sympatomatic. Last hospitalization was last month. Received 1u pRBC. Was seeing Dr. Nuñez, but transferring care to [facility]. Typically has 1 pain crisis requiring hospitalization a year. Has had acute chest syndrome approx 3 times. Last time was in . Has had an exchange transfusion, last in high school. S/p splenectomy. Partner has been tested and is not a carrier. Only using oxycodone prn, not needed daily.     The patient was counseled about sickle cell anemia in pregnancy. The patient was counseled regarding maternal risks, which include but are not limited to: sickle cell crisis and acute chest syndrome, antepartum hospitalization, venous thromboembolic disease, stroke, preeclampsia/eclampsia,  delivery, infectious morbidity,  delivery, and death. The patient was counseled regarding fetal risks which include but are not limited to: miscarriage, fetal growth restriction, stillbirth, low birthweight, and prematurity. The patient was counseled regarding the inheritance risk. Sickle cell disease is inherited in an autosomal recessive fashion. The carrier rate in the  population is 1:12.     Baseline Labs/Evaluation:  Lab Results   Component Value Date    HGB 8.5 (L) 2024    HCT 24.1 (L) 2024     2024    CREATININE 0.6 2024    AST 42 (H) 2024    ALT 39 2024     EKG 6/15/22: Sinus tach, otherwise wnl  TTE: 6/15/22: LV normal in size with concentric remodeling and normal systolic function. Estimated EF 60%. Normal LV diastolic function. PA pressure 22mmHg.  Trivial posterior pericardial effusion.     Recommendations:  Laboratory evaluation  For partner: Hemoglobin electrophoresis and CBC (and additional testing for alpha or beta thalassemia as indicated) (Jackson screening to be performed after birth)  Prenatal diagnosis is available if desired/indicated  Baseline labs: CBC, CMP, P/C ratio or 24 hour urine protein  Urine culture q trimester  CBC monthly  Medications:  Folic acid 4 mg  Aspirin 81 mg  Narcotics per heme/onc-avoid abrupt cessation and avoid NSAIDs, particularly after 32 weeks  Avoid iron supplementation due to risks of iron overload  Prophylactic lovenox or heparin while admitted to the hospital antepartum or postpartum  Vaccinations (per primary physician):  Hep B, pneumovax, flu vaccination annually, and COVID vaccination as appropriate  Baseline maternal evaluation (ordered by primary OB):  Needs updated EKG and echocardiogram  Pulmonary function studies, if not performed previously  Ophthalmic exam  Needs enrollment in connected mom  Ultrasounds (with MFM):  Detailed anatomic survey at 19-20 weeks  Serial growth ultrasounds starting at 26-28 weeks, to be performed every 4-6 weeks  Antepartum testing twice weekly at 32 weeks (this can be ordered by primary OB)  Delivery timin weeks, unless indicated earlier        FOLLOW UP:   F/u MD and completion of anatomy in 4 weeks.     DELMIS Martínez MD  Maternal Fetal Medicine Fellow   PGY-5

## 2024-06-27 NOTE — ASSESSMENT & PLAN NOTE
Overall not too sympatomatic. Last hospitalization was last month. Received 1u pRBC. Was seeing Dr. Nuñez, but transferring care to [facility]. Typically has 1 pain crisis requiring hospitalization a year. Has had acute chest syndrome approx 3 times. Last time was in . Has had an exchange transfusion, last in high school. S/p splenectomy. Partner has been tested and is not a carrier. Only using oxycodone prn, not needed daily.     The patient was counseled about sickle cell anemia in pregnancy. The patient was counseled regarding maternal risks, which include but are not limited to: sickle cell crisis and acute chest syndrome, antepartum hospitalization, venous thromboembolic disease, stroke, preeclampsia/eclampsia,  delivery, infectious morbidity,  delivery, and death. The patient was counseled regarding fetal risks which include but are not limited to: miscarriage, fetal growth restriction, stillbirth, low birthweight, and prematurity. The patient was counseled regarding the inheritance risk. Sickle cell disease is inherited in an autosomal recessive fashion. The carrier rate in the  population is 1:12.     Baseline Labs/Evaluation:  Lab Results   Component Value Date    HGB 8.5 (L) 2024    HCT 24.1 (L) 2024     2024    CREATININE 0.6 2024    AST 42 (H) 2024    ALT 39 2024     EKG 6/15/22: Sinus tach, otherwise wnl  TTE: 6/15/22: LV normal in size with concentric remodeling and normal systolic function. Estimated EF 60%. Normal LV diastolic function. PA pressure 22mmHg. Trivial posterior pericardial effusion.     Recommendations:  Laboratory evaluation  For partner: Hemoglobin electrophoresis and CBC (and additional testing for alpha or beta thalassemia as indicated) ( screening to be performed after birth)  Prenatal diagnosis is available if desired/indicated  Baseline labs: CBC, CMP, P/C ratio or 24 hour urine  protein  Urine culture q trimester  CBC monthly  Medications:  Folic acid 4 mg  Aspirin 81 mg  Narcotics per heme/onc-avoid abrupt cessation and avoid NSAIDs, particularly after 32 weeks  Avoid iron supplementation due to risks of iron overload  Prophylactic lovenox or heparin while admitted to the hospital antepartum or postpartum  Vaccinations (per primary physician):  Hep B, pneumovax, flu vaccination annually, and COVID vaccination as appropriate  Baseline maternal evaluation (ordered by primary OB):  Needs updated EKG and echocardiogram  Pulmonary function studies, if not performed previously  Ophthalmic exam  Needs enrollment in connected mom  Ultrasounds (with MFM):  Detailed anatomic survey at 19-20 weeks  Serial growth ultrasounds starting at 26-28 weeks, to be performed every 4-6 weeks  Antepartum testing twice weekly at 32 weeks (this can be ordered by primary OB)  Delivery timin weeks, unless indicated earlier

## 2024-07-11 ENCOUNTER — PATIENT MESSAGE (OUTPATIENT)
Dept: OTHER | Facility: OTHER | Age: 29
End: 2024-07-11
Payer: MEDICAID

## 2024-07-19 ENCOUNTER — CLINICAL SUPPORT (OUTPATIENT)
Dept: LAB | Facility: HOSPITAL | Age: 29
End: 2024-07-19
Attending: OBSTETRICS & GYNECOLOGY
Payer: MEDICAID

## 2024-07-19 ENCOUNTER — ROUTINE PRENATAL (OUTPATIENT)
Dept: OBSTETRICS AND GYNECOLOGY | Facility: CLINIC | Age: 29
End: 2024-07-19
Payer: MEDICAID

## 2024-07-19 VITALS
DIASTOLIC BLOOD PRESSURE: 72 MMHG | SYSTOLIC BLOOD PRESSURE: 114 MMHG | BODY MASS INDEX: 24.31 KG/M2 | WEIGHT: 132.94 LBS

## 2024-07-19 DIAGNOSIS — D57.1 SICKLE CELL ANEMIA OF MOTHER DURING PREGNANCY: ICD-10-CM

## 2024-07-19 DIAGNOSIS — O09.93 HIGH-RISK PREGNANCY IN THIRD TRIMESTER: ICD-10-CM

## 2024-07-19 DIAGNOSIS — D57.1 SICKLE CELL DISEASE WITHOUT CRISIS: Primary | ICD-10-CM

## 2024-07-19 DIAGNOSIS — O99.019 SICKLE CELL ANEMIA OF MOTHER DURING PREGNANCY: ICD-10-CM

## 2024-07-19 DIAGNOSIS — O09.92 SUPERVISION OF HIGH RISK PREGNANCY IN SECOND TRIMESTER: ICD-10-CM

## 2024-07-19 LAB
ANISOCYTOSIS BLD QL SMEAR: ABNORMAL
BASOPHILS # BLD AUTO: 0.04 K/UL (ref 0–0.2)
BASOPHILS NFR BLD: 0.3 % (ref 0–1.9)
DIFFERENTIAL METHOD BLD: ABNORMAL
EOSINOPHIL # BLD AUTO: 0.5 K/UL (ref 0–0.5)
EOSINOPHIL NFR BLD: 4.4 % (ref 0–8)
ERYTHROCYTE [DISTWIDTH] IN BLOOD BY AUTOMATED COUNT: 20.4 % (ref 11.5–14.5)
GLUCOSE SERPL-MCNC: 75 MG/DL (ref 70–140)
HCT VFR BLD AUTO: 22.8 % (ref 37–48.5)
HGB BLD-MCNC: 8.1 G/DL (ref 12–16)
HYPOCHROMIA BLD QL SMEAR: ABNORMAL
IMM GRANULOCYTES # BLD AUTO: 0.11 K/UL (ref 0–0.04)
IMM GRANULOCYTES NFR BLD AUTO: 0.9 % (ref 0–0.5)
LYMPHOCYTES # BLD AUTO: 2.3 K/UL (ref 1–4.8)
LYMPHOCYTES NFR BLD: 18.9 % (ref 18–48)
MCH RBC QN AUTO: 35.8 PG (ref 27–31)
MCHC RBC AUTO-ENTMCNC: 35.5 G/DL (ref 32–36)
MCV RBC AUTO: 101 FL (ref 82–98)
MONOCYTES # BLD AUTO: 0.9 K/UL (ref 0.3–1)
MONOCYTES NFR BLD: 7.1 % (ref 4–15)
NEUTROPHILS # BLD AUTO: 8.3 K/UL (ref 1.8–7.7)
NEUTROPHILS NFR BLD: 68.4 % (ref 38–73)
NRBC BLD-RTO: 19 /100 WBC
OVALOCYTES BLD QL SMEAR: ABNORMAL
PLATELET # BLD AUTO: 328 K/UL (ref 150–450)
PLATELET BLD QL SMEAR: ABNORMAL
PMV BLD AUTO: 11.8 FL (ref 9.2–12.9)
POIKILOCYTOSIS BLD QL SMEAR: ABNORMAL
POLYCHROMASIA BLD QL SMEAR: ABNORMAL
RBC # BLD AUTO: 2.26 M/UL (ref 4–5.4)
SICKLE CELLS BLD QL SMEAR: ABNORMAL
SPHEROCYTES BLD QL SMEAR: ABNORMAL
TARGETS BLD QL SMEAR: ABNORMAL
WBC # BLD AUTO: 12.14 K/UL (ref 3.9–12.7)

## 2024-07-19 PROCEDURE — 85025 COMPLETE CBC W/AUTO DIFF WBC: CPT | Performed by: OBSTETRICS & GYNECOLOGY

## 2024-07-19 PROCEDURE — 93005 ELECTROCARDIOGRAM TRACING: CPT

## 2024-07-19 PROCEDURE — 36415 COLL VENOUS BLD VENIPUNCTURE: CPT | Performed by: OBSTETRICS & GYNECOLOGY

## 2024-07-19 PROCEDURE — 99999 PR PBB SHADOW E&M-EST. PATIENT-LVL III: CPT | Mod: PBBFAC,,, | Performed by: OBSTETRICS & GYNECOLOGY

## 2024-07-19 PROCEDURE — 99213 OFFICE O/P EST LOW 20 MIN: CPT | Mod: PBBFAC,25,TH | Performed by: OBSTETRICS & GYNECOLOGY

## 2024-07-19 PROCEDURE — 99213 OFFICE O/P EST LOW 20 MIN: CPT | Mod: TH,S$PBB,, | Performed by: OBSTETRICS & GYNECOLOGY

## 2024-07-19 PROCEDURE — 82950 GLUCOSE TEST: CPT | Performed by: OBSTETRICS & GYNECOLOGY

## 2024-07-19 PROCEDURE — 93010 ELECTROCARDIOGRAM REPORT: CPT | Mod: ,,, | Performed by: INTERNAL MEDICINE

## 2024-07-19 NOTE — PROGRESS NOTES
Good FM, no LOF, Ctx, VB. Having some bladder pressure, will get dip today. Glucose with CBC today. Will set up EKG, echo. Will place referral to pulm for PFT. Saw TONYA, has f/u US scheduled. Has questions today about fundal massage after delivery, pitocin, delayed cord clamping, skin to skin. Advised to bring birth plan. Needs PNT scheduled.     Given precautions.

## 2024-07-23 ENCOUNTER — PATIENT MESSAGE (OUTPATIENT)
Dept: OBSTETRICS AND GYNECOLOGY | Facility: CLINIC | Age: 29
End: 2024-07-23
Payer: MEDICAID

## 2024-07-23 ENCOUNTER — HOSPITAL ENCOUNTER (INPATIENT)
Facility: OTHER | Age: 29
LOS: 2 days | Discharge: HOME | DRG: 831 | End: 2024-07-27
Attending: OBSTETRICS & GYNECOLOGY | Admitting: OBSTETRICS & GYNECOLOGY
Payer: MEDICAID

## 2024-07-23 DIAGNOSIS — O09.93 HIGH-RISK PREGNANCY IN THIRD TRIMESTER: ICD-10-CM

## 2024-07-23 DIAGNOSIS — Z3A.29 29 WEEKS GESTATION OF PREGNANCY: ICD-10-CM

## 2024-07-23 DIAGNOSIS — R94.31 ABNORMAL EKG: Primary | ICD-10-CM

## 2024-07-23 DIAGNOSIS — R07.1 CHEST PAIN ON BREATHING: ICD-10-CM

## 2024-07-23 DIAGNOSIS — D57.1 SICKLE CELL DISEASE WITHOUT CRISIS: ICD-10-CM

## 2024-07-23 DIAGNOSIS — R07.9 CHEST PAIN: ICD-10-CM

## 2024-07-23 DIAGNOSIS — D57.00 SICKLE CELL CRISIS: ICD-10-CM

## 2024-07-23 DIAGNOSIS — D57.00 SICKLE CELL ANEMIA WITH CRISIS: Primary | ICD-10-CM

## 2024-07-23 PROBLEM — J45.909 ASTHMA: Status: ACTIVE | Noted: 2024-07-23

## 2024-07-23 PROBLEM — Z3A.30 30 WEEKS GESTATION OF PREGNANCY: Status: ACTIVE | Noted: 2024-07-23

## 2024-07-23 LAB
ABO + RH BLD: ABNORMAL
ALBUMIN SERPL BCP-MCNC: 3.4 G/DL (ref 3.5–5.2)
ALP SERPL-CCNC: 109 U/L (ref 55–135)
ALT SERPL W/O P-5'-P-CCNC: 39 U/L (ref 10–44)
ANION GAP SERPL CALC-SCNC: 11 MMOL/L (ref 8–16)
AST SERPL-CCNC: 77 U/L (ref 10–40)
BACTERIA #/AREA URNS HPF: NORMAL /HPF
BASOPHILS # BLD AUTO: 0.07 K/UL (ref 0–0.2)
BASOPHILS NFR BLD: 0.4 % (ref 0–1.9)
BILIRUB SERPL-MCNC: 3.6 MG/DL (ref 0.1–1)
BILIRUB UR QL STRIP: NEGATIVE
BLD GP AB SCN CELLS X3 SERPL QL: ABNORMAL
BUN SERPL-MCNC: 4 MG/DL (ref 6–20)
CALCIUM SERPL-MCNC: 9 MG/DL (ref 8.7–10.5)
CHLORIDE SERPL-SCNC: 102 MMOL/L (ref 95–110)
CLARITY UR: ABNORMAL
CO2 SERPL-SCNC: 18 MMOL/L (ref 23–29)
COLOR UR: YELLOW
CREAT SERPL-MCNC: 0.6 MG/DL (ref 0.5–1.4)
DIFFERENTIAL METHOD BLD: ABNORMAL
EOSINOPHIL # BLD AUTO: 0.2 K/UL (ref 0–0.5)
EOSINOPHIL NFR BLD: 1.3 % (ref 0–8)
ERYTHROCYTE [DISTWIDTH] IN BLOOD BY AUTOMATED COUNT: 20.9 % (ref 11.5–14.5)
EST. GFR  (NO RACE VARIABLE): >60 ML/MIN/1.73 M^2
GLUCOSE SERPL-MCNC: 74 MG/DL (ref 70–110)
GLUCOSE UR QL STRIP: NEGATIVE
HCT VFR BLD AUTO: 24.4 % (ref 37–48.5)
HGB BLD-MCNC: 8.7 G/DL (ref 12–16)
HGB UR QL STRIP: NEGATIVE
IMM GRANULOCYTES # BLD AUTO: 0.53 K/UL (ref 0–0.04)
IMM GRANULOCYTES NFR BLD AUTO: 2.9 % (ref 0–0.5)
INFLUENZA A, MOLECULAR: NEGATIVE
INFLUENZA B, MOLECULAR: NEGATIVE
KETONES UR QL STRIP: NEGATIVE
LDH SERPL L TO P-CCNC: 701 U/L (ref 110–260)
LEUKOCYTE ESTERASE UR QL STRIP: ABNORMAL
LYMPHOCYTES # BLD AUTO: 2.7 K/UL (ref 1–4.8)
LYMPHOCYTES NFR BLD: 14.9 % (ref 18–48)
MCH RBC QN AUTO: 35.2 PG (ref 27–31)
MCHC RBC AUTO-ENTMCNC: 35.7 G/DL (ref 32–36)
MCV RBC AUTO: 99 FL (ref 82–98)
MICROSCOPIC COMMENT: NORMAL
MONOCYTES # BLD AUTO: 1.5 K/UL (ref 0.3–1)
MONOCYTES NFR BLD: 8.2 % (ref 4–15)
NEUTROPHILS # BLD AUTO: 13.1 K/UL (ref 1.8–7.7)
NEUTROPHILS NFR BLD: 72.3 % (ref 38–73)
NITRITE UR QL STRIP: NEGATIVE
NRBC BLD-RTO: 16 /100 WBC
OHS QRS DURATION: 88 MS
OHS QTC CALCULATION: 462 MS
PH UR STRIP: 6 [PH] (ref 5–8)
PLATELET # BLD AUTO: 294 K/UL (ref 150–450)
PMV BLD AUTO: 11.2 FL (ref 9.2–12.9)
POTASSIUM SERPL-SCNC: 4.1 MMOL/L (ref 3.5–5.1)
PROT SERPL-MCNC: 7.6 G/DL (ref 6–8.4)
PROT UR QL STRIP: NEGATIVE
RBC # BLD AUTO: 2.47 M/UL (ref 4–5.4)
RBC #/AREA URNS HPF: 2 /HPF (ref 0–4)
RETICS/RBC NFR AUTO: >23 % (ref 0.5–2.5)
SARS-COV-2 RDRP RESP QL NAA+PROBE: NEGATIVE
SODIUM SERPL-SCNC: 131 MMOL/L (ref 136–145)
SP GR UR STRIP: 1.01 (ref 1–1.03)
SPECIMEN OUTDATE: ABNORMAL
SPECIMEN SOURCE: NORMAL
SQUAMOUS #/AREA URNS HPF: 13 /HPF
TROPONIN I SERPL DL<=0.01 NG/ML-MCNC: <0.006 NG/ML (ref 0–0.03)
URN SPEC COLLECT METH UR: ABNORMAL
UROBILINOGEN UR STRIP-ACNC: NEGATIVE EU/DL
WBC # BLD AUTO: 18.07 K/UL (ref 3.9–12.7)
WBC #/AREA URNS HPF: 5 /HPF (ref 0–5)

## 2024-07-23 PROCEDURE — 94640 AIRWAY INHALATION TREATMENT: CPT

## 2024-07-23 PROCEDURE — 85025 COMPLETE CBC W/AUTO DIFF WBC: CPT

## 2024-07-23 PROCEDURE — 87502 INFLUENZA DNA AMP PROBE: CPT

## 2024-07-23 PROCEDURE — 81000 URINALYSIS NONAUTO W/SCOPE: CPT

## 2024-07-23 PROCEDURE — U0002 COVID-19 LAB TEST NON-CDC: HCPCS

## 2024-07-23 PROCEDURE — 63600175 PHARM REV CODE 636 W HCPCS

## 2024-07-23 PROCEDURE — 86900 BLOOD TYPING SEROLOGIC ABO: CPT

## 2024-07-23 PROCEDURE — 83020 HEMOGLOBIN ELECTROPHORESIS: CPT

## 2024-07-23 PROCEDURE — G0378 HOSPITAL OBSERVATION PER HR: HCPCS

## 2024-07-23 PROCEDURE — 25000003 PHARM REV CODE 250

## 2024-07-23 PROCEDURE — 99285 EMERGENCY DEPT VISIT HI MDM: CPT | Mod: 25,,, | Performed by: OBSTETRICS & GYNECOLOGY

## 2024-07-23 PROCEDURE — 86901 BLOOD TYPING SEROLOGIC RH(D): CPT

## 2024-07-23 PROCEDURE — 86850 RBC ANTIBODY SCREEN: CPT

## 2024-07-23 PROCEDURE — 25000242 PHARM REV CODE 250 ALT 637 W/ HCPCS

## 2024-07-23 PROCEDURE — 59025 FETAL NON-STRESS TEST: CPT | Mod: 26,,, | Performed by: OBSTETRICS & GYNECOLOGY

## 2024-07-23 PROCEDURE — 85045 AUTOMATED RETICULOCYTE COUNT: CPT

## 2024-07-23 PROCEDURE — 87086 URINE CULTURE/COLONY COUNT: CPT

## 2024-07-23 PROCEDURE — 86870 RBC ANTIBODY IDENTIFICATION: CPT

## 2024-07-23 PROCEDURE — 80053 COMPREHEN METABOLIC PANEL: CPT

## 2024-07-23 PROCEDURE — 84484 ASSAY OF TROPONIN QUANT: CPT

## 2024-07-23 PROCEDURE — 83615 LACTATE (LD) (LDH) ENZYME: CPT

## 2024-07-23 RX ORDER — SODIUM CHLORIDE 9 MG/ML
INJECTION, SOLUTION INTRAVENOUS CONTINUOUS
Status: DISCONTINUED | OUTPATIENT
Start: 2024-07-23 | End: 2024-07-24

## 2024-07-23 RX ORDER — ACETAMINOPHEN 500 MG
1000 TABLET ORAL EVERY 6 HOURS
Status: DISCONTINUED | OUTPATIENT
Start: 2024-07-24 | End: 2024-07-23

## 2024-07-23 RX ORDER — DIPHENHYDRAMINE HYDROCHLORIDE 50 MG/ML
25 INJECTION INTRAMUSCULAR; INTRAVENOUS EVERY 4 HOURS PRN
Status: DISCONTINUED | OUTPATIENT
Start: 2024-07-23 | End: 2024-07-27 | Stop reason: HOSPADM

## 2024-07-23 RX ORDER — SIMETHICONE 80 MG
1 TABLET,CHEWABLE ORAL EVERY 6 HOURS PRN
Status: DISCONTINUED | OUTPATIENT
Start: 2024-07-23 | End: 2024-07-27 | Stop reason: HOSPADM

## 2024-07-23 RX ORDER — ALBUTEROL SULFATE 90 UG/1
2 AEROSOL, METERED RESPIRATORY (INHALATION) EVERY 6 HOURS PRN
Status: DISCONTINUED | OUTPATIENT
Start: 2024-07-23 | End: 2024-07-27 | Stop reason: HOSPADM

## 2024-07-23 RX ORDER — PRENATAL WITH FERROUS FUM AND FOLIC ACID 3080; 920; 120; 400; 22; 1.84; 3; 20; 10; 1; 12; 200; 27; 25; 2 [IU]/1; [IU]/1; MG/1; [IU]/1; MG/1; MG/1; MG/1; MG/1; MG/1; MG/1; UG/1; MG/1; MG/1; MG/1; MG/1
1 TABLET ORAL DAILY
Status: DISCONTINUED | OUTPATIENT
Start: 2024-07-24 | End: 2024-07-23

## 2024-07-23 RX ORDER — HYDROMORPHONE HYDROCHLORIDE 1 MG/ML
1 INJECTION, SOLUTION INTRAMUSCULAR; INTRAVENOUS; SUBCUTANEOUS
Status: DISCONTINUED | OUTPATIENT
Start: 2024-07-23 | End: 2024-07-24

## 2024-07-23 RX ORDER — PRENATAL WITH FERROUS FUM AND FOLIC ACID 3080; 920; 120; 400; 22; 1.84; 3; 20; 10; 1; 12; 200; 27; 25; 2 [IU]/1; [IU]/1; MG/1; [IU]/1; MG/1; MG/1; MG/1; MG/1; MG/1; MG/1; UG/1; MG/1; MG/1; MG/1; MG/1
1 TABLET ORAL DAILY
Status: DISCONTINUED | OUTPATIENT
Start: 2024-07-24 | End: 2024-07-27 | Stop reason: HOSPADM

## 2024-07-23 RX ORDER — DIPHENHYDRAMINE HCL 25 MG
25 CAPSULE ORAL EVERY 4 HOURS PRN
Status: DISCONTINUED | OUTPATIENT
Start: 2024-07-23 | End: 2024-07-27 | Stop reason: HOSPADM

## 2024-07-23 RX ORDER — SODIUM CHLORIDE 0.9 % (FLUSH) 0.9 %
10 SYRINGE (ML) INJECTION
Status: DISCONTINUED | OUTPATIENT
Start: 2024-07-23 | End: 2024-07-27 | Stop reason: HOSPADM

## 2024-07-23 RX ORDER — HYDROMORPHONE HYDROCHLORIDE 1 MG/ML
1 INJECTION, SOLUTION INTRAMUSCULAR; INTRAVENOUS; SUBCUTANEOUS ONCE
Status: COMPLETED | OUTPATIENT
Start: 2024-07-23 | End: 2024-07-23

## 2024-07-23 RX ORDER — SODIUM CHLORIDE, SODIUM LACTATE, POTASSIUM CHLORIDE, CALCIUM CHLORIDE 600; 310; 30; 20 MG/100ML; MG/100ML; MG/100ML; MG/100ML
INJECTION, SOLUTION INTRAVENOUS CONTINUOUS
Status: DISCONTINUED | OUTPATIENT
Start: 2024-07-23 | End: 2024-07-27 | Stop reason: HOSPADM

## 2024-07-23 RX ORDER — ASPIRIN 81 MG/1
81 TABLET ORAL DAILY
Status: DISCONTINUED | OUTPATIENT
Start: 2024-07-24 | End: 2024-07-27 | Stop reason: HOSPADM

## 2024-07-23 RX ORDER — BUDESONIDE 0.25 MG/2ML
0.5 INHALANT ORAL EVERY 12 HOURS
Status: DISCONTINUED | OUTPATIENT
Start: 2024-07-23 | End: 2024-07-25

## 2024-07-23 RX ORDER — FOLIC ACID 1 MG/1
4 TABLET ORAL DAILY
Status: DISCONTINUED | OUTPATIENT
Start: 2024-07-24 | End: 2024-07-27 | Stop reason: HOSPADM

## 2024-07-23 RX ORDER — AMOXICILLIN 250 MG
1 CAPSULE ORAL NIGHTLY PRN
Status: DISCONTINUED | OUTPATIENT
Start: 2024-07-23 | End: 2024-07-27 | Stop reason: HOSPADM

## 2024-07-23 RX ORDER — ACETAMINOPHEN 325 MG/1
650 TABLET ORAL EVERY 6 HOURS PRN
Status: DISCONTINUED | OUTPATIENT
Start: 2024-07-23 | End: 2024-07-23

## 2024-07-23 RX ORDER — ONDANSETRON 8 MG/1
8 TABLET, ORALLY DISINTEGRATING ORAL EVERY 8 HOURS PRN
Status: DISCONTINUED | OUTPATIENT
Start: 2024-07-23 | End: 2024-07-24

## 2024-07-23 RX ORDER — ACETAMINOPHEN 500 MG
1000 TABLET ORAL EVERY 6 HOURS PRN
Status: DISCONTINUED | OUTPATIENT
Start: 2024-07-23 | End: 2024-07-27 | Stop reason: HOSPADM

## 2024-07-23 RX ADMIN — ACETAMINOPHEN 1000 MG: 500 TABLET ORAL at 08:07

## 2024-07-23 RX ADMIN — HYDROMORPHONE HYDROCHLORIDE 1 MG: 1 INJECTION, SOLUTION INTRAMUSCULAR; INTRAVENOUS; SUBCUTANEOUS at 08:07

## 2024-07-23 RX ADMIN — HYDROMORPHONE HYDROCHLORIDE 1 MG: 1 INJECTION, SOLUTION INTRAMUSCULAR; INTRAVENOUS; SUBCUTANEOUS at 05:07

## 2024-07-23 RX ADMIN — BUDESONIDE 0.5 MG: 0.25 INHALANT RESPIRATORY (INHALATION) at 07:07

## 2024-07-23 RX ADMIN — SODIUM CHLORIDE: 9 INJECTION, SOLUTION INTRAVENOUS at 05:07

## 2024-07-23 NOTE — CARE UPDATE
American College of Obstetricians & Gynecologists Recommendations for Imaging in Pregnant Patients:  - Ultrasonongraphy and MRI are the imaging techniques of choice, but use prudently.  - If radiography, CT, or nuclear imaging is more readily available than, or are needed in addition to, US or MRI, they should NOT be withheld from a pregnant patient. Radiation exposure with these techniques is not typically high enough to result in fetal harm.  - If contrast is needed with an MRI, gadolinium contrast should be used over superparamagnetic iron oxide. However, the use of gadolinium should be limited and used only if it significantly improves diagnostic performance. Breastfeeding should NOT be interrupted after gadolinium administration.  - Ultrasonongraphy and MRI are the imaging techniques of choice, but use prudently.  - If radiography, CT, or nuclear imaging is more readily available than, or are needed in addition to,  US or MRI, they should NOT be withheld from a pregnant patient. Radiation exposure with these techniques is not typically high enough to result in fetal harm.    Source: ACOG Committee Opinion 656, Guidelines for Diagnostic Imaging During Lactation and Pregnancy      Pt advised on above recs. Amenable to CXR. To scanner.    Rosanne Logan MD PGY-3  Obstetrics and Gynecology  [facility]

## 2024-07-23 NOTE — ED PROVIDER NOTES
Encounter Date: 2024       History     Chief Complaint   Patient presents with    Sickle Cell Pain Crisis     Pt. Is complaining of pain everywhere. Pt. Has a HX of Sickle Cell Crisis. Pt. Is 30 weeks pregnant. Pt. Is alert and ABC's are intact.     Elizabeth Franco is a 29 y.o. F at 29w5d presents complaining of sickle cell pain crisis. Patient was sitting in from of air conditioning when pain started which is often one of her triggers. Pain is mainly in legs, hips, arms, and chest. She reports pain is worst in hips and legs. Denies any coughing, wheezing, SOB or any other pulmonary complaints. She denies fevers at home. Reports one episode of emesis during the day. Denies dysuria. Took her home tramadol and oxy without relief. Her home pain regimen is PRN oxy 15 and tramadol but she uses infrequently.         This IUP is complicated by Asthma, Abnormal EKG.  Patient denies contractions, denies vaginal bleeding, denies LOF.   Fetal Movement: normal.           Review of patient's allergies indicates:  No Known Allergies  Past Medical History:   Diagnosis Date    Hepatomegaly 6/15/2022    Hidradenitis suppurativa     Nocturnal enuresis     Sickle cell anemia      No past surgical history on file.  Family History   Problem Relation Name Age of Onset    No Known Problems Mother      No Known Problems Father       Social History     Tobacco Use    Smoking status: Never    Smokeless tobacco: Never   Substance Use Topics    Alcohol use: No     Alcohol/week: 0.0 standard drinks of alcohol    Drug use: Never     Review of Systems   Constitutional:  Negative for chills and fever.   HENT:  Negative for congestion and sore throat.    Eyes:  Negative for visual disturbance.   Respiratory:  Negative for cough and shortness of breath.    Cardiovascular:  Positive for chest pain. Negative for palpitations.   Gastrointestinal:  Negative for abdominal pain, constipation, diarrhea, nausea and vomiting.   Genitourinary:   Negative for dysuria, hematuria, vaginal bleeding and vaginal discharge.   Musculoskeletal:  Negative for back pain.        Left arm pain, bilateral leg pain   Neurological:  Negative for dizziness, light-headedness and headaches.       Physical Exam     Initial Vitals [07/23/24 1636]   BP Pulse Resp Temp SpO2   128/62 (!) 124 20 98.6 °F (37 °C) 100 %      MAP       --         Physical Exam    Vitals reviewed.  Constitutional: She appears well-developed and well-nourished. She is not diaphoretic. She appears distressed.   HENT:   Head: Normocephalic and atraumatic.   Eyes: EOM are normal.   Cardiovascular:  Normal rate, regular rhythm and normal heart sounds.           Pulmonary/Chest: Breath sounds normal. No respiratory distress. She has no wheezes.   Abdominal: Abdomen is soft. There is no abdominal tenderness.   Gravid abdomen There is no rebound and no guarding.   Musculoskeletal:         General: No tenderness or edema.     Neurological: She is alert and oriented to person, place, and time.   Skin: Skin is warm and dry.   Psychiatric: She has a normal mood and affect. Her behavior is normal. Thought content normal.         ED Course   Fetal non-stress test    Date/Time: 7/23/2024 10:03 PM    Performed by: Carissa Cruz MD  Authorized by: Bart Robles MD    Nonstress Test:     Variability:  6-25 BPM    Decelerations:  None    Accelerations:  15 bpm    Baseline:  150    Contractions:  Regular    Contraction Frequency:  Every 3-5 minutes  Biophysical Profile:     Nonstress Test Interpretation: reactive      Overall Impression:  Reassuring  Post-procedure:     Patient tolerance:  Patient tolerated the procedure well with no immediate complications    Labs Reviewed   CBC W/ AUTO DIFFERENTIAL - Abnormal       Result Value    WBC 18.07 (*)     RBC 2.47 (*)     Hemoglobin 8.7 (*)     Hematocrit 24.4 (*)     MCV 99 (*)     MCH 35.2 (*)     MCHC 35.7      RDW 20.9 (*)     Platelets 294      MPV 11.2      Immature  Granulocytes 2.9 (*)     Gran # (ANC) 13.1 (*)     Immature Grans (Abs) 0.53 (*)     Lymph # 2.7      Mono # 1.5 (*)     Eos # 0.2      Baso # 0.07      nRBC 16 (*)     Gran % 72.3      Lymph % 14.9 (*)     Mono % 8.2      Eosinophil % 1.3      Basophil % 0.4      Differential Method Automated     LACTATE DEHYDROGENASE - Abnormal     (*)    COMPREHENSIVE METABOLIC PANEL - Abnormal    Sodium 131 (*)     Potassium 4.1      Chloride 102      CO2 18 (*)     Glucose 74      BUN 4 (*)     Creatinine 0.6      Calcium 9.0      Total Protein 7.6      Albumin 3.4 (*)     Total Bilirubin 3.6 (*)     Alkaline Phosphatase 109      AST 77 (*)     ALT 39      eGFR >60      Anion Gap 11     URINALYSIS - Abnormal    Specimen UA Urine, Clean Catch      Color, UA Yellow      Appearance, UA Hazy (*)     pH, UA 6.0      Specific Gravity, UA 1.010      Protein, UA Negative      Glucose, UA Negative      Ketones, UA Negative      Bilirubin (UA) Negative      Occult Blood UA Negative      Nitrite, UA Negative      Urobilinogen, UA Negative      Leukocytes, UA 1+ (*)    RETICULOCYTES - Abnormal    Retic >23.0 (*)    TYPE & SCREEN - Abnormal    Group & Rh B POS      Indirect Migel POS (*)     Specimen Outdate 07/26/2024 23:59     TROPONIN I    Troponin I <0.006     SARS-COV-2 RNA AMPLIFICATION, QUAL    SARS-CoV-2 RNA, Amplification, Qual Negative     URINALYSIS MICROSCOPIC    RBC, UA 2      WBC, UA 5      Bacteria Rare      Squam Epithel, UA 13      Microscopic Comment SEE COMMENT            Imaging Results              X-Ray Chest AP Portable (Final result)  Result time 07/23/24 17:48:59      Final result by Sindhu Barahona MD (07/23/24 17:48:59)                   Impression:      No acute cardiopulmonary process identified.      Electronically signed by: Sindhu Barahona MD  Date:    07/23/2024  Time:    17:48               Narrative:    EXAMINATION:  XR CHEST AP PORTABLE    CLINICAL HISTORY:  Hb-SS disease with crisis,  unspecified    TECHNIQUE:  Single frontal view of the chest was performed.    COMPARISON:  2022.    FINDINGS:  Cardiac silhouette is stable in size.  Lungs are symmetrically expanded.  Mild chronic coarse interstitial lung changes are seen.  No evidence of focal consolidative process, pneumothorax, or significant pleural effusion.  No acute osseous abnormality identified.                                       Medications   lactated ringers infusion ( Intravenous Verify Only 24 132)   albuterol inhaler 2 puff (has no administration in time range)   aspirin EC tablet 81 mg (81 mg Oral Given 24)   budesonide nebulizer solution 0.5 mg (0.5 mg Nebulization Given 24)   folic acid tablet 4 mg (4 mg Oral Given 24)   sodium chloride 0.9% flush 10 mL (has no administration in time range)   senna-docusate 8.6-50 mg per tablet 1 tablet (has no administration in time range)   simethicone chewable tablet 80 mg (has no administration in time range)   diphenhydrAMINE capsule 25 mg (has no administration in time range)   diphenhydrAMINE injection 25 mg (has no administration in time range)   prenatal vitamin oral tablet (1 tablet Oral Not Given 24)   acetaminophen tablet 1,000 mg (1,000 mg Oral Given 24)   traMADoL tablet 50 mg (50 mg Oral Given 24)   oxyCODONE immediate release tablet Tab 10 mg (10 mg Oral Given 24)   HYDROmorphone injection 1 mg (has no administration in time range)   HYDROmorphone injection 1 mg (1 mg Intravenous Given 24)     Medical Decision Making  Elizabeth Perez Ann Salvador is a 29 y.o. F at 29w5d presents complaining of sickle cell pain crisis.     Temp:  [98.6 °F (37 °C)-99.3 °F (37.4 °C)] 98.6 °F (37 °C)  Pulse:  [] 79  Resp:  [17-20] 17  SpO2:  [96 %-100 %] 97 %  BP: (109-128)/(62-76) 118/68     Sickle Cell Pain Crisis  CBC, Type and Screen, LDH, Reticulocyte count, hemoglobin electrophoresis ordered  CXR  ordered  Urine cultured pending  SPO2 on RA low 90-92%. Satting % on 2L NC  CBC: 18/9/24/294   K 4.1   Cr 0.6   AST/ALT 77/39      CXR: No acute cardiopulmonary process identified     Chest pain:  - EKG: Stable from previous.   - Trop Neg     - Patient's pain significantly improved in MARTHA with 1g dilaudid    Will admit to [facility] for pain management in the setting of sickle cell pain crisis.   Please see H&P for full assessment and plan   Consents signed   BSUS performed by MD Carissa Celeste MD  Obstetrics and Gynecology - PGY2    Amount and/or Complexity of Data Reviewed  Labs: ordered.  Radiology: ordered.    Risk  Prescription drug management.              Attending Attestation:   Physician Attestation Statement for Resident:  As the supervising MD   Physician Attestation Statement: I have personally seen and examined this patient.   I agree with the above history.  -:   As the supervising MD I agree with the above PE.     As the supervising MD I agree with the above treatment, course, plan, and disposition.   -: NST reactive and reassuring, tocometer with contractions every 3-5 minutes.    Exam not consistent with labor.    Patient presents with severe pain due to sickle cell pain crisis.  Upon presentation patient noted to have decreased O2 sats in the lower 90s.    O2 sat improved with 5 L nasal cannula.  Stat chest x-ray performed normal.  Patient's pain is localized to the chest, arms and upper legs and hips.    IV started and normal saline given.  Patient given 1 mg of Dilaudid with an improvement in pain symptoms.    COVID and flu swabs negative, reticulocyte count is over 23.  [facility] consulted, we will admit to antepartum for treatment of sickle cell crisis.  I was personally present during the critical portions of the procedure(s) performed by the resident and was immediately available in the ED to provide services and assistance as needed during the entire procedure.  I have  reviewed and agree with the residents interpretation of the following: lab data and x-rays.  I have reviewed the following: old records at this facility.                ED Course as of 07/24/24 2040 Wed Jul 24, 2024 2034 Ketones, UA: Negative [CD]   2034 Leukocyte Esterase, UA(!): 1+ [CD]   2034 SARS-CoV-2 RNA, Amplification, Qual: Negative [CD]   2034 Influenza B, Molecular: Negative [CD]   2034 Influenza A, Molecular: Negative [CD]   2034 Retic(!): >23.0 [CD]   2035 Creatinine: 0.6 [CD]   2035 AST(!): 77 [CD]   2035 ALT: 39 [CD]   2035 Lactate Dehydrogenase(!): 701 [CD]   2035 Hemoglobin(!): 8.7 [CD]   2035 Hematocrit(!): 24.4 [CD]   2035 Platelet Count: 294 [CD]   2035 Troponin I: <0.006 [CD]      ED Course User Index  [CD] Nataly Ulloa MD                           Clinical Impression:  Final diagnoses:  [R07.9] Chest pain  [D57.00] Sickle cell anemia with crisis (Primary)  [Z3A.29] 29 weeks gestation of pregnancy  [R07.1] Chest pain on breathing          ED Disposition Condition    Send to L&D                 Carissa Cruz MD  Resident  07/23/24 2210       Nataly Ulloa MD  07/24/24 2040

## 2024-07-24 LAB
BACTERIA UR CULT: NORMAL
BACTERIA UR CULT: NORMAL
BLOOD GROUP ANTIBODIES SERPL: NORMAL

## 2024-07-24 PROCEDURE — 86077 PHYS BLOOD BANK SERV XMATCH: CPT | Mod: ,,, | Performed by: PATHOLOGY

## 2024-07-24 PROCEDURE — 59025 FETAL NON-STRESS TEST: CPT | Mod: 26,,, | Performed by: OBSTETRICS & GYNECOLOGY

## 2024-07-24 PROCEDURE — 25000003 PHARM REV CODE 250

## 2024-07-24 PROCEDURE — 94761 N-INVAS EAR/PLS OXIMETRY MLT: CPT

## 2024-07-24 PROCEDURE — 99900035 HC TECH TIME PER 15 MIN (STAT)

## 2024-07-24 PROCEDURE — 25000242 PHARM REV CODE 250 ALT 637 W/ HCPCS

## 2024-07-24 PROCEDURE — 94640 AIRWAY INHALATION TREATMENT: CPT | Mod: XB

## 2024-07-24 PROCEDURE — 94799 UNLISTED PULMONARY SVC/PX: CPT | Mod: XB

## 2024-07-24 PROCEDURE — 85660 RBC SICKLE CELL TEST: CPT

## 2024-07-24 PROCEDURE — 4A1HXCZ MONITORING OF PRODUCTS OF CONCEPTION, CARDIAC RATE, EXTERNAL APPROACH: ICD-10-PCS | Performed by: OBSTETRICS & GYNECOLOGY

## 2024-07-24 PROCEDURE — 27000221 HC OXYGEN, UP TO 24 HOURS

## 2024-07-24 PROCEDURE — 86922 COMPATIBILITY TEST ANTIGLOB: CPT

## 2024-07-24 PROCEDURE — 63600175 PHARM REV CODE 636 W HCPCS

## 2024-07-24 PROCEDURE — G0378 HOSPITAL OBSERVATION PER HR: HCPCS

## 2024-07-24 PROCEDURE — 99232 SBSQ HOSP IP/OBS MODERATE 35: CPT | Mod: 25,TH,, | Performed by: OBSTETRICS & GYNECOLOGY

## 2024-07-24 RX ORDER — HYDROMORPHONE HYDROCHLORIDE 1 MG/ML
1 INJECTION, SOLUTION INTRAMUSCULAR; INTRAVENOUS; SUBCUTANEOUS
Status: DISCONTINUED | OUTPATIENT
Start: 2024-07-24 | End: 2024-07-27 | Stop reason: HOSPADM

## 2024-07-24 RX ORDER — OXYCODONE HYDROCHLORIDE 10 MG/1
10 TABLET ORAL EVERY 4 HOURS PRN
Status: DISCONTINUED | OUTPATIENT
Start: 2024-07-24 | End: 2024-07-27 | Stop reason: HOSPADM

## 2024-07-24 RX ORDER — TRAMADOL HYDROCHLORIDE 50 MG/1
50 TABLET ORAL EVERY 6 HOURS PRN
Status: DISCONTINUED | OUTPATIENT
Start: 2024-07-24 | End: 2024-07-27 | Stop reason: HOSPADM

## 2024-07-24 RX ADMIN — ACETAMINOPHEN 1000 MG: 500 TABLET ORAL at 03:07

## 2024-07-24 RX ADMIN — SODIUM CHLORIDE, POTASSIUM CHLORIDE, SODIUM LACTATE AND CALCIUM CHLORIDE: 600; 310; 30; 20 INJECTION, SOLUTION INTRAVENOUS at 12:07

## 2024-07-24 RX ADMIN — SODIUM CHLORIDE, POTASSIUM CHLORIDE, SODIUM LACTATE AND CALCIUM CHLORIDE: 600; 310; 30; 20 INJECTION, SOLUTION INTRAVENOUS at 10:07

## 2024-07-24 RX ADMIN — ACETAMINOPHEN 1000 MG: 500 TABLET ORAL at 09:07

## 2024-07-24 RX ADMIN — BUDESONIDE 0.5 MG: 0.25 INHALANT RESPIRATORY (INHALATION) at 08:07

## 2024-07-24 RX ADMIN — BUDESONIDE 0.5 MG: 0.25 INHALANT RESPIRATORY (INHALATION) at 09:07

## 2024-07-24 RX ADMIN — OXYCODONE HYDROCHLORIDE 10 MG: 10 TABLET ORAL at 11:07

## 2024-07-24 RX ADMIN — TRAMADOL HYDROCHLORIDE 50 MG: 50 TABLET, COATED ORAL at 07:07

## 2024-07-24 RX ADMIN — ASPIRIN 81 MG: 81 TABLET, COATED ORAL at 09:07

## 2024-07-24 RX ADMIN — FOLIC ACID 4 MG: 1 TABLET ORAL at 09:07

## 2024-07-24 RX ADMIN — TRAMADOL HYDROCHLORIDE 50 MG: 50 TABLET, COATED ORAL at 10:07

## 2024-07-24 RX ADMIN — OXYCODONE HYDROCHLORIDE 10 MG: 10 TABLET ORAL at 07:07

## 2024-07-24 NOTE — PLAN OF CARE
Problem:  Fall Injury Risk  Goal: Absence of Fall, Infant Drop and Related Injury  Outcome: Progressing     Problem: Adult Inpatient Plan of Care  Goal: Plan of Care Review  Outcome: Progressing  Goal: Patient-Specific Goal (Individualized)  Outcome: Progressing  Goal: Absence of Hospital-Acquired Illness or Injury  Outcome: Progressing  Goal: Optimal Comfort and Wellbeing  Outcome: Progressing  Goal: Readiness for Transition of Care  Outcome: Progressing     Problem: Sepsis/Septic Shock  Goal: Optimal Coping  Outcome: Progressing  Goal: Absence of Bleeding  Outcome: Progressing  Goal: Blood Glucose Level Within Targeted Range  Outcome: Progressing  Goal: Absence of Infection Signs and Symptoms  Outcome: Progressing  Goal: Optimal Nutrition Intake  Outcome: Progressing     Problem: Pneumonia  Goal: Fluid Balance  Outcome: Progressing  Goal: Resolution of Infection Signs and Symptoms  Outcome: Progressing  Goal: Effective Oxygenation and Ventilation  Outcome: Progressing

## 2024-07-24 NOTE — ASSESSMENT & PLAN NOTE
- Current respiratory status felt to be due to sickle pain crisis   - Continue nasal cannula for O2 stat > 95%  - Continue pulmicort daily and albuterol PRN

## 2024-07-24 NOTE — CARE UPDATE
Resident to bedside for PM evaluation.    Patient reports improvement in pain, now rates it 1/10. Denies chest or extremity pain which was prevalent earlier.    Temp:  [98.6 °F (37 °C)-99.3 °F (37.4 °C)] 98.6 °F (37 °C)  Pulse:  [] 79  Resp:  [17-20] 17  SpO2:  [96 %-100 %] 97 %  BP: (109-128)/(62-76) 118/68    PE:  Cardiopulmonary exam WNL      Plan:  - Continue pain regimen as scheduled  - Plan to wean from 2L NC>1L NC   - Will continue to monitor closely      Lyle Donahue MD PGY2  Obstetrics and Gynecology

## 2024-07-24 NOTE — ASSESSMENT & PLAN NOTE
- Patient with history of acute chest x3. Most recent hospitalization June 2023.   - In MARTHA, patient tachycardic 120s. SPO2 on RA low 90-92%. Satting % on 2L.   - Patient denies any respiratory complaints including coughing, wheezing, SOB  - Lab Review: 18/9/24/294 K 4.1 Cr 0.6 AST/ALT 77/39  Trop Neg Retic >  23% HgbS 88% (06/11)   - EKG: Stable from previous. Please see abnormal EKG problem below.   - CXR: No acute cardiopulmonary process identified   - Patient's pain significantly improved in MARTHA with 1g diladudid  - Had pulm/crit care review chest x-ray. CXR with no acute processes. No concern for acute chest syndrome at this time. Will admit to antepartum for sickle cell pain crisis   - If patient were to develop any pulmonary complaints, have increased oxygen requirement, or become febrile would have low threshold to re-image and treat for acute chest     Plan:   - Pain regimen: Tylenol 1000mg Q6 hours, Dilaudid 1g Q3hrs. Can up titrate per patient's requirements.   - HgbS on admission pending   - Blood consents signed. Last transfusion 6/15/24. H/H on admit stable from previous. Will defer blood transfusion at this time. 1u pRBC held for patient.   - Incentive spirometry Q2H, nasal cannula as needed to maintain oxygen saturation above 95%  - Continue supportive care

## 2024-07-24 NOTE — ASSESSMENT & PLAN NOTE
-Patient has history of abnormal EKG: NSR with t wave abnormality   - EKG on admit reviewed by ED staff, and to noted to be stable when compared to prior EKGs  - Patient has outpatient cardiology referral in   - Clinical suspicion for chest pain felt to be due to sickle cell pain crisis  - Trop on admit negative

## 2024-07-24 NOTE — PROGRESS NOTES
Pt received on 2LNC;SPO2 normal. Treatment was given and tolerated well. IS was left at bedside but was unable to do due to severe pain. Will continue to monitor.

## 2024-07-24 NOTE — H&P
[facility] Emergency Dept (Obstetrics)  Obstetrics  History & Physical    Patient Name: Elizabeth Franco  MRN: [id number]  Admission Date: 2024  Primary Care Provider: Alina, Primary Doctor    Subjective:     Principal Problem:Sickle cell crisis    History of Present Illness:  Elizabeth Franco is a 29 y.o. F at 29w5d presents complaining of sickle cell pain crisis. Patient was sitting in from of air conditioning when pain started which is often one of her triggers. Pain is mainly in legs, hips, arms, and chest. She reports pain is worst in hips and legs. Denies any coughing, wheezing, SOB or any other pulmonary complaints. She denies fevers at home. Reports one episode of emesis during the day. Denies dysuria. Took her home tramadol and oxy without relief. Her home pain regimen is PRN oxy 15 and tramadol but she uses infrequently.        This IUP is complicated by Asthma, Abnormal EKG.  Patient denies contractions, denies vaginal bleeding, denies LOF.   Fetal Movement: normal.         OB History    Para Term  AB Living   1 0 0 0 0 0   SAB IAB Ectopic Multiple Live Births   0 0 0 0 0      # Outcome Date GA Lbr Jason/2nd Weight Sex Type Anes PTL Lv   1 Current              Past Medical History:   Diagnosis Date    Hepatomegaly 6/15/2022    Hidradenitis suppurativa     Nocturnal enuresis     Sickle cell anemia      No past surgical history on file.    (Not in a hospital admission)      Review of patient's allergies indicates:  No Known Allergies     Family History       Problem Relation (Age of Onset)    No Known Problems Mother, Father          Tobacco Use    Smoking status: Never    Smokeless tobacco: Never   Substance and Sexual Activity    Alcohol use: No     Alcohol/week: 0.0 standard drinks of alcohol    Drug use: Never    Sexual activity: Not Currently     Review of Systems   Constitutional:  Negative for chills, fatigue and fever.   Eyes:  Negative for visual disturbance.    Respiratory:  Negative for cough, shortness of breath and wheezing.    Cardiovascular:  Positive for chest pain.   Gastrointestinal:  Positive for nausea and vomiting. Negative for abdominal pain.   Genitourinary:  Negative for dysuria, pelvic pain and vaginal bleeding.   Musculoskeletal:  Positive for arthralgias, back pain and leg pain.   Neurological:  Negative for syncope.   Hematological:  Negative for adenopathy.   Psychiatric/Behavioral:  Negative for depression.       Objective:     Vital Signs (Most Recent):  Temp: 99.3 °F (37.4 °C) (24 1723)  Pulse: 106 (24 1723)  Resp: 18 (24 1722)  BP: 118/72 (24 1723)  SpO2: 100 % (24 1636) Vital Signs (24h Range):  Temp:  [98.6 °F (37 °C)-99.3 °F (37.4 °C)] 99.3 °F (37.4 °C)  Pulse:  [106-124] 106  Resp:  [18-20] 18  SpO2:  [100 %] 100 %  BP: (118-128)/(62-72) 118/72     Weight: 63.5 kg (140 lb)  Body mass index is 24.8 kg/m².    FHT: Baseline 150 mod variability + accels, - decels reactive and reassuring for gestational age  TOCO:  No contractions     Physical Exam:   Constitutional: She is oriented to person, place, and time. She appears well-developed. She appears distressed.    HENT:   Head: Normocephalic.      Cardiovascular:  Normal rate.             Pulmonary/Chest: Effort normal and breath sounds normal. She has no wheezes. She has no rales. She exhibits tenderness.        Abdominal: There is abdominal tenderness. There is no guarding.   Gravid uterus             Musculoskeletal: Normal range of motion.      Comments: Bilateral hip and leg tenderness        Neurological: She is alert and oriented to person, place, and time.    Skin: Skin is warm and dry. She is not diaphoretic.    Psychiatric: She has a normal mood and affect.        Cervix:Deferred      Significant Labs:  Lab Results   Component Value Date    [facility]    HEPBSAG Non-reactive 2024     Assessment/Plan:     29 y.o. female  at 29w5d  for:    * Sickle cell crisis  - Patient with history of acute chest x3. Most recent hospitalization June 2023.   - In [facility], patient tachycardic 120s. SPO2 on RA low 90-92%. Satting % on 2L.   - Patient denies any respiratory complaints including coughing, wheezing, SOB  - Lab Review: 18/9/24/294 K 4.1 Cr 0.6 AST/ALT 77/39  Trop Neg Retic >  23% HgbS 88% (06/11)   - EKG: Stable from previous. Please see abnormal EKG problem below.   - CXR: No acute cardiopulmonary process identified   - Patient's pain significantly improved in [facility] with 1g diladudid  - Had pulm/crit care review chest x-ray. CXR with no acute processes. No concern for acute chest syndrome at this time. Will admit to antepartum for sickle cell pain crisis   - If patient were to develop any pulmonary complaints, have increased oxygen requirement, or become febrile would have low threshold to re-image and treat for acute chest     Plan:   - Pain regimen: Tylenol 1000mg Q6 hours, Dilaudid 1g Q3hrs. Can up titrate per patient's requirements.   - HgbS on admission pending   - Blood consents signed. Last transfusion 6/15/24. H/H on admit stable from previous. Will defer blood transfusion at this time. 1u pRBC held for patient.   - Incentive spirometry Q2H, nasal cannula as needed to maintain oxygen saturation above 95%  - Continue supportive care     Abnormal EKG  -Patient has history of abnormal EKG: NSR with t wave abnormality   - EKG on admit reviewed by ED staff, and to noted to be stable when compared to prior EKGs  - Patient has outpatient cardiology referral in   - Clinical suspicion for chest pain felt to be due to sickle cell pain crisis  - Trop on admit negative    Asthma  - Current respiratory status felt to be due to sickle pain crisis   - Continue nasal cannula for O2 stat > 95%  - Continue pulmicort daily and albuterol PRN    29 weeks gestation of pregnancy  - Primary OB: Jeansonne   - Delivery consents, including vaginal,  operative, and , and blood consents reviewed and signed at time of admission.   - Presentation: Vertex; If moving forward with delivery, will rescan to determine fetal pesentation   - Growth Ultrasound: 1301g, 26th%, AC 11th% @ 29w5d ()   - Diet: Regular  - Monitoring: Continuous  - Aneuploidy Screening: negative MT21  - 1hr GTT: never performed  - GBS: to be collected  - BMZ deferred  - Magnesium deferred  - Tdap: Needs between 27-36 weeks   - Continue PNV  - Contraception: Undecided; Will continue to discuss           Rosanne Logan MD  Obstetrics  [facility] - Emergency Dept (Obstetrics)    Fellow attestation. Patient is a 29 y.o.  at 29w6d admitted to [facility] service for vaso-occlusive crisis with sickle cell disease. Patient presented to [facility] with new onset severe pain in bilateral lower and upper extremities as well as chest. States crisis was triggered by sitting in front an of air conditioner for too long.     Patient with one prior admission this pregnancy for sickle cell pain ([facility] in ). She reports a prior admission for ACS in 2022. Upon arrival, she reported that her chest pain felt similar to ACS. Of note, in 2022 patient was febrile with tachycardia.     Patient with rapid improvement in pain in [facility] after one dose of dilaudid. Chest pain resolved. CXR reviewed with Pulm/Critical Care without concern for acute chest syndrome.      Temp:  [98.3 °F (36.8 °C)-99.3 °F (37.4 °C)] 98.4 °F (36.9 °C)  Pulse:  [] 95  Resp:  [14-20] 17  SpO2:  [96 %-100 %] 96 %  BP: (100-128)/(57-76) 100/58    Plan to admit for management of vaso-occlusive crisis. Current O2 requirements at 2 L NC. Plan for continuous IVFs, pain control, and supplemental O2 to maintain sats >95%. Recommend CFM while patient requiring supplemental O2. As clinical concern is low for ACS, will provide supportive care for vaso-occlusive crisis. If patient with new onset chest pain, worsening O2 requirements, fever,  or other s/sxs concerning for ACS, recommend transition to ICU with initiation of broad spectrum antibiotics.     Jennyfer Guzman MD  PGY 7  Maternal Fetal Medicine  [facility]

## 2024-07-24 NOTE — HPI
Elizabeth Franco is a 29 y.o. F at 29w5d presents complaining of sickle cell pain crisis. Patient was sitting in from of air conditioning when pain started which is often one of her triggers. Pain is mainly in legs, hips, arms, and chest. She reports pain is worst in hips and legs. Denies any coughing, wheezing, SOB or any other pulmonary complaints. She denies fevers at home. Reports one episode of emesis during the day. Denies dysuria. Took her home tramadol and oxy without relief. Her home pain regimen is PRN oxy 15 and tramadol but she uses infrequently.        This IUP is complicated by Asthma, Abnormal EKG.  Patient denies contractions, denies vaginal bleeding, denies LOF.   Fetal Movement: normal.

## 2024-07-24 NOTE — ASSESSMENT & PLAN NOTE
- Primary OB: Jeansonne   - Delivery consents, including vaginal, operative, and , and blood consents reviewed and signed at time of admission.   - Presentation: Vertex; If moving forward with delivery, will rescan to determine fetal pesentation   - Growth Ultrasound: 1301g, 26th%, AC 11th% @ 29w5d ()   - Diet: Regular  - Monitoring: Continuous  - Aneuploidy Screening: negative MT21  - 1hr GTT: never performed  - GBS: to be collected  - BMZ deferred  - Magnesium deferred  - Tdap: Needs between 27-36 weeks   - Continue PNV  - Contraception: Undecided; Will continue to discuss

## 2024-07-24 NOTE — PROGRESS NOTES
"Maternal Fetal Medicine  Progress Note          Subjective:    Elizabeth Franco is a 29 y.o.  at 29w6d admitted for sickle cell pain crisis. Please see H&P for details regarding presentation at admission. This pregnancy is complicated by asthma, abnormal EKG.    Interim HPI: Reports improvement in pain. Denies cough, wheezing, SOB. Pt denies HA, vision changes, CP, SOB, or RUQ pain. Denies contractions, vaginal bleeding, LOF. Reports good fetal movement.     Medical, Surgical, Social, Family, and Obstetric History: reviewed in chart  Current Medications:    aspirin  81 mg Oral Daily    budesonide  0.5 mg Nebulization Q12H    folic acid  4 mg Oral Daily    prenatal vitamin  1 tablet Oral Daily      Current Facility-Administered Medications:     acetaminophen, 1,000 mg, Oral, Q6H PRN    albuterol, 2 puff, Inhalation, Q6H PRN    diphenhydrAMINE, 25 mg, Oral, Q4H PRN    diphenhydrAMINE, 25 mg, Intravenous, Q4H PRN    HYDROmorphone, 1 mg, Intravenous, Q3H PRN    senna-docusate 8.6-50 mg, 1 tablet, Oral, Nightly PRN    simethicone, 1 tablet, Oral, Q6H PRN    sodium chloride 0.9%, 10 mL, Intravenous, PRN   Objective:   /73   Pulse 69   Temp 98.3 °F (36.8 °C) (Oral)   Resp 16   Ht 5' 3" (1.6 m)   Wt 63.5 kg (140 lb)   LMP 2023   SpO2 99%   BMI 24.80 kg/m²     Focused Physical Examination   General: well developed, no acute distress  Pulmonary: respiratory effort normal with no retractions. Lungs CTAB. No rales or abnormal breath sounds heard.  Abdomen: gravid  Cervix: Deferred.    Fetal Monitoring  EFM: 140 baseline/ moderate variability/+ accels/- decels  Tocometer: Quiet     Lab Results  Recent Labs   Lab 24  1348 24  1716   WBC 12.14 18.07*   HGB 8.1* 8.7*   HCT 22.8* 24.4*    294   BUN  --  4*   CREATININE  --  0.6   ALT  --  39   AST  --  77*   LDH  --  701*          Assessment:   29 y.o.  at 29w6d admitted for sickle cell pain crisis    Plan:   Sickle cell crisis  - " Overnight: afebrile, HR normal (70s-80s), satting 97-8% on 1.5L NC  - Required 1g of dilaudid lat at 2027.   - Reports significant improvement in chest, hip, and leg pain.   - Pain regimen: Tylenol 1000mg Q6 hours, Dilaudid 1g Q3hrs.   - HgbS on admission pending   - H/H on admit stable from previous. Will defer blood transfusion at this time. 1u pRBC held for patient.   - Incentive spirometry Q2H, nasal cannula as needed to maintain oxygen saturation above 95%. Will try weaning NC requirement today. Anticipate patient will need O2 walk test.   - Continue supportive care      Abnormal EKG  -Patient has history of abnormal EKG: NSR with t wave abnormality and prolonged QT (398)  - EKG on admit reviewed by ED staff, and to noted to be stable when compared to prior EKGs  - Avoid QT prolonging agents     Asthma  - Current respiratory status felt to be due to sickle pain crisis   - Continue nasal cannula for O2 stat > 95%  - Continue pulmicort daily and albuterol PRN    Abnormal Type and Screen  - Direct Migel +   - Antibody identification pending     Rosanne Logan MD PGY-3  Obstetrics and Gynecology  [facility]

## 2024-07-24 NOTE — SUBJECTIVE & OBJECTIVE
OB History    Para Term  AB Living   1 0 0 0 0 0   SAB IAB Ectopic Multiple Live Births   0 0 0 0 0      # Outcome Date GA Lbr Jason/2nd Weight Sex Type Anes PTL Lv   1 Current              Past Medical History:   Diagnosis Date    Hepatomegaly 6/15/2022    Hidradenitis suppurativa     Nocturnal enuresis     Sickle cell anemia      No past surgical history on file.    (Not in a hospital admission)      Review of patient's allergies indicates:  No Known Allergies     Family History       Problem Relation (Age of Onset)    No Known Problems Mother, Father          Tobacco Use    Smoking status: Never    Smokeless tobacco: Never   Substance and Sexual Activity    Alcohol use: No     Alcohol/week: 0.0 standard drinks of alcohol    Drug use: Never    Sexual activity: Not Currently     Review of Systems   Constitutional:  Negative for chills, fatigue and fever.   Eyes:  Negative for visual disturbance.   Respiratory:  Negative for cough, shortness of breath and wheezing.    Cardiovascular:  Positive for chest pain.   Gastrointestinal:  Positive for nausea and vomiting. Negative for abdominal pain.   Genitourinary:  Negative for dysuria, pelvic pain and vaginal bleeding.   Musculoskeletal:  Positive for arthralgias, back pain and leg pain.   Neurological:  Negative for syncope.   Hematological:  Negative for adenopathy.   Psychiatric/Behavioral:  Negative for depression.       Objective:     Vital Signs (Most Recent):  Temp: 99.3 °F (37.4 °C) (24 1723)  Pulse: 106 (24 1723)  Resp: 18 (24 1722)  BP: 118/72 (24 1723)  SpO2: 100 % (24 1636) Vital Signs (24h Range):  Temp:  [98.6 °F (37 °C)-99.3 °F (37.4 °C)] 99.3 °F (37.4 °C)  Pulse:  [106-124] 106  Resp:  [18-20] 18  SpO2:  [100 %] 100 %  BP: (118-128)/(62-72) 118/72     Weight: 63.5 kg (140 lb)  Body mass index is 24.8 kg/m².    FHT: Baseline 150 mod variability + accels, - decels reactive and reassuring for gestational  age  TOCO:  No contractions     Physical Exam:   Constitutional: She is oriented to person, place, and time. She appears well-developed. She appears distressed.    HENT:   Head: Normocephalic.      Cardiovascular:  Normal rate.             Pulmonary/Chest: Effort normal and breath sounds normal. She has no wheezes. She has no rales. She exhibits tenderness.        Abdominal: There is abdominal tenderness. There is no guarding.   Gravid uterus             Musculoskeletal: Normal range of motion.      Comments: Bilateral hip and leg tenderness        Neurological: She is alert and oriented to person, place, and time.    Skin: Skin is warm and dry. She is not diaphoretic.    Psychiatric: She has a normal mood and affect.        Cervix:Deferred      Significant Labs:  Lab Results   Component Value Date    [facility] 07/[facility]    HEPBSAG Non-reactive 02/22/2024

## 2024-07-24 NOTE — CARE UPDATE
Resident to bedside with RN for cervical check given frequent regular contractions. Patient denies feeling any painful contractions. SVE closed/thick/high. Will continue to monitor    Iris Ratliff MD  PGY-4 OB/GYN

## 2024-07-25 ENCOUNTER — PATIENT MESSAGE (OUTPATIENT)
Dept: OTHER | Facility: OTHER | Age: 29
End: 2024-07-25
Payer: MEDICAID

## 2024-07-25 LAB
ANISOCYTOSIS BLD QL SMEAR: ABNORMAL
BASOPHILS # BLD AUTO: 0.07 K/UL (ref 0–0.2)
BASOPHILS NFR BLD: 0.8 % (ref 0–1.9)
BLD PROD TYP BPU: NORMAL
BLOOD UNIT EXPIRATION DATE: NORMAL
BLOOD UNIT TYPE CODE: 1700
BLOOD UNIT TYPE: NORMAL
CODING SYSTEM: NORMAL
CROSSMATCH INTERPRETATION: NORMAL
DIFFERENTIAL METHOD BLD: ABNORMAL
DISPENSE STATUS: NORMAL
EOSINOPHIL # BLD AUTO: 0.4 K/UL (ref 0–0.5)
EOSINOPHIL NFR BLD: 4.2 % (ref 0–8)
ERYTHROCYTE [DISTWIDTH] IN BLOOD BY AUTOMATED COUNT: 19.9 % (ref 11.5–14.5)
HCT VFR BLD AUTO: 20 % (ref 37–48.5)
HGB BLD-MCNC: 7.2 G/DL (ref 12–16)
HGB FRACT BLD ELPH-IMP: NORMAL
HGB S MFR BLD ELPH: 89.8 %
HYPOCHROMIA BLD QL SMEAR: ABNORMAL
IMM GRANULOCYTES # BLD AUTO: 0.07 K/UL (ref 0–0.04)
IMM GRANULOCYTES NFR BLD AUTO: 0.8 % (ref 0–0.5)
LYMPHOCYTES # BLD AUTO: 2.5 K/UL (ref 1–4.8)
LYMPHOCYTES NFR BLD: 28.3 % (ref 18–48)
MCH RBC QN AUTO: 35.1 PG (ref 27–31)
MCHC RBC AUTO-ENTMCNC: 36 G/DL (ref 32–36)
MCV RBC AUTO: 98 FL (ref 82–98)
MONOCYTES # BLD AUTO: 0.8 K/UL (ref 0.3–1)
MONOCYTES NFR BLD: 8.7 % (ref 4–15)
NEUTROPHILS # BLD AUTO: 5.1 K/UL (ref 1.8–7.7)
NEUTROPHILS NFR BLD: 57.2 % (ref 38–73)
NRBC BLD-RTO: 20 /100 WBC
NUM UNITS TRANS PACKED RBC: NORMAL
OVALOCYTES BLD QL SMEAR: ABNORMAL
PATHOLOGIST INTERPRETATION AB/XM: NORMAL
PLATELET # BLD AUTO: 267 K/UL (ref 150–450)
PLATELET BLD QL SMEAR: ABNORMAL
PMV BLD AUTO: 11 FL (ref 9.2–12.9)
POIKILOCYTOSIS BLD QL SMEAR: ABNORMAL
POLYCHROMASIA BLD QL SMEAR: ABNORMAL
RBC # BLD AUTO: 2.05 M/UL (ref 4–5.4)
SICKLE CELLS BLD QL SMEAR: ABNORMAL
SPHEROCYTES BLD QL SMEAR: ABNORMAL
TARGETS BLD QL SMEAR: ABNORMAL
WBC # BLD AUTO: 8.95 K/UL (ref 3.9–12.7)

## 2024-07-25 PROCEDURE — 36430 TRANSFUSION BLD/BLD COMPNT: CPT

## 2024-07-25 PROCEDURE — 85025 COMPLETE CBC W/AUTO DIFF WBC: CPT

## 2024-07-25 PROCEDURE — 86922 COMPATIBILITY TEST ANTIGLOB: CPT

## 2024-07-25 PROCEDURE — 63600175 PHARM REV CODE 636 W HCPCS

## 2024-07-25 PROCEDURE — 59025 FETAL NON-STRESS TEST: CPT | Mod: 26,,, | Performed by: OBSTETRICS & GYNECOLOGY

## 2024-07-25 PROCEDURE — 11000001 HC ACUTE MED/SURG PRIVATE ROOM

## 2024-07-25 PROCEDURE — 25000003 PHARM REV CODE 250

## 2024-07-25 PROCEDURE — P9016 RBC LEUKOCYTES REDUCED: HCPCS

## 2024-07-25 PROCEDURE — 94761 N-INVAS EAR/PLS OXIMETRY MLT: CPT

## 2024-07-25 PROCEDURE — 85660 RBC SICKLE CELL TEST: CPT

## 2024-07-25 PROCEDURE — 99222 1ST HOSP IP/OBS MODERATE 55: CPT | Mod: ,,, | Performed by: INTERNAL MEDICINE

## 2024-07-25 PROCEDURE — 63700000 PHARM REV CODE 250 ALT 637 W/O HCPCS

## 2024-07-25 PROCEDURE — 27000221 HC OXYGEN, UP TO 24 HOURS

## 2024-07-25 PROCEDURE — 99233 SBSQ HOSP IP/OBS HIGH 50: CPT | Mod: 25,TH,, | Performed by: OBSTETRICS & GYNECOLOGY

## 2024-07-25 PROCEDURE — 30233N1 TRANSFUSION OF NONAUTOLOGOUS RED BLOOD CELLS INTO PERIPHERAL VEIN, PERCUTANEOUS APPROACH: ICD-10-PCS | Performed by: OBSTETRICS & GYNECOLOGY

## 2024-07-25 PROCEDURE — 36415 COLL VENOUS BLD VENIPUNCTURE: CPT

## 2024-07-25 PROCEDURE — 25500020 PHARM REV CODE 255: Performed by: OBSTETRICS & GYNECOLOGY

## 2024-07-25 RX ORDER — HYDROCODONE BITARTRATE AND ACETAMINOPHEN 500; 5 MG/1; MG/1
TABLET ORAL
Status: DISCONTINUED | OUTPATIENT
Start: 2024-07-25 | End: 2024-07-27 | Stop reason: HOSPADM

## 2024-07-25 RX ORDER — METOCLOPRAMIDE HYDROCHLORIDE 5 MG/ML
10 INJECTION INTRAMUSCULAR; INTRAVENOUS ONCE
Status: COMPLETED | OUTPATIENT
Start: 2024-07-25 | End: 2024-07-25

## 2024-07-25 RX ORDER — ENOXAPARIN SODIUM 100 MG/ML
40 INJECTION SUBCUTANEOUS EVERY 24 HOURS
Status: DISCONTINUED | OUTPATIENT
Start: 2024-07-25 | End: 2024-07-27 | Stop reason: HOSPADM

## 2024-07-25 RX ORDER — DIPHENHYDRAMINE HYDROCHLORIDE 50 MG/ML
25 INJECTION INTRAMUSCULAR; INTRAVENOUS ONCE
Status: COMPLETED | OUTPATIENT
Start: 2024-07-25 | End: 2024-07-25

## 2024-07-25 RX ORDER — AZITHROMYCIN 250 MG/1
500 TABLET, FILM COATED ORAL DAILY
Status: DISCONTINUED | OUTPATIENT
Start: 2024-07-25 | End: 2024-07-27 | Stop reason: HOSPADM

## 2024-07-25 RX ORDER — OXYCODONE HCL 10 MG/1
10 TABLET, FILM COATED, EXTENDED RELEASE ORAL EVERY 12 HOURS
Status: DISCONTINUED | OUTPATIENT
Start: 2024-07-25 | End: 2024-07-27 | Stop reason: HOSPADM

## 2024-07-25 RX ADMIN — ENOXAPARIN SODIUM 40 MG: 40 INJECTION SUBCUTANEOUS at 04:07

## 2024-07-25 RX ADMIN — TRAMADOL HYDROCHLORIDE 50 MG: 50 TABLET, COATED ORAL at 03:07

## 2024-07-25 RX ADMIN — OXYCODONE HYDROCHLORIDE 10 MG: 10 TABLET, FILM COATED, EXTENDED RELEASE ORAL at 09:07

## 2024-07-25 RX ADMIN — FOLIC ACID 4 MG: 1 TABLET ORAL at 08:07

## 2024-07-25 RX ADMIN — SODIUM CHLORIDE, POTASSIUM CHLORIDE, SODIUM LACTATE AND CALCIUM CHLORIDE: 600; 310; 30; 20 INJECTION, SOLUTION INTRAVENOUS at 10:07

## 2024-07-25 RX ADMIN — ASPIRIN 81 MG: 81 TABLET, COATED ORAL at 08:07

## 2024-07-25 RX ADMIN — CEFTRIAXONE SODIUM 2 G: 2 INJECTION, POWDER, FOR SOLUTION INTRAMUSCULAR; INTRAVENOUS at 01:07

## 2024-07-25 RX ADMIN — DIPHENHYDRAMINE HYDROCHLORIDE 25 MG: 50 INJECTION, SOLUTION INTRAMUSCULAR; INTRAVENOUS at 01:07

## 2024-07-25 RX ADMIN — METOCLOPRAMIDE 10 MG: 5 INJECTION, SOLUTION INTRAMUSCULAR; INTRAVENOUS at 01:07

## 2024-07-25 RX ADMIN — ACETAMINOPHEN 1000 MG: 500 TABLET ORAL at 07:07

## 2024-07-25 RX ADMIN — OXYCODONE HYDROCHLORIDE 10 MG: 10 TABLET ORAL at 08:07

## 2024-07-25 RX ADMIN — AZITHROMYCIN DIHYDRATE 500 MG: 250 TABLET ORAL at 01:07

## 2024-07-25 RX ADMIN — IOHEXOL 100 ML: 350 INJECTION, SOLUTION INTRAVENOUS at 10:07

## 2024-07-25 NOTE — HPI
29-year-old female with sickle cell anemia admitted 07/23 with sickle cell crisis.  Her crisis originally began in her back and extended to involve her extremities and chest.  Earlier today she developed some increased shortness of breath with O2 saturations decreasing to 92%.    CT angiogram did not show any evidence of pulmonary embolism; however, it did show some patchy infiltrates at the bases possibly consistent with acute chest syndrome.      Subsequently she has had improved oxygenation on supplemental O2-currently 100% saturation.    This afternoon she reports that she has been feeling better.  She is not short of breath.  Her pain control has been good.    She does have history of acute chest syndrome in the past but has not had an exchange transfusion for approximately 10 years.  Overall her crises have been relatively infrequent.  She is followed in H[facility]ology [facility] by Dr. Nuñez.  In March her ferritin level was 1500.    CBC today shows a hemoglobin of 7.2 (decreased from 8.7 on June 23rd).

## 2024-07-25 NOTE — CARE UPDATE
Patient with acute chest. STAT CBC as below. Will tranfuse 1u pRBCs. Blood bank confirmed blood available. Sending to antepartum unit.     Recent Labs   Lab 07/19/24  1348 07/23/24  1716 07/25/24  1257   WBC 12.14 18.07* 8.95   HGB 8.1* 8.7* 7.2*   HCT 22.8* 24.4* 20.0*   * 99* 98    294 267      Nikkie Martinez MD/MPH  OB/GYN PGY-4  [facility]

## 2024-07-25 NOTE — CARE UPDATE
Reviewed with patient CTA results. Results concerning for acute chest syndrome. Due to patient's continued report of intermittent chest pain, O2 requirements, and history of acute chest syndrome - will proceed with treatment.     Patient appears overall clinically stable at this time. Will initiate broad spectrum antibiotics and continue supportive care with IVFs, pain medication, supplemental O2. Counseled to use incentive spirometer q2 hours.     Consults placed to Hematology and Pulmonology/Critical Care. Appreciate any additional recommendations. Plan to continue care on Antepartum floor with continuous fetal monitoring. If any acute worsening of symptoms or increase in oxygen requirements, fevers, etc will escalate to ICU level care. CBC ordered and pending.     Most recent HgS 88.1% (6/2024). Repeat pending. Labs on admission stable consistent with baseline. Patient reports her last transfusion was during 3/2024 admission for vaso-occlusive crisis. Reports 2 exchange transfusions in the past at age 20.     Jennyfer Guzman MD  PGY 7  Maternal Fetal Medicine  [facility]

## 2024-07-25 NOTE — CARE UPDATE
Resident to bedside for evaluation of worsening chest pain and chills. Patient reports that her sickle cell pain has returned in both her chest and extremities. She also reports that it is difficult to take a deep breath.       Temp:  [98.2 °F (36.8 °C)-98.4 °F (36.9 °C)] 98.2 °F (36.8 °C)  Pulse:  [] 102  Resp:  [14-20] 20  SpO2:  [95 %-100 %] 98 %  BP: (100-123)/(51-73) 123/73    PE:  General: lying in bed, in pain but in no acute distress  Lungs: clear to auscultation BL, normal work of breathing  Cardiac: tachycardic, regular rhythm    A/P:  Will obtain STAT CXR to rule out ACS  Oxygen bumped to 3L   Pain regimen per ARY Ratliff MD  PGY-4 OB/GYN

## 2024-07-25 NOTE — ASSESSMENT & PLAN NOTE
Appears to be mild.  She has responded well to oxygen and is in no distress resting comfortably.  Chest imaging findings are mild.    Recommendations: Would transfuse with 1 unit of packed red blood cells and recheck CBC.  If clinically she is doing well then can continue supportive care.  If chest symptoms persist and hemoglobin less than 10 would transfuse a 2nd unit of packed red blood cells.

## 2024-07-25 NOTE — SUBJECTIVE & OBJECTIVE
Oncology Treatment Plan:   [No matching plan found]    Medications:  Continuous Infusions:   lactated ringers   Intravenous Continuous 125 mL/hr at 07/25/24 1019 New Bag at 07/25/24 1019     Scheduled Meds:   aspirin  81 mg Oral Daily    azithromycin  500 mg Oral Daily    cefTRIAXone (Rocephin) IV (PEDS and ADULTS)  2 g Intravenous Q24H    enoxparin  40 mg Subcutaneous Q24H (prophylaxis, 1700)    folic acid  4 mg Oral Daily    oxyCODONE  10 mg Oral Q12H    prenatal vitamin  1 tablet Oral Daily     PRN Meds:  Current Facility-Administered Medications:     0.9%  NaCl infusion (for blood administration), , Intravenous, Q24H PRN    acetaminophen, 1,000 mg, Oral, Q6H PRN    albuterol, 2 puff, Inhalation, Q6H PRN    diphenhydrAMINE, 25 mg, Oral, Q4H PRN    diphenhydrAMINE, 25 mg, Intravenous, Q4H PRN    HYDROmorphone, 1 mg, Intravenous, Q3H PRN    oxyCODONE, 10 mg, Oral, Q4H PRN    senna-docusate 8.6-50 mg, 1 tablet, Oral, Nightly PRN    simethicone, 1 tablet, Oral, Q6H PRN    sodium chloride 0.9%, 10 mL, Intravenous, PRN    traMADoL, 50 mg, Oral, Q6H PRN     Review of patient's allergies indicates:  No Known Allergies     Past Medical History:   Diagnosis Date    Hepatomegaly 6/15/2022    Hidradenitis suppurativa     Nocturnal enuresis     Sickle cell anemia      No past surgical history on file.  Family History       Problem Relation (Age of Onset)    No Known Problems Mother, Father          Tobacco Use    Smoking status: Never    Smokeless tobacco: Never   Substance and Sexual Activity    Alcohol use: No     Alcohol/week: 0.0 standard drinks of alcohol    Drug use: Never    Sexual activity: Not Currently       Review of Systems   Constitutional:  Negative for fever.   Respiratory:  Positive for shortness of breath. Negative for cough.    Cardiovascular:  Positive for chest pain.   Gastrointestinal: Negative.    Musculoskeletal:  Positive for back pain.        Leg pain   Neurological: Negative.     Psychiatric/Behavioral: Negative.       Objective:     Vital Signs (Most Recent):  Temp: 98.1 °F (36.7 °C) (07/25/24 1440)  Pulse: 82 (07/25/24 1440)  Resp: 15 (07/25/24 1440)  BP: (!) 104/58 (07/25/24 1440)  SpO2: 100 % (07/25/24 1440) Vital Signs (24h Range):  Temp:  [97.8 °F (36.6 °C)-99 °F (37.2 °C)] 98.1 °F (36.7 °C)  Pulse:  [] 82  Resp:  [14-20] 15  SpO2:  [92 %-100 %] 100 %  BP: (102-123)/(56-73) 104/58     Weight: 63.5 kg (140 lb)  Body mass index is 24.8 kg/m².  Body surface area is 1.68 meters squared.      Intake/Output Summary (Last 24 hours) at 7/25/2024 1457  Last data filed at 7/24/2024 1715  Gross per 24 hour   Intake 240 ml   Output 400 ml   Net -160 ml        Physical Exam  Vitals (Comfortable in no acute distress) reviewed.   Cardiovascular:      Rate and Rhythm: Normal rate and regular rhythm.   Pulmonary:      Effort: Pulmonary effort is normal. No respiratory distress.      Breath sounds: Rales (few at right base) present. No wheezing.   Abdominal:      Comments: gravid   Skin:     General: Skin is warm and dry.   Neurological:      Mental Status: She is alert and oriented to person, place, and time.   Psychiatric:         Mood and Affect: Mood normal.         Thought Content: Thought content normal.         Judgment: Judgment normal.          Significant Labs:   All pertinent labs from the last 24 hours have been reviewed.    Diagnostic Results:  I have reviewed all pertinent imaging results/findings within the past 24 hours.

## 2024-07-25 NOTE — PROGRESS NOTES
"Maternal Fetal Medicine  Progress Note          Subjective:    Elizabeth Franco is a 29 y.o.  at 30w0d admitted for sickle cell pain crisis. Please see H&P for details regarding presentation at admission. This pregnancy is complicated by asthma, abnormal EKG.    Interim HPI: Overnight, patient had an exacerbation of sickle cell pain. Required two oxy 10 and 2 tramadol tablets. Reports pain was mainly in her chest and legs. Reports improvement in pain since episode last night. Denies cough, wheezing, SOB. Pt denies HA, vision changes, CP, SOB, or RUQ pain. Denies contractions, vaginal bleeding, LOF. Reports good fetal movement.     Medical, Surgical, Social, Family, and Obstetric History: reviewed in chart  Current Medications:    aspirin  81 mg Oral Daily    budesonide  0.5 mg Nebulization Q12H    folic acid  4 mg Oral Daily    prenatal vitamin  1 tablet Oral Daily      Current Facility-Administered Medications:     acetaminophen, 1,000 mg, Oral, Q6H PRN    albuterol, 2 puff, Inhalation, Q6H PRN    diphenhydrAMINE, 25 mg, Oral, Q4H PRN    diphenhydrAMINE, 25 mg, Intravenous, Q4H PRN    HYDROmorphone, 1 mg, Intravenous, Q3H PRN    oxyCODONE, 10 mg, Oral, Q4H PRN    senna-docusate 8.6-50 mg, 1 tablet, Oral, Nightly PRN    simethicone, 1 tablet, Oral, Q6H PRN    sodium chloride 0.9%, 10 mL, Intravenous, PRN    traMADoL, 50 mg, Oral, Q6H PRN   Objective:   BP (!) 108/56   Pulse (!) 111   Temp 99 °F (37.2 °C) (Oral)   Resp 20   Ht 5' 3" (1.6 m)   Wt 63.5 kg (140 lb)   LMP 2023   SpO2 (!) 92%   BMI 24.80 kg/m²     Focused Physical Examination   General: well developed, no acute distress  Pulmonary: respiratory effort normal with no retractions. Lungs CTAB. No rales or abnormal breath sounds heard.  Abdomen: gravid  Cervix: Deferred.    Fetal Monitoring  EFM: 140 baseline/ moderate variability/+ accels/- decels  Tocometer: Quiet     Lab Results  Recent Labs   Lab 24  1348 24  1716   WBC " 12.14 18.07*   HGB 8.1* 8.7*   HCT 22.8* 24.4*    294   BUN  --  4*   CREATININE  --  0.6   ALT  --  39   AST  --  77*   LDH  --  701*          Assessment:   29 y.o.  at 30w0d admitted for sickle cell pain crisis    Plan:   Sickle cell crisis  - Overnight: afebrile, HR slightly tachycardic (110s), satting 97-8% on 3L NC  - Reports exacerbation in chest pain overnight   - Pain regimen: Tylenol 1000mg Q6 hours, Tramdol 50 PRN, Oxy 10 PRN, Dilaudid 1g Q3hrs BNTP.   - HgbS on admission pending   - H/H on admit stable from previous. Will defer blood transfusion at this time. 1u pRBC held for patient.   - Incentive spirometry Q2H, nasal cannula as needed to maintain oxygen saturation above 95%. Will try weaning NC requirement today.   - Continue supportive care      Abnormal EKG  -Patient has history of abnormal EKG: NSR with t wave abnormality and prolonged QT (398)  - EKG on admit reviewed by ED staff, and to noted to be stable when compared to prior EKGs  - Avoid QT prolonging agents     Asthma  - Current respiratory status felt to be due to sickle pain crisis   - Continue nasal cannula for O2 stat > 95%  - Continue pulmicort daily and albuterol PRN    Abnormal Type and Screen  - Direct Migel +   - Antibody identification: auto-e too weak to titrate    Rosanne Logan MD PGY-3  Obstetrics and Gynecology  [facility]    Fellow attestation. Patient is a 29 y.o.  at 30w0d admitted to [facility] service for vaso-occlusive crisis in sickle cell disease. Patient with recurrent episode of chest pain overnight. This quickly resolved with pain medication, CXR at the time unchanged from prior. No fever.     Patient is overall more uncomfortable than yesterday, but feels that pain is manageable with medications. No obstetric complaints.      Temp:  [98.2 °F (36.8 °C)-99 °F (37.2 °C)] 98.5 °F (36.9 °C)  Pulse:  [] 94  Resp:  [17-20] 17  SpO2:  [92 %-100 %] 96 %  BP: (102-123)/(51-73) 105/59    NST:    Baseline 150; moderate variability; +accels; rare late deceleration (0737)  No contractions  Overall reactive and reassuring     Plan to continue supportive care with IVFs, pain medications, and supplemental oxygen as needed. Discussed recommendation for CTA to rule out pulmonary embolism in setting of recurrent chest pain with need for O2. Will initiate prophylactic lovenox as well as a long acting opioid. Continue IR and IV opioid medications prn.     Jennyfer Guzman MD  PGY 7  Maternal Fetal Medicine  [facility]

## 2024-07-25 NOTE — CONSULTS
[facility] - Antepartum  Hematology/Oncology  Consult Note    Patient Name: Elizabeth Franco  MRN: [id number]  Admission Date: 7/23/2024  Hospital Length of Stay: 0 days  Code Status: Full Code   Attending Provider: Bart Robles MD  Consulting Provider: Vasquez Moses MD  Primary Care Physician: Alina, Primary Doctor  Principal Problem:Sickle cell crisis    Consults  Subjective:     HPI:  29-year-old female with sickle cell anemia admitted 07/23 with sickle cell crisis.  Her crisis originally began in her back and extended to involve her extremities and chest.  Earlier today she developed some increased shortness of breath with O2 saturations decreasing to 92%.    CT angiogram did not show any evidence of pulmonary embolism; however, it did show some patchy infiltrates at the bases possibly consistent with acute chest syndrome.      Subsequently she has had improved oxygenation on supplemental O2-currently 100% saturation.    This afternoon she reports that she has been feeling better.  She is not short of breath.  Her pain control has been good.    She does have history of acute chest syndrome in the past but has not had an exchange transfusion for approximately 10 years.  Overall her crises have been relatively infrequent.  She is followed in Hematology Clinic by Dr. Nuñez.  In March her ferritin level was 1500.    CBC today shows a hemoglobin of 7.2 (decreased from 8.7 on June 23rd).    Oncology Treatment Plan:   [No matching plan found]    Medications:  Continuous Infusions:   lactated ringers   Intravenous Continuous 125 mL/hr at 07/25/24 1019 New Bag at 07/25/24 1019     Scheduled Meds:   aspirin  81 mg Oral Daily    azithromycin  500 mg Oral Daily    cefTRIAXone (Rocephin) IV (PEDS and ADULTS)  2 g Intravenous Q24H    enoxparin  40 mg Subcutaneous Q24H (prophylaxis, 1700)    folic acid  4 mg Oral Daily    oxyCODONE  10 mg Oral Q12H    prenatal vitamin  1 tablet Oral Daily     PRN Meds:  Current  Facility-Administered Medications:     0.9%  NaCl infusion (for blood administration), , Intravenous, Q24H PRN    acetaminophen, 1,000 mg, Oral, Q6H PRN    albuterol, 2 puff, Inhalation, Q6H PRN    diphenhydrAMINE, 25 mg, Oral, Q4H PRN    diphenhydrAMINE, 25 mg, Intravenous, Q4H PRN    HYDROmorphone, 1 mg, Intravenous, Q3H PRN    oxyCODONE, 10 mg, Oral, Q4H PRN    senna-docusate 8.6-50 mg, 1 tablet, Oral, Nightly PRN    simethicone, 1 tablet, Oral, Q6H PRN    sodium chloride 0.9%, 10 mL, Intravenous, PRN    traMADoL, 50 mg, Oral, Q6H PRN     Review of patient's allergies indicates:  No Known Allergies     Past Medical History:   Diagnosis Date    Hepatomegaly 6/15/2022    Hidradenitis suppurativa     Nocturnal enuresis     Sickle cell anemia      No past surgical history on file.  Family History       Problem Relation (Age of Onset)    No Known Problems Mother, Father          Tobacco Use    Smoking status: Never    Smokeless tobacco: Never   Substance and Sexual Activity    Alcohol use: No     Alcohol/week: 0.0 standard drinks of alcohol    Drug use: Never    Sexual activity: Not Currently       Review of Systems   Constitutional:  Negative for fever.   Respiratory:  Positive for shortness of breath. Negative for cough.    Cardiovascular:  Positive for chest pain.   Gastrointestinal: Negative.    Musculoskeletal:  Positive for back pain.        Leg pain   Neurological: Negative.    Psychiatric/Behavioral: Negative.       Objective:     Vital Signs (Most Recent):  Temp: 98.1 °F (36.7 °C) (07/25/24 1440)  Pulse: 82 (07/25/24 1440)  Resp: 15 (07/25/24 1440)  BP: (!) 104/58 (07/25/24 1440)  SpO2: 100 % (07/25/24 1440) Vital Signs (24h Range):  Temp:  [97.8 °F (36.6 °C)-99 °F (37.2 °C)] 98.1 °F (36.7 °C)  Pulse:  [] 82  Resp:  [14-20] 15  SpO2:  [92 %-100 %] 100 %  BP: (102-123)/(56-73) 104/58     Weight: 63.5 kg (140 lb)  Body mass index is 24.8 kg/m².  Body surface area is 1.68 meters squared.      Intake/Output  Summary (Last 24 hours) at 7/25/2024 2207  Last data filed at 7/24/2024 1715  Gross per 24 hour   Intake 240 ml   Output 400 ml   Net -160 ml        Physical Exam  Vitals (Comfortable in no acute distress) reviewed.   Cardiovascular:      Rate and Rhythm: Normal rate and regular rhythm.   Pulmonary:      Effort: Pulmonary effort is normal. No respiratory distress.      Breath sounds: Rales (few at right base) present. No wheezing.   Abdominal:      Comments: gravid   Skin:     General: Skin is warm and dry.   Neurological:      Mental Status: She is alert and oriented to person, place, and time.   Psychiatric:         Mood and Affect: Mood normal.         Thought Content: Thought content normal.         Judgment: Judgment normal.          Significant Labs:   All pertinent labs from the last 24 hours have been reviewed.    Diagnostic Results:  I have reviewed all pertinent imaging results/findings within the past 24 hours.  Assessment/Plan:     Acute chest syndrome due to sickle cell crisis  Appears to be mild.  She has responded well to oxygen and is in no distress resting comfortably.  Chest imaging findings are mild.    Recommendations: Would transfuse with 1 unit of packed red blood cells and recheck CBC.  If clinically she is doing well then can continue supportive care.  If chest symptoms persist and hemoglobin less than 10 would transfuse a 2nd unit of packed red blood cells.        Thank you for your consult.  We will follow by chart with additional recs as needed.    Vasquez Moses MD  Hematology/Oncology  [facility] - Antepartum

## 2024-07-25 NOTE — CARE UPDATE
Resident to bedside for evaluation.    Patient reports improvement in pain with recent administration of oxycodone 10mg. She denies any SOB or difficulty ambulating.     Temp:  [98.2 °F (36.8 °C)-99 °F (37.2 °C)] 99 °F (37.2 °C)  Pulse:  [] 111  Resp:  [14-20] 20  SpO2:  [92 %-100 %] 92 %  BP: (100-123)/(51-73) 108/56    PE:   AAOx3, not in any acute distress  Nasal cannula in place, on 3L  Cardiopulmonary exam WNL. Slightly tachycardic. Normal work of breathing.    A/P:  STAT CXR resulted with no acute process shown, unchanged from previous  Pain regimen per MAR  Will continue to monitor closely      Lyle Donahue MD PGY2  Obstetrics and Gynecology

## 2024-07-25 NOTE — CONSULTS
Pulmonary and Critical Care Plan of Care    Consult acknowledged and chart reviewed. Full consult note to follow.     29 year old pregnant female at 29w5d with PMH of sickle cell disease and ashtma who presented with concerns for sickle cell pain crisis. She complained of pain in her legs, hips, arms, and chest on admission, but worse in her hips and legs. She denies any shortness of breath or cough at that time. She also had had no fevers. CXR on admission with no focal consolidations. Pain initially well controlled but overnight 7/24-7/25 she had recurrence of chest and leg pain and required oxycodone 10 mg x2 and tramadol. Repeat CXR again with no consolidation but CTA chest with small area of consolidation in right posterior lung. Oxygen requirement has remained the same and she has remained HDS with mild tachycardia.     - Would treat as acute chest  - Agree with antibiotics  - Continue fluids to prevent dehydration  - Continue current pain regimen for  adequate pain control  - Continue supplemental oxygen for spO2 goal >95%  - HgS pending  - Hematology consulted, appreciate recommendations  - Would consider blood transfusion in the setting of acute chest but would defer to hematology  - If patient clinically worsens in any way please call me or PCCM on call    Marjan Maddox DO  [employer] PCCM Fellow

## 2024-07-26 ENCOUNTER — PATIENT MESSAGE (OUTPATIENT)
Dept: OBSTETRICS AND GYNECOLOGY | Facility: CLINIC | Age: 29
End: 2024-07-26
Payer: MEDICAID

## 2024-07-26 ENCOUNTER — ANESTHESIA (OUTPATIENT)
Dept: OBSTETRICS AND GYNECOLOGY | Facility: OTHER | Age: 29
DRG: 831 | End: 2024-07-26
Payer: MEDICAID

## 2024-07-26 LAB
ABO + RH BLD: ABNORMAL
ALBUMIN SERPL BCP-MCNC: 2.6 G/DL (ref 3.5–5.2)
ALP SERPL-CCNC: 89 U/L (ref 55–135)
ALT SERPL W/O P-5'-P-CCNC: 27 U/L (ref 10–44)
ANION GAP SERPL CALC-SCNC: 10 MMOL/L (ref 8–16)
AST SERPL-CCNC: 42 U/L (ref 10–40)
BASOPHILS # BLD AUTO: 0.05 K/UL (ref 0–0.2)
BASOPHILS NFR BLD: 0.6 % (ref 0–1.9)
BILIRUB SERPL-MCNC: 2.4 MG/DL (ref 0.1–1)
BLD GP AB SCN CELLS X3 SERPL QL: ABNORMAL
BLD PROD TYP BPU: NORMAL
BLOOD UNIT EXPIRATION DATE: NORMAL
BLOOD UNIT TYPE CODE: 5100
BLOOD UNIT TYPE CODE: 7300
BLOOD UNIT TYPE: NORMAL
BUN SERPL-MCNC: 4 MG/DL (ref 6–20)
CALCIUM SERPL-MCNC: 8.5 MG/DL (ref 8.7–10.5)
CHLORIDE SERPL-SCNC: 107 MMOL/L (ref 95–110)
CO2 SERPL-SCNC: 21 MMOL/L (ref 23–29)
CODING SYSTEM: NORMAL
CREAT SERPL-MCNC: 0.5 MG/DL (ref 0.5–1.4)
CROSSMATCH INTERPRETATION: NORMAL
DIFFERENTIAL METHOD BLD: ABNORMAL
DISPENSE STATUS: NORMAL
EOSINOPHIL # BLD AUTO: 0.4 K/UL (ref 0–0.5)
EOSINOPHIL NFR BLD: 4.5 % (ref 0–8)
ERYTHROCYTE [DISTWIDTH] IN BLOOD BY AUTOMATED COUNT: 17.8 % (ref 11.5–14.5)
EST. GFR  (NO RACE VARIABLE): >60 ML/MIN/1.73 M^2
GLUCOSE SERPL-MCNC: 95 MG/DL (ref 70–110)
HCT VFR BLD AUTO: 23.2 % (ref 37–48.5)
HGB BLD-MCNC: 8.2 G/DL (ref 12–16)
IMM GRANULOCYTES # BLD AUTO: 0.06 K/UL (ref 0–0.04)
IMM GRANULOCYTES NFR BLD AUTO: 0.7 % (ref 0–0.5)
LYMPHOCYTES # BLD AUTO: 2.3 K/UL (ref 1–4.8)
LYMPHOCYTES NFR BLD: 28.6 % (ref 18–48)
MCH RBC QN AUTO: 33.3 PG (ref 27–31)
MCHC RBC AUTO-ENTMCNC: 35.3 G/DL (ref 32–36)
MCV RBC AUTO: 94 FL (ref 82–98)
MONOCYTES # BLD AUTO: 0.8 K/UL (ref 0.3–1)
MONOCYTES NFR BLD: 9.5 % (ref 4–15)
NEUTROPHILS # BLD AUTO: 4.6 K/UL (ref 1.8–7.7)
NEUTROPHILS NFR BLD: 56.1 % (ref 38–73)
NRBC BLD-RTO: 15 /100 WBC
NUM UNITS TRANS PACKED RBC: NORMAL
PLATELET # BLD AUTO: 271 K/UL (ref 150–450)
PMV BLD AUTO: 11.2 FL (ref 9.2–12.9)
POTASSIUM SERPL-SCNC: 3.7 MMOL/L (ref 3.5–5.1)
PROT SERPL-MCNC: 6.2 G/DL (ref 6–8.4)
RBC # BLD AUTO: 2.46 M/UL (ref 4–5.4)
SODIUM SERPL-SCNC: 138 MMOL/L (ref 136–145)
SPECIMEN OUTDATE: ABNORMAL
WBC # BLD AUTO: 8.17 K/UL (ref 3.9–12.7)

## 2024-07-26 PROCEDURE — 11000001 HC ACUTE MED/SURG PRIVATE ROOM

## 2024-07-26 PROCEDURE — 99223 1ST HOSP IP/OBS HIGH 75: CPT | Mod: ,,, | Performed by: INTERNAL MEDICINE

## 2024-07-26 PROCEDURE — 36430 TRANSFUSION BLD/BLD COMPNT: CPT

## 2024-07-26 PROCEDURE — 25000003 PHARM REV CODE 250

## 2024-07-26 PROCEDURE — P9016 RBC LEUKOCYTES REDUCED: HCPCS | Performed by: PATHOLOGY

## 2024-07-26 PROCEDURE — 86922 COMPATIBILITY TEST ANTIGLOB: CPT | Performed by: PATHOLOGY

## 2024-07-26 PROCEDURE — 99232 SBSQ HOSP IP/OBS MODERATE 35: CPT | Mod: 25,,, | Performed by: OBSTETRICS & GYNECOLOGY

## 2024-07-26 PROCEDURE — 63600175 PHARM REV CODE 636 W HCPCS: Performed by: OBSTETRICS & GYNECOLOGY

## 2024-07-26 PROCEDURE — 76815 OB US LIMITED FETUS(S): CPT

## 2024-07-26 PROCEDURE — 63600175 PHARM REV CODE 636 W HCPCS: Performed by: PATHOLOGY

## 2024-07-26 PROCEDURE — 83020 HEMOGLOBIN ELECTROPHORESIS: CPT

## 2024-07-26 PROCEDURE — 86901 BLOOD TYPING SEROLOGIC RH(D): CPT

## 2024-07-26 PROCEDURE — 25000003 PHARM REV CODE 250: Performed by: PATHOLOGY

## 2024-07-26 PROCEDURE — 85025 COMPLETE CBC W/AUTO DIFF WBC: CPT

## 2024-07-26 PROCEDURE — 59025 FETAL NON-STRESS TEST: CPT | Mod: 26,,, | Performed by: OBSTETRICS & GYNECOLOGY

## 2024-07-26 PROCEDURE — 36415 COLL VENOUS BLD VENIPUNCTURE: CPT

## 2024-07-26 PROCEDURE — 63600175 PHARM REV CODE 636 W HCPCS

## 2024-07-26 PROCEDURE — 63700000 PHARM REV CODE 250 ALT 637 W/O HCPCS

## 2024-07-26 PROCEDURE — 86902 BLOOD TYPE ANTIGEN DONOR EA: CPT

## 2024-07-26 PROCEDURE — 80053 COMPREHEN METABOLIC PANEL: CPT

## 2024-07-26 PROCEDURE — 85660 RBC SICKLE CELL TEST: CPT

## 2024-07-26 PROCEDURE — 36556 INSERT NON-TUNNEL CV CATH: CPT | Mod: ,,, | Performed by: ANESTHESIOLOGY

## 2024-07-26 PROCEDURE — 76816 OB US FOLLOW-UP PER FETUS: CPT | Mod: 26,,, | Performed by: OBSTETRICS & GYNECOLOGY

## 2024-07-26 PROCEDURE — 80505 PATH CLIN CONSLTJ HIGH 41-60: CPT | Mod: ,,, | Performed by: PATHOLOGY

## 2024-07-26 PROCEDURE — P9016 RBC LEUKOCYTES REDUCED: HCPCS

## 2024-07-26 RX ORDER — HEPARIN SODIUM 1000 [USP'U]/ML
3000 INJECTION, SOLUTION INTRAVENOUS; SUBCUTANEOUS ONCE
Status: COMPLETED | OUTPATIENT
Start: 2024-07-26 | End: 2024-07-26

## 2024-07-26 RX ORDER — DIPHENHYDRAMINE HYDROCHLORIDE 50 MG/ML
50 INJECTION INTRAMUSCULAR; INTRAVENOUS ONCE
Status: DISCONTINUED | OUTPATIENT
Start: 2024-07-26 | End: 2024-07-27 | Stop reason: HOSPADM

## 2024-07-26 RX ORDER — METOCLOPRAMIDE HYDROCHLORIDE 5 MG/ML
10 INJECTION INTRAMUSCULAR; INTRAVENOUS ONCE
Status: COMPLETED | OUTPATIENT
Start: 2024-07-26 | End: 2024-07-26

## 2024-07-26 RX ORDER — ACETAMINOPHEN 325 MG/1
650 TABLET ORAL ONCE
Status: COMPLETED | OUTPATIENT
Start: 2024-07-26 | End: 2024-07-26

## 2024-07-26 RX ADMIN — ACETAMINOPHEN 650 MG: 325 TABLET, FILM COATED ORAL at 05:07

## 2024-07-26 RX ADMIN — OXYCODONE HYDROCHLORIDE 10 MG: 10 TABLET, FILM COATED, EXTENDED RELEASE ORAL at 08:07

## 2024-07-26 RX ADMIN — DIPHENHYDRAMINE HYDROCHLORIDE 25 MG: 50 INJECTION, SOLUTION INTRAMUSCULAR; INTRAVENOUS at 01:07

## 2024-07-26 RX ADMIN — ENOXAPARIN SODIUM 40 MG: 40 INJECTION SUBCUTANEOUS at 05:07

## 2024-07-26 RX ADMIN — OXYCODONE HYDROCHLORIDE 10 MG: 10 TABLET, FILM COATED, EXTENDED RELEASE ORAL at 09:07

## 2024-07-26 RX ADMIN — METOCLOPRAMIDE 10 MG: 5 INJECTION, SOLUTION INTRAMUSCULAR; INTRAVENOUS at 01:07

## 2024-07-26 RX ADMIN — ASPIRIN 81 MG: 81 TABLET, COATED ORAL at 09:07

## 2024-07-26 RX ADMIN — DIPHENHYDRAMINE HYDROCHLORIDE 25 MG: 50 INJECTION, SOLUTION INTRAMUSCULAR; INTRAVENOUS at 05:07

## 2024-07-26 RX ADMIN — HEPARIN SODIUM 3000 UNITS: 1000 INJECTION, SOLUTION INTRAVENOUS; SUBCUTANEOUS at 07:07

## 2024-07-26 RX ADMIN — FOLIC ACID 4 MG: 1 TABLET ORAL at 09:07

## 2024-07-26 RX ADMIN — CEFTRIAXONE SODIUM 2 G: 2 INJECTION, POWDER, FOR SOLUTION INTRAMUSCULAR; INTRAVENOUS at 12:07

## 2024-07-26 RX ADMIN — AZITHROMYCIN DIHYDRATE 500 MG: 250 TABLET ORAL at 09:07

## 2024-07-26 NOTE — CONSULTS
[facility] - Antepartum  Transfusion Medicine  Consult Note    Patient Name: Elizabeth Franco  MRN: [id number]  Admission Date: 7/23/2024  Hospital Length of Stay: 1 days  Attending Physician: Bart Robles MD  Primary Care Provider: Alina Primary Doctor     Inpatient consult to [facility]  Consult performed by: Iris Mahmood MD  Consult ordered by: Rosanne Logan MD        Subjective:     Principal Problem:Sickle cell crisis    History of Present Illness:   Elizabeth Franco is a 29-year-old female with sickle cell anemia and pregnancy (approximately 29-30 weeks GA) admitted 07/23 with sickle cell crisis.  Her crisis originally began in her back and extended to involve her extremities and chest. She developed some increased shortness of breath with O2 saturations decreasing to 92%. CT angiogram did not show any evidence of pulmonary embolism; however, it did show some patchy infiltrates at the bases possibly consistent with acute chest syndrome.  Subsequently, she has had improved oxygenation on supplemental O2-currently 100% saturation.  She does have history of acute chest syndrome in the past but has not had an exchange transfusion for approximately 10 years. Hemoglobin S is 89.8%. Red blood cell exchange is requested by Obstetric and Gynecology to treat sickle cell disease with acute chest syndrome.      PMH and PSH reviewed 07/26/2024 and relevant items addressed in HPI.    Past Medical History:   Diagnosis Date    Hepatomegaly 6/15/2022    Hidradenitis suppurativa     Nocturnal enuresis     Sickle cell anemia      No past surgical history on file.    Review of patient's allergies indicates:  No Known Allergies    All medications reviewed 07/26/2024 and ace inhibitors not identified.      Current Facility-Administered Medications:     0.9%  NaCl infusion (for blood administration), , Intravenous, Q24H PRN, Nikkie Martinez MD    acetaminophen tablet 1,000 mg, 1,000 mg, Oral,  Q6H PRN, Rosanne Logan MD, 1,000 mg at 07/25/24 0737    albuterol inhaler 2 puff, 2 puff, Inhalation, Q6H PRN, Nikkie Martinez MD    aspirin EC tablet 81 mg, 81 mg, Oral, Daily, Nikkie Martinez MD, 81 mg at 07/26/24 0902    azithromycin tablet 500 mg, 500 mg, Oral, Daily, Rosanne Logan MD, 500 mg at 07/26/24 0901    cefTRIAXone (ROCEPHIN) 2 g in D5W 100 mL IVPB (MB+), 2 g, Intravenous, Q24H, Rosanne Logan MD, Stopped at 07/26/24 1307    diphenhydrAMINE capsule 25 mg, 25 mg, Oral, Q4H PRN, Nikkie Martinez MD    diphenhydrAMINE injection 25 mg, 25 mg, Intravenous, Q4H PRN, Nikkie Martinez MD, 25 mg at 07/26/24 1717    diphenhydrAMINE injection 50 mg, 50 mg, Intravenous, Once, Iris Mahmood MD    enoxaparin injection 40 mg, 40 mg, Subcutaneous, Q24H (prophylaxis, 1700), Nikkie Martinez MD, 40 mg at 07/25/24 1605    folic acid tablet 4 mg, 4 mg, Oral, Daily, Nikkie Martinez MD, 4 mg at 07/26/24 0902    heparin (porcine) injection 3,000 Units, 3,000 Units, Intravenous, Once, Iris Mahmood MD    HYDROmorphone injection 1 mg, 1 mg, Intravenous, Q3H PRN, Lyle Donahue MD    lactated ringers infusion, , Intravenous, Continuous, Nikkie Martinez MD, Last Rate: 125 mL/hr at 07/25/24 1019, New Bag at 07/25/24 1019    oxyCODONE 12 hr tablet 10 mg, 10 mg, Oral, Q12H, Rosanne Logan MD, 10 mg at 07/26/24 0901    oxyCODONE immediate release tablet Tab 10 mg, 10 mg, Oral, Q4H PRN, Lyle Donahue MD, 10 mg at 07/25/24 0854    prenatal vitamin oral tablet, 1 tablet, Oral, Daily, Nikkie Martinez MD    senna-docusate 8.6-50 mg per tablet 1 tablet, 1 tablet, Oral, Nightly PRN, Nikkie Martinez MD    simethicone chewable tablet 80 mg, 1 tablet, Oral, Q6H PRN, Nikike Martinez MD    sodium chloride 0.9% flush 10 mL, 10 mL, Intravenous, PRN, Nikkie Martinez MD    traMADoL tablet 50 mg, 50 mg, Oral, Q6H PRN, Rosanne Logan MD, 50 mg at 07/25/24 0342      Family History       Problem Relation (Age of  Onset)    No Known Problems Mother, Father          Tobacco Use    Smoking status: Never    Smokeless tobacco: Never   Substance and Sexual Activity    Alcohol use: No     Alcohol/week: 0.0 standard drinks of alcohol    Drug use: Never    Sexual activity: Not Currently     Review of Systems  Objective:     Vital Signs (Most Recent):  Temp: 97.7 °F (36.5 °C) (07/26/24 1237)  Pulse: 88 (07/26/24 1223)  Resp: 18 (07/26/24 0901)  BP: 116/68 (07/26/24 1116)  SpO2: 95 % (07/26/24 1211) Vital Signs (24h Range):  Temp:  [97.7 °F (36.5 °C)-98.5 °F (36.9 °C)] 97.7 °F (36.5 °C)  Pulse:  [] 88  Resp:  [15-18] 18  SpO2:  [92 %-100 %] 95 %  BP: ()/(55-68) 116/68     Weight: 63.5 kg (140 lb)    Physical Exam  Physical exam deferred to primary team.     Significant Labs: BMP:   Recent Labs   Lab 07/26/24  0555   GLU 95      K 3.7      CO2 21*   BUN 4*   CREATININE 0.5   CALCIUM 8.5*     CBC:   Recent Labs   Lab 07/25/24  1257 07/26/24  0555   WBC 8.95 8.17   HGB 7.2* 8.2*   HCT 20.0* 23.2*    271     CMP:   Recent Labs   Lab 07/26/24  0555      K 3.7      CO2 21*   GLU 95   BUN 4*   CREATININE 0.5   CALCIUM 8.5*   PROT 6.2   ALBUMIN 2.6*   BILITOT 2.4*   ALKPHOS 89   AST 42*   ALT 27   ANIONGAP 10      Latest Reference Range & Units 07/23/24 17:16   Hgb S Quant % 89.8       Assessment/Plan:     Sickle cell disease with acute chest syndrome and pregnancy carries a Category II Grade 1C indication for therapeutic apheresis via the 2016 Journal of Clinical Apheresis Guidelines (Deepika VILLAFUERTE et al. Journal of Clinical Apheresis 2016; 31:149-162.) The RBC exchange plan is as follows: Goal HCT < 30%, Hgb S < 30% after 1 RBC exchange.    Active Diagnoses:    Diagnosis Date Noted POA    PRINCIPAL PROBLEM:  Sickle cell crisis [D57.00] 07/23/2024 Yes    29 weeks gestation of pregnancy [Z3A.30] 07/23/2024 Not Applicable    Asthma [J45.909] 07/23/2024 Yes    Abnormal EKG [R94.31] 07/23/2024 Yes    Acute  chest syndrome due to sickle cell crisis [D57.01] 06/15/2022 Yes      Problems Resolved During this Admission:     Iris Mahmood M.D., Ph.D.  Section of Transfusion Medicine & Blood Donor Services  Department of Pathology and Laboratory Medicine  [facility]  [phone] (Blood Bank)  07/26/2024    Iris Mahmood MD  Transfusion Medicine  [facility] - Ante[facility]

## 2024-07-26 NOTE — CARE UPDATE
Resident to bedside for PM evaluation.     Patient resting comfortably in chair. She reports mild CP, rates it 4/10 and has improved since last night. She recently took oxycodone 10mg with notable improvement. She denies dizziness, lightheadedness, SOB, or palpitations.     Temp:  [97.8 °F (36.6 °C)-99 °F (37.2 °C)] 98.3 °F (36.8 °C)  Pulse:  [] 90  Resp:  [14-20] 18  SpO2:  [92 %-100 %] 97 %  BP: ()/(55-64) 99/56    PE:  Patient resting comfortably. Not in any acute distress.  Cardiopulmonary exam WNL. Slight tachycardia evident. Normal work of breathing.    A/P:  - Patient is currently not on NC as she is waiting for RN to bring extension tubing so that she may sit in the chair. Current O2 Sats 91-93% (RA)  - Continue current management  - Pain regimen per ARY Donahue MD PGY2  Obstetrics and Gynecology

## 2024-07-26 NOTE — ANESTHESIA PROCEDURE NOTES
Central Line    Diagnosis: Sickle cell crisis requiring exchange transfusion  Patient location during procedure: floor  Procedure Urgency: Routine  Procedure start time: 7/26/2024 11:01 AM  Timeout: 7/26/2024 11:00 AM  Procedure end time: 7/26/2024 11:16 AM      Staffing  Authorizing Provider: Ross Peng MD  Performing Provider: Feliciano Gomez MD    Staffing  Performed: resident/CRNA   Anesthesiologist: Ross Peng MD  Resident/CRNA: Feliciano Gomez MD  Performed by: Feliciano Gomez MD  Authorized by: Ross Peng MD    Anesthesiologist was present at the time of the procedure.  Preanesthetic Checklist  Completed: patient identified, IV checked, site marked, risks and benefits discussed, surgical consent, monitors and equipment checked, pre-op evaluation, timeout performed and anesthesia consent given  Indication   Indication: med administration     Anesthesia   local infiltration    Central Line   Skin Prep: skin prepped with ChloraPrep, skin prep agent completely dried prior to procedure  Sterile Barriers Followed: Yes    All five maximal barriers used- gloves, gown, cap, mask, and large sterile sheet    hand hygiene performed prior to central venous catheter insertion  Location: right internal jugular.   Catheter type: dialysis  Catheter Size: 12 Fr (13 Fr)  Inserted Catheter Length: 20 cm  Ultrasound: vascular probe with ultrasound   Vessel Caliber: medium, patent  Vascular Doppler:  not done, compressibility normal  Needle advanced into vessel with real time Ultrasound guidance.  Guidewire confirmed in vessel.  sterile gel and probe cover used in ultrasound-guided central venous catheter insertion   Manometry: Venous cannualation confirmed by visual estimation of blood vessel pressure using manometry.  Insertion Attempts: 1   Securement:line sutured, sterile dressing applied, blood return through all ports and chlorhexidine patch    Post-Procedure    Adverse  Events:none      Guidewire Guidewire removed intact.

## 2024-07-26 NOTE — ANESTHESIA PREPROCEDURE EVALUATION
[facility]  Anesthesia Pre-Operative Evaluation         Patient Name: Elizabeth Franco  YOB: 1995  MRN: [id number]    2024      Elizabeth Franco is a 29 y.o. female  @ 30w1d who presents for sickle cell crisis. Anesthesia requested for trialysis line placement for exchange transfusion.    Patient denies cardiopulmonary disease, bleeding disorders, or spine pathology.     Paper consent signed and placed in patient's chart.    OB History    Para Term  AB Living   1             SAB IAB Ectopic Multiple Live Births                  # Outcome Date GA Lbr Jason/2nd Weight Sex Type Anes PTL Lv   1 Current                Review of patient's allergies indicates:  No Known Allergies    Wt Readings from Last 1 Encounters:   24 1636 63.5 kg (140 lb)       BP Readings from Last 3 Encounters:   24 (!) 100/58   24 114/72   24 111/67       Patient Active Problem List   Diagnosis    Sickle cell anemia    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    Elevated bilirubin    GERD (gastroesophageal reflux disease)    Cellulitis of extremity    Acute chest syndrome due to sickle cell crisis    Constipation    Severe sepsis    Gall bladder stones    Pneumonia    Hypoxemia suspected multifactorial    Elevated d-dimer- Pulmonary emboli excluded    Elevated troponin and Elevated BNP    Warm antibody Autoimmune hemolytic anemia    History of splenectomy    History of appendectomy    Right elbow pain    Sickle cell anemia of mother during pregnancy    Sickle cell crisis    29 weeks gestation of pregnancy    Asthma    Abnormal EKG       No past surgical history on file.    Social History     Socioeconomic History    Marital status: Single   Tobacco Use    Smoking status: Never    Smokeless tobacco: Never   Substance and Sexual Activity    Alcohol use: No     Alcohol/week: 0.0 standard drinks of alcohol    Drug use: Never    Sexual activity: Not  Currently     Social Determinants of Health     Financial Resource Strain: Medium Risk (5/1/2024)    Received from [facility]    Overall Financial Resource Strain (CARDIA)     Difficulty of Paying Living Expenses: Somewhat hard   Food Insecurity: No Food Insecurity (5/1/2024)    Received from [facility]    Hunger Vital Sign     Worried About Running Out of Food in the Last Year: Never true     Ran Out of Food in the Last Year: Never true   Transportation Needs: No Transportation Needs (5/1/2024)    Received from [facility]    PRAPARE - Transportation     Lack of Transportation (Medical): No     Lack of Transportation (Non-Medical): No   Physical Activity: Insufficiently Active (5/1/2024)    Received from [facility]    Exercise Vital Sign     Days of Exercise per Week: 3 days     Minutes of Exercise per Session: 20 min   Stress: Stress Concern Present (5/1/2024)    Received from [facility]    [employer] - Occupational Stress Questionnaire     Feeling of Stress : To some extent   Housing Stability: Low Risk  (4/2/2024)    [facility]    Housing Stability Vital Sign     In the last 12 months, was there a time when you were not able to pay the mortgage or rent on time?: No     In the past 12 months, how many times have you moved where you were living?: 0     At any time in the past 12 months, were you homeless or living in a shelter (including now)?: No         Chemistry        Component Value Date/Time     07/26/2024 0555    K 3.7 07/26/2024 0555     07/26/2024 0555    CO2 21 (L) 07/26/2024 0555    BUN 4 (L) 07/26/2024 0555    CREATININE 0.5 07/26/2024 0555    GLU 95 07/26/2024 0555        Component Value Date/Time    CALCIUM 8.5 (L) 07/26/2024 0555    ALKPHOS 89 07/26/2024 0555    AST 42 (H) 07/26/2024 0555    ALT 27 07/26/2024 0555    BILITOT 2.4 (H) 07/26/2024 0555    ESTGFRAFRICA >60 06/20/2022 0507    EGFRNONAA >60 06/20/2022 0507  "           Lab Results   Component Value Date    WBC 8.17 07/26/2024    HGB 8.2 (L) 07/26/2024    HCT 23.2 (L) 07/26/2024    MCV 94 07/26/2024     07/26/2024       No results for input(s): "PT", "INR", "PROTIME", "APTT" in the last 72 hours.      Pre-op Assessment    I have reviewed the Patient Summary Reports.     I have reviewed the Nursing Notes.    I have reviewed the Medications.     Review of Systems  Anesthesia Hx:  No problems with previous Anesthesia             Denies Family Hx of Anesthesia complications.    Denies Personal Hx of Anesthesia complications.                    Social:  Non-Smoker       Hematology/Oncology:       -- Anemia:                                  Cardiovascular:      Denies Hypertension.    Denies CAD.     Denies Dysrhythmias.    Denies CHF.                                 Pulmonary:    Denies COPD. Asthma                    Renal/:   Denies Chronic Renal Disease.                Hepatic/GI:      Denies GERD.             Neurological:    Denies CVA.    Denies Seizures.                                Endocrine:  Denies Diabetes.           Psych:   denies anxiety                 Physical Exam  General: Well nourished, Cooperative, Alert and Oriented    Airway:  Mallampati: III   Mouth Opening: Normal  TM Distance: Normal  Neck ROM: Normal ROM    Dental:  Intact        Anesthesia Plan  Type of Anesthesia, risks & benefits discussed:    Anesthesia Type: Gen Natural Airway  Intra-op Monitoring Plan: Standard ASA Monitors and Central Line  Informed Consent: Informed consent signed with the Patient and all parties understand the risks and agree with anesthesia plan.  All questions answered.   ASA Score: 3  Day of Surgery Review of History & Physical: H&P Update referred to the surgeon/provider.    Ready For Surgery From Anesthesia Perspective.     .      "

## 2024-07-26 NOTE — ANESTHESIA POSTPROCEDURE EVALUATION
Anesthesia Post Evaluation    Patient: Elizabeth Franco    Procedure(s) Performed: * No procedures listed *    Final Anesthesia Type: local      Patient location during evaluation: floor  Patient participation: Yes- Able to Participate  Level of consciousness: awake and alert and oriented  Post-procedure vital signs: reviewed and stable  Pain management: adequate  Airway patency: patent  XAVIER mitigation strategies: Multimodal analgesia  PONV status at discharge: No PONV  Anesthetic complications: no      Cardiovascular status: blood pressure returned to baseline  Respiratory status: unassisted, spontaneous ventilation and room air  Hydration status: euvolemic                Vitals Value Taken Time   /68 07/26/24 1116   Temp 36.6 °C (97.8 °F) 07/26/24 0913   Pulse 101 07/26/24 1133   Resp 18 07/26/24 0901   SpO2 94 % 07/26/24 1133   Vitals shown include unfiled device data.      No case tracking events are documented in the log.      Pain/Haja Score: Pain Rating Prior to Med Admin: 1 (7/26/2024  9:01 AM)  Pain Rating Post Med Admin: 0 (7/25/2024 10:00 PM)

## 2024-07-26 NOTE — CONSULTS
Pulmonary & Critical Care Medicine Consult Note    Primary Attending Physician: Dr. Robles  Consultant Attending: Dr. Bui  Consultant Fellow: Marjan Maddox DO    Reason for Consult:     ACS    Subjective:      History of Present Illness:  29 year old pregnant female at 29w6d with PMH of sickle cell disease who presented to the ED with concerns for sickle cell crisis. On admission, she complained of pain in her legs, arms, hips, and chest with the pain worse in her hips and legs. She denied feeling short of breath or having cough or fevers. CXR on admission with no signs of focal consolidation. She was requiring 1-2L NC. Patient was admitted and started on treatment for sickle cell pain crisis. Overnight 7/24-7/25, she had some recurrence of chest and leg pain. She required oxycodone 10 mg x2 and tramadol x2. Repeat CXR with no consolidations. CTA chest was ordered for further evaluation which revealed no PE, however did show small area of possible consolidation in right posterior lung. Oxygen requirements remained at 2L. Repeat CBC with Hg of 7.2 which was a decrease from 8.7 on 7/23. She was given 1 unit PRBCs. HgS of 88%. She reports that she does not have a personal history of asthma but does have a family history. She was started on Pulmicort during pregnancy because she had complained of some wheezing, but she has never been diagnosed with asthma.     This morning the patient was resting in the chair. She denies any chest pain, leg pain, arm pain. She says at this point she just has some aching. She denies shortness of breath or cough. She says her pain has been very well controlled.     Past Medical History:  Past Medical History:   Diagnosis Date    Hepatomegaly 6/15/2022    Hidradenitis suppurativa     Nocturnal enuresis     Sickle cell anemia        Past Surgical History:  No past surgical history on file.    Allergies:  Review of patient's allergies indicates:  No Known Allergies    Medications:    In-Hospital Scheduled Medications:   aspirin  81 mg Oral Daily    azithromycin  500 mg Oral Daily    cefTRIAXone (Rocephin) IV (PEDS and ADULTS)  2 g Intravenous Q24H    enoxparin  40 mg Subcutaneous Q24H (prophylaxis, 1700)    folic acid  4 mg Oral Daily    oxyCODONE  10 mg Oral Q12H    prenatal vitamin  1 tablet Oral Daily      In-Hospital PRN Medications:    Current Facility-Administered Medications:     0.9%  NaCl infusion (for blood administration), , Intravenous, Q24H PRN    acetaminophen, 1,000 mg, Oral, Q6H PRN    albuterol, 2 puff, Inhalation, Q6H PRN    diphenhydrAMINE, 25 mg, Oral, Q4H PRN    diphenhydrAMINE, 25 mg, Intravenous, Q4H PRN    HYDROmorphone, 1 mg, Intravenous, Q3H PRN    oxyCODONE, 10 mg, Oral, Q4H PRN    senna-docusate 8.6-50 mg, 1 tablet, Oral, Nightly PRN    simethicone, 1 tablet, Oral, Q6H PRN    sodium chloride 0.9%, 10 mL, Intravenous, PRN    traMADoL, 50 mg, Oral, Q6H PRN   In-Hospital IV Infusion Medications:   lactated ringers   Intravenous Continuous 125 mL/hr at 07/25/24 1019 New Bag at 07/25/24 1019      Home Medications:  Prior to Admission medications    Medication Sig Start Date End Date Taking? Authorizing Provider   albuterol sulfate (PROAIR RESPICLICK) 90 mcg/actuation inhaler Inhale 1-2 puffs into the lungs every 4 (four) hours as needed for Wheezing. Rescue 7/3/24   Huma Dobbins MD   aspirin (ECOTRIN) 81 MG EC tablet Take 1 tablet (81 mg total) by mouth once daily. 4/12/24 1/17/25  Huma Dobbins MD   budesonide (PULMICORT FLEXHALER) 90 mcg/actuation AePB INHALE 2 PUFFS INTO THE LUNGS ONCE DAILY. CONTROLLER 7/12/24   Huma Dobbins MD   folic acid (FOLVITE) 1 MG tablet Take 4 tablets (4 mg total) by mouth once daily. 4/12/24 4/12/25  Huma Dobbins MD   oxyCODONE (ROXICODONE) 10 mg Tab immediate release tablet Take 1 tablet (10 mg total) by mouth every 4 (four) hours as needed for Pain.  Patient not taking: Reported on 7/19/2024 6/12/23   Elvira Nuñez MD    prenatal vit 87-iron-folic-dha (PRENATE MINI, FERR ASP GLYCIN,) 18-1-350 mg Cap Take 1 tablet by mouth once daily. for 270 doses 24  Huma Dobbins MD   traMADoL (ULTRAM) 50 mg tablet Take 1 tablet (50 mg total) by mouth every 8 (eight) hours as needed for Pain.  Patient not taking: Reported on 2024   Elvira Nuñez MD       Family History:  Family History   Problem Relation Name Age of Onset    No Known Problems Mother      No Known Problems Father         Social History:  Social History     Tobacco Use    Smoking status: Never    Smokeless tobacco: Never   Substance Use Topics    Alcohol use: No     Alcohol/week: 0.0 standard drinks of alcohol    Drug use: Never       Review of Systems:  Per HPI     Objective:   Last 24 Hour Vital Signs:  BP  Min: 99/56  Max: 113/64  Temp  Av.2 °F (36.8 °C)  Min: 97.8 °F (36.6 °C)  Max: 98.5 °F (36.9 °C)  Pulse  Av.7  Min: 74  Max: 105  Resp  Av.1  Min: 14  Max: 18  SpO2  Av.2 %  Min: 92 %  Max: 100 %  I/O last 3 completed shifts:  In: 3961.2 [P.O.:2680; I.V.:972.9; Blood:308.3]  Out: 8050 [Urine:8050]    Physical Examination:  Physical Exam  Constitutional:       General: She is not in acute distress.  HENT:      Head: Normocephalic and atraumatic.   Eyes:      Extraocular Movements: Extraocular movements intact.      Pupils: Pupils are equal, round, and reactive to light.   Cardiovascular:      Rate and Rhythm: Normal rate and regular rhythm.      Heart sounds: No murmur heard.  Pulmonary:      Breath sounds: No wheezing, rhonchi or rales.   Abdominal:      Comments: Gravid abdomen   Musculoskeletal:      Right lower leg: No edema.      Left lower leg: No edema.   Skin:     General: Skin is warm and dry.   Neurological:      General: No focal deficit present.      Mental Status: She is alert and oriented to person, place, and time.           Laboratory:  Trended Lab Data:  Recent Labs     24  1716 24  1257  07/26/24  0555   WBC 18.07* 8.95 8.17   HGB 8.7* 7.2* 8.2*   HCT 24.4* 20.0* 23.2*    267 271   *  --  138   K 4.1  --  3.7     --  107   CO2 18*  --  21*   BUN 4*  --  4*   CREATININE 0.6  --  0.5   GLU 74  --  95   BILITOT 3.6*  --  2.4*   AST 77*  --  42*   ALT 39  --  27   ALKPHOS 109  --  89   CALCIUM 9.0  --  8.5*   ALBUMIN 3.4*  --  2.6*   PROT 7.6  --  6.2       Cardiac:   Recent Labs   Lab 07/23/24 1716   TROPONINI <0.006       Urinalysis:   Lab Results   Component Value Date    LABURIN  07/23/2024     Multiple organisms isolated. None in predominance.  Repeat if    LABURIN clinically necessary. 07/23/2024    COLORU Yellow 07/23/2024    SPECGRAV 1.010 07/23/2024    NITRITE Negative 07/23/2024    KETONESU Negative 07/23/2024    UROBILINOGEN Negative 07/23/2024       Microbiology:  Microbiology Results (last 7 days)       Procedure Component Value Units Date/Time    Urine Culture High Risk [[id number]] Collected: 07/23/24 1837    Order Status: Completed Specimen: Urine Updated: 07/24/24 2045     Urine Culture, Routine Multiple organisms isolated. None in predominance.  Repeat if      clinically necessary.    Narrative:      Indicated criteria for high risk culture:->Pregnant    Influenza A & B by Molecular [[id number]] Collected: 07/23/24 1822    Order Status: Completed Specimen: Nasopharyngeal Swab Updated: 07/23/24 2001     Influenza A, Molecular Negative     Influenza B, Molecular Negative     Flu A & B Source Nasal Swab            Radiology:  CTA chest 7/25: No PE, small area of increased nodularity/consolidation in the right posterior lower lobe     I have personally reviewed the above labs and imaging.    Current Medications:     Infusions:   lactated ringers   Intravenous Continuous 125 mL/hr at 07/25/24 1019 New Bag at 07/25/24 1019        Scheduled:   aspirin  81 mg Oral Daily    azithromycin  500 mg Oral Daily    cefTRIAXone (Rocephin) IV (PEDS and ADULTS)  2 g Intravenous Q24H     enoxparin  40 mg Subcutaneous Q24H (prophylaxis, 1700)    folic acid  4 mg Oral Daily    oxyCODONE  10 mg Oral Q12H    prenatal vitamin  1 tablet Oral Daily        PRN:    Current Facility-Administered Medications:     0.9%  NaCl infusion (for blood administration), , Intravenous, Q24H PRN    acetaminophen, 1,000 mg, Oral, Q6H PRN    albuterol, 2 puff, Inhalation, Q6H PRN    diphenhydrAMINE, 25 mg, Oral, Q4H PRN    diphenhydrAMINE, 25 mg, Intravenous, Q4H PRN    HYDROmorphone, 1 mg, Intravenous, Q3H PRN    oxyCODONE, 10 mg, Oral, Q4H PRN    senna-docusate 8.6-50 mg, 1 tablet, Oral, Nightly PRN    simethicone, 1 tablet, Oral, Q6H PRN    sodium chloride 0.9%, 10 mL, Intravenous, PRN    traMADoL, 50 mg, Oral, Q6H PRN     Assessment:     Elizabeth Ana Franco is a 29 y.o. female with:  Patient Active Problem List    Diagnosis Date Noted    Sickle cell crisis 07/23/2024    29 weeks gestation of pregnancy 07/23/2024    Asthma 07/23/2024    Abnormal EKG 07/23/2024    Sickle cell anemia of mother during pregnancy 06/27/2024    Right elbow pain 04/17/2024    History of splenectomy 02/22/2024    History of appendectomy 02/22/2024    Warm antibody Autoimmune hemolytic anemia 06/19/2022    Acute chest syndrome due to sickle cell crisis 06/15/2022    Constipation 06/15/2022    Severe sepsis 06/15/2022    Gall bladder stones 06/15/2022    Pneumonia 06/15/2022    Hypoxemia suspected multifactorial 06/15/2022    Elevated d-dimer- Pulmonary emboli excluded 06/15/2022    Elevated troponin and Elevated BNP 06/15/2022    Cellulitis of extremity 04/05/2022    GERD (gastroesophageal reflux disease) 04/02/2022    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 03/03/2021    Elevated bilirubin 03/03/2021    Sickle cell anemia 04/04/2020        Plan:     #Acute chest syndrome  #Sickle Cell Disease   - Admitted for sickle cell crisis however had worsening chest pain overnight 7/24-7/25  - CXR have never had significant focal  consolidations, but CTA chest on 7/25 with some area of consolidation I the right posterior lower lobe   - Has been on 2L NC to prevent hypoxia, started on antibiotics  - Received 1 unit PRBC on7/25  - Feeling well this morning, no chest pain or shortness of breath; pain well controlled with no prn needs     Recommendations:  - Continue ceftriaxone and azithromycin while in hospital, would complete 3 days of azithromycin 500 mg and 5-7 days of ceftriaxone or oral equivalent if patient being discharged  - Continue supplemental oxygen as needed   - Continue IV fluids  - Continue current pain regiment with scheduled oxycodone ER 10 mg BID and prn tylenol, oxycodone IR, tramadol, and dilaudid   - No history of asthma but could have pregnancy induced asthma, would discharge with prescription for albuterol inhaler   - Not on nebulizer treatment as she reported some chest discomfort following treatment     Thank you for allowing us to participate in the care of this patient. Please contact me if you have any questions regarding this consult.    Seen and discussed with Dr. Bui.     Marjan Maddox MD  [facility] Fellow

## 2024-07-26 NOTE — CONSULTS
Inpatient consult to Hematology/Oncology  Consult performed by: Jemma Jeter MD  Consult ordered by: Jennyfer Guzman MD      Please see consult note dated 7/25/24

## 2024-07-26 NOTE — PROGRESS NOTES
"Maternal Fetal Medicine  Progress Note          Subjective:    Elizabeth Franco is a 29 y.o.  at 30w1d admitted for sickle cell pain crisis. Please see H&P for details regarding presentation at admission. This pregnancy is complicated by asthma, abnormal EKG.    Interim HPI: Overnight, patient reports improvement in sickle cell crisis pain. Reports no pain in chest, hips or legs at the moment. Reports continued muscle soreness.  Denies cough, wheezing, SOB. Pt denies HA, vision changes, CP, SOB, or RUQ pain. Denies contractions, vaginal bleeding, LOF. Reports good fetal movement.     Medical, Surgical, Social, Family, and Obstetric History: reviewed in chart  Current Medications:    aspirin  81 mg Oral Daily    azithromycin  500 mg Oral Daily    cefTRIAXone (Rocephin) IV (PEDS and ADULTS)  2 g Intravenous Q24H    enoxparin  40 mg Subcutaneous Q24H (prophylaxis, 1700)    folic acid  4 mg Oral Daily    oxyCODONE  10 mg Oral Q12H    prenatal vitamin  1 tablet Oral Daily      Current Facility-Administered Medications:     0.9%  NaCl infusion (for blood administration), , Intravenous, Q24H PRN    acetaminophen, 1,000 mg, Oral, Q6H PRN    albuterol, 2 puff, Inhalation, Q6H PRN    diphenhydrAMINE, 25 mg, Oral, Q4H PRN    diphenhydrAMINE, 25 mg, Intravenous, Q4H PRN    HYDROmorphone, 1 mg, Intravenous, Q3H PRN    oxyCODONE, 10 mg, Oral, Q4H PRN    senna-docusate 8.6-50 mg, 1 tablet, Oral, Nightly PRN    simethicone, 1 tablet, Oral, Q6H PRN    sodium chloride 0.9%, 10 mL, Intravenous, PRN    traMADoL, 50 mg, Oral, Q6H PRN   Objective:   BP (!) 106/57   Pulse 90   Temp 98.4 °F (36.9 °C) (Oral)   Resp 16   Ht 5' 3" (1.6 m)   Wt 63.5 kg (140 lb)   LMP 2023   SpO2 (!) 94%   BMI 24.80 kg/m²     Focused Physical Examination   General: well developed, no acute distress  Pulmonary: respiratory effort normal with no retractions. Lungs CTAB. No rales or abnormal breath sounds heard.  Abdomen: gravid  Cervix: " Deferred.    Fetal Monitoring  EFM: 140 baseline/ moderate variability/+ accels/- decels  Tocometer: Quiet     Lab Results  Recent Labs   Lab 24  1716 24  1257 24  0555   WBC 18.07* 8.95 8.17   HGB 8.7* 7.2* 8.2*   HCT 24.4* 20.0* 23.2*    267 271   BUN 4*  --   --    CREATININE 0.6  --   --    ALT 39  --   --    AST 77*  --   --    *  --   --           Assessment:   29 y.o.  at 30w1d admitted for sickle cell pain crisis    Plan:   Acute Chest Syndrome/Sickle cell crisis  - Overnight: afebrile, HR stable, satting 95-97% on 1L NC  - CTA performed yesterday showed patchy infiltrates consistent with acute chest syndrome.   - Continue CAP abx: Rocephin 2g q24, azithromycin 500mg QD  - Pain regimen:  Oxycontin 10 mg BID, Tylenol 1000mg Q6 hours, Tramdol 50 PRN, Oxy 10 PRN, Dilaudid 1g Q3hrs BNTP.   - Patient did not require any PRN pain medication.   - HgbS on admission 89.8%  - Incentive spirometry Q2H, nasal cannula as needed to maintain oxygen saturation above 95%. Will try weaning NC requirement today.   - Hematology and pulmonology consulted. Both recommended transfusing 1u pRBC given h/h yesterday 7.2/20. H/H corinne appropriately to 8.2/23.3 this morning.   - Continue supportive care      Abnormal EKG  -Patient has history of abnormal EKG: NSR with t wave abnormality and prolonged QT (398)  - EKG on admit reviewed by ED staff, and to noted to be stable when compared to prior EKGs  - Avoid QT prolonging agents     Asthma  - Current respiratory status felt to be due to sickle pain crisis   - Continue nasal cannula for O2 stat > 95%  - Continue pulmicort daily and albuterol PRN    Abnormal Type and Screen  - Direct Migel +   - Antibody identification: auto-e too weak to titrate  - Maintain T&C    Rosanne Logan MD PGY-3  Obstetrics and Gynecology  [facility]    Fellow attestation. Patient is a 29 y.o.  at 30w1d admitted to [facility] service for sickle cell disease  with acute chest syndrome and vaso-occlusive crisis. Received 1 u PRBCs yesterday. Azithro/Rocephin initiated after diagnosis of ACS.     Patient status overall improved today. Reports improvement in extremity pain and resolution of chest pain. Continues to require 1-2 L of O2 via NC.     Temp:  [97.8 °F (36.6 °C)-98.5 °F (36.9 °C)] 97.8 °F (36.6 °C)  Pulse:  [] 80  Resp:  [14-18] 18  SpO2:  [92 %-100 %] 97 %  BP: ()/(55-64) 100/58    AM NST:   Baseline 140; moderate variability; +accels; no significant decels  No contractions  Overall reactive and reassuring     HgS 89.8%. H/H improved after initial transfusion. After discussion with blood bank and patient's outpatient Hematologist, will proceed with exchange transfusion. Continue IVFs, pain medication as needed, and supplemental O2 to maintain O2 sats >95%. Continue IV antibiotics. Cardiology consult for abnormal EKG. Growth US today.     Jennyfer Guzman MD  PGY 7  Maternal Fetal Medicine  [facility]

## 2024-07-26 NOTE — PLAN OF CARE
Received pt conscious and alert, breathing spontaneously on room air; in no obvious distress. Pt reviewed at bedside by MD. See notes & MAR for updates.  O2 via N/C titrated to 1L/m as per order. Sat 91-94% on R/A.  VSS and WNL  Cat 2 tracing; cont.  Transfusion received and tolerated. No adverse reactions a this time.  Maternal-fetal well-being maintained this shift, as evidence by nursing assessments and interventions charted. Pt cooperative and tolerated well. Management continues.

## 2024-07-27 VITALS
TEMPERATURE: 98 F | HEART RATE: 74 BPM | OXYGEN SATURATION: 97 % | DIASTOLIC BLOOD PRESSURE: 56 MMHG | RESPIRATION RATE: 18 BRPM | WEIGHT: 140 LBS | HEIGHT: 63 IN | SYSTOLIC BLOOD PRESSURE: 111 MMHG | BODY MASS INDEX: 24.8 KG/M2

## 2024-07-27 LAB
ALBUMIN SERPL BCP-MCNC: 2.5 G/DL (ref 3.5–5.2)
ALP SERPL-CCNC: 88 U/L (ref 55–135)
ALT SERPL W/O P-5'-P-CCNC: 24 U/L (ref 10–44)
ANION GAP SERPL CALC-SCNC: 6 MMOL/L (ref 8–16)
AST SERPL-CCNC: 48 U/L (ref 10–40)
BASOPHILS # BLD AUTO: 0.05 K/UL (ref 0–0.2)
BASOPHILS NFR BLD: 0.7 % (ref 0–1.9)
BILIRUB SERPL-MCNC: 2 MG/DL (ref 0.1–1)
BUN SERPL-MCNC: 6 MG/DL (ref 6–20)
CALCIUM SERPL-MCNC: 8.4 MG/DL (ref 8.7–10.5)
CHLORIDE SERPL-SCNC: 108 MMOL/L (ref 95–110)
CO2 SERPL-SCNC: 22 MMOL/L (ref 23–29)
CREAT SERPL-MCNC: 0.5 MG/DL (ref 0.5–1.4)
DIFFERENTIAL METHOD BLD: ABNORMAL
EOSINOPHIL # BLD AUTO: 0.4 K/UL (ref 0–0.5)
EOSINOPHIL NFR BLD: 5.9 % (ref 0–8)
ERYTHROCYTE [DISTWIDTH] IN BLOOD BY AUTOMATED COUNT: 15.9 % (ref 11.5–14.5)
EST. GFR  (NO RACE VARIABLE): >60 ML/MIN/1.73 M^2
GLUCOSE SERPL-MCNC: 78 MG/DL (ref 70–110)
HCT VFR BLD AUTO: 30.1 % (ref 37–48.5)
HGB BLD-MCNC: 10.2 G/DL (ref 12–16)
IMM GRANULOCYTES # BLD AUTO: 0.08 K/UL (ref 0–0.04)
IMM GRANULOCYTES NFR BLD AUTO: 1.1 % (ref 0–0.5)
LYMPHOCYTES # BLD AUTO: 2.2 K/UL (ref 1–4.8)
LYMPHOCYTES NFR BLD: 29.5 % (ref 18–48)
MCH RBC QN AUTO: 30.4 PG (ref 27–31)
MCHC RBC AUTO-ENTMCNC: 33.9 G/DL (ref 32–36)
MCV RBC AUTO: 90 FL (ref 82–98)
MONOCYTES # BLD AUTO: 0.7 K/UL (ref 0.3–1)
MONOCYTES NFR BLD: 9.7 % (ref 4–15)
NEUTROPHILS # BLD AUTO: 3.9 K/UL (ref 1.8–7.7)
NEUTROPHILS NFR BLD: 53.1 % (ref 38–73)
NRBC BLD-RTO: 6 /100 WBC
PLATELET # BLD AUTO: 176 K/UL (ref 150–450)
PMV BLD AUTO: 11.6 FL (ref 9.2–12.9)
POTASSIUM SERPL-SCNC: 4.5 MMOL/L (ref 3.5–5.1)
PROT SERPL-MCNC: 6.1 G/DL (ref 6–8.4)
RBC # BLD AUTO: 3.35 M/UL (ref 4–5.4)
SODIUM SERPL-SCNC: 136 MMOL/L (ref 136–145)
WBC # BLD AUTO: 7.3 K/UL (ref 3.9–12.7)

## 2024-07-27 PROCEDURE — 85025 COMPLETE CBC W/AUTO DIFF WBC: CPT

## 2024-07-27 PROCEDURE — 63700000 PHARM REV CODE 250 ALT 637 W/O HCPCS

## 2024-07-27 PROCEDURE — 99239 HOSP IP/OBS DSCHRG MGMT >30: CPT | Mod: 25,,, | Performed by: OBSTETRICS & GYNECOLOGY

## 2024-07-27 PROCEDURE — 36415 COLL VENOUS BLD VENIPUNCTURE: CPT

## 2024-07-27 PROCEDURE — 80053 COMPREHEN METABOLIC PANEL: CPT

## 2024-07-27 PROCEDURE — 25000003 PHARM REV CODE 250

## 2024-07-27 RX ORDER — TRAMADOL HYDROCHLORIDE 50 MG/1
50 TABLET ORAL EVERY 6 HOURS PRN
Qty: 20 TABLET | Refills: 0 | Status: SHIPPED | OUTPATIENT
Start: 2024-07-27

## 2024-07-27 RX ORDER — ALBUTEROL SULFATE 90 UG/1
2 AEROSOL, METERED RESPIRATORY (INHALATION) EVERY 6 HOURS PRN
Qty: 18 G | Refills: 0 | OUTPATIENT
Start: 2024-07-27 | End: 2025-07-27

## 2024-07-27 RX ORDER — CEPHALEXIN 500 MG/1
500 CAPSULE ORAL EVERY 6 HOURS
Qty: 16 CAPSULE | Refills: 0 | Status: SHIPPED | OUTPATIENT
Start: 2024-07-27 | End: 2024-07-30

## 2024-07-27 RX ORDER — OXYCODONE HYDROCHLORIDE 10 MG/1
10 TABLET ORAL EVERY 4 HOURS PRN
Refills: 0 | OUTPATIENT
Start: 2024-07-27 | End: 2024-08-09

## 2024-07-27 RX ORDER — ALBUTEROL SULFATE 90 UG/1
1-2 AEROSOL, METERED RESPIRATORY (INHALATION) EVERY 6 HOURS PRN
Qty: 8 G | Refills: 1 | Status: SHIPPED | OUTPATIENT
Start: 2024-07-27 | End: 2025-07-27

## 2024-07-27 RX ORDER — OXYCODONE HCL 10 MG/1
10 TABLET, FILM COATED, EXTENDED RELEASE ORAL EVERY 12 HOURS
Refills: 0 | OUTPATIENT
Start: 2024-07-27

## 2024-07-27 RX ORDER — OXYCODONE HYDROCHLORIDE 5 MG/1
10 TABLET ORAL EVERY 6 HOURS PRN
Qty: 20 TABLET | Refills: 0 | Status: SHIPPED | OUTPATIENT
Start: 2024-07-27

## 2024-07-27 RX ADMIN — FOLIC ACID 4 MG: 1 TABLET ORAL at 09:07

## 2024-07-27 RX ADMIN — ASPIRIN 81 MG: 81 TABLET, COATED ORAL at 09:07

## 2024-07-27 RX ADMIN — AZITHROMYCIN DIHYDRATE 500 MG: 250 TABLET ORAL at 09:07

## 2024-07-27 NOTE — PLAN OF CARE
Problem:  Fall Injury Risk  Goal: Absence of Fall, Infant Drop and Related Injury  Outcome: Progressing     Problem: Adult Inpatient Plan of Care  Goal: Plan of Care Review  Outcome: Progressing  Goal: Patient-Specific Goal (Individualized)  Outcome: Progressing  Goal: Absence of Hospital-Acquired Illness or Injury  Outcome: Progressing  Goal: Optimal Comfort and Wellbeing  Outcome: Progressing  Goal: Readiness for Transition of Care  Outcome: Progressing     Problem: Sepsis/Septic Shock  Goal: Optimal Coping  Outcome: Progressing  Goal: Absence of Bleeding  Outcome: Progressing  Goal: Blood Glucose Level Within Targeted Range  Outcome: Progressing  Goal: Absence of Infection Signs and Symptoms  Outcome: Progressing  Goal: Optimal Nutrition Intake  Outcome: Progressing     Problem: Pneumonia  Goal: Fluid Balance  Outcome: Progressing  Goal: Resolution of Infection Signs and Symptoms  Outcome: Progressing  Goal: Effective Oxygenation and Ventilation  Outcome: Progressing     Problem: Infection  Goal: Absence of Infection Signs and Symptoms  Outcome: Progressing     Problem: Sickle Cell Disease in Pregnancy  Goal: Optimal Cerebral Tissue Perfusion  Outcome: Progressing  Goal: Optimal Coping with Sickle Cell Disease  Outcome: Progressing  Goal: Effective Tissue Perfusion  Outcome: Progressing  Goal: Absence of Infection Signs and Symptoms  Outcome: Progressing  Goal: Optimal Pain Control and Function  Outcome: Progressing  Goal: Optimal Oxygenation  Outcome: Progressing     Problem: Hospitalized  Patient  Goal: Optimal Patient-Fetal Wellbeing  Outcome: Progressing

## 2024-07-27 NOTE — DISCHARGE INSTRUCTIONS
Contact your primary OB or after hours at [phone] if you experience any of the following:    Contractions every 7-10 minutes for 1 or more hours.   A sudden gush or constant leaking of fluid.  Heavy vaginal bleeding.   If you experience a constant headache, blurry vision, pain underneath your right rib, or sudden swelling of your hands, feet, and face.   If you are 28 weeks pregnant or greater, you can measure kick counts with a goal of 10 or more movements within 2 hours.     Remember to stay hydrated; drink 8-10 bottles of water a day.     Maintain all follow-up appointments.

## 2024-07-27 NOTE — PROCEDURES
[facility] - Antepartum  Transfusion Medicine  Procedure Note    SUMMARY   Procedures  Date of Procedure: 2024     Procedure: Red Blood Cell Exchange    Provider: Alberto Rivera Jr, MD     Assisting Provider: None    Pre-Procedure Diagnosis: Sickle cell crisis    Post-Procedure Diagnosis: Sickle cell crisis    Follow-up Assessment: Elizabeth Franco is a 29 y.o.  at 30w1d admitted for sickle cell pain crisis. RBCx requested by Hem Onc.  Completed last evening without problems.     Pertinent Laboratory Data: Complete Blood Count:   Lab Results   Component Value Date    HGB 10.2 (L) 2024    HCT 30.1 (L) 2024     2024    WBC 7.30 2024     Lactate Dehydrogenase (LDH):   Lab Results   Component Value Date     (H) 2024     Basic Metabolic Panel:   Lab Results   Component Value Date     2024    K 4.5 2024     2024    CO2 22 (L) 2024    GLU 78 2024    BUN 6 2024    CREATININE 0.5 2024    CALCIUM 8.4 (L) 2024    ANIONGAP 6 (L) 2024    ESTGFRAFRICA >60 2022    EGFRNONAA >60 2022     Comprehensive Metabolic Panel:   Lab Results   Component Value Date     2024    K 4.5 2024     2024    CO2 22 (L) 2024    GLU 78 2024    BUN 6 2024    CREATININE 0.5 2024    CALCIUM 8.4 (L) 2024    PROT 6.1 2024    ALBUMIN 2.5 (L) 2024    BILITOT 2.0 (H) 2024    ALKPHOS 88 2024    AST 48 (H) 2024    ALT 24 2024    ANIONGAP 6 (L) 2024    EGFRNONAA >60 2022       Pertinent Medications: n/a    Review of patient's allergies indicates:  No Known Allergies    Anesthesia: None     Technical Procedures Used: Red Blood Cell Exchange: Approximately 6 units of packed red blood cells were exchanged.    Description of the Findings of the Procedure:     Please see Apheresis Nurse flowsheet for details.    The patient was  evaluated and all clinical and laboratory data relevant to the treatment was reviewed, and a decision was made to proceed with the Apheresis procedure.    I was available to the clinical staff throughout the procedure.    Significant Surgical Tasks Conducted by the Assistant(s): Not applicable    Complications: None    Estimated Blood Loss (EBL): None    Implants: None     Specimens: None

## 2024-07-27 NOTE — DISCHARGE SUMMARY
Discharge Summary  Gynecology      Admit Date: 2024    Discharge Date and Time: 2024    Attending Physician: Taz Castillo    Principal Diagnoses: Sickle cell crisis    Active Hospital Problems    Diagnosis  POA    *Sickle cell crisis [D57.00]  Yes    29 weeks gestation of pregnancy [Z3A.30]  Not Applicable    Asthma [J45.909]  Yes    Abnormal EKG [R94.31]  Yes    Acute chest syndrome due to sickle cell crisis [D57.01]  Yes      Resolved Hospital Problems   No resolved problems to display.       Discharged Condition: good    Hospital Course:   Elizabeth Franco is a 29 y.o.  at 29w5d who presented for sickle cell pain crisis. Work-up in ED notable for chest pain. Patient tahcycardic to 120s. SPO2 on RA low 90-92%. Labs overall normal. CXR performed at that time showed no pulmonary abnormalities. Patient admitted for sickle cell pain crisis and IV pain medicine,.    On HD#2-3 patient had slow clinical improvement but continued to require oxygen. On HD#3 , patient had new onset chest pain and increased O2 requirements. CTA was performed that showed no PE but was concerning for development of acute chest syndrome. Patient was started on CAP abx. On HD#4, patient received exchange transfusion with improvement in symptoms. She was started on long acting [id number] BID for pain control.     On day of discharge, patient was urinating, ambulating, and tolerating a regular diet without difficulty. She was saturating > 95 on room ainr.  Pain was well controlled on PO medication. She was discharged home stable condition with instructions to follow up with Dr. Castillo on 24.     Consults: Pulm, Heme/onc, Pathology    Significant Diagnostic Studies:  Recent Labs   Lab 24  1257 24  0555 24  0412   WBC 8.95 8.17 7.30   HGB 7.2* 8.2* 10.2*   HCT 20.0* 23.2* 30.1*   MCV 98 94 90    271 176        Disposition: Home or Self Care    Patient Instructions:   Discharge Medication  List as of 7/27/2024  1:44 PM        START taking these medications    Details   albuterol (PROVENTIL/VENTOLIN HFA) 90 mcg/actuation inhaler Inhale 1-2 puffs into the lungs every 6 (six) hours as needed for Wheezing or Shortness of Breath. Rescue, Starting Sat 7/27/2024, Until Sun 7/27/2025 at 2359, Normal      cephALEXin (KEFLEX) 500 MG capsule Take 1 capsule (500 mg total) by mouth every 6 (six) hours. for 4 days, Starting Sat 7/27/2024, Until Wed 7/31/2024, Normal           CONTINUE these medications which have CHANGED    Details   oxyCODONE (ROXICODONE) 5 MG immediate release tablet Take 2 tablets (10 mg total) by mouth every 6 (six) hours as needed., Starting Sat 7/27/2024, Normal      traMADoL (ULTRAM) 50 mg tablet Take 1 tablet (50 mg total) by mouth every 6 (six) hours as needed for Pain., Starting Sat 7/27/2024, Normal           CONTINUE these medications which have NOT CHANGED    Details   albuterol sulfate (PROAIR RESPICLICK) 90 mcg/actuation inhaler Inhale 1-2 puffs into the lungs every 4 (four) hours as needed for Wheezing. Rescue, Starting Wed 7/3/2024, Normal      aspirin (ECOTRIN) 81 MG EC tablet Take 1 tablet (81 mg total) by mouth once daily., Starting Fri 4/12/2024, Until Fri 1/17/2025, OTC      budesonide (PULMICORT FLEXHALER) 90 mcg/actuation AePB INHALE 2 PUFFS INTO THE LUNGS ONCE DAILY. CONTROLLER, Starting Fri 7/12/2024, Normal      folic acid (FOLVITE) 1 MG tablet Take 4 tablets (4 mg total) by mouth once daily., Starting Fri 4/12/2024, Until Sat 4/12/2025, Normal      prenatal vit 87-iron-folic-dha (PRENATE MINI, FERR ASP GLYCIN,) 18-1-350 mg Cap Take 1 tablet by mouth once daily. for 270 doses, Starting Thu 2/22/2024, Until Mon 11/18/2024, Normal             Discharge Procedure Orders   Notify your health care provider if you experience any of the following:  temperature >100.4     Notify your health care provider if you experience any of the following:  severe uncontrolled pain     Notify  your health care provider if you experience any of the following:  persistent dizziness, light-headedness, or visual disturbances     Notify your health care provider if you experience any of the following:  severe persistent headache        Follow-up Information       Taz Castillo MD Follow up on 7/30/2024.    Specialty: Maternal and Fetal Medicine  Why: Post- hospital follow-up  Contact information:  [address]  [facility] [address]  [phone]               Jeansonne, Julie R., MD Follow up on 8/9/2024.    Specialty: Obstetrics and Gynecology  Why: Please attend routine prenatal care  Contact information:  [address]  [facility] [address]  [phone]               Sickle Cell Specialist Follow up on 8/12/2024.    Why: Please attend follow-up with sickle cell clinic on 08/12/2024.                           Rosanne Logan MD PGY-3  Obstetrics and Gynecology  [facility]

## 2024-07-27 NOTE — PROGRESS NOTES
"Maternal Fetal Medicine  Progress Note          Subjective:    Elizabeth Franco is a 29 y.o.  at 30w1d admitted for sickle cell pain crisis. Please see H&P for details regarding presentation at admission. This pregnancy is complicated by asthma, abnormal EKG.    Interim HPI: Overnight, patient reports improvement in sickle cell crisis pain. Reports no pain in chest, hips or legs at the moment.  Denies cough, wheezing, SOB. Patient is breathing without difficulty on RA. Patient denies HA, vision changes, CP, SOB, or RUQ pain. Denies contractions, vaginal bleeding, LOF. Reports good fetal movement.     Medical, Surgical, Social, Family, and Obstetric History: reviewed in chart  Current Medications:    aspirin  81 mg Oral Daily    azithromycin  500 mg Oral Daily    cefTRIAXone (Rocephin) IV (PEDS and ADULTS)  2 g Intravenous Q24H    diphenhydrAMINE  50 mg Intravenous Once    enoxparin  40 mg Subcutaneous Q24H (prophylaxis, 1700)    folic acid  4 mg Oral Daily    oxyCODONE  10 mg Oral Q12H    prenatal vitamin  1 tablet Oral Daily      Current Facility-Administered Medications:     0.9%  NaCl infusion (for blood administration), , Intravenous, Q24H PRN    acetaminophen, 1,000 mg, Oral, Q6H PRN    albuterol, 2 puff, Inhalation, Q6H PRN    diphenhydrAMINE, 25 mg, Oral, Q4H PRN    diphenhydrAMINE, 25 mg, Intravenous, Q4H PRN    HYDROmorphone, 1 mg, Intravenous, Q3H PRN    oxyCODONE, 10 mg, Oral, Q4H PRN    senna-docusate 8.6-50 mg, 1 tablet, Oral, Nightly PRN    simethicone, 1 tablet, Oral, Q6H PRN    sodium chloride 0.9%, 10 mL, Intravenous, PRN    traMADoL, 50 mg, Oral, Q6H PRN   Objective:   /69   Pulse 76   Temp 98.2 °F (36.8 °C)   Resp 17   Ht 5' 3" (1.6 m)   Wt 63.5 kg (140 lb)   LMP 2023   SpO2 97%   BMI 24.80 kg/m²     Focused Physical Examination   General: well developed, no acute distress  Pulmonary: respiratory effort normal with no retractions. Lungs CTAB. No rales or abnormal " breath sounds heard.  Abdomen: gravid  Cervix: Deferred.    Fetal Monitoring  EFM: 130 bpm, mod variability, + accels, - decels; reassuring and appropriate for gestational age   TOCO: none     Lab Results  Recent Labs   Lab 24  1716 24  1257 24  0555 24  0412   WBC 18.07* 8.95 8.17 7.30   HGB 8.7* 7.2* 8.2* 10.2*   HCT 24.4* 20.0* 23.2* 30.1*    267 271 176   BUN 4*  --  4* 6   CREATININE 0.6  --  0.5 0.5   ALT 39  --  27 24   AST 77*  --  42* 48*   *  --   --   --           Assessment:   29 y.o.  at 30w2d admitted for sickle cell pain crisis    Plan:     Acute Chest Syndrome/Sickle cell crisis  - Overnight: afebrile, HR stable, satting % on 1L NC  - CTA performed  showed patchy infiltrates consistent with acute chest syndrome.   - Continue CAP abx: Rocephin 2g q24, azithromycin 500mg QD  - Pain regimen:  Oxycontin 10 mg BID, Tylenol 1000mg Q6 hours, Tramdol 50 PRN, Oxy 10 PRN, Dilaudid 1g Q3hrs BNTP.   - Patient did not require any PRN pain medication.   - HgbS on admission 89.8%  - Incentive spirometry Q2H, nasal cannula as needed to maintain oxygen saturation above 95%.    - Patient currently on 1L NC  - Patient received 1u pRBCs  with appropriate rise in H/H  - Hematology and Pulmonology consulted.    - Pulmonology Recommendations:    -  Continue ceftriaxone and azithromycin while in hospital, would complete 3 days of azithromycin 500 mg and 5-7 days of ceftriaxone or oral equivalent if patient being discharged  - Continue supplemental oxygen as needed   - Continue IV fluids  - Continue current pain regiment with scheduled oxycodone ER 10 mg BID and prn tylenol, oxycodone IR, tramadol, and dilaudid   - No history of asthma but could have pregnancy induced asthma, would discharge with prescription for albuterol inhaler   - Not on nebulizer treatment as she reported some chest discomfort following treatment   - Continue supportive care   - Hematology  Recommendations:   - Would transfuse with 1 unit of packed red blood cells and recheck CBC. If clinically she is doing well then can continue supportive care. If chest symptoms persist and hemoglobin less than 10 would transfuse a 2nd unit of packed red blood cells.   - S/P Exchange transfusion , will repeat Hemoglobin S with goal less than 30%  - CBC this AM: 10..1/176    Abnormal EKG  -Patient has history of abnormal EKG: NSR with t wave abnormality and prolonged QT (398)  - EKG on admit reviewed by ED staff, and to noted to be stable when compared to prior EKGs  - Cardiology consulted, appreciate recommendations  - Avoid QT prolonging agents     Asthma  - Current respiratory status felt to be due to sickle pain crisis   - Continue nasal cannula for O2 stat > 95%  - Continue  albuterol PRN    Abnormal Type and Screen  - Direct Migel +   - Antibody identification: auto-e too weak to titrate  - Maintain T&C    Carissa Cruz MD  Obstetrics and Gynecology - PGY2     Fellow attestation.    Patient is a 29 y.o.  at 30w2d admitted for sickle cell pain crises and acute chest syndrome. Maternal vitals are stable within normal limits, including SpO2 WNL on room air. Fetal status reassuring on continuous monitoring. Yesterday, she received a exchange transfusion and was also able to wean off her O2 to room air without incident. H/H this morning 10..1. Her pain remains well controlled on her PO regimen. Overall stable maternal and fetal status. Will plan for DC home today with outpatient follow up with both MFM and Hematology, and will continue keflex to complete a 7 day course of abx. Remainder of plan as below:    Temp:  [97.7 °F (36.5 °C)-98.6 °F (37 °C)] 97.8 °F (36.6 °C)  Pulse:  [] 76  Resp:  [16-18] 18  SpO2:  [95 %-100 %] 96 %  BP: ()/(53-83) 102/56    FHRT:  baseline 130, +acels, very rare variable decelerations, moderate variability  no contractions  Overall reassuring    #Acute Chest  Syndrome/Sickle cell crisis  -(7/25) CTA Chest patchy infiltrates consistent with acute chest syndrome.   -(7/25) s/p1u PRBCs  -(7/26) s/p exchange transfusion  -(7/27) H/H 10.2/31.1  -(7/25-7/27) s/p Azithro 500mg   -Current abx regimen: Rocephin 2g q24 --> transition to PO keflex 500mg q6 upon discharge to complete 7 day course per pulm recs  - Pain regimen:  Oxycontin 10 mg BID, Tylenol 1000mg Q6 hours, Tramdol 50 PRN, Oxy 10 PRN, Dilaudid 1g Q3hrs BNTP  - dc home with albuterol inahler per pulm recs  [] f/up (7/27) repeat Hb S%    #Abnormal EKG  -Patient has history of abnormal EKG: NSR with t wave abnormality and prolonged QT (398)  - EKG on admit reviewed by ED staff, and to noted to be stable when compared to prior EKGs  - Cardiology consulted, follow up outpatient  - Avoid QT prolonging agents      Shanita Spaulding PGY5  Maternal Fetal Medicine Fellow

## 2024-07-27 NOTE — PLAN OF CARE
Problem:  Fall Injury Risk  Goal: Absence of Fall, Infant Drop and Related Injury  Outcome: Met     Problem: Adult Inpatient Plan of Care  Goal: Plan of Care Review  Outcome: Met  Goal: Patient-Specific Goal (Individualized)  Outcome: Met  Goal: Absence of Hospital-Acquired Illness or Injury  Outcome: Met  Goal: Optimal Comfort and Wellbeing  Outcome: Met  Goal: Readiness for Transition of Care  Outcome: Met     Problem: Sepsis/Septic Shock  Goal: Optimal Coping  Outcome: Met  Goal: Absence of Bleeding  Outcome: Met  Goal: Blood Glucose Level Within Targeted Range  Outcome: Met  Goal: Absence of Infection Signs and Symptoms  Outcome: Met  Goal: Optimal Nutrition Intake  Outcome: Met     Problem: Pneumonia  Goal: Fluid Balance  Outcome: Met  Goal: Resolution of Infection Signs and Symptoms  Outcome: Met  Goal: Effective Oxygenation and Ventilation  Outcome: Met     Problem: Infection  Goal: Absence of Infection Signs and Symptoms  Outcome: Met     Problem: Sickle Cell Disease in Pregnancy  Goal: Optimal Cerebral Tissue Perfusion  Outcome: Met  Goal: Optimal Coping with Sickle Cell Disease  Outcome: Met  Goal: Effective Tissue Perfusion  Outcome: Met  Goal: Absence of Infection Signs and Symptoms  Outcome: Met  Goal: Optimal Pain Control and Function  Outcome: Met  Goal: Optimal Oxygenation  Outcome: Met     Problem: Hospitalized  Patient  Goal: Optimal Patient-Fetal Wellbeing  Outcome: Met     Health teaching rendered. Intrajugular catheter is removed by Anesthesiologist, and after 1 hour of removal of the catheter there is no bleeding seen. Pt. Wearing a mask going home and advised to do it every time she is going out.  Pt. Stated that she will be looking at her my chart for discharge instruction.

## 2024-07-27 NOTE — CARE UPDATE
EFM: 130 bpm, mod variability, + accels, - decels; reassuring and appropriate for gestational age     TOCO: none     - Review of patient's chart:   - VSS     - Has not required any additional PRN pain medication   - Completed exchange transfusion, repeat Hgb S placed for tomorrow   - Will continue plan per AM A&P     Nikkie Martinez MD/MPH  OB/GYN PGY-4  [facility]

## 2024-07-30 ENCOUNTER — OFFICE VISIT (OUTPATIENT)
Dept: MATERNAL FETAL MEDICINE | Facility: CLINIC | Age: 29
End: 2024-07-30
Attending: OBSTETRICS & GYNECOLOGY
Payer: MEDICAID

## 2024-07-30 ENCOUNTER — PROCEDURE VISIT (OUTPATIENT)
Dept: MATERNAL FETAL MEDICINE | Facility: CLINIC | Age: 29
End: 2024-07-30
Payer: MEDICAID

## 2024-07-30 VITALS
BODY MASS INDEX: 22.73 KG/M2 | WEIGHT: 128.31 LBS | SYSTOLIC BLOOD PRESSURE: 115 MMHG | HEIGHT: 63 IN | DIASTOLIC BLOOD PRESSURE: 68 MMHG | HEART RATE: 88 BPM

## 2024-07-30 DIAGNOSIS — D57.01 ACUTE CHEST SYNDROME DUE TO SICKLE CELL CRISIS: ICD-10-CM

## 2024-07-30 DIAGNOSIS — D57.1 SICKLE CELL ANEMIA OF MOTHER DURING PREGNANCY: Primary | ICD-10-CM

## 2024-07-30 DIAGNOSIS — O99.019 SICKLE CELL ANEMIA OF MOTHER DURING PREGNANCY: Primary | ICD-10-CM

## 2024-07-30 DIAGNOSIS — D57.01 ACUTE CHEST SYNDROME DUE TO HEMOGLOBIN S DISEASE: ICD-10-CM

## 2024-07-30 DIAGNOSIS — Z36.2 ENCOUNTER FOR FOLLOW-UP ULTRASOUND OF FETAL ANATOMY: ICD-10-CM

## 2024-07-30 DIAGNOSIS — Z90.81 HISTORY OF SPLENECTOMY: ICD-10-CM

## 2024-07-30 DIAGNOSIS — Z36.89 ENCOUNTER FOR ULTRASOUND TO CHECK FETAL GROWTH: ICD-10-CM

## 2024-07-30 DIAGNOSIS — Z36.89 ENCOUNTER FOR ULTRASOUND TO ASSESS FETAL GROWTH: ICD-10-CM

## 2024-07-30 PROCEDURE — 99213 OFFICE O/P EST LOW 20 MIN: CPT | Mod: PBBFAC,TH,25 | Performed by: OBSTETRICS & GYNECOLOGY

## 2024-07-30 PROCEDURE — 99999 PR PBB SHADOW E&M-EST. PATIENT-LVL III: CPT | Mod: PBBFAC,,, | Performed by: OBSTETRICS & GYNECOLOGY

## 2024-07-30 PROCEDURE — 76816 OB US FOLLOW-UP PER FETUS: CPT | Mod: PBBFAC | Performed by: OBSTETRICS & GYNECOLOGY

## 2024-07-30 RX ORDER — AMOXICILLIN AND CLAVULANATE POTASSIUM 875; 125 MG/1; MG/1
1 TABLET, FILM COATED ORAL EVERY 12 HOURS
Qty: 6 TABLET | Refills: 0 | Status: SHIPPED | OUTPATIENT
Start: 2024-07-30

## 2024-07-30 NOTE — PROGRESS NOTES
"Maternal Fetal Medicine follow up consult    SUBJECTIVE:     Elizabeth Franco is a 29 y.o.  female with IUP at 30w5d who is seen in follow up consultation by [facility].  Pregnancy complications include:   No problems updated.    Previous notes reviewed.   No changes to medical, surgical, family, social, or obstetric history.    Interval history since last [facility] visit:  Patient was recently discharged from the hospital with sickle cell crisis and acute chest syndrome.  She received an exchange transfusion while admitted.  She recounts she is feeling much better.  She has no shortness of breath or chest pain.  She now walks without pain or shortness of breath.    Medications reviewed.    Care team members:  Dr. Jeansonne - Primary OB       OBJECTIVE:   /68 (BP Location: Left arm, Patient Position: Sitting, BP Method: Small (Automatic))   Pulse 88   Ht 5' 3" (1.6 m)   Wt 58.2 kg (128 lb 4.9 oz)   LMP 2023   Breastfeeding No   BMI 22.73 kg/m²         Ultrasound performed. See viewpoint for full ultrasound report.  The fetal anatomic survey was completed today, and no fetal structural abnormalities were identified of the structures imaged today.   AFV is normal.       ASSESSMENT/PLAN:     29 y.o.  female with IUP at 30w5d    The patient was given an opportunity to ask questions about management and the diease process.  She expressed an understanding of and agreement to the above impression and plan. All questions were answered to her satisfaction.  She was given contact information to the [facility] clinic to address further concerns.      "

## 2024-07-31 ENCOUNTER — PATIENT MESSAGE (OUTPATIENT)
Dept: MATERNAL FETAL MEDICINE | Facility: CLINIC | Age: 29
End: 2024-07-31
Payer: MEDICAID

## 2024-07-31 DIAGNOSIS — D57.1 SICKLE CELL DISEASE WITHOUT CRISIS: Primary | ICD-10-CM

## 2024-07-31 LAB
HGB FRACT BLD ELPH-IMP: NORMAL
HGB S MFR BLD ELPH: 28 %

## 2024-08-09 ENCOUNTER — PATIENT MESSAGE (OUTPATIENT)
Dept: OBSTETRICS AND GYNECOLOGY | Facility: CLINIC | Age: 29
End: 2024-08-09
Payer: MEDICAID

## 2024-08-09 ENCOUNTER — ROUTINE PRENATAL (OUTPATIENT)
Dept: OBSTETRICS AND GYNECOLOGY | Facility: CLINIC | Age: 29
End: 2024-08-09
Payer: MEDICAID

## 2024-08-09 VITALS
WEIGHT: 134.06 LBS | SYSTOLIC BLOOD PRESSURE: 109 MMHG | DIASTOLIC BLOOD PRESSURE: 71 MMHG | BODY MASS INDEX: 23.74 KG/M2

## 2024-08-09 DIAGNOSIS — D57.1 SICKLE CELL DISEASE WITHOUT CRISIS: ICD-10-CM

## 2024-08-09 DIAGNOSIS — O09.93 HIGH-RISK PREGNANCY IN THIRD TRIMESTER: Primary | ICD-10-CM

## 2024-08-09 PROCEDURE — 99212 OFFICE O/P EST SF 10 MIN: CPT | Mod: PBBFAC,TH | Performed by: OBSTETRICS & GYNECOLOGY

## 2024-08-09 PROCEDURE — 99999 PR PBB SHADOW E&M-EST. PATIENT-LVL II: CPT | Mod: PBBFAC,,, | Performed by: OBSTETRICS & GYNECOLOGY

## 2024-08-12 ENCOUNTER — HOSPITAL ENCOUNTER (OUTPATIENT)
Dept: PERINATAL CARE | Facility: OTHER | Age: 29
Discharge: HOME | End: 2024-08-12
Attending: OBSTETRICS & GYNECOLOGY
Payer: MEDICAID

## 2024-08-12 DIAGNOSIS — D57.1 SICKLE CELL ANEMIA OF MOTHER DURING PREGNANCY: ICD-10-CM

## 2024-08-12 DIAGNOSIS — O99.019 SICKLE CELL ANEMIA OF MOTHER DURING PREGNANCY: ICD-10-CM

## 2024-08-12 DIAGNOSIS — D57.01 ACUTE CHEST SYNDROME DUE TO HEMOGLOBIN S DISEASE: ICD-10-CM

## 2024-08-12 PROCEDURE — 76815 OB US LIMITED FETUS(S): CPT

## 2024-08-12 PROCEDURE — 59025 FETAL NON-STRESS TEST: CPT

## 2024-08-13 ENCOUNTER — HOSPITAL ENCOUNTER (OUTPATIENT)
Dept: CARDIOLOGY | Facility: HOSPITAL | Age: 29
Discharge: HOME | End: 2024-08-13
Attending: OBSTETRICS & GYNECOLOGY
Payer: MEDICAID

## 2024-08-13 VITALS
BODY MASS INDEX: 23.74 KG/M2 | SYSTOLIC BLOOD PRESSURE: 109 MMHG | HEART RATE: 87 BPM | DIASTOLIC BLOOD PRESSURE: 70 MMHG | WEIGHT: 134 LBS | HEIGHT: 63 IN

## 2024-08-13 DIAGNOSIS — O99.019 SICKLE CELL ANEMIA OF MOTHER DURING PREGNANCY: ICD-10-CM

## 2024-08-13 DIAGNOSIS — D57.1 SICKLE CELL ANEMIA OF MOTHER DURING PREGNANCY: ICD-10-CM

## 2024-08-13 DIAGNOSIS — O09.92 SUPERVISION OF HIGH RISK PREGNANCY IN SECOND TRIMESTER: ICD-10-CM

## 2024-08-13 LAB
ASCENDING AORTA: 2.33 CM
AV INDEX (PROSTH): 0.72
AV MEAN GRADIENT: 9 MMHG
AV PEAK GRADIENT: 16 MMHG
AV VALVE AREA BY VELOCITY RATIO: 2.19 CM²
AV VALVE AREA: 2.27 CM²
AV VELOCITY RATIO: 0.7
BSA FOR ECHO PROCEDURE: 1.64 M2
CV ECHO LV RWT: 0.36 CM
DOP CALC AO PEAK VEL: 2.02 M/S
DOP CALC AO VTI: 39.28 CM
DOP CALC LVOT AREA: 3.1 CM2
DOP CALC LVOT DIAMETER: 2 CM
DOP CALC LVOT PEAK VEL: 1.41 M/S
DOP CALC LVOT STROKE VOLUME: 89.05 CM3
DOP CALCLVOT PEAK VEL VTI: 28.36 CM
E WAVE DECELERATION TIME: 198.53 MSEC
E/A RATIO: 1.97
E/E' RATIO: 9.08 M/S
ECHO LV POSTERIOR WALL: 0.9 CM (ref 0.6–1.1)
EJECTION FRACTION: 60 %
FRACTIONAL SHORTENING: 36 % (ref 28–44)
INTERVENTRICULAR SEPTUM: 0.83 CM (ref 0.6–1.1)
LA MAJOR: 5.27 CM
LA MINOR: 5.42 CM
LA WIDTH: 4.52 CM
LEFT ATRIUM SIZE: 3.66 CM
LEFT ATRIUM VOLUME INDEX MOD: 45.7 ML/M2
LEFT ATRIUM VOLUME INDEX: 46.1 ML/M2
LEFT ATRIUM VOLUME MOD: 74.43 CM3
LEFT ATRIUM VOLUME: 75.15 CM3
LEFT INTERNAL DIMENSION IN SYSTOLE: 3.18 CM (ref 2.1–4)
LEFT VENTRICLE DIASTOLIC VOLUME INDEX: 70.48 ML/M2
LEFT VENTRICLE DIASTOLIC VOLUME: 114.89 ML
LEFT VENTRICLE MASS INDEX: 90 G/M2
LEFT VENTRICLE SYSTOLIC VOLUME INDEX: 24.7 ML/M2
LEFT VENTRICLE SYSTOLIC VOLUME: 40.34 ML
LEFT VENTRICULAR INTERNAL DIMENSION IN DIASTOLE: 4.94 CM (ref 3.5–6)
LEFT VENTRICULAR MASS: 147.19 G
LV LATERAL E/E' RATIO: 8.43 M/S
LV SEPTAL E/E' RATIO: 9.83 M/S
MV A" WAVE DURATION": 9.71 MSEC
MV PEAK A VEL: 0.6 M/S
MV PEAK E VEL: 1.18 M/S
MV STENOSIS PRESSURE HALF TIME: 57.57 MS
MV VALVE AREA P 1/2 METHOD: 3.82 CM2
PISA TR MAX VEL: 2.68 M/S
PULM VEIN S/D RATIO: 0.81
PV PEAK D VEL: 0.57 M/S
PV PEAK S VEL: 0.46 M/S
RA MAJOR: 5.01 CM
RA PRESSURE ESTIMATED: 3 MMHG
RA WIDTH: 4.03 CM
RIGHT VENTRICLE DIASTOLIC BASEL DIMENSION: 4.1 CM
RV TB RVSP: 6 MMHG
SINUS: 2.49 CM
STJ: 1.88 CM
TDI LATERAL: 0.14 M/S
TDI SEPTAL: 0.12 M/S
TDI: 0.13 M/S
TR MAX PG: 29 MMHG
TRICUSPID ANNULAR PLANE SYSTOLIC EXCURSION: 3.37 CM
TV REST PULMONARY ARTERY PRESSURE: 32 MMHG
Z-SCORE OF LEFT VENTRICULAR DIMENSION IN END DIASTOLE: 0.72
Z-SCORE OF LEFT VENTRICULAR DIMENSION IN END SYSTOLE: 0.86

## 2024-08-13 PROCEDURE — 93306 TTE W/DOPPLER COMPLETE: CPT

## 2024-08-13 PROCEDURE — 93306 TTE W/DOPPLER COMPLETE: CPT | Mod: 26,,, | Performed by: INTERNAL MEDICINE

## 2024-08-15 ENCOUNTER — HOSPITAL ENCOUNTER (OUTPATIENT)
Dept: PERINATAL CARE | Facility: OTHER | Age: 29
Discharge: HOME | End: 2024-08-15
Attending: OBSTETRICS & GYNECOLOGY
Payer: MEDICAID

## 2024-08-15 DIAGNOSIS — D57.1 SICKLE CELL ANEMIA OF MOTHER DURING PREGNANCY: ICD-10-CM

## 2024-08-15 DIAGNOSIS — D57.01 ACUTE CHEST SYNDROME DUE TO HEMOGLOBIN S DISEASE: ICD-10-CM

## 2024-08-15 DIAGNOSIS — O09.93 HIGH RISK PREGNANCY CASE MANAGEMENT PATIENT IN THIRD TRIMESTER: Primary | ICD-10-CM

## 2024-08-15 DIAGNOSIS — O99.019 SICKLE CELL ANEMIA OF MOTHER DURING PREGNANCY: ICD-10-CM

## 2024-08-15 PROCEDURE — 59025 FETAL NON-STRESS TEST: CPT

## 2024-08-15 PROCEDURE — 59025 FETAL NON-STRESS TEST: CPT | Mod: 26,,, | Performed by: OBSTETRICS & GYNECOLOGY

## 2024-08-16 ENCOUNTER — PATIENT MESSAGE (OUTPATIENT)
Dept: OBSTETRICS AND GYNECOLOGY | Facility: CLINIC | Age: 29
End: 2024-08-16
Payer: MEDICAID

## 2024-08-16 LAB
OHS QRS DURATION: 82 MS
OHS QTC CALCULATION: 448 MS

## 2024-08-19 ENCOUNTER — TELEPHONE (OUTPATIENT)
Dept: OBSTETRICS AND GYNECOLOGY | Facility: CLINIC | Age: 29
End: 2024-08-19
Payer: MEDICAID

## 2024-08-19 NOTE — TELEPHONE ENCOUNTER
Called pt in regards to rescheduling NST appt, pt has already rescheduled. Pt verbalized understanding.

## 2024-08-19 NOTE — TELEPHONE ENCOUNTER
----- Message from Gabi Arroyo MA sent at 8/19/2024  2:48 PM CDT -----    ----- Message -----  From: Jeansonne, Julie R., MD  Sent: 8/19/2024   2:09 PM CDT  To: Gabi Arroyo MA      ----- Message -----  From: Faustino Crews  Sent: 8/19/2024   7:21 AM CDT  To: Jeansonne Julie Staff    Name Of Caller: Elizabeth        Provider Name:Jeansonne, Julie         Does patient feel the need to be seen today? no        Relationship to the Pt?: patient        Contact Preference?: [phone]        What is the nature of the call?:Patient has an appointment scheduled for today at 10am for a stress test, patient states that she needs to speak with the office to get this appointment rescheduled.

## 2024-08-20 ENCOUNTER — HOSPITAL ENCOUNTER (OUTPATIENT)
Dept: PERINATAL CARE | Facility: OTHER | Age: 29
Discharge: HOME | End: 2024-08-20
Attending: OBSTETRICS & GYNECOLOGY
Payer: MEDICAID

## 2024-08-20 DIAGNOSIS — O99.019 SICKLE CELL ANEMIA OF MOTHER DURING PREGNANCY: ICD-10-CM

## 2024-08-20 DIAGNOSIS — D57.01 ACUTE CHEST SYNDROME DUE TO HEMOGLOBIN S DISEASE: ICD-10-CM

## 2024-08-20 DIAGNOSIS — D57.1 SICKLE CELL ANEMIA OF MOTHER DURING PREGNANCY: ICD-10-CM

## 2024-08-20 DIAGNOSIS — D57.1 SICKLE CELL DISEASE WITHOUT CRISIS: ICD-10-CM

## 2024-08-20 DIAGNOSIS — O09.93 HIGH-RISK PREGNANCY IN THIRD TRIMESTER: ICD-10-CM

## 2024-08-20 PROCEDURE — 76815 OB US LIMITED FETUS(S): CPT

## 2024-08-20 PROCEDURE — 76815 OB US LIMITED FETUS(S): CPT | Mod: 26,,, | Performed by: OBSTETRICS & GYNECOLOGY

## 2024-08-20 PROCEDURE — 59025 FETAL NON-STRESS TEST: CPT | Mod: 26,,, | Performed by: OBSTETRICS & GYNECOLOGY

## 2024-08-20 PROCEDURE — 59025 FETAL NON-STRESS TEST: CPT

## 2024-08-23 ENCOUNTER — ROUTINE PRENATAL (OUTPATIENT)
Dept: OBSTETRICS AND GYNECOLOGY | Facility: CLINIC | Age: 29
End: 2024-08-23
Payer: MEDICAID

## 2024-08-23 ENCOUNTER — LAB VISIT (OUTPATIENT)
Dept: LAB | Facility: HOSPITAL | Age: 29
End: 2024-08-23
Attending: OBSTETRICS & GYNECOLOGY
Payer: MEDICAID

## 2024-08-23 ENCOUNTER — PROCEDURE VISIT (OUTPATIENT)
Dept: OBSTETRICS AND GYNECOLOGY | Facility: CLINIC | Age: 29
End: 2024-08-23
Payer: MEDICAID

## 2024-08-23 VITALS
DIASTOLIC BLOOD PRESSURE: 76 MMHG | BODY MASS INDEX: 23.63 KG/M2 | WEIGHT: 133.38 LBS | SYSTOLIC BLOOD PRESSURE: 117 MMHG

## 2024-08-23 DIAGNOSIS — O09.93 SUPERVISION OF HIGH RISK PREGNANCY IN THIRD TRIMESTER: Primary | ICD-10-CM

## 2024-08-23 DIAGNOSIS — O09.93 SUPERVISION OF HIGH RISK PREGNANCY IN THIRD TRIMESTER: ICD-10-CM

## 2024-08-23 DIAGNOSIS — O09.93 HIGH RISK PREGNANCY CASE MANAGEMENT PATIENT IN THIRD TRIMESTER: ICD-10-CM

## 2024-08-23 DIAGNOSIS — D57.1 SICKLE CELL DISEASE WITHOUT CRISIS: ICD-10-CM

## 2024-08-23 LAB
BASOPHILS # BLD AUTO: 0.08 K/UL (ref 0–0.2)
BASOPHILS NFR BLD: 0.8 % (ref 0–1.9)
DIFFERENTIAL METHOD BLD: ABNORMAL
EOSINOPHIL # BLD AUTO: 0.3 K/UL (ref 0–0.5)
EOSINOPHIL NFR BLD: 2.9 % (ref 0–8)
ERYTHROCYTE [DISTWIDTH] IN BLOOD BY AUTOMATED COUNT: 17.7 % (ref 11.5–14.5)
HCT VFR BLD AUTO: 31.3 % (ref 37–48.5)
HGB BLD-MCNC: 10.5 G/DL (ref 12–16)
HIV 1+2 AB+HIV1 P24 AG SERPL QL IA: NORMAL
IMM GRANULOCYTES # BLD AUTO: 0.08 K/UL (ref 0–0.04)
IMM GRANULOCYTES NFR BLD AUTO: 0.8 % (ref 0–0.5)
LYMPHOCYTES # BLD AUTO: 2.3 K/UL (ref 1–4.8)
LYMPHOCYTES NFR BLD: 21.7 % (ref 18–48)
MCH RBC QN AUTO: 32 PG (ref 27–31)
MCHC RBC AUTO-ENTMCNC: 33.5 G/DL (ref 32–36)
MCV RBC AUTO: 95 FL (ref 82–98)
MONOCYTES # BLD AUTO: 0.9 K/UL (ref 0.3–1)
MONOCYTES NFR BLD: 8.4 % (ref 4–15)
NEUTROPHILS # BLD AUTO: 6.9 K/UL (ref 1.8–7.7)
NEUTROPHILS NFR BLD: 65.4 % (ref 38–73)
NRBC BLD-RTO: 4 /100 WBC
PLATELET # BLD AUTO: 382 K/UL (ref 150–450)
PMV BLD AUTO: 12.2 FL (ref 9.2–12.9)
RBC # BLD AUTO: 3.28 M/UL (ref 4–5.4)
TREPONEMA PALLIDUM IGG+IGM AB [PRESENCE] IN SERUM OR PLASMA BY IMMUNOASSAY: NONREACTIVE
WBC # BLD AUTO: 10.59 K/UL (ref 3.9–12.7)

## 2024-08-23 PROCEDURE — 76819 FETAL BIOPHYS PROFIL W/O NST: CPT | Mod: PBBFAC | Performed by: OBSTETRICS & GYNECOLOGY

## 2024-08-23 PROCEDURE — 99999 PR PBB SHADOW E&M-EST. PATIENT-LVL II: CPT | Mod: PBBFAC,,, | Performed by: OBSTETRICS & GYNECOLOGY

## 2024-08-23 PROCEDURE — 36415 COLL VENOUS BLD VENIPUNCTURE: CPT | Performed by: OBSTETRICS & GYNECOLOGY

## 2024-08-23 PROCEDURE — 99212 OFFICE O/P EST SF 10 MIN: CPT | Mod: PBBFAC,TH | Performed by: OBSTETRICS & GYNECOLOGY

## 2024-08-23 PROCEDURE — 87389 HIV-1 AG W/HIV-1&-2 AB AG IA: CPT | Performed by: OBSTETRICS & GYNECOLOGY

## 2024-08-23 PROCEDURE — 85025 COMPLETE CBC W/AUTO DIFF WBC: CPT | Performed by: OBSTETRICS & GYNECOLOGY

## 2024-08-23 PROCEDURE — 86593 SYPHILIS TEST NON-TREP QUANT: CPT | Performed by: OBSTETRICS & GYNECOLOGY

## 2024-08-23 PROCEDURE — 87086 URINE CULTURE/COLONY COUNT: CPT | Performed by: OBSTETRICS & GYNECOLOGY

## 2024-08-23 NOTE — PROGRESS NOTES
Patient with no complaints. Denies vaginal bleeding or contractions. Good FM. PNT normal today. 3T labs with CBC and urine culture today. No pain crises recently. Discussed rec for 39 week delivery. She would like to think about dates. Wants to bring birth plan next time. GBS next time. Looking at peds.  RTC in 2 weeks.     Vitals signs, FHTs, urine dip, and PE findings documented, reviewed and available in OB flow chart.       I spent a total of 20 minutes on the day of the visit.This includes face to face time and non-face to face time preparing to see the patient (eg, review of tests), Obtaining and/or reviewing separately obtained history, Documenting clinical information in the electronic or other health record, Independently interpreting resultsand communicating results to the patient/family/caregiver, or Care coordination.

## 2024-08-24 LAB — BACTERIA UR CULT: NO GROWTH

## 2024-08-26 ENCOUNTER — HOSPITAL ENCOUNTER (OUTPATIENT)
Dept: PERINATAL CARE | Facility: OTHER | Age: 29
Discharge: HOME | End: 2024-08-26
Attending: OBSTETRICS & GYNECOLOGY
Payer: MEDICAID

## 2024-08-26 DIAGNOSIS — O99.019 SICKLE CELL ANEMIA OF MOTHER DURING PREGNANCY: ICD-10-CM

## 2024-08-26 DIAGNOSIS — D57.1 SICKLE CELL ANEMIA OF MOTHER DURING PREGNANCY: ICD-10-CM

## 2024-08-26 DIAGNOSIS — D57.1 SICKLE CELL DISEASE WITHOUT CRISIS: ICD-10-CM

## 2024-08-26 DIAGNOSIS — D57.01 ACUTE CHEST SYNDROME DUE TO HEMOGLOBIN S DISEASE: ICD-10-CM

## 2024-08-26 DIAGNOSIS — O09.93 HIGH-RISK PREGNANCY IN THIRD TRIMESTER: ICD-10-CM

## 2024-08-26 PROCEDURE — 59025 FETAL NON-STRESS TEST: CPT | Mod: 26,,, | Performed by: OBSTETRICS & GYNECOLOGY

## 2024-08-26 PROCEDURE — 59025 FETAL NON-STRESS TEST: CPT

## 2024-08-29 ENCOUNTER — PATIENT MESSAGE (OUTPATIENT)
Dept: OTHER | Facility: OTHER | Age: 29
End: 2024-08-29
Payer: MEDICAID

## 2024-08-29 ENCOUNTER — OFFICE VISIT (OUTPATIENT)
Dept: PULMONOLOGY | Facility: CLINIC | Age: 29
End: 2024-08-29
Payer: MEDICAID

## 2024-08-29 VITALS
WEIGHT: 135.81 LBS | BODY MASS INDEX: 24.06 KG/M2 | HEART RATE: 88 BPM | OXYGEN SATURATION: 96 % | SYSTOLIC BLOOD PRESSURE: 109 MMHG | DIASTOLIC BLOOD PRESSURE: 68 MMHG

## 2024-08-29 DIAGNOSIS — O09.93 HIGH-RISK PREGNANCY IN THIRD TRIMESTER: ICD-10-CM

## 2024-08-29 DIAGNOSIS — D57.1 SICKLE CELL DISEASE WITHOUT CRISIS: ICD-10-CM

## 2024-08-29 DIAGNOSIS — J45.909 ASTHMA, UNSPECIFIED ASTHMA SEVERITY, UNSPECIFIED WHETHER COMPLICATED, UNSPECIFIED WHETHER PERSISTENT: Primary | ICD-10-CM

## 2024-08-29 PROCEDURE — 99999 PR PBB SHADOW E&M-EST. PATIENT-LVL III: CPT | Mod: PBBFAC,,,

## 2024-08-29 PROCEDURE — 99213 OFFICE O/P EST LOW 20 MIN: CPT | Mod: PBBFAC,PN

## 2024-08-29 RX ORDER — ENOXAPARIN SODIUM 100 MG/ML
INJECTION SUBCUTANEOUS
COMMUNITY
Start: 2024-08-12

## 2024-08-29 NOTE — PROGRESS NOTES
History and Physical Note  [facility]    Subjective:     Reason for visit: Pulmonary Evaluation    Patient ID:  Elizabeth Franco is a 29 y.o. female    Interval History 08/29/2024:  Patient is referred here today by her Ob/GYN for PFT.  She has a history of HbSS. She is 35 weeks pregnant today. This is her first pregnancy.   Recent acute chest syndrome in March that was treated with levaquin and supplemental oxygen. Sickle cell crisis in July. No pulmonary complaints or increased oxygen requirements since that time. She is struggling with shortness of breath that was worse in her first and second trimester. It has improved and she gets relief from albuterol rescue inhaler that she only uses as needed. She was prescribed pulmicort daily but this is not covered by her insurance but she feels her symptoms are well managed by her albuterol. She did not grow up with asthma as a child.   No wheezing or cough associated with shortness of breath.     Additional Pulmonary History:  Occupational: Meal prepping; retail;   Environmental Exposures: None  Exposure to Animals/Pets: 2 cats; sinus infection  Travel History: None  Family History of Lung Cancer: None  Childhood Illnesses:  Sickle cell;   Tobacco/Smoking: None  Social History     Tobacco Use   Smoking Status Never   Smokeless Tobacco Never       Objective:     Vitals:    08/29/24 0905   BP: 109/68   BP Location: Left arm   Patient Position: Sitting   Pulse: 88   SpO2: 96%   Weight: 61.6 kg (135 lb 12.9 oz)         Physical Exam  Constitutional:       Appearance: Normal appearance. She is well-developed.   HENT:      Head: Normocephalic.   Cardiovascular:      Rate and Rhythm: Normal rate.      Pulses: Normal pulses.      Heart sounds: Normal heart sounds, S1 normal and S2 normal.   Pulmonary:      Effort: Pulmonary effort is normal.      Breath sounds: Normal breath sounds. No decreased breath sounds.   Musculoskeletal:      Right lower leg: No edema.       Left lower leg: No edema.   Skin:     General: Skin is warm.      Capillary Refill: Capillary refill takes less than 2 seconds.      Findings: No rash.      Nails: There is no clubbing.   Neurological:      Mental Status: She is alert and oriented to person, place, and time. Mental status is at baseline.   Psychiatric:         Attention and Perception: Attention normal.         Mood and Affect: Mood and affect normal.         Speech: Speech normal.         Behavior: Behavior is cooperative.          Personal Diagnostic Review and Interpretation  CTA 07/25/2024:  No mediastinal, hilar, or axillary adenopathy is seen.  There are few small reactive appearing axillary lymph nodes.  Arch and great vessels are normal.  Pulmonary vessels are well opacified.  No pulmonary embolus seen.  No mediastinal, hilar, or axillary adenopathy is seen.  Upper abdominal organs demonstrate nothing unusual.  No pulmonary nodules are seen.  There are patchy focal infiltrates at the lung bases posteriorly.  There vertebral body changes suggesting possible sickle cell disease.  Heart size is prominent.    CXR 07/26/2024: lungs clear      Pertinent Studies Reviewed & Interpreted:     Pulmonary Function Tests:   pending    Echocardiograms:   08/13/2024 TTE:     Left Ventricle: The left ventricle is normal in size. Ventricular mass is normal. Normal wall thickness. Normal wall motion. There is normal systolic function with a visually estimated ejection fraction of 55 - 60%. Ejection fraction by visual approximation is 60%. There is normal diastolic function.    Right Ventricle: Normal right ventricular cavity size. Wall thickness is normal. Systolic function is normal.    Left Atrium: Left atrium is moderately dilated.    Right Atrium: Right atrium is mildly dilated.    Tricuspid Valve: There is trace regurgitation.    Pulmonary Artery: The estimated pulmonary artery systolic pressure is 32 mmHg.    IVC/SVC: Normal venous pressure at 3  mmHg.    Pericardium: There is a very trivial posterior effusion and very trivial under the RA.       Other Pertinent Laboratories:  Lab Results   Component Value Date    WBC 10.59 08/23/2024    RBC 3.28 (L) 08/23/2024    HGB 10.5 (L) 08/23/2024    MCV 95 08/23/2024    MCH 32.0 (H) 08/23/2024    MCHC 33.5 08/23/2024    RDW 17.7 (H) 08/23/2024     08/23/2024    MPV 12.2 08/23/2024    GRAN 6.9 08/23/2024    GRAN 65.4 08/23/2024    LYMPH 2.3 08/23/2024    LYMPH 21.7 08/23/2024    MONO 0.9 08/23/2024    MONO 8.4 08/23/2024    EOS 0.3 08/23/2024    BASO 0.08 08/23/2024      Latest Reference Range & Units 02/10/16 13:52 05/31/16 15:25 07/27/16 13:05 05/24/17 16:07 06/05/18 08:15 12/10/18 11:56 04/25/19 11:30 04/03/20 10:21 04/04/20 02:35 04/05/20 09:20 03/03/21 19:06 03/04/21 07:13 03/05/21 07:23 03/15/21 13:12 04/02/22 08:45 04/03/22 06:59 04/04/22 04:37 04/05/22 03:42 04/06/22 03:50 04/07/22 05:11 04/08/22 05:05 04/12/22 11:15 06/15/22 08:34 06/16/22 05:59 06/17/22 04:34 06/18/22 06:46 06/19/22 05:43 06/20/22 05:07 08/05/22 09:57 05/10/23 13:08 08/25/23 10:03 02/22/24 15:05 03/07/24 12:30 06/11/24 10:47 07/19/24 13:48 07/23/24 17:16 07/25/24 12:57 07/26/24 05:55 07/27/24 04:12 08/23/24 14:22   Eos # 0.0 - 0.5 K/uL 1.0 (H) 0.8 (H) CANCELED 0.4 0.4 0.3 CANCELED 0.2 0.4 0.6 (H) 0.7 (H) 0.6 (H) 0.8 (H) 0.5 0.0 0.4 0.3 0.4 0.4 0.7 (H) 0.8 (H) 0.4 0.0 0.0 0.0 0.1 0.4 0.0 0.5 0.8 (H) 0.8 (H) 0.6 (H) 0.4 0.9 (H) 0.5 0.2 0.4 0.4 0.4 0.3       Assessment & Plan:       Plan:  Clinical impression is resonably certain and repeated evaluation prn +/- follow up will be needed as below.      1. Asthma, unspecified asthma severity, unspecified whether complicated, unspecified whether persistent  -     Complete PFT with bronchodilator; Future    2. Sickle cell disease without crisis  -     Ambulatory referral/consult to Pulmonology  -     Complete PFT with bronchodilator; Future    3. High-risk pregnancy in third trimester  -      Ambulatory referral/consult to Pulmonology      Patient was advised by her insurance company that PFT needed to be rescheduled for authorization. This test was rescheduled for 09/06. Patient is not in acute distress and symptoms are being managed with albuterol rescue inhaler. We will follow up that day to review results of her PFT.      There are no Patient Instructions on file for this visit.    RETURN TO CLINIC AFTER PFT/      Total professional time spent for the encounter: 26 minutes  Time was spent preparing to see the patient, reviewing results of prior testing, obtaining and/or reviewing separately obtained history, performing a medically appropriate examination and interview, counseling and educating the patient/family, ordering medications/tests/procedures, referring and communicating with other health care professionals, documenting clinical information in the electronic health record, and independently interpreting results.    Zoraida Gale, DNP

## 2024-08-30 ENCOUNTER — OFFICE VISIT (OUTPATIENT)
Dept: MATERNAL FETAL MEDICINE | Facility: CLINIC | Age: 29
End: 2024-08-30
Payer: MEDICAID

## 2024-08-30 ENCOUNTER — PROCEDURE VISIT (OUTPATIENT)
Dept: MATERNAL FETAL MEDICINE | Facility: CLINIC | Age: 29
End: 2024-08-30
Payer: MEDICAID

## 2024-08-30 VITALS
WEIGHT: 136.69 LBS | DIASTOLIC BLOOD PRESSURE: 70 MMHG | BODY MASS INDEX: 24.22 KG/M2 | SYSTOLIC BLOOD PRESSURE: 113 MMHG | HEIGHT: 63 IN | HEART RATE: 90 BPM

## 2024-08-30 DIAGNOSIS — D57.1 SICKLE CELL ANEMIA OF MOTHER DURING PREGNANCY: ICD-10-CM

## 2024-08-30 DIAGNOSIS — D57.1 SICKLE CELL ANEMIA OF MOTHER DURING PREGNANCY: Primary | ICD-10-CM

## 2024-08-30 DIAGNOSIS — O99.019 SICKLE CELL ANEMIA OF MOTHER DURING PREGNANCY: ICD-10-CM

## 2024-08-30 DIAGNOSIS — Z36.89 ENCOUNTER FOR ULTRASOUND TO ASSESS FETAL GROWTH: ICD-10-CM

## 2024-08-30 DIAGNOSIS — O99.019 SICKLE CELL ANEMIA OF MOTHER DURING PREGNANCY: Primary | ICD-10-CM

## 2024-08-30 PROBLEM — M25.521 RIGHT ELBOW PAIN: Status: RESOLVED | Noted: 2024-04-17 | Resolved: 2024-08-30

## 2024-08-30 PROBLEM — D59.11: Status: RESOLVED | Noted: 2022-06-19 | Resolved: 2024-08-30

## 2024-08-30 PROBLEM — D57.01 ACUTE CHEST SYNDROME DUE TO HEMOGLOBIN S DISEASE: Status: RESOLVED | Noted: 2021-03-03 | Resolved: 2024-08-30

## 2024-08-30 PROBLEM — R09.02 HYPOXEMIA: Status: RESOLVED | Noted: 2022-06-15 | Resolved: 2024-08-30

## 2024-08-30 PROBLEM — R79.89 ELEVATED TROPONIN: Status: RESOLVED | Noted: 2022-06-15 | Resolved: 2024-08-30

## 2024-08-30 PROBLEM — D57.00 SICKLE CELL CRISIS: Status: RESOLVED | Noted: 2024-07-23 | Resolved: 2024-08-30

## 2024-08-30 PROBLEM — A41.9 SEVERE SEPSIS: Status: RESOLVED | Noted: 2022-06-15 | Resolved: 2024-08-30

## 2024-08-30 PROBLEM — R65.20 SEVERE SEPSIS: Status: RESOLVED | Noted: 2022-06-15 | Resolved: 2024-08-30

## 2024-08-30 PROBLEM — R79.89 ELEVATED D-DIMER: Status: RESOLVED | Noted: 2022-06-15 | Resolved: 2024-08-30

## 2024-08-30 PROBLEM — K59.00 CONSTIPATION: Status: RESOLVED | Noted: 2022-06-15 | Resolved: 2024-08-30

## 2024-08-30 PROBLEM — L03.119 CELLULITIS OF EXTREMITY: Status: RESOLVED | Noted: 2022-04-05 | Resolved: 2024-08-30

## 2024-08-30 PROBLEM — J18.9 PNEUMONIA: Status: RESOLVED | Noted: 2022-06-15 | Resolved: 2024-08-30

## 2024-08-30 PROBLEM — D57.01 ACUTE CHEST SYNDROME DUE TO SICKLE CELL CRISIS: Status: RESOLVED | Noted: 2022-06-15 | Resolved: 2024-08-30

## 2024-08-30 PROBLEM — R94.31 ABNORMAL EKG: Status: RESOLVED | Noted: 2024-07-23 | Resolved: 2024-08-30

## 2024-08-30 PROCEDURE — 99999 PR PBB SHADOW E&M-EST. PATIENT-LVL III: CPT | Mod: PBBFAC,,, | Performed by: STUDENT IN AN ORGANIZED HEALTH CARE EDUCATION/TRAINING PROGRAM

## 2024-08-30 PROCEDURE — 76816 OB US FOLLOW-UP PER FETUS: CPT | Mod: PBBFAC | Performed by: STUDENT IN AN ORGANIZED HEALTH CARE EDUCATION/TRAINING PROGRAM

## 2024-08-30 PROCEDURE — 99213 OFFICE O/P EST LOW 20 MIN: CPT | Mod: PBBFAC,TH | Performed by: STUDENT IN AN ORGANIZED HEALTH CARE EDUCATION/TRAINING PROGRAM

## 2024-08-30 NOTE — PROGRESS NOTES
"Maternal Fetal Medicine follow up consult    SUBJECTIVE:     Elizabeth Franco is a 29 y.o.  female with IUP at 35w1d who is seen in follow up consultation by MFM.  Pregnancy complications include:   Problem   Sickle Cell Anemia of Mother During Pregnancy     Previous notes reviewed.   No changes to medical, surgical, family, social, or obstetric history.    Interval history since last MFM visit: Patient states she is overall feeling ok, but hopes that she is not developing a pain crisis. Reports active fetal movement and denies contractions, vaginal bleeding, or leakage of fluid.     Medications reviewed.     OBJECTIVE:   /70 (BP Location: Left arm, Patient Position: Sitting, BP Method: Small (Automatic))   Pulse 90   Ht 5' 3" (1.6 m)   Wt 62 kg (136 lb 11 oz)   LMP 2023   BMI 24.21 kg/m²     Ultrasound performed. See viewpoint for full ultrasound report.  [facility]    ASSESSMENT/PLAN:     29 y.o.  female with IUP at 35w1d    Sickle cell anemia of mother during pregnancy  Doing well today. No pain crises since hospital admission at end of July. Only using oxycodone prn, not needed daily.   Please see prior note for full counseling.  Partner has been tested and is not a carrier.     Most recent labs:  Lab Results   Component Value Date    HGB 10.5 (L) 2024    HCT 31.3 (L) 2024     2024    CREATININE 0.5 2024    AST 48 (H) 2024    ALT 24 2024 TTE:Normal systolic and diastolic function. EF 60%    Recommendations:  Urine culture q trimester  CBC monthly  Continue: Folic acid 4 mg, Aspirin 81 mg  Narcotics per heme/onc-avoid abrupt cessation and avoid NSAIDs  Avoid iron supplementation due to risks of iron overload  Prophylactic lovenox or heparin while admitted to the hospital antepartum or postpartum  Serial growth ultrasounds, to be performed every 4-6 weeks  Antepartum testing twice weekly at 32 weeks (this can be ordered by primary " OB)  Delivery timin weeks, unless indicated earlier      FOLLOW UP  F/u in 3 weeks for growth ultrasound  No additional follow up has been scheduled with M. Please do not hesitate to contact us with any additional questions, and thank you for allowing us to participate in the care of this patient.    30 minutes of total time spent on the encounter, which includes face to face time and non-face to face time preparing to see the patient (eg, review of tests), obtaining and/or reviewing separately obtained history, documenting clinical information in the electronic or other health record, independently interpreting results (not separately reported) and communicating results to the patient/family/caregiver, or care coordination (not separately reported).    SINGH Chowdhury MD   Maternal-Fetal Medicine

## 2024-08-30 NOTE — ASSESSMENT & PLAN NOTE
Doing well today. No pain crises since hospital admission at end of July. Only using oxycodone prn, not needed daily.   Please see prior note for full counseling.  Partner has been tested and is not a carrier.     Most recent labs:  Lab Results   Component Value Date    HGB 10.5 (L) 2024    HCT 31.3 (L) 2024     2024    CREATININE 0.5 2024    AST 48 (H) 2024    ALT 24 2024 TTE:Normal systolic and diastolic function. EF 60%    Recommendations:  Urine culture q trimester  CBC monthly  Continue: Folic acid 4 mg, Aspirin 81 mg  Narcotics per heme/onc-avoid abrupt cessation and avoid NSAIDs  Avoid iron supplementation due to risks of iron overload  Prophylactic lovenox or heparin while admitted to the hospital antepartum or postpartum  Serial growth ultrasounds, to be performed every 4-6 weeks  Antepartum testing twice weekly at 32 weeks (this can be ordered by primary OB)  Delivery timin weeks, unless indicated earlier

## 2024-09-03 ENCOUNTER — HOSPITAL ENCOUNTER (OUTPATIENT)
Dept: PERINATAL CARE | Facility: OTHER | Age: 29
Discharge: HOME | End: 2024-09-03
Attending: OBSTETRICS & GYNECOLOGY
Payer: MEDICAID

## 2024-09-03 DIAGNOSIS — D57.1 SICKLE CELL DISEASE WITHOUT CRISIS: ICD-10-CM

## 2024-09-03 DIAGNOSIS — O09.93 HIGH-RISK PREGNANCY IN THIRD TRIMESTER: ICD-10-CM

## 2024-09-03 PROCEDURE — 59025 FETAL NON-STRESS TEST: CPT | Mod: 26,,, | Performed by: OBSTETRICS & GYNECOLOGY

## 2024-09-03 PROCEDURE — 59025 FETAL NON-STRESS TEST: CPT

## 2024-09-05 DIAGNOSIS — D57.1 SICKLE CELL ANEMIA OF MOTHER DURING PREGNANCY: Primary | ICD-10-CM

## 2024-09-05 DIAGNOSIS — O99.019 SICKLE CELL ANEMIA OF MOTHER DURING PREGNANCY: Primary | ICD-10-CM

## 2024-09-06 ENCOUNTER — ROUTINE PRENATAL (OUTPATIENT)
Dept: OBSTETRICS AND GYNECOLOGY | Facility: CLINIC | Age: 29
End: 2024-09-06
Payer: MEDICAID

## 2024-09-06 ENCOUNTER — PROCEDURE VISIT (OUTPATIENT)
Dept: OBSTETRICS AND GYNECOLOGY | Facility: CLINIC | Age: 29
End: 2024-09-06
Payer: MEDICAID

## 2024-09-06 ENCOUNTER — PATIENT MESSAGE (OUTPATIENT)
Dept: PULMONOLOGY | Facility: CLINIC | Age: 29
End: 2024-09-06
Payer: MEDICAID

## 2024-09-06 ENCOUNTER — PATIENT MESSAGE (OUTPATIENT)
Dept: OBSTETRICS AND GYNECOLOGY | Facility: CLINIC | Age: 29
End: 2024-09-06

## 2024-09-06 VITALS
BODY MASS INDEX: 24.84 KG/M2 | DIASTOLIC BLOOD PRESSURE: 73 MMHG | SYSTOLIC BLOOD PRESSURE: 107 MMHG | WEIGHT: 140.19 LBS

## 2024-09-06 DIAGNOSIS — O99.019 SICKLE CELL ANEMIA OF MOTHER DURING PREGNANCY: ICD-10-CM

## 2024-09-06 DIAGNOSIS — O09.93 SUPERVISION OF HIGH RISK PREGNANCY IN THIRD TRIMESTER: Primary | ICD-10-CM

## 2024-09-06 DIAGNOSIS — D57.1 SICKLE CELL DISEASE WITHOUT CRISIS: ICD-10-CM

## 2024-09-06 DIAGNOSIS — D57.1 SICKLE CELL ANEMIA OF MOTHER DURING PREGNANCY: ICD-10-CM

## 2024-09-06 PROCEDURE — 76819 FETAL BIOPHYS PROFIL W/O NST: CPT | Mod: PBBFAC | Performed by: OBSTETRICS & GYNECOLOGY

## 2024-09-06 PROCEDURE — 99999 PR PBB SHADOW E&M-EST. PATIENT-LVL II: CPT | Mod: PBBFAC,,, | Performed by: OBSTETRICS & GYNECOLOGY

## 2024-09-06 PROCEDURE — 87491 CHLMYD TRACH DNA AMP PROBE: CPT | Performed by: OBSTETRICS & GYNECOLOGY

## 2024-09-06 PROCEDURE — 87147 CULTURE TYPE IMMUNOLOGIC: CPT | Performed by: OBSTETRICS & GYNECOLOGY

## 2024-09-06 PROCEDURE — 99212 OFFICE O/P EST SF 10 MIN: CPT | Mod: PBBFAC,25,TH | Performed by: OBSTETRICS & GYNECOLOGY

## 2024-09-06 PROCEDURE — 99213 OFFICE O/P EST LOW 20 MIN: CPT | Mod: TH,S$PBB,, | Performed by: OBSTETRICS & GYNECOLOGY

## 2024-09-06 PROCEDURE — 87081 CULTURE SCREEN ONLY: CPT | Performed by: OBSTETRICS & GYNECOLOGY

## 2024-09-07 LAB
C TRACH DNA SPEC QL NAA+PROBE: NOT DETECTED
N GONORRHOEA DNA SPEC QL NAA+PROBE: NOT DETECTED

## 2024-09-09 NOTE — PROGRESS NOTES
Patient with no complaints. Denies vaginal bleeding or contractions. Good FM. GBS collected today with Gc. Doing 2x weekly PNT. Labor precautions discused with patient. RTC in 1 week. Birth plan reviewed. Discussed she will need continuous monitoring, can labor in tub but not deliver if room available and able to monitor. Would like 3 people in room for delivery. Denies pain crisis symptoms. Ok with IOL 39 weeks, ordered for 10/2, patient would like to wait until as close to 40 weeks as she can.     Vitals signs, FHTs, urine dip, and PE findings documented, reviewed and available in OB flow chart.       I spent a total of 20 minutes on the day of the visit.This includes face to face time and non-face to face time preparing to see the patient (eg, review of tests), Obtaining and/or reviewing separately obtained history, Documenting clinical information in the electronic or other health record, Independently interpreting resultsand communicating results to the patient/family/caregiver, or Care coordination.

## 2024-09-10 ENCOUNTER — ROUTINE PRENATAL (OUTPATIENT)
Dept: OBSTETRICS AND GYNECOLOGY | Facility: CLINIC | Age: 29
End: 2024-09-10
Payer: MEDICAID

## 2024-09-10 ENCOUNTER — HOSPITAL ENCOUNTER (OUTPATIENT)
Dept: PERINATAL CARE | Facility: OTHER | Age: 29
Discharge: HOME | End: 2024-09-10
Attending: OBSTETRICS & GYNECOLOGY
Payer: MEDICAID

## 2024-09-10 VITALS
BODY MASS INDEX: 24.8 KG/M2 | SYSTOLIC BLOOD PRESSURE: 116 MMHG | HEART RATE: 78 BPM | WEIGHT: 140 LBS | DIASTOLIC BLOOD PRESSURE: 76 MMHG

## 2024-09-10 DIAGNOSIS — D57.1 SICKLE CELL DISEASE WITHOUT CRISIS: ICD-10-CM

## 2024-09-10 DIAGNOSIS — Z3A.36 36 WEEKS GESTATION OF PREGNANCY: Primary | ICD-10-CM

## 2024-09-10 DIAGNOSIS — O09.93 HIGH-RISK PREGNANCY IN THIRD TRIMESTER: ICD-10-CM

## 2024-09-10 LAB — BACTERIA SPEC AEROBE CULT: ABNORMAL

## 2024-09-10 PROCEDURE — 76815 OB US LIMITED FETUS(S): CPT

## 2024-09-10 PROCEDURE — 59025 FETAL NON-STRESS TEST: CPT | Mod: 26,,, | Performed by: STUDENT IN AN ORGANIZED HEALTH CARE EDUCATION/TRAINING PROGRAM

## 2024-09-10 PROCEDURE — 59025 FETAL NON-STRESS TEST: CPT

## 2024-09-10 PROCEDURE — 99999 PR PBB SHADOW E&M-EST. PATIENT-LVL III: CPT | Mod: PBBFAC,,,

## 2024-09-10 PROCEDURE — 99213 OFFICE O/P EST LOW 20 MIN: CPT | Mod: PBBFAC

## 2024-09-10 PROCEDURE — 76815 OB US LIMITED FETUS(S): CPT | Mod: 26,,, | Performed by: STUDENT IN AN ORGANIZED HEALTH CARE EDUCATION/TRAINING PROGRAM

## 2024-09-10 PROCEDURE — 99213 OFFICE O/P EST LOW 20 MIN: CPT | Mod: TH,S$PBB,25,
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
## 2024-09-13 ENCOUNTER — HOSPITAL ENCOUNTER (OUTPATIENT)
Dept: PERINATAL CARE | Facility: OTHER | Age: 29
Discharge: HOME | End: 2024-09-13
Attending: OBSTETRICS & GYNECOLOGY
Payer: MEDICAID

## 2024-09-13 DIAGNOSIS — O09.93 HIGH-RISK PREGNANCY IN THIRD TRIMESTER: ICD-10-CM

## 2024-09-13 DIAGNOSIS — D57.1 SICKLE CELL DISEASE WITHOUT CRISIS: ICD-10-CM

## 2024-09-13 PROCEDURE — 59025 FETAL NON-STRESS TEST: CPT | Mod: 26,,, | Performed by: STUDENT IN AN ORGANIZED HEALTH CARE EDUCATION/TRAINING PROGRAM

## 2024-09-13 PROCEDURE — 59025 FETAL NON-STRESS TEST: CPT

## 2024-09-17 ENCOUNTER — HOSPITAL ENCOUNTER (OUTPATIENT)
Dept: PERINATAL CARE | Facility: OTHER | Age: 29
Discharge: HOME | End: 2024-09-17
Attending: OBSTETRICS & GYNECOLOGY
Payer: MEDICAID

## 2024-09-17 DIAGNOSIS — D57.1 SICKLE CELL ANEMIA OF MOTHER DURING PREGNANCY: Primary | ICD-10-CM

## 2024-09-17 DIAGNOSIS — D57.1 SICKLE CELL DISEASE WITHOUT CRISIS: ICD-10-CM

## 2024-09-17 DIAGNOSIS — O99.019 SICKLE CELL ANEMIA OF MOTHER DURING PREGNANCY: Primary | ICD-10-CM

## 2024-09-17 DIAGNOSIS — O09.93 HIGH-RISK PREGNANCY IN THIRD TRIMESTER: ICD-10-CM

## 2024-09-17 PROCEDURE — 59025 FETAL NON-STRESS TEST: CPT

## 2024-09-17 PROCEDURE — 59025 FETAL NON-STRESS TEST: CPT | Mod: 26,,, | Performed by: OBSTETRICS & GYNECOLOGY

## 2024-09-20 ENCOUNTER — ROUTINE PRENATAL (OUTPATIENT)
Dept: OBSTETRICS AND GYNECOLOGY | Facility: CLINIC | Age: 29
End: 2024-09-20
Payer: MEDICAID

## 2024-09-20 ENCOUNTER — PROCEDURE VISIT (OUTPATIENT)
Dept: OBSTETRICS AND GYNECOLOGY | Facility: CLINIC | Age: 29
End: 2024-09-20
Payer: MEDICAID

## 2024-09-20 ENCOUNTER — TELEPHONE (OUTPATIENT)
Dept: OBSTETRICS AND GYNECOLOGY | Facility: OTHER | Age: 29
End: 2024-09-20
Payer: MEDICAID

## 2024-09-20 VITALS
WEIGHT: 138.25 LBS | DIASTOLIC BLOOD PRESSURE: 73 MMHG | SYSTOLIC BLOOD PRESSURE: 126 MMHG | BODY MASS INDEX: 24.49 KG/M2

## 2024-09-20 DIAGNOSIS — D57.1 SICKLE CELL ANEMIA OF MOTHER DURING PREGNANCY: ICD-10-CM

## 2024-09-20 DIAGNOSIS — O99.019 SICKLE CELL ANEMIA OF MOTHER DURING PREGNANCY: ICD-10-CM

## 2024-09-20 DIAGNOSIS — O09.93 SUPERVISION OF HIGH RISK PREGNANCY IN THIRD TRIMESTER: Primary | ICD-10-CM

## 2024-09-20 PROCEDURE — 76819 FETAL BIOPHYS PROFIL W/O NST: CPT | Mod: PBBFAC | Performed by: OBSTETRICS & GYNECOLOGY

## 2024-09-20 PROCEDURE — 99212 OFFICE O/P EST SF 10 MIN: CPT | Mod: PBBFAC,TH | Performed by: OBSTETRICS & GYNECOLOGY

## 2024-09-20 PROCEDURE — 99999 PR PBB SHADOW E&M-EST. PATIENT-LVL II: CPT | Mod: PBBFAC,,, | Performed by: OBSTETRICS & GYNECOLOGY

## 2024-09-20 NOTE — PROGRESS NOTES
Patient c/o some pink with wiping and ctx today. Good FM. GBS +. IOL 10/2. Denies SS pain symptoms. PNT today. Labor precautions discused with patient. RTC in 1 week.     Vitals signs, FHTs, urine dip, and PE findings documented, reviewed and available in OB flow chart.       I spent a total of 20 minutes on the day of the visit.This includes face to face time and non-face to face time preparing to see the patient (eg, review of tests), Obtaining and/or reviewing separately obtained history, Documenting clinical information in the electronic or other health record, Independently interpreting resultsand communicating results to the patient/family/caregiver, or Care coordination.

## 2024-09-21 ENCOUNTER — TELEPHONE (OUTPATIENT)
Dept: OBSTETRICS AND GYNECOLOGY | Facility: OTHER | Age: 29
End: 2024-09-21
Payer: MEDICAID

## 2024-09-22 ENCOUNTER — TELEPHONE (OUTPATIENT)
Dept: OBSTETRICS AND GYNECOLOGY | Facility: OTHER | Age: 29
End: 2024-09-22
Payer: MEDICAID

## 2024-09-22 ENCOUNTER — HOSPITAL ENCOUNTER (INPATIENT)
Facility: OTHER | Age: 29
LOS: 2 days | Discharge: HOME | End: 2024-09-24
Attending: OBSTETRICS & GYNECOLOGY | Admitting: OBSTETRICS & GYNECOLOGY
Payer: MEDICAID

## 2024-09-22 ENCOUNTER — ANESTHESIA EVENT (OUTPATIENT)
Dept: OBSTETRICS AND GYNECOLOGY | Facility: OTHER | Age: 29
End: 2024-09-22
Payer: MEDICAID

## 2024-09-22 ENCOUNTER — ANESTHESIA (OUTPATIENT)
Dept: OBSTETRICS AND GYNECOLOGY | Facility: OTHER | Age: 29
End: 2024-09-22
Payer: MEDICAID

## 2024-09-22 DIAGNOSIS — Z3A.38 38 WEEKS GESTATION OF PREGNANCY: ICD-10-CM

## 2024-09-22 DIAGNOSIS — Z34.90 ENCOUNTER FOR INDUCTION OF LABOR: ICD-10-CM

## 2024-09-22 DIAGNOSIS — O47.9 UTERINE CONTRACTIONS AT GREATER THAN 20 WEEKS OF GESTATION: ICD-10-CM

## 2024-09-22 PROBLEM — O13.3 GESTATIONAL HYPERTENSION, THIRD TRIMESTER: Status: ACTIVE | Noted: 2024-09-22

## 2024-09-22 LAB
ABO + RH BLD: ABNORMAL
ALBUMIN SERPL BCP-MCNC: 3.6 G/DL (ref 3.5–5.2)
ALLENS TEST: ABNORMAL
ALP SERPL-CCNC: 200 U/L (ref 55–135)
ALT SERPL W/O P-5'-P-CCNC: 40 U/L (ref 10–44)
ANION GAP SERPL CALC-SCNC: 13 MMOL/L (ref 8–16)
AST SERPL-CCNC: 61 U/L (ref 10–40)
BASOPHILS # BLD AUTO: 0.05 K/UL (ref 0–0.2)
BASOPHILS NFR BLD: 0.4 % (ref 0–1.9)
BILIRUB SERPL-MCNC: 3.3 MG/DL (ref 0.1–1)
BILIRUB SERPL-MCNC: NORMAL MG/DL
BLD GP AB SCN CELLS X3 SERPL QL: ABNORMAL
BLOOD GROUP ANTIBODIES SERPL: NORMAL
BLOOD URINE, POC: NORMAL
BUN SERPL-MCNC: 6 MG/DL (ref 6–20)
CALCIUM SERPL-MCNC: 9.3 MG/DL (ref 8.7–10.5)
CHLORIDE SERPL-SCNC: 106 MMOL/L (ref 95–110)
CO2 SERPL-SCNC: 18 MMOL/L (ref 23–29)
COLOR, POC UA: YELLOW
CREAT SERPL-MCNC: 0.6 MG/DL (ref 0.5–1.4)
CREAT UR-MCNC: 44 MG/DL (ref 15–325)
DAT IGG-SP REAG RBC-IMP: NORMAL
DIFFERENTIAL METHOD BLD: ABNORMAL
EOSINOPHIL # BLD AUTO: 0 K/UL (ref 0–0.5)
EOSINOPHIL NFR BLD: 0.3 % (ref 0–8)
ERYTHROCYTE [DISTWIDTH] IN BLOOD BY AUTOMATED COUNT: 21 % (ref 11.5–14.5)
EST. GFR  (NO RACE VARIABLE): >60 ML/MIN/1.73 M^2
GLUCOSE SERPL-MCNC: 81 MG/DL (ref 70–110)
GLUCOSE UR QL STRIP: NORMAL
HCO3 UR-SCNC: 20.1 MMOL/L (ref 24–28)
HCT VFR BLD AUTO: 28.8 % (ref 37–48.5)
HGB BLD-MCNC: 10.1 G/DL (ref 12–16)
IMM GRANULOCYTES # BLD AUTO: 0.11 K/UL (ref 0–0.04)
IMM GRANULOCYTES NFR BLD AUTO: 0.8 % (ref 0–0.5)
KETONES UR QL STRIP: NORMAL
LEUKOCYTE ESTERASE URINE, POC: NORMAL
LYMPHOCYTES # BLD AUTO: 2 K/UL (ref 1–4.8)
LYMPHOCYTES NFR BLD: 15.1 % (ref 18–48)
MCH RBC QN AUTO: 32.3 PG (ref 27–31)
MCHC RBC AUTO-ENTMCNC: 35.1 G/DL (ref 32–36)
MCV RBC AUTO: 92 FL (ref 82–98)
MONOCYTES # BLD AUTO: 0.9 K/UL (ref 0.3–1)
MONOCYTES NFR BLD: 6.9 % (ref 4–15)
NEUTROPHILS # BLD AUTO: 9.9 K/UL (ref 1.8–7.7)
NEUTROPHILS NFR BLD: 76.5 % (ref 38–73)
NITRITE, POC UA: NORMAL
NRBC BLD-RTO: 19 /100 WBC
PCO2 BLDA: 23.9 MMHG (ref 35–45)
PH SMN: 7.53 [PH] (ref 7.35–7.45)
PH, POC UA: 7
PLATELET # BLD AUTO: 427 K/UL (ref 150–450)
PMV BLD AUTO: 11.7 FL (ref 9.2–12.9)
PO2 BLDA: 91 MMHG (ref 80–100)
POC BE: -3 MMOL/L
POC SATURATED O2: 98 % (ref 95–100)
POC TCO2: 21 MMOL/L (ref 23–27)
POTASSIUM SERPL-SCNC: 4 MMOL/L (ref 3.5–5.1)
PROT SERPL-MCNC: 8.6 G/DL (ref 6–8.4)
PROT UR-MCNC: 11 MG/DL (ref 0–15)
PROT/CREAT UR: 0.25 MG/G{CREAT} (ref 0–0.2)
PROTEIN, POC: NORMAL
RBC # BLD AUTO: 3.13 M/UL (ref 4–5.4)
SAMPLE: ABNORMAL
SITE: ABNORMAL
SODIUM SERPL-SCNC: 137 MMOL/L (ref 136–145)
SPECIFIC GRAVITY, POC UA: 1.01
SPECIMEN OUTDATE: ABNORMAL
TREPONEMA PALLIDUM IGG+IGM AB [PRESENCE] IN SERUM OR PLASMA BY IMMUNOASSAY: NONREACTIVE
UROBILINOGEN, POC UA: 1
WBC # BLD AUTO: 12.98 K/UL (ref 3.9–12.7)

## 2024-09-22 PROCEDURE — 94640 AIRWAY INHALATION TREATMENT: CPT

## 2024-09-22 PROCEDURE — 94761 N-INVAS EAR/PLS OXIMETRY MLT: CPT

## 2024-09-22 PROCEDURE — 80053 COMPREHEN METABOLIC PANEL: CPT | Performed by: OBSTETRICS & GYNECOLOGY

## 2024-09-22 PROCEDURE — 86860 RBC ANTIBODY ELUTION: CPT | Performed by: OBSTETRICS & GYNECOLOGY

## 2024-09-22 PROCEDURE — 85025 COMPLETE CBC W/AUTO DIFF WBC: CPT | Performed by: OBSTETRICS & GYNECOLOGY

## 2024-09-22 PROCEDURE — 25000003 PHARM REV CODE 250

## 2024-09-22 PROCEDURE — 63600175 PHARM REV CODE 636 W HCPCS

## 2024-09-22 PROCEDURE — 82803 BLOOD GASES ANY COMBINATION: CPT

## 2024-09-22 PROCEDURE — 25000242 PHARM REV CODE 250 ALT 637 W/ HCPCS

## 2024-09-22 PROCEDURE — 27201040 HC RC 50 FILTER: Performed by: OBSTETRICS & GYNECOLOGY

## 2024-09-22 PROCEDURE — 86593 SYPHILIS TEST NON-TREP QUANT: CPT | Performed by: OBSTETRICS & GYNECOLOGY

## 2024-09-22 PROCEDURE — 27200710 HC EPIDURAL INFUSION PUMP SET: Performed by: ANESTHESIOLOGY

## 2024-09-22 PROCEDURE — 99283 EMERGENCY DEPT VISIT LOW MDM: CPT | Mod: ,,, | Performed by: OBSTETRICS & GYNECOLOGY

## 2024-09-22 PROCEDURE — 27100107 HC POCKET PEAK FLOW METER

## 2024-09-22 PROCEDURE — 11000001 HC ACUTE MED/SURG PRIVATE ROOM

## 2024-09-22 PROCEDURE — 72100002 HC LABOR CARE, 1ST 8 HOURS

## 2024-09-22 PROCEDURE — 86850 RBC ANTIBODY SCREEN: CPT | Performed by: OBSTETRICS & GYNECOLOGY

## 2024-09-22 PROCEDURE — 36600 WITHDRAWAL OF ARTERIAL BLOOD: CPT

## 2024-09-22 PROCEDURE — 86902 BLOOD TYPE ANTIGEN DONOR EA: CPT | Performed by: OBSTETRICS & GYNECOLOGY

## 2024-09-22 PROCEDURE — 63600175 PHARM REV CODE 636 W HCPCS: Performed by: OBSTETRICS & GYNECOLOGY

## 2024-09-22 PROCEDURE — 85660 RBC SICKLE CELL TEST: CPT | Performed by: OBSTETRICS & GYNECOLOGY

## 2024-09-22 PROCEDURE — 59025 FETAL NON-STRESS TEST: CPT | Mod: 26,,, | Performed by: OBSTETRICS & GYNECOLOGY

## 2024-09-22 PROCEDURE — 76815 OB US LIMITED FETUS(S): CPT | Mod: 26,,, | Performed by: OBSTETRICS & GYNECOLOGY

## 2024-09-22 PROCEDURE — 86880 COOMBS TEST DIRECT: CPT | Performed by: OBSTETRICS & GYNECOLOGY

## 2024-09-22 PROCEDURE — 86900 BLOOD TYPING SEROLOGIC ABO: CPT | Performed by: OBSTETRICS & GYNECOLOGY

## 2024-09-22 PROCEDURE — 51702 INSERT TEMP BLADDER CATH: CPT

## 2024-09-22 PROCEDURE — 62326 NJX INTERLAMINAR LMBR/SAC: CPT

## 2024-09-22 PROCEDURE — 4A1HXCZ MONITORING OF PRODUCTS OF CONCEPTION, CARDIAC RATE, EXTERNAL APPROACH: ICD-10-PCS | Performed by: STUDENT IN AN ORGANIZED HEALTH CARE EDUCATION/TRAINING PROGRAM

## 2024-09-22 PROCEDURE — 99900035 HC TECH TIME PER 15 MIN (STAT)

## 2024-09-22 PROCEDURE — 86870 RBC ANTIBODY IDENTIFICATION: CPT | Performed by: OBSTETRICS & GYNECOLOGY

## 2024-09-22 PROCEDURE — C1751 CATH, INF, PER/CENT/MIDLINE: HCPCS | Performed by: ANESTHESIOLOGY

## 2024-09-22 PROCEDURE — 86901 BLOOD TYPING SEROLOGIC RH(D): CPT | Performed by: OBSTETRICS & GYNECOLOGY

## 2024-09-22 PROCEDURE — 82570 ASSAY OF URINE CREATININE: CPT

## 2024-09-22 RX ORDER — ALBUTEROL SULFATE 2.5 MG/.5ML
2.5 SOLUTION RESPIRATORY (INHALATION) EVERY 4 HOURS PRN
Status: DISCONTINUED | OUTPATIENT
Start: 2024-09-22 | End: 2024-09-24 | Stop reason: HOSPADM

## 2024-09-22 RX ORDER — HYDROCODONE BITARTRATE AND ACETAMINOPHEN 500; 5 MG/1; MG/1
TABLET ORAL
Status: DISCONTINUED | OUTPATIENT
Start: 2024-09-22 | End: 2024-09-23

## 2024-09-22 RX ORDER — CLINDAMYCIN PHOSPHATE 900 MG/50ML
900 INJECTION, SOLUTION INTRAVENOUS ONCE AS NEEDED
Status: DISCONTINUED | OUTPATIENT
Start: 2024-09-22 | End: 2024-09-23

## 2024-09-22 RX ORDER — SODIUM CHLORIDE 9 MG/ML
INJECTION, SOLUTION INTRAVENOUS
Status: DISCONTINUED | OUTPATIENT
Start: 2024-09-22 | End: 2024-09-24 | Stop reason: HOSPADM

## 2024-09-22 RX ORDER — FENTANYL/BUPIVACAINE/NS/PF 2MCG/ML-.1
PLASTIC BAG, INJECTION (ML) INJECTION CONTINUOUS PRN
Status: DISCONTINUED | OUTPATIENT
Start: 2024-09-22 | End: 2024-09-23

## 2024-09-22 RX ORDER — SODIUM CHLORIDE 0.9 % (FLUSH) 0.9 %
2 SYRINGE (ML) INJECTION
Status: DISCONTINUED | OUTPATIENT
Start: 2024-09-22 | End: 2024-09-23

## 2024-09-22 RX ORDER — ALBUTEROL SULFATE 90 UG/1
2 INHALANT RESPIRATORY (INHALATION) EVERY 8 HOURS
Status: DISCONTINUED | OUTPATIENT
Start: 2024-09-22 | End: 2024-09-22

## 2024-09-22 RX ORDER — FENTANYL/BUPIVACAINE/NS/PF 2MCG/ML-.1
PLASTIC BAG, INJECTION (ML) INJECTION
Status: COMPLETED
Start: 2024-09-22 | End: 2024-09-22

## 2024-09-22 RX ORDER — LIDOCAINE HYDROCHLORIDE AND EPINEPHRINE 15; 5 MG/ML; UG/ML
INJECTION, SOLUTION EPIDURAL
Status: DISCONTINUED | OUTPATIENT
Start: 2024-09-22 | End: 2024-09-23

## 2024-09-22 RX ORDER — SODIUM CHLORIDE, SODIUM LACTATE, POTASSIUM CHLORIDE, CALCIUM CHLORIDE 600; 310; 30; 20 MG/100ML; MG/100ML; MG/100ML; MG/100ML
INJECTION, SOLUTION INTRAVENOUS CONTINUOUS
Status: DISCONTINUED | OUTPATIENT
Start: 2024-09-22 | End: 2024-09-24 | Stop reason: HOSPADM

## 2024-09-22 RX ORDER — SODIUM CITRATE AND CITRIC ACID MONOHYDRATE 334; 500 MG/5ML; MG/5ML
30 SOLUTION ORAL ONCE
OUTPATIENT
Start: 2024-09-22 | End: 2024-09-22

## 2024-09-22 RX ORDER — OXYTOCIN-SODIUM CHLORIDE 0.9% IV SOLN 30 UNIT/500ML 30-0.9/5 UT/ML-%
0-32 SOLUTION INTRAVENOUS CONTINUOUS
Status: DISCONTINUED | OUTPATIENT
Start: 2024-09-22 | End: 2024-09-23

## 2024-09-22 RX ORDER — FAMOTIDINE 10 MG/ML
20 INJECTION INTRAVENOUS ONCE
OUTPATIENT
Start: 2024-09-22 | End: 2024-09-22

## 2024-09-22 RX ADMIN — SODIUM CHLORIDE, POTASSIUM CHLORIDE, SODIUM LACTATE AND CALCIUM CHLORIDE: 600; 310; 30; 20 INJECTION, SOLUTION INTRAVENOUS at 06:09

## 2024-09-22 RX ADMIN — ALBUTEROL SULFATE 2.5 MG: 2.5 SOLUTION RESPIRATORY (INHALATION) at 02:09

## 2024-09-22 RX ADMIN — DEXTROSE MONOHYDRATE 5 MILLION UNITS: 5 INJECTION INTRAVENOUS at 06:09

## 2024-09-22 RX ADMIN — FENTANYL CITRATE 8 ML: 50 INJECTION INTRAMUSCULAR; INTRAVENOUS at 07:09

## 2024-09-22 RX ADMIN — SODIUM CHLORIDE, POTASSIUM CHLORIDE, SODIUM LACTATE AND CALCIUM CHLORIDE: 600; 310; 30; 20 INJECTION, SOLUTION INTRAVENOUS at 05:09

## 2024-09-22 RX ADMIN — FENTANYL CITRATE 10 ML/HR: 50 INJECTION INTRAMUSCULAR; INTRAVENOUS at 07:09

## 2024-09-22 RX ADMIN — LIDOCAINE HYDROCHLORIDE,EPINEPHRINE BITARTRATE 3 ML: 15; .005 INJECTION, SOLUTION EPIDURAL; INFILTRATION; INTRACAUDAL; PERINEURAL at 07:09

## 2024-09-22 RX ADMIN — SODIUM CHLORIDE, POTASSIUM CHLORIDE, SODIUM LACTATE AND CALCIUM CHLORIDE 1000 ML: 600; 310; 30; 20 INJECTION, SOLUTION INTRAVENOUS at 03:09

## 2024-09-22 RX ADMIN — Medication 4 MILLI-UNITS/MIN: at 07:09

## 2024-09-22 RX ADMIN — DEXTROSE MONOHYDRATE 3 MILLION UNITS: 50 INJECTION, SOLUTION INTRAVENOUS at 10:09

## 2024-09-22 NOTE — H&P
HISTORY AND PHYSICAL                                                OBSTETRICS          Subjective:       Elizabeth Franco is a 29 y.o.  female with IUP at 38w3d weeks gestation who presents for IOL 2/2 gHTN.    Patient reports contractions, denies vaginal bleeding, denies LOF.   Fetal Movement: normal.    This IUP is complicated by sickle cell disease, gHTN, GBS+.    Review of Systems   Constitutional:  Negative for chills and fever.   Respiratory:  Negative for cough and shortness of breath.    Cardiovascular:  Negative for chest pain.   Gastrointestinal:  Positive for abdominal pain (contractions). Negative for diarrhea, nausea and vomiting.   Endocrine: Negative.    Genitourinary:  Positive for vaginal discharge. Negative for dysuria and vaginal bleeding.   Musculoskeletal:  Negative for back pain.   Neurological:  Negative for headaches.   Hematological:  Does not bruise/bleed easily.   Psychiatric/Behavioral:  Positive for sleep disturbance. Negative for depression. The patient is not nervous/anxious.    Breast: negative.        PMHx:   Past Medical History:   Diagnosis Date    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 2021    Acute chest syndrome due to sickle cell crisis 06/15/2022    Hepatomegaly 06/15/2022    Hidradenitis suppurativa     Nocturnal enuresis     Sickle cell anemia     Sickle cell crisis 2024    Warm antibody Autoimmune hemolytic anemia 2022       PSHx:   Past Surgical History:   Procedure Laterality Date    APPENDECTOMY      SPLENECTOMY         All:   Review of patient's allergies indicates:   Allergen Reactions    Cephalosporins Hives       Meds:   Medications Prior to Admission   Medication Sig Dispense Refill Last Dose    aspirin (ECOTRIN) 81 MG EC tablet Take 1 tablet (81 mg total) by mouth once daily.  0 2024    folic acid (FOLVITE) 1 MG tablet Take 4 tablets (4 mg total) by mouth once daily. 360 tablet 3 2024    prenatal vit  87-iron-folic-dha (PRENATE MINI, FERR ASP GLYCIN,) 18-1-350 mg Cap Take 1 tablet by mouth once daily. for 270 doses 90 capsule 3 9/22/2024    traMADoL (ULTRAM) 50 mg tablet Take 1 tablet (50 mg total) by mouth every 6 (six) hours as needed for Pain. 20 tablet 0 9/21/2024    albuterol (PROVENTIL/VENTOLIN HFA) 90 mcg/actuation inhaler Inhale 1-2 puffs into the lungs every 6 (six) hours as needed for Wheezing or Shortness of Breath. Rescue (Patient not taking: Reported on 8/29/2024) 8 g 1     albuterol sulfate (PROAIR RESPICLICK) 90 mcg/actuation inhaler Inhale 1-2 puffs into the lungs every 4 (four) hours as needed for Wheezing. Rescue 1 each 0     budesonide (PULMICORT FLEXHALER) 90 mcg/actuation AePB INHALE 2 PUFFS INTO THE LUNGS ONCE DAILY. CONTROLLER (Patient not taking: Reported on 7/30/2024) 1 each 0     enoxaparin (LOVENOX) 40 mg/0.4 mL Syrg        oxyCODONE (ROXICODONE) 5 MG immediate release tablet Take 2 tablets (10 mg total) by mouth every 6 (six) hours as needed. 20 tablet 0 More than a month       SH:   Social History     Socioeconomic History    Marital status: Single   Tobacco Use    Smoking status: Never    Smokeless tobacco: Never   Substance and Sexual Activity    Alcohol use: No     Alcohol/week: 0.0 standard drinks of alcohol    Drug use: Never    Sexual activity: Not Currently     Social Determinants of Health     Financial Resource Strain: Medium Risk (5/1/2024)    Received from [facility]    Overall Financial Resource Strain (CARDIA)     Difficulty of Paying Living Expenses: Somewhat hard   Food Insecurity: No Food Insecurity (5/1/2024)    Received from [facility]    Hunger Vital Sign     Worried About Running Out of Food in the Last Year: Never true     Ran Out of Food in the Last Year: Never true   Transportation Needs: No Transportation Needs (5/1/2024)    Received from [facility]    PRAPARE - Transportation     Lack of Transportation (Medical): No     Lack of Transportation (Non-Medical):  No   Physical Activity: Insufficiently Active (2024)    Received from [facility]    Exercise Vital Sign     Days of Exercise per Week: 3 days     Minutes of Exercise per Session: 20 min   Stress: Stress Concern Present (2024)    Received from [facility]    [employer] - Occupational Stress Questionnaire     Feeling of Stress : To some extent   Housing Stability: Low Risk  (2024)    Received from [facility]    Housing Stability Vital Sign     In the last 12 months, was there a time when you were not able to pay the mortgage or rent on time?: No     In the past 12 months, how many times have you moved where you were living?: 0     At any time in the past 12 months, were you homeless or living in a shelter (including now)?: No       FH:   Family History   Problem Relation Name Age of Onset    No Known Problems Mother      No Known Problems Father         OBHx:   OB History    Para Term  AB Living   1 0 0 0 0 0   SAB IAB Ectopic Multiple Live Births   0 0 0 0 0      # Outcome Date GA Lbr Jason/2nd Weight Sex Type Anes PTL Lv   1 Current                Objective:       /80   Pulse 104   Temp 98.5 °F (36.9 °C) (Oral)   Resp (!) 32   LMP 2023   SpO2 (!) 94%   Breastfeeding No     Vitals:    24 1649 24 1703 24 1704 24 1719   BP: 118/73 (!) 153/80  135/80   Pulse: 100 95 93 104   Resp:       Temp:       TempSrc:       SpO2: 96%  97% (!) 94%       General:   alert, appears stated age and cooperative, no apparent distress   HENT:  normocephalic, atraumatic   Eyes:  extraocular movements and conjunctivae normal   Neck:  supple, range of motion normal, no thyromegaly   Lungs:   no respiratory distress   Heart:   regular rate   Abdomen:  soft, non-tender, non-distended but gravid, no rebound or guarding    Extremities negative edema, negative erythema   FHT: 140 bpm, moderate bernard, +accels, -decels;  Cat 1  (reassuring)                 TOCO: Q 5 minutes   Presentations: cephalic by ultrasound   Cervix:     Dilation: 2cm    Effacement: 70    Station:  -3    Consistency: medium    Position: middle   Sterile Speculum Exam: no pooling, blood-tinged clear discharge    EFW by Leopold's: 6.5    Recent Growth Scan: EFW 2560 (29%) AC 34%    Lab Review  Blood Type B POS  GBBS: positive  Rubella: Immune  RPR: Net  HIV: negative  HepB: negative       Assessment:       38w3d weeks gestation with     Active Hospital Problems    Diagnosis  POA    *Encounter for induction of labor [Z34.90]  Not Applicable    Gestational hypertension, third trimester [O13.3]  Yes      Resolved Hospital Problems   No resolved problems to display.          Plan:      Risks, benefits, alternatives and possible complications have been discussed in detail with the patient.   - Consents signed and to chart  - Admit to Labor and Delivery unit   - Epidural per Anesthesia  - Draw CBC, T&S, Trep  - Notify Staff  - Recheck in 2-4 hrs or PRN  - EFW 2560 (29%) AC 34%    GBS+  - PCN G prophylaxis    Sickle Cell Disease   - CXR stable compared to prior, pulmonary vascular congestion unchanged  - No clinical signs of acute chest syndrome  - No pain suggestive of sickle cell crisis    gHTN  - BP: (106-153)/(60-90) 135/80   - PP BP check  - CBC: 10.1, 28.8  - CMP: Cr 0.6, AST 61, ALT 40  - PCR: 0.245    Asthma  - Home albuterol + pulmicort PRN  - S/p albuterol neb in MARTHA for Sats in low 90s   - ISTAT: pH 7.53, PCO2 23.9, PO2 90, HCO3 20.1    Post-Partum Hemorrhage risk - High    Lora Galindo MD  Obstetrics & Gynecology, PGY-1

## 2024-09-22 NOTE — CARE UPDATE
09/22/24 1510   Peak Flow   $ Peak Flow Charges Given;Pocket Peak Flow Meter - Supply   PEFR PREtreatment (L/Min) 300   PEFR POST-Treatment (L/Min) 320   Effort (PEFR) good   Patient Position (PEFR) other (see comments)  (kneeling on bed)   Change Pre/Post Treatment (%) 6.67   Labs   $ Was an ABG obtained? POCT - Blood gas;Arterial Puncture   $ Labs Tech Time 15 min   Critical Value Communication   Date Result Received 09/22/24   Time Result Received 1511   Resulting Department of Critical Value resp   Who communicated critical value from resulting department? sws   Critical Test #1 pco2   Critical Test #1 Result 23.9   Name of Notified Physician/Designee shannon   Date Notified 09/22/24   Time Notified 1510

## 2024-09-22 NOTE — ED PROVIDER NOTES
Encounter Date: 2024       History     Chief Complaint   Patient presents with    Laboring     Elizabeth Ana Franco is a 29 y.o. F at 38w3d presents complaining of contractions.    Patient complains of contractions q 5-8 min. She was seen in [facility] past 2 days for contractions and found to have cervical dilation of 0.5 2 days ago and 1cm yesterday. She reports that contractions have gotten closer together and more intense. She reports increased discharge without a gush of fluid.       This IUP is complicated by sickle cell disease, asthma, GBS+.  Patient reports contractions, denies vaginal bleeding, denies LOF.   Fetal Movement: normal      Review of patient's allergies indicates:   Allergen Reactions    Cephalosporins Hives     Past Medical History:   Diagnosis Date    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 2021    Acute chest syndrome due to sickle cell crisis 06/15/2022    Hepatomegaly 06/15/2022    Hidradenitis suppurativa     Nocturnal enuresis     Sickle cell anemia     Sickle cell crisis 2024    Warm antibody Autoimmune hemolytic anemia 2022     Past Surgical History:   Procedure Laterality Date    APPENDECTOMY  1999    SPLENECTOMY       Family History   Problem Relation Name Age of Onset    No Known Problems Mother      No Known Problems Father       Social History     Tobacco Use    Smoking status: Never    Smokeless tobacco: Never   Substance Use Topics    Alcohol use: No     Alcohol/week: 0.0 standard drinks of alcohol    Drug use: Never     Review of Systems   Constitutional:  Negative for activity change, chills, diaphoresis and fever.   HENT:  Negative for congestion and sore throat.    Eyes:  Negative for visual disturbance.   Respiratory:  Negative for shortness of breath.    Cardiovascular:  Negative for chest pain and leg swelling.   Gastrointestinal:  Positive for abdominal pain (contractions). Negative for nausea and vomiting.   Genitourinary:  Negative  for dysuria and vaginal bleeding.   Musculoskeletal:  Negative for back pain.   Skin:  Negative for rash.   Neurological:  Negative for weakness and headaches.   Hematological:  Does not bruise/bleed easily.   Psychiatric/Behavioral: Negative.         Physical Exam     Initial Vitals [09/22/24 1403]   BP Pulse Resp Temp SpO2   135/88 87 18 98.5 °F (36.9 °C) (!) 91 %      MAP       --         Physical Exam    Vitals reviewed.  Constitutional: She appears well-developed and well-nourished.   HENT:   Head: Normocephalic and atraumatic.   Cardiovascular:  Normal rate.           Pulmonary/Chest: Breath sounds normal. No respiratory distress. She has no wheezes. She has no rhonchi. She has no rales.   Abdominal: Abdomen is soft.   Gravid habitus   Musculoskeletal:         General: Normal range of motion.     Neurological: She is alert and oriented to person, place, and time.   Skin: Skin is warm and dry. Capillary refill takes less than 2 seconds.   Psychiatric: She has a normal mood and affect. Her behavior is normal. Judgment and thought content normal.     OB LABOR EXAM:   Pre-Term Labor: No.   Membranes ruptured: No.   Method: Sterile vaginal exam per MD.   Vaginal Bleeding: none present.     Dilatation: 2.   Station: -3.   Effacement: 70%.   Amniotic Fluid Color: no fluid.   Amniotic Fluid Amount: none noted.         ED Course   Obtain Fetal nonstress test (NST)    Date/Time: 9/22/2024 7:29 PM    Performed by: Lora Galindo MD  Authorized by: Karen Ty MD    Nonstress Test:     Variability:  6-25 BPM    Decelerations:  None    Accelerations:  15 bpm    Acoustic Stimulator: No      Baseline:  130    Uterine Irritability: Yes      Contractions:  Regular    Contraction Frequency:  Q5-8  Biophysical Profile:     Nonstress Test Interpretation: reactive      Overall Impression:  Reassuring  Post-procedure:     Patient tolerance:  Patient tolerated the procedure well with no immediate complications    Labs  Reviewed   CBC W/ AUTO DIFFERENTIAL - Abnormal       Result Value    WBC 12.98 (*)     RBC 3.13 (*)     Hemoglobin 10.1 (*)     Hematocrit 28.8 (*)     MCV 92      MCH 32.3 (*)     MCHC 35.1      RDW 21.0 (*)     Platelets 427      MPV 11.7      Immature Granulocytes 0.8 (*)     Gran # (ANC) 9.9 (*)     Immature Grans (Abs) 0.11 (*)     Lymph # 2.0      Mono # 0.9      Eos # 0.0      Baso # 0.05      nRBC 19 (*)     Gran % 76.5 (*)     Lymph % 15.1 (*)     Mono % 6.9      Eosinophil % 0.3      Basophil % 0.4      Differential Method Automated     ISTAT PROCEDURE - Abnormal    POC PH 7.532 (*)     POC PCO2 23.9 (*)     POC PO2 91      POC HCO3 20.1 (*)     POC BE -3 (*)     POC SATURATED O2 98      POC TCO2 21 (*)     Sample ARTERIAL      Site RR      Allens Test Pass     COMPREHENSIVE METABOLIC PANEL   TREPONEMA PALLIDIUM ANTIBODIES IGG, IGM   PROTEIN / CREATININE RATIO, URINE   TREPONEMA PALLIDIUM ANTIBODIES IGG, IGM   POCT URINALYSIS, DIPSTICK OR TABLET REAGENT, AUTOMATED, WITH MICROSCOP    Color, UA Yellow      Spec Grav UA 1.010      pH, UA 7      WBC, UA neg      Nitrite, UA neg      Protein, POC neg      Glucose, UA neg      Ketones, UA neg      Urobilinogen, UA 1.0      Bilirubin, POC neg      Blood, UA trace     TYPE & SCREEN          Imaging Results              X-Ray Chest AP Portable (Final result)  Result time 09/22/24 14:54:40      Final result by Taz Adams MD (09/22/24 14:54:40)                   Impression:      Findings suggestive vascular congestion similar to prior exam.      Electronically signed by: Taz Adams MD  Date:    09/22/2024  Time:    14:54               Narrative:    EXAMINATION:  XR CHEST AP PORTABLE    CLINICAL HISTORY:  Hypoxia;    TECHNIQUE:  Single frontal view of the chest was performed.    COMPARISON:  Chest radiograph from 07/26/2024.    FINDINGS:  Cardiac silhouette is enlarged similar to prior exam. Prominence of the pulmonary vasculature suggestive of  vascular congestion.  No focal consolidation. No pleural effusion. No pneumothorax. No evidence of acute fracture.                                       Medications   albuterol sulfate nebulizer solution 2.5 mg (2.5 mg Nebulization Given 24 1459)   sodium chloride 0.9% flush 2 mL (has no administration in time range)   lactated ringers bolus 1,000 mL (has no administration in time range)   lactated ringers bolus 500 mL (has no administration in time range)   lactated ringers infusion (has no administration in time range)   0.9%  NaCl infusion (has no administration in time range)   clindamycin in D5W 900 mg/50 mL IVPB 900 mg (has no administration in time range)     And   gentamicin (GARAMYCIN) 5 mg/kg in 0.9% NaCl 100 mL IVPB (has no administration in time range)     And   azithromycin (ZITHROMAX) 500 mg in D5W 250 mL  IVPB (admixture device) (has no administration in time range)   penicillin G potassium 5 Million Units in D5W 100 mL IVPB (MB+) (has no administration in time range)   penicillin G potassium 3 Million Units in dextrose 5 % 100 mL IVPB (has no administration in time range)   lactated ringers bolus 1,000 mL (0 mLs Intravenous Stopped 24 1649)     Medical Decision Making  Elizabeth Ana Miles Salvador is a 29 y.o. F at 38w3d presents complaining of contractions.    NST: R&R  [place]: irritability, CTX q5-8 min    Temp:  [98.2 °F (36.8 °C)-98.6 °F (37 °C)] 98.2 °F (36.8 °C)  Pulse:  [] 90  Resp:  [18-32] 18  SpO2:  [91 %-98 %] 95 %  BP: (106-153)/(60-90) 140/67     Contractions  - Patient painfully kaveh q5-8 min  - NST: R&R   - [place]: irritability + CTX 5-8 min  - SVE: /-3, 2 hour re-check /-3  - Udip WNL  - 1L LR bolus    Sickle Cell Disease  - History of acute chest syndrome  - Denies SOB  - Sats in low 90s on presentation  - CXR NAP, stable pulmonary vasculature congestion  - Denies pain outside of contractions that would be consistent with sickle cell crisis    Asthma  -  Diagnosed with asthma this pregnancy  - Home albuterol and pulmicort. Patient reports she has not used in over a month.   - Denies SOB  - Sats in low 90s on presentation  - Albuterol nebulizer administered with improvement in O2 Sats    gHTN  - Patient with history of mild range BP x1 this pregnancy  - Met criteria for gHTN in [facility]  - CBC Plt 427  - CMP Cr 0.6 AST 61 ALT 40  - PCR 0.25  - Will continue to monitor BP   - Admit to L&D for IOL 2/2 gHTN  - Consents for delivery including vaginal or  section, and blood transfusion signed in chart from   - Risks, benefits, alternatives and possible complications have been discussed in detail with the patient.   - Epidural per Anesthesia  - Draw CBC, T&S, Trep  - Notify Staff  - Recheck in 2-4 hrs or PRN    Lora Galindo MD  Obstetrics & Gynecology, PGY-1     Amount and/or Complexity of Data Reviewed  Labs: ordered.    Risk  Prescription drug management.              Attending Attestation:   Physician Attestation Statement for Resident:  As the supervising MD   Physician Attestation Statement: I have personally seen and examined this patient.   I agree with the above history.  -:   As the supervising MD I agree with the above PE.     As the supervising MD I agree with the above treatment, course, plan, and disposition.   -: I agree with the above edited resident note. Pt seen and examined, chart and labs reviewed.    Briefly, 28 yo G1 @ 38w3d presenting for r/o labor. Afebrile, mild range BP in [facility] and O2 sats in low 90's with episodes of tachypnea. Pt denies cough, congestion, SOB, CP. Hx of acute chest this pregnancy with hospitalization in . CXR obtained showing stable pulmonary congestion. ABG with mildly decreased CO2 due to tachypnea. Pt reports tachypnea is due to her contraction pain and denies any pulm symptoms at this time. PreE labs obtained: plts 427, Cr 0.6, AST/ALT 61/40 and P:C 0.25. SVE /-3, unchanged on repeat exam 2 hrs later. Pt  has now met criteria for gHTN, discussed recommendation for admission and IOL for same. Pt is amenable. GBS+, will start PCN. Consents in chart. Cephalic on BSS.     All questions answered. Admit to L&D for further mgmt.     Karen Ty MD  OB Hospitalist  9/22/2024     I was personally present during the critical portions of the procedure(s) performed by the resident and was immediately available in the ED to provide services and assistance as needed during the entire procedure.  I have reviewed and agree with the residents interpretation of the following: lab data.  I have reviewed the following: old records at this facility.                                       Clinical Impression:  Final diagnoses:  [O47.9] Uterine contractions at greater than 20 weeks of gestation  [Z3A.38] 38 weeks gestation of pregnancy          ED Disposition Condition    Send to L&D Stable                Lora Galindo MD  Resident  09/22/24 2259       Karen Ty MD  09/22/24 1868

## 2024-09-22 NOTE — ANESTHESIA PREPROCEDURE EVALUATION
[facility]  Anesthesia Pre-Operative Evaluation         Patient Name: Elizabeth Franco  YOB: 1995  MRN: [id number]    2024      Elizabeth Franco is a 29 y.o. female  @ 38w3d who presents for IOL 2/2 gHTN. Denies spinal procedures/abnormalities, or coagulopathies. Hx of asthma and gHTN.      OB History    Para Term  AB Living   1             SAB IAB Ectopic Multiple Live Births                  # Outcome Date GA Lbr Jason/2nd Weight Sex Type Anes PTL Lv   1 Current                Review of patient's allergies indicates:   Allergen Reactions    Cephalosporins Hives       Wt Readings from Last 1 Encounters:   24 1704 62.7 kg (138 lb 3.7 oz)       BP Readings from Last 3 Encounters:   24 (!) 140/67   24 128/61   24 126/73       Patient Active Problem List   Diagnosis    Sickle cell anemia    Elevated bilirubin    GERD (gastroesophageal reflux disease)    Gall bladder stones    History of splenectomy    History of appendectomy    Sickle cell anemia of mother during pregnancy    29 weeks gestation of pregnancy    Asthma    Gestational hypertension, third trimester    Encounter for induction of labor       Past Surgical History:   Procedure Laterality Date    APPENDECTOMY      SPLENECTOMY         Social History     Socioeconomic History    Marital status: Single   Tobacco Use    Smoking status: Never    Smokeless tobacco: Never   Substance and Sexual Activity    Alcohol use: No     Alcohol/week: 0.0 standard drinks of alcohol    Drug use: Never    Sexual activity: Not Currently     Social Determinants of Health     Financial Resource Strain: Medium Risk (2024)    Received from [facility]    Overall Financial Resource Strain (CARDIA)     Difficulty of Paying Living Expenses: Somewhat hard   Food Insecurity: No Food Insecurity (2024)    Received from [facility] [employer]    Hunger Vital Sign     Worried About Running Out of  "Food in the Last Year: Never true     Ran Out of Food in the Last Year: Never true   Transportation Needs: No Transportation Needs (5/1/2024)    Received from [facility]    PRAPARE - Transportation     Lack of Transportation (Medical): No     Lack of Transportation (Non-Medical): No   Physical Activity: Insufficiently Active (5/1/2024)    Received from [facility]    Exercise Vital Sign     Days of Exercise per Week: 3 days     Minutes of Exercise per Session: 20 min   Stress: Stress Concern Present (5/1/2024)    Received from [facility]    [employer] [place] [employer] - Occupational Stress Questionnaire     Feeling of Stress : To some extent   Housing Stability: Low Risk  (4/2/2024)    Received from [facility]    Housing Stability Vital Sign     In the last 12 months, was there a time when you were not able to pay the mortgage or rent on time?: No     In the past 12 months, how many times have you moved where you were living?: 0     At any time in the past 12 months, were you homeless or living in a shelter (including now)?: No         Chemistry        Component Value Date/Time     09/22/2024 1700    K 4.0 09/22/2024 1700     09/22/2024 1700    CO2 18 (L) 09/22/2024 1700    BUN 6 09/22/2024 1700    CREATININE 0.6 09/22/2024 1700    GLU 81 09/22/2024 1700        Component Value Date/Time    CALCIUM 9.3 09/22/2024 1700    ALKPHOS 200 (H) 09/22/2024 1700    AST 61 (H) 09/22/2024 1700    ALT 40 09/22/2024 1700    BILITOT 3.3 (H) 09/22/2024 1700    ESTGFRAFRICA >60 06/20/2022 0507    EGFRNONAA >60 06/20/2022 0507            Lab Results   Component Value Date    WBC 12.98 (H) 09/22/2024    HGB 10.1 (L) 09/22/2024    HCT 28.8 (L) 09/22/2024    MCV 92 09/22/2024     09/22/2024       No results for input(s): "PT", "INR", "PROTIME", "APTT" in the last 72 hours.                                                                                                               "  09/22/2024  Elizabeth Franco is a 29 y.o., female.      Pre-op Assessment    I have reviewed the Patient Summary Reports.     I have reviewed the Nursing Notes. I have reviewed the NPO Status.   I have reviewed the Medications.     Review of Systems  Anesthesia Hx:  No problems with previous Anesthesia             Denies Family Hx of Anesthesia complications.    Denies Personal Hx of Anesthesia complications.                    Social:  Non-Smoker       Hematology/Oncology:       -- Anemia:                                  Cardiovascular:     Hypertension    Denies CAD.     Denies Dysrhythmias.    Denies CHF.                                 Pulmonary:    Denies COPD. Asthma                    Renal/:   Denies Chronic Renal Disease.                Hepatic/GI:      Denies GERD.             Neurological:    Denies CVA.    Denies Seizures.                                Endocrine:  Denies Diabetes.           Psych:   denies anxiety                 Physical Exam  General: Well nourished, Cooperative, Alert and Oriented    Airway:  Mallampati: II / I  Mouth Opening: Normal  TM Distance: Normal  Tongue: Normal  Neck ROM: Normal ROM    Dental:  Intact        Anesthesia Plan  Type of Anesthesia, risks & benefits discussed:    Anesthesia Type: Epidural, Gen ETT, MAC, Spinal, CSE  Intra-op Monitoring Plan: Standard ASA Monitors  Post Op Pain Control Plan: epidural analgesia and multimodal analgesia  Induction:  IV  Airway Plan: Video, Post-Induction  Informed Consent: Informed consent signed with the Patient and all parties understand the risks and agree with anesthesia plan.  All questions answered.   ASA Score: 2  Day of Surgery Review of History & Physical: H&P Update referred to the surgeon/provider.    Ready For Surgery From Anesthesia Perspective.     .

## 2024-09-23 PROCEDURE — 72100003 HC LABOR CARE, EA. ADDL. 8 HRS

## 2024-09-23 PROCEDURE — 63600175 PHARM REV CODE 636 W HCPCS

## 2024-09-23 PROCEDURE — 72200005 HC VAGINAL DELIVERY LEVEL II

## 2024-09-23 PROCEDURE — 25000003 PHARM REV CODE 250

## 2024-09-23 PROCEDURE — 59409 OBSTETRICAL CARE: CPT | Mod: AT,,, | Performed by: STUDENT IN AN ORGANIZED HEALTH CARE EDUCATION/TRAINING PROGRAM

## 2024-09-23 PROCEDURE — 86922 COMPATIBILITY TEST ANTIGLOB: CPT

## 2024-09-23 PROCEDURE — 3E033VJ INTRODUCTION OF OTHER HORMONE INTO PERIPHERAL VEIN, PERCUTANEOUS APPROACH: ICD-10-PCS | Performed by: STUDENT IN AN ORGANIZED HEALTH CARE EDUCATION/TRAINING PROGRAM

## 2024-09-23 PROCEDURE — 10907ZC DRAINAGE OF AMNIOTIC FLUID, THERAPEUTIC FROM PRODUCTS OF CONCEPTION, VIA NATURAL OR ARTIFICIAL OPENING: ICD-10-PCS | Performed by: STUDENT IN AN ORGANIZED HEALTH CARE EDUCATION/TRAINING PROGRAM

## 2024-09-23 PROCEDURE — 11000001 HC ACUTE MED/SURG PRIVATE ROOM

## 2024-09-23 RX ORDER — TRANEXAMIC ACID 10 MG/ML
1000 INJECTION, SOLUTION INTRAVENOUS EVERY 30 MIN PRN
Status: DISCONTINUED | OUTPATIENT
Start: 2024-09-23 | End: 2024-09-24 | Stop reason: HOSPADM

## 2024-09-23 RX ORDER — SIMETHICONE 80 MG
1 TABLET,CHEWABLE ORAL EVERY 6 HOURS PRN
Status: DISCONTINUED | OUTPATIENT
Start: 2024-09-23 | End: 2024-09-24 | Stop reason: HOSPADM

## 2024-09-23 RX ORDER — OXYCODONE AND ACETAMINOPHEN 10; 325 MG/1; MG/1
1 TABLET ORAL EVERY 4 HOURS PRN
Status: DISCONTINUED | OUTPATIENT
Start: 2024-09-23 | End: 2024-09-24 | Stop reason: HOSPADM

## 2024-09-23 RX ORDER — CARBOPROST TROMETHAMINE 250 UG/ML
250 INJECTION, SOLUTION INTRAMUSCULAR ONCE AS NEEDED
Status: DISCONTINUED | OUTPATIENT
Start: 2024-09-23 | End: 2024-09-23

## 2024-09-23 RX ORDER — SIMETHICONE 80 MG
1 TABLET,CHEWABLE ORAL 4 TIMES DAILY PRN
Status: DISCONTINUED | OUTPATIENT
Start: 2024-09-23 | End: 2024-09-23

## 2024-09-23 RX ORDER — BUPIVACAINE HYDROCHLORIDE 2.5 MG/ML
INJECTION, SOLUTION EPIDURAL; INFILTRATION; INTRACAUDAL
Status: DISPENSED
Start: 2024-09-23 | End: 2024-09-23

## 2024-09-23 RX ORDER — METHYLERGONOVINE MALEATE 0.2 MG/ML
200 INJECTION INTRAVENOUS ONCE AS NEEDED
Status: DISCONTINUED | OUTPATIENT
Start: 2024-09-23 | End: 2024-09-23

## 2024-09-23 RX ORDER — OXYTOCIN 10 [USP'U]/ML
10 INJECTION, SOLUTION INTRAMUSCULAR; INTRAVENOUS ONCE AS NEEDED
Status: DISCONTINUED | OUTPATIENT
Start: 2024-09-23 | End: 2024-09-24 | Stop reason: HOSPADM

## 2024-09-23 RX ORDER — ENOXAPARIN SODIUM 100 MG/ML
40 INJECTION SUBCUTANEOUS EVERY 24 HOURS
Status: DISCONTINUED | OUTPATIENT
Start: 2024-09-23 | End: 2024-09-24 | Stop reason: HOSPADM

## 2024-09-23 RX ORDER — ACETAMINOPHEN 325 MG/1
650 TABLET ORAL EVERY 6 HOURS SCHEDULED
Status: DISCONTINUED | OUTPATIENT
Start: 2024-09-23 | End: 2024-09-24 | Stop reason: HOSPADM

## 2024-09-23 RX ORDER — TRANEXAMIC ACID 10 MG/ML
1000 INJECTION, SOLUTION INTRAVENOUS EVERY 30 MIN PRN
Status: DISCONTINUED | OUTPATIENT
Start: 2024-09-23 | End: 2024-09-23

## 2024-09-23 RX ORDER — PRENATAL WITH FERROUS FUM AND FOLIC ACID 3080; 920; 120; 400; 22; 1.84; 3; 20; 10; 1; 12; 200; 27; 25; 2 [IU]/1; [IU]/1; MG/1; [IU]/1; MG/1; MG/1; MG/1; MG/1; MG/1; MG/1; UG/1; MG/1; MG/1; MG/1; MG/1
1 TABLET ORAL DAILY
Status: DISCONTINUED | OUTPATIENT
Start: 2024-09-23 | End: 2024-09-24 | Stop reason: HOSPADM

## 2024-09-23 RX ORDER — HYDROCORTISONE 25 MG/G
CREAM TOPICAL 3 TIMES DAILY PRN
Status: DISCONTINUED | OUTPATIENT
Start: 2024-09-23 | End: 2024-09-24 | Stop reason: HOSPADM

## 2024-09-23 RX ORDER — CALCIUM CARBONATE 200(500)MG
500 TABLET,CHEWABLE ORAL 3 TIMES DAILY PRN
Status: DISCONTINUED | OUTPATIENT
Start: 2024-09-23 | End: 2024-09-24 | Stop reason: HOSPADM

## 2024-09-23 RX ORDER — OXYTOCIN-SODIUM CHLORIDE 0.9% IV SOLN 30 UNIT/500ML 30-0.9/5 UT/ML-%
10 SOLUTION INTRAVENOUS ONCE AS NEEDED
Status: DISCONTINUED | OUTPATIENT
Start: 2024-09-23 | End: 2024-09-24 | Stop reason: HOSPADM

## 2024-09-23 RX ORDER — IBUPROFEN 600 MG/1
600 TABLET ORAL EVERY 6 HOURS
Status: DISCONTINUED | OUTPATIENT
Start: 2024-09-23 | End: 2024-09-24 | Stop reason: HOSPADM

## 2024-09-23 RX ORDER — OXYTOCIN-SODIUM CHLORIDE 0.9% IV SOLN 30 UNIT/500ML 30-0.9/5 UT/ML-%
95 SOLUTION INTRAVENOUS CONTINUOUS PRN
Status: DISCONTINUED | OUTPATIENT
Start: 2024-09-23 | End: 2024-09-24 | Stop reason: HOSPADM

## 2024-09-23 RX ORDER — OXYTOCIN-SODIUM CHLORIDE 0.9% IV SOLN 30 UNIT/500ML 30-0.9/5 UT/ML-%
95 SOLUTION INTRAVENOUS ONCE AS NEEDED
Status: DISCONTINUED | OUTPATIENT
Start: 2024-09-23 | End: 2024-09-24 | Stop reason: HOSPADM

## 2024-09-23 RX ORDER — TRANEXAMIC ACID 100 MG/ML
1000 INJECTION, SOLUTION INTRAVENOUS ONCE AS NEEDED
Status: DISCONTINUED | OUTPATIENT
Start: 2024-09-23 | End: 2024-09-23

## 2024-09-23 RX ORDER — CARBOPROST TROMETHAMINE 250 UG/ML
250 INJECTION, SOLUTION INTRAMUSCULAR
Status: DISCONTINUED | OUTPATIENT
Start: 2024-09-23 | End: 2024-09-24 | Stop reason: HOSPADM

## 2024-09-23 RX ORDER — TERBUTALINE SULFATE 1 MG/ML
INJECTION SUBCUTANEOUS
Status: COMPLETED
Start: 2024-09-23 | End: 2024-09-23

## 2024-09-23 RX ORDER — TERBUTALINE SULFATE 1 MG/ML
0.25 INJECTION SUBCUTANEOUS ONCE
Status: COMPLETED | OUTPATIENT
Start: 2024-09-23 | End: 2024-09-23

## 2024-09-23 RX ORDER — DIPHENHYDRAMINE HYDROCHLORIDE 50 MG/ML
25 INJECTION INTRAMUSCULAR; INTRAVENOUS EVERY 4 HOURS PRN
Status: DISCONTINUED | OUTPATIENT
Start: 2024-09-23 | End: 2024-09-24 | Stop reason: HOSPADM

## 2024-09-23 RX ORDER — ONDANSETRON 8 MG/1
8 TABLET, ORALLY DISINTEGRATING ORAL EVERY 8 HOURS PRN
Status: DISCONTINUED | OUTPATIENT
Start: 2024-09-23 | End: 2024-09-24 | Stop reason: HOSPADM

## 2024-09-23 RX ORDER — DIPHENOXYLATE HYDROCHLORIDE AND ATROPINE SULFATE 2.5; .025 MG/1; MG/1
2 TABLET ORAL EVERY 6 HOURS PRN
Status: DISCONTINUED | OUTPATIENT
Start: 2024-09-23 | End: 2024-09-23

## 2024-09-23 RX ORDER — DOCUSATE SODIUM 100 MG/1
200 CAPSULE, LIQUID FILLED ORAL 2 TIMES DAILY PRN
Status: DISCONTINUED | OUTPATIENT
Start: 2024-09-23 | End: 2024-09-24 | Stop reason: HOSPADM

## 2024-09-23 RX ORDER — FOLIC ACID 1 MG/1
4 TABLET ORAL DAILY
Status: DISCONTINUED | OUTPATIENT
Start: 2024-09-23 | End: 2024-09-24 | Stop reason: HOSPADM

## 2024-09-23 RX ORDER — ALBUTEROL SULFATE 90 UG/1
2 INHALANT RESPIRATORY (INHALATION) EVERY 4 HOURS PRN
Status: DISCONTINUED | OUTPATIENT
Start: 2024-09-23 | End: 2024-09-24 | Stop reason: HOSPADM

## 2024-09-23 RX ORDER — SODIUM CHLORIDE 0.9 % (FLUSH) 0.9 %
10 SYRINGE (ML) INJECTION
Status: DISCONTINUED | OUTPATIENT
Start: 2024-09-23 | End: 2024-09-24 | Stop reason: HOSPADM

## 2024-09-23 RX ORDER — OXYCODONE AND ACETAMINOPHEN 5; 325 MG/1; MG/1
1 TABLET ORAL EVERY 4 HOURS PRN
Status: DISCONTINUED | OUTPATIENT
Start: 2024-09-23 | End: 2024-09-24 | Stop reason: HOSPADM

## 2024-09-23 RX ORDER — DIPHENOXYLATE HYDROCHLORIDE AND ATROPINE SULFATE 2.5; .025 MG/1; MG/1
2 TABLET ORAL EVERY 6 HOURS PRN
Status: DISCONTINUED | OUTPATIENT
Start: 2024-09-23 | End: 2024-09-24 | Stop reason: HOSPADM

## 2024-09-23 RX ORDER — MISOPROSTOL 200 UG/1
800 TABLET ORAL ONCE AS NEEDED
Status: DISCONTINUED | OUTPATIENT
Start: 2024-09-23 | End: 2024-09-23

## 2024-09-23 RX ORDER — ONDANSETRON 8 MG/1
8 TABLET, ORALLY DISINTEGRATING ORAL EVERY 8 HOURS PRN
Status: DISCONTINUED | OUTPATIENT
Start: 2024-09-23 | End: 2024-09-23

## 2024-09-23 RX ORDER — DIPHENHYDRAMINE HCL 25 MG
25 CAPSULE ORAL EVERY 4 HOURS PRN
Status: DISCONTINUED | OUTPATIENT
Start: 2024-09-23 | End: 2024-09-24 | Stop reason: HOSPADM

## 2024-09-23 RX ORDER — MISOPROSTOL 200 UG/1
800 TABLET ORAL ONCE AS NEEDED
Status: DISCONTINUED | OUTPATIENT
Start: 2024-09-23 | End: 2024-09-24 | Stop reason: HOSPADM

## 2024-09-23 RX ORDER — METHYLERGONOVINE MALEATE 0.2 MG/ML
200 INJECTION INTRAVENOUS ONCE AS NEEDED
Status: COMPLETED | OUTPATIENT
Start: 2024-09-23 | End: 2024-09-23

## 2024-09-23 RX ORDER — OXYTOCIN-SODIUM CHLORIDE 0.9% IV SOLN 30 UNIT/500ML 30-0.9/5 UT/ML-%
30 SOLUTION INTRAVENOUS ONCE AS NEEDED
Status: DISCONTINUED | OUTPATIENT
Start: 2024-09-23 | End: 2024-09-24 | Stop reason: HOSPADM

## 2024-09-23 RX ORDER — CARBOPROST TROMETHAMINE 250 UG/ML
250 INJECTION, SOLUTION INTRAMUSCULAR
Status: DISCONTINUED | OUTPATIENT
Start: 2024-09-23 | End: 2024-09-23

## 2024-09-23 RX ORDER — OXYTOCIN 10 [USP'U]/ML
10 INJECTION, SOLUTION INTRAMUSCULAR; INTRAVENOUS ONCE AS NEEDED
Status: DISCONTINUED | OUTPATIENT
Start: 2024-09-23 | End: 2024-09-23

## 2024-09-23 RX ORDER — OXYTOCIN-SODIUM CHLORIDE 0.9% IV SOLN 30 UNIT/500ML 30-0.9/5 UT/ML-%
10 SOLUTION INTRAVENOUS ONCE AS NEEDED
Status: COMPLETED | OUTPATIENT
Start: 2024-09-23 | End: 2024-09-23

## 2024-09-23 RX ORDER — LIDOCAINE HYDROCHLORIDE 10 MG/ML
10 INJECTION, SOLUTION INFILTRATION; PERINEURAL ONCE AS NEEDED
Status: DISCONTINUED | OUTPATIENT
Start: 2024-09-23 | End: 2024-09-24 | Stop reason: HOSPADM

## 2024-09-23 RX ADMIN — ACETAMINOPHEN 650 MG: 325 TABLET, FILM COATED ORAL at 05:09

## 2024-09-23 RX ADMIN — IBUPROFEN 600 MG: 600 TABLET, FILM COATED ORAL at 11:09

## 2024-09-23 RX ADMIN — TERBUTALINE SULFATE 0.25 MG: 1 INJECTION SUBCUTANEOUS at 01:09

## 2024-09-23 RX ADMIN — ENOXAPARIN SODIUM 40 MG: 40 INJECTION SUBCUTANEOUS at 05:09

## 2024-09-23 RX ADMIN — DOCUSATE SODIUM 200 MG: 100 CAPSULE, LIQUID FILLED ORAL at 11:09

## 2024-09-23 RX ADMIN — METHYLERGONOVINE MALEATE 200 MCG: 0.2 INJECTION, SOLUTION INTRAMUSCULAR; INTRAVENOUS at 04:09

## 2024-09-23 RX ADMIN — IBUPROFEN 600 MG: 600 TABLET, FILM COATED ORAL at 09:09

## 2024-09-23 RX ADMIN — FOLIC ACID 4 MG: 1 TABLET ORAL at 11:09

## 2024-09-23 RX ADMIN — IBUPROFEN 600 MG: 600 TABLET, FILM COATED ORAL at 04:09

## 2024-09-23 RX ADMIN — ACETAMINOPHEN 650 MG: 325 TABLET, FILM COATED ORAL at 11:09

## 2024-09-23 RX ADMIN — AMPICILLIN SODIUM 2 G: 2 INJECTION, POWDER, FOR SOLUTION INTRAMUSCULAR; INTRAVENOUS at 05:09

## 2024-09-23 RX ADMIN — PRENATAL VIT W/ FE FUMARATE-FA TAB 27-0.8 MG 1 TABLET: 27-0.8 TAB at 11:09

## 2024-09-23 RX ADMIN — IBUPROFEN 600 MG: 600 TABLET, FILM COATED ORAL at 05:09

## 2024-09-23 RX ADMIN — CALCIUM CARBONATE 500 MG: 500 TABLET, CHEWABLE ORAL at 10:09

## 2024-09-23 RX ADMIN — DOCUSATE SODIUM 200 MG: 100 CAPSULE, LIQUID FILLED ORAL at 07:09

## 2024-09-23 RX ADMIN — GENTAMICIN SULFATE 313.6 MG: 40 INJECTION, SOLUTION INTRAMUSCULAR; INTRAVENOUS at 06:09

## 2024-09-23 RX ADMIN — DEXTROSE MONOHYDRATE 3 MILLION UNITS: 50 INJECTION, SOLUTION INTRAVENOUS at 02:09

## 2024-09-23 RX ADMIN — OXYTOCIN-SODIUM CHLORIDE 0.9% IV SOLN 30 UNIT/500ML 10 UNITS: 30-0.9/5 SOLUTION at 04:09

## 2024-09-23 RX ADMIN — ACETAMINOPHEN 650 MG: 325 TABLET, FILM COATED ORAL at 09:09

## 2024-09-23 RX ADMIN — ACETAMINOPHEN 650 MG: 325 TABLET, FILM COATED ORAL at 04:09

## 2024-09-23 NOTE — CARE UPDATE
"LABOR NOTE    S:  Complaints: No.  Epidural working:  yes  To bedside for cervical exam    O: BP (!) 140/67   Pulse 90   Temp 98.2 °F (36.8 °C) (Oral)   Resp 18   Ht 5' 3" (1.6 m)   Wt 62.7 kg (138 lb 3.7 oz)   LMP 2023   SpO2 95%   Breastfeeding No   BMI 24.49 kg/m²       FHT: 140bmp, moderate btbv, intermittent variable decels that resolve to baseline spontaneously  Cat 2 (reassuring)  CTX: q 1-2 minutes  SVE: 3/80/-2    Labor timeline:  1600: /-3  2100: 3/80/-2, pit @ 4mU    ASSESSMENT:   29 y.o.  at 38w3d, IOL    FHT reassuring    Active Hospital Problems    Diagnosis  POA    *Encounter for induction of labor [Z34.90]  Not Applicable    Gestational hypertension, third trimester [O13.3]  Yes      Resolved Hospital Problems   No resolved problems to display.         PLAN:    Continue Close Maternal/Fetal Monitoring  Pitocin Augmentation per protocol  Recheck 4 hours or PRN  AROM next check    Ashia Beckham M.D.      "

## 2024-09-23 NOTE — L&D DELIVERY NOTE
"[facility] - Labor & Delivery  Vaginal Delivery   Operative Note    SUMMARY     Normal spontaneous vaginal delivery of live infant, was placed on mothers abdomen for skin to skin and bulb suctioning performed.  Infant delivered position OA over intact perineum.  Nuchal cord: yes, reduced prior to delivery of the body.    Spontaneous delivery of placenta and IV pitocin given noting uterine atony with bleeding. Given IM methergine with good uterine response  No lacerations noted.  Patient tolerated delivery well. Sponge needle and lap counted correctly x2.  cc.     Indications:  (spontaneous vaginal delivery)  Pregnancy complicated by:   Patient Active Problem List   Diagnosis    Sickle cell anemia    Elevated bilirubin    GERD (gastroesophageal reflux disease)    Gall bladder stones    History of splenectomy    History of appendectomy    Sickle cell anemia of mother during pregnancy    29 weeks gestation of pregnancy    Asthma    Gestational hypertension, third trimester     (spontaneous vaginal delivery)     Admitting GA: 38w4d    Delivery Information for Analilia Franco    Birth information:  YOB: 2022   Time of birth: 4:01 AM   Sex: female   Head Delivery Date/Time: 2024  4:01 AM   Delivery type: Vaginal, Spontaneous   Gestational Age: 38w4d        Delivery Providers    Delivering clinician: Ashia Beckham MD   Provider Role    Kaylah Campos MD Resident    Rivera, Melyssa, RN Delivery Nurse    Deena Leone, RN Registered Nurse    Alison Choi RN Charge Nurse              Measurements    Weight: 2680 g  Weight (lbs): 5 lb 14.5 oz  Length: 50.8 cm  Length (in): 20"  Head circumference: 35 cm  Chest circumference: 30.5 cm         Apgars    Living status: Living  Apgar Component Scores:  1 min.:  5 min.:  10 min.:  15 min.:  20 min.:    Skin color:  0  1       Heart rate:  2  2       Reflex irritability:  2  2       Muscle tone:  2  2       Respiratory " effort:  1  2       Total:  7  9       Apgars assigned by: MARSHALL VALDEZ RNC         Operative Delivery    Forceps attempted?: No  Vacuum extractor attempted?: No         Shoulder Dystocia    Shoulder dystocia present?: No           Presentation    Presentation: Vertex  Position: Occiput           Interventions/Resuscitation    Method: Bulb Suctioning, Tactile Stimulation       Cord    Vessels: 3 vessels  Complications: Nuchal  Nuchal Intervention: reduced  Nuchal Cord Description: loose nuchal cord  Number of Loops: 1  Delayed Cord Clamping?: Yes  Cord Clamped Date/Time: 2024  4:02 AM  Cord Blood Disposition: Discarded  Gases Sent?: No  Stem Cell Collection (by MD): No       Placenta    Placenta delivery date/time: 2024 0413  Placenta removal: Spontaneous  Placenta appearance: Intact  Placenta disposition: Discarded           Labor Events:       labor: No     Labor Onset Date/Time:         Dilation Complete Date/Time:         Start Pushing Date/Time:         Start Pushing Date/Time:       Rupture Date/Time: 24         Rupture type: ARM (Artificial Rupture)         Fluid Amount:       Fluid Color: Clear               steroids: None     Antibiotics given for GBS: Yes     Induction: oxytocin     Indications for induction:  Hypertension     Augmentation:       Indications for augmentation:       Labor complications: None     Additional complications:          Cervical ripening:                     Delivery:      Episiotomy: None     Indication for Episiotomy:       Perineal Lacerations: None Repaired:      Periurethral Laceration:   Repaired:     Labial Laceration:   Repaired:     Sulcus Laceration:   Repaired:     Vaginal Laceration:   Repaired:     Cervical Laceration:   Repaired:     Repair suture: None     Repair # of packets: 0     Last Value - EBL - Nursing (mL):       Sum - EBL - Nursing (mL): 0     Last Value - EBL - Anesthesia (mL):      Calculated QBL (mL): 200      Running  total QBL (mL): 200      Vaginal Sweep Performed: No     Surgicount Correct: Yes     Vaginal Packing: No Quantity:       Other providers:       Anesthesia    Method: Epidural          Details (if applicable):  Trial of Labor      Categorization:      Priority:     Indications for :     Incision Type:       Additional  information:  Forceps:    Vacuum:    Breech:    Observed anomalies    Other (Comments):       Valentina Giraldo [facility]   OBGYN PGY1    I was present for the entire procedure, and agree with the above resident's assessment of findings and description of procedure.  Ashia Beckham M.D.

## 2024-09-23 NOTE — PROGRESS NOTES
"LABOR NOTE    S:  Complaints: No.  Epidural Working:  yes  MD to bedside for Cat II tracing     O: BP (!) 140/67   Pulse 90   Temp 98.2 °F (36.8 °C) (Oral)   Resp 18   Ht 5' 3" (1.6 m)   Wt 62.7 kg (138 lb 3.7 oz)   LMP 12/28/2023   SpO2 95%   Breastfeeding No   BMI 24.49 kg/m²     FHT: 145, mod bernard, +accels/+reccurent late decels Cat 2 (overall reassuring)  CTX: q 1-2 minutes, pit @ 3  SVE: 7/90/0    TIMELINE:   Labor timeline:  1600: 2/70/-3  2100: 3/80/-2, pit @ 4mU  0015: 7/90/0, pit @ 6 and decreased to 3 mU/min, s/p fluid bolus and maternal repositioning with improvement in strip, overall reassuring    PLAN:      Continue Close Maternal/Fetal Monitoring  Pitocin Augmentation per protocol  Recheck 2 hours or PRN    2. gHTN   - asymptomatic   - BP as above   - CBC: H/H 10.1/28.8, CMP: Cr 0.6, AST/ALT: 61/40  - PCR: 0.245    3. Asthma   - asymptomatic   - PRN albuterol     4. GBS+  - PCN G per protocol     5. Sickle Cell Disease   - asymptomatic, no clinical signs of acute chest syndrome or sickle cell crisis   - CXR stable compared to prior, pulmonary vascular congestion unchanged      Kaylah Campos  [facility]   OBGYN PGY1      "

## 2024-09-23 NOTE — CARE UPDATE
To bedside for evaluation 2/2 prolonged deceleration with FHT to the 80s. Uterine tachysystole noted, terbutaline administered with improvement in FHT. SVE 8-9/90/0.   Will return in 30 min for AROM and possible pit restart.    Nell Arias MD  OBGYN, PGY-4

## 2024-09-23 NOTE — PROGRESS NOTES
"LABOR NOTE    S:  Complaints: No.  Epidural Working:  yes  MD to bedside for AROM    O: BP (!) 140/67   Pulse 90   Temp 98.2 °F (36.8 °C) (Oral)   Resp 18   Ht 5' 3" (1.6 m)   Wt 62.7 kg (138 lb 3.7 oz)   LMP 12/28/2023   SpO2 95%   Breastfeeding No   BMI 24.49 kg/m²     FHT: 135, mod bernard, +accels/-decels, Cat 1 (reassuring)  CTX: q 3-6 minutes  SVE: 8/90/0, AROM clear     TIMELINE:   Labor timeline:  1600: 2/70/-3  2100: 3/80/-2, pit @ 4mU  0015: 7/90/0, pit @ 6 and decreased to 3 mU/min  0115: 8-9/90/0 BBOW, terb given  0215: 8/90/0, AROM clear, restart pit    PLAN:      Continue Close Maternal/Fetal Monitoring  Pitocin Augmentation per protocol. Restart at this time.   Recheck 2 hours or PRN      Nell Arias MD  OBGYN, PGY-4        "

## 2024-09-23 NOTE — PROGRESS NOTES
09/22/24 2246   [facility] Note - Strip   Strip Reviewed by [facility] Nurse? Yes   [facility] Note - Communication   [place] Nurse Communicated with Bedside Nurse Regarding: Fetal Status   [facility] Note - Notification   Nurse Notified? Yes

## 2024-09-23 NOTE — PLAN OF CARE
Basic lactation education reviewed, Breastfeeding/postpartum guide at bedside.   Assisted with position and latch, baby nursing well with rhythmic sucking and swallows observed.    LC number on board to call for assistance. To feed on cue 8 or more times in 24hrs, monitor voids/stools, and weight loss. Hand express and offer EBM if sleepy.   Pt has DME information to call to obtain a personal breast pump for home use.

## 2024-09-23 NOTE — LACTATION NOTE
09/23/24 1315   Maternal Assessment   Breast Shape Bilateral:;round   Breast Density Bilateral:;soft   Areola Bilateral:;elastic   Nipples Bilateral:;everted   Maternal Infant Feeding   Maternal Emotional State assist needed;relaxed   Infant Positioning cross-cradle   Signs of Milk Transfer infant jaw motion present;audible swallow   Nipple Shape After Feeding, Left Round   Nipple Shape After Feeding, Right .Round   Latch Assistance yes     Baby latched in cross-cradle hold for 10 minutes on the left breast and 15 minutes on the right side. Very sleepy and needed very frequent stimulation. Some good tugs and pulls, some swallows heard periodically. 1 poop since birth. LC taught mom hand expression. Encouraged latching every 3 hours or more if baby shows cues. Basic breastfeeding education given.

## 2024-09-23 NOTE — PROGRESS NOTES
09/23/24 0123   [facility] Note - Strip   Strip Reviewed by [facility] Nurse? Yes   [facility] Note - Communication   [place] Nurse Communicated with Bedside Nurse Regarding: Fetal Status   [facility] Note - Notification   Nurse Notified? Yes

## 2024-09-23 NOTE — CARE UPDATE
To bedside for SVE 2/2 increased pain. SVE 10/100/+2. Set up for 2nd stage of labor. Anticipate .    Nell Arias MD  OBGYN, PGY-4

## 2024-09-24 VITALS
RESPIRATION RATE: 18 BRPM | WEIGHT: 138.25 LBS | OXYGEN SATURATION: 97 % | DIASTOLIC BLOOD PRESSURE: 60 MMHG | BODY MASS INDEX: 24.5 KG/M2 | SYSTOLIC BLOOD PRESSURE: 125 MMHG | HEART RATE: 99 BPM | TEMPERATURE: 99 F | HEIGHT: 63 IN

## 2024-09-24 PROBLEM — D64.9 ACUTE ON CHRONIC ANEMIA: Status: ACTIVE | Noted: 2024-09-24

## 2024-09-24 PROBLEM — K80.20 GALL BLADDER STONES: Status: RESOLVED | Noted: 2022-06-15 | Resolved: 2024-09-24

## 2024-09-24 PROBLEM — D62 ACUTE BLOOD LOSS ANEMIA: Status: ACTIVE | Noted: 2024-09-24

## 2024-09-24 PROBLEM — Z90.81 HISTORY OF SPLENECTOMY: Status: RESOLVED | Noted: 2024-02-22 | Resolved: 2024-09-24

## 2024-09-24 PROBLEM — Z90.49 HISTORY OF APPENDECTOMY: Status: RESOLVED | Noted: 2024-02-22 | Resolved: 2024-09-24

## 2024-09-24 PROBLEM — O41.1290 CHORIOAMNIONITIS: Status: RESOLVED | Noted: 2024-09-24 | Resolved: 2024-09-24

## 2024-09-24 PROBLEM — O41.1290 CHORIOAMNIONITIS: Status: ACTIVE | Noted: 2024-09-24

## 2024-09-24 PROBLEM — J45.909 MATERNAL ASTHMA COMPLICATING PREGNANCY: Status: ACTIVE | Noted: 2024-09-24

## 2024-09-24 PROBLEM — O99.519 MATERNAL ASTHMA COMPLICATING PREGNANCY: Status: ACTIVE | Noted: 2024-09-24

## 2024-09-24 PROBLEM — Z3A.30 30 WEEKS GESTATION OF PREGNANCY: Status: RESOLVED | Noted: 2024-07-23 | Resolved: 2024-09-24

## 2024-09-24 LAB
BASOPHILS # BLD AUTO: 0.1 K/UL (ref 0–0.2)
BASOPHILS NFR BLD: 0.6 % (ref 0–1.9)
DIFFERENTIAL METHOD BLD: ABNORMAL
EOSINOPHIL # BLD AUTO: 0.5 K/UL (ref 0–0.5)
EOSINOPHIL NFR BLD: 2.9 % (ref 0–8)
ERYTHROCYTE [DISTWIDTH] IN BLOOD BY AUTOMATED COUNT: 20 % (ref 11.5–14.5)
HCT VFR BLD AUTO: 21.1 % (ref 37–48.5)
HGB BLD-MCNC: 7.3 G/DL (ref 12–16)
IMM GRANULOCYTES # BLD AUTO: 0.17 K/UL (ref 0–0.04)
IMM GRANULOCYTES NFR BLD AUTO: 1 % (ref 0–0.5)
LYMPHOCYTES # BLD AUTO: 4.1 K/UL (ref 1–4.8)
LYMPHOCYTES NFR BLD: 23 % (ref 18–48)
MCH RBC QN AUTO: 32.2 PG (ref 27–31)
MCHC RBC AUTO-ENTMCNC: 34.6 G/DL (ref 32–36)
MCV RBC AUTO: 93 FL (ref 82–98)
MONOCYTES # BLD AUTO: 1.6 K/UL (ref 0.3–1)
MONOCYTES NFR BLD: 8.9 % (ref 4–15)
NEUTROPHILS # BLD AUTO: 11.3 K/UL (ref 1.8–7.7)
NEUTROPHILS NFR BLD: 63.6 % (ref 38–73)
NRBC BLD-RTO: 7 /100 WBC
PLATELET # BLD AUTO: 345 K/UL (ref 150–450)
PMV BLD AUTO: 11.9 FL (ref 9.2–12.9)
RBC # BLD AUTO: 2.27 M/UL (ref 4–5.4)
WBC # BLD AUTO: 17.68 K/UL (ref 3.9–12.7)

## 2024-09-24 PROCEDURE — 25000003 PHARM REV CODE 250

## 2024-09-24 PROCEDURE — 99232 SBSQ HOSP IP/OBS MODERATE 35: CPT | Mod: ,,, | Performed by: OBSTETRICS & GYNECOLOGY

## 2024-09-24 PROCEDURE — 85025 COMPLETE CBC W/AUTO DIFF WBC: CPT | Performed by: OBSTETRICS & GYNECOLOGY

## 2024-09-24 PROCEDURE — 36415 COLL VENOUS BLD VENIPUNCTURE: CPT | Performed by: OBSTETRICS & GYNECOLOGY

## 2024-09-24 PROCEDURE — 63600175 PHARM REV CODE 636 W HCPCS

## 2024-09-24 RX ORDER — IBUPROFEN 600 MG/1
600 TABLET ORAL EVERY 6 HOURS
Qty: 60 TABLET | Refills: 0 | Status: SHIPPED | OUTPATIENT
Start: 2024-09-24

## 2024-09-24 RX ORDER — DEXTROMETHORPHAN HYDROBROMIDE, GUAIFENESIN 5; 100 MG/5ML; MG/5ML
650 LIQUID ORAL EVERY 6 HOURS
Qty: 60 TABLET | Refills: 0 | Status: SHIPPED | OUTPATIENT
Start: 2024-09-24

## 2024-09-24 RX ORDER — DOCUSATE SODIUM 100 MG/1
100 CAPSULE, LIQUID FILLED ORAL 2 TIMES DAILY
Qty: 60 CAPSULE | Refills: 0 | Status: SHIPPED | OUTPATIENT
Start: 2024-09-24

## 2024-09-24 RX ADMIN — DOCUSATE SODIUM 200 MG: 100 CAPSULE, LIQUID FILLED ORAL at 09:09

## 2024-09-24 RX ADMIN — IBUPROFEN 600 MG: 600 TABLET, FILM COATED ORAL at 09:09

## 2024-09-24 RX ADMIN — ACETAMINOPHEN 650 MG: 325 TABLET, FILM COATED ORAL at 09:09

## 2024-09-24 RX ADMIN — PRENATAL VIT W/ FE FUMARATE-FA TAB 27-0.8 MG 1 TABLET: 27-0.8 TAB at 09:09

## 2024-09-24 RX ADMIN — ACETAMINOPHEN 650 MG: 325 TABLET, FILM COATED ORAL at 04:09

## 2024-09-24 RX ADMIN — ACETAMINOPHEN 650 MG: 325 TABLET, FILM COATED ORAL at 03:09

## 2024-09-24 RX ADMIN — FOLIC ACID 4 MG: 1 TABLET ORAL at 09:09

## 2024-09-24 RX ADMIN — ENOXAPARIN SODIUM 40 MG: 40 INJECTION SUBCUTANEOUS at 04:09

## 2024-09-24 RX ADMIN — IBUPROFEN 600 MG: 600 TABLET, FILM COATED ORAL at 03:09

## 2024-09-24 RX ADMIN — IBUPROFEN 600 MG: 600 TABLET, FILM COATED ORAL at 04:09

## 2024-09-24 NOTE — PROGRESS NOTES
POSTPARTUM PROGRESS NOTE    Subjective:     PPD/POD#: 1   Procedure:    EGA: 38w4d   N/V: No   F/C: No   Abd Pain: Mild, well-controlled with oral pain medication   Lochia: Mild   Voiding: Yes   Ambulating: Yes   Bowel fnc: No   Contraception: Per primary OB     Objective:      Temp:  [97.8 °F (36.6 °C)-99.1 °F (37.3 °C)] 98.2 °F (36.8 °C)  Pulse:  [] 72  Resp:  [16-18] 17  SpO2:  [93 %-96 %] 95 %  BP: ()/(52-71) 108/59    Abdomen: Soft, appropriately tender   Uterus: Firm, no fundal tenderness   Incision: N/A     Lab Review    Recent Labs   Lab 24  1700      K 4.0      CO2 18*   BUN 6   CREATININE 0.6   GLU 81   PROT 8.6*   BILITOT 3.3*   ALKPHOS 200*   ALT 40   AST 61*       Recent Labs   Lab 24  1700 24  0609   WBC 12.98* 17.68*   HGB 10.1* 7.3*   HCT 28.8* 21.1*   MCV 92 93    345         I/O    Intake/Output Summary (Last 24 hours) at 2024 0649  Last data filed at 2024 1500  Gross per 24 hour   Intake 796.11 ml   Output 1700 ml   Net -903.89 ml        Assessment and Plan:   Postpartum care:  - Patient doing well.  - Continue routine management and advances.    IAI  -S/p amp/gent  -Tlast 101 0415 ()    gHTN  - BP as above  - asymptomatic  - preE labs as above  - UOP: voiding spontaneously   - Mag: not indicated  - Hypertensive agent not indicated    Sickle Cell Anemia  - H/H as above  - asymptomatic, no clinical signs of acute chest syndrome or sickle cell crisis   - CXR () stable compared to prior, pulmonary vascular congestion unchanged  - Post partum lovenox indicated     Asthma  -Home albuterol + pulmicort PRN     Katelin Baron MD  Obstetrics & Gynecology, PGY-1

## 2024-09-24 NOTE — ANESTHESIA POSTPROCEDURE EVALUATION
Anesthesia Post Evaluation    Patient: Elizabeth Franco    Procedure(s) Performed: * No procedures listed *    Final Anesthesia Type: epidural      Patient location during evaluation: med/surg floor  Patient participation: Yes- Able to Participate  Level of consciousness: awake and alert and oriented  Post-procedure vital signs: reviewed and stable  Pain management: adequate  Airway patency: patent  XAVIER mitigation strategies: Multimodal analgesia  PONV status at discharge: No PONV  Anesthetic complications: no      Cardiovascular status: hemodynamically stable  Respiratory status: unassisted, spontaneous ventilation and room air  Hydration status: euvolemic  Follow-up not needed.              Vitals Value Taken Time   /64 09/24/24 0807   Temp 37 °C (98.6 °F) 09/24/24 0807   Pulse 90 09/24/24 0807   Resp 18 09/24/24 0807   SpO2 96 % 09/24/24 0807         No case tracking events are documented in the log.      Pain/Haja Score: Pain Rating Prior to Med Admin: 0 (9/24/2024  9:07 AM)  Pain Rating Post Med Admin: 0 (9/24/2024  4:20 AM)

## 2024-09-24 NOTE — DISCHARGE SUMMARY
Delivery Discharge Summary  Obstetrics      Primary OB Clinician: Jeansonne, Julie R., MD      Admission date: 2024  Discharge date: 2024    Disposition: To home, self care    Discharge Diagnosis List:      Patient Active Problem List   Diagnosis    Sickle cell anemia    Elevated bilirubin    GERD (gastroesophageal reflux disease)    Sickle cell anemia of mother during pregnancy    Asthma    Gestational hypertension, third trimester     (spontaneous vaginal delivery)    Maternal asthma complicating pregnancy    Acute on chronic anemia    Acute blood loss anemia       Procedure:     Hospital Course:  Elizabeth Franco is a 29 y.o. now , PPD #1 who was admitted on 2024 at 38w4d for IOL 2/2 gHTN. Patient was subsequently admitted to labor and delivery unit with signed consents.     This IUP was complicated by sickle cell disease, gHTN, GBS+.     Labor course was uncomplicated and resulted in  without complications. IAI noted at time of delivery and patient received antibiotics and remained afebrile thereafter.     Please see delivery note for further details. Her postpartum course was uncomplicated. H/H was 7.3/21 on discharge day however patient evaluated and noted to be asymptomatic.  On discharge day, patient's pain is controlled with oral pain medications. Pt is tolerating ambulation without SOB or CP, and regular diet without N/V. Reports lochia is mild. Denies any HA, vision changes, F/C, LE swelling. Denies any breast pain/soreness.    Pt in stable condition and ready for discharge. She has been instructed to start and/or continue medications and follow up with her obstetrics provider as listed below.    Pertinent studies:  CBC  Recent Labs   Lab 24  1700 24  0609   WBC 12.98* 17.68*   HGB 10.1* 7.3*   HCT 28.8* 21.1*   MCV 92 93    345        Immunization History   Administered Date(s) Administered    Tdap 2024        Delivery:    Episiotomy: None  "  Lacerations: None   Repair suture: None   Repair # of packets: 0   Blood loss (ml):       Birth information:  YOB: 2022   Time of birth: 4:01 AM   Sex: female   Delivery type: Vaginal, Spontaneous   Gestational Age: 38w4d     Measurements    Weight: 2680 g  Weight (lbs): 5 lb 14.5 oz  Length: 50.8 cm  Length (in): 20"  Head circumference: 35 cm  Chest circumference: 30.5 cm         Delivery Clinician: Delivery Providers    Delivering clinician: Ashia Beckham MD   Provider Role    Kaylah Campos MD Resident    Rivera, Melyssa, RN Delivery Nurse    Deena Leone RN Registered Nurse    Alison Choi RN Charge Nurse             Additional  information:  Forceps:    Vacuum:    Breech:    Observed anomalies      Living?:     Apgars    Living status: Living  Apgar Component Scores:  1 min.:  5 min.:  10 min.:  15 min.:  20 min.:    Skin color:  0  1       Heart rate:  2  2       Reflex irritability:  2  2       Muscle tone:  2  2       Respiratory effort:  1  2       Total:  7  9       Apgars assigned by: MARSHALL LEONE RNC         Placenta: Delivered:       appearance    Patient Instructions:   Current Discharge Medication List        START taking these medications    Details   acetaminophen (TYLENOL) 650 MG TbSR Take 1 tablet (650 mg total) by mouth every 6 (six) hours. Alternate between ibuprofen and tylenol every 3 hours. For example: @0800: ibuprofen 600mg @1100: tylenol 650mg @1400: ibuprofen 600mg @1700: tylenol 650 mg @2000: ibuprofen 600mg  Qty: 60 tablet, Refills: 0      docusate sodium (COLACE) 100 MG capsule Take 1 capsule (100 mg total) by mouth 2 (two) times daily.  Qty: 60 capsule, Refills: 0      ibuprofen (ADVIL,MOTRIN) 600 MG tablet Take 1 tablet (600 mg total) by mouth every 6 (six) hours. Alternate between ibuprofen and tylenol every 3 hours. For example: @0800: ibuprofen 600mg @1100: tylenol 650mg @1400: ibuprofen 600mg @1700: tylenol 650 mg @2000: ibuprofen " 600mg  Qty: 60 tablet, Refills: 0           CONTINUE these medications which have NOT CHANGED    Details   folic acid (FOLVITE) 1 MG tablet Take 4 tablets (4 mg total) by mouth once daily.  Qty: 360 tablet, Refills: 3      prenatal vit 87-iron-folic-dha (PRENATE MINI, FERR ASP GLYCIN,) 18-1-350 mg Cap Take 1 tablet by mouth once daily. for 270 doses  Qty: 90 capsule, Refills: 3    Associated Diagnoses: Encounter for supervision of normal pregnancy, antepartum, unspecified       traMADoL (ULTRAM) 50 mg tablet Take 1 tablet (50 mg total) by mouth every 6 (six) hours as needed for Pain.  Qty: 20 tablet, Refills: 0    Comments: n/a       albuterol (PROVENTIL/VENTOLIN HFA) 90 mcg/actuation inhaler Inhale 1-2 puffs into the lungs every 6 (six) hours as needed for Wheezing or Shortness of Breath. Rescue  Qty: 8 g, Refills: 1      albuterol sulfate (PROAIR RESPICLICK) 90 mcg/actuation inhaler Inhale 1-2 puffs into the lungs every 4 (four) hours as needed for Wheezing. Rescue  Qty: 1 each, Refills: 0    Associated Diagnoses: Asthma, unspecified asthma severity, unspecified whether complicated, unspecified whether persistent      budesonide (PULMICORT FLEXHALER) 90 mcg/actuation AePB INHALE 2 PUFFS INTO THE LUNGS ONCE DAILY. CONTROLLER  Qty: 1 each, Refills: 0    Associated Diagnoses: Asthma, unspecified asthma severity, unspecified whether complicated, unspecified whether persistent      enoxaparin (LOVENOX) 40 mg/0.4 mL Syrg       oxyCODONE (ROXICODONE) 5 MG immediate release tablet Take 2 tablets (10 mg total) by mouth every 6 (six) hours as needed.  Qty: 20 tablet, Refills: 0    Comments: n/a            STOP taking these medications       aspirin (ECOTRIN) 81 MG EC tablet Comments:   Reason for Stopping:               Discharge Procedure Orders   Diet Adult Regular     Lifting restrictions   Order Comments: No lifting more than infant for six weeks.     Other restrictions (specify):     No driving until:   Order  Comments: Comfortable stepping on gas and brakes     Pelvic Rest   Order Comments: Nothing in vagina, including intercourse, for at least six weeks     Notify your health care provider if you experience any of the following:  temperature >100.4     Notify your health care provider if you experience any of the following:  persistent nausea and vomiting or diarrhea     Notify your health care provider if you experience any of the following:  severe uncontrolled pain     Notify your health care provider if you experience any of the following:  redness, tenderness, or signs of infection (pain, swelling, redness, odor or green/yellow discharge around incision site)     Notify your health care provider if you experience any of the following:  severe persistent headache     Notify your health care provider if you experience any of the following:  persistent dizziness, light-headedness, or visual disturbances     Notify your health care provider if you experience any of the following:  increased confusion or weakness     Notify your health care provider if you experience any of the following:   Order Comments: Heavy vaginal bleeding, saturating more than two pads in one hour     Activity as tolerated        Follow-up Information       Jeansonne, Julie R., MD Follow up in 1 week(s).    Specialty: Obstetrics and Gynecology  Why: For BP check or take BP 2x daily on [facility]  Contact information:  [address] [id number] [phone]               Jeansonne, Julie R., MD Follow up in 6 week(s).    Specialty: Obstetrics and Gynecology  Why: For post-partum follow-up  Contact information:  [address] [id number] [phone]                              Lyle Donahue MD PGY2  Obstetrics and Gynecology

## 2024-09-24 NOTE — LACTATION NOTE
09/24/24 1110   Maternal Assessment   Breast Shape Bilateral:;round   Breast Density Bilateral:;soft   Areola Bilateral:;elastic   Nipples Bilateral:;everted   Maternal Infant Feeding   Maternal Emotional State assist needed   Infant Positioning cross-cradle;clutch/football   Signs of Milk Transfer audible swallow;infant jaw motion present   Latch Assistance yes   Community Referrals   Community Referrals outpatient lactation program;support group  (communit resources)     Discharge lactation education provided.questions answered. Pt has lactation contact number and community resources. Assisted pt with position and latch. Baby latched easily to breast. Good tugs and pulls observed.pt shown how to use breast compression and stimulation  to keep baby actively breastfeeding. Support and encouragement given.

## 2024-09-24 NOTE — LACTATION NOTE
This note was copied from a baby's chart.  Mother requested to give infant formula.     Discussed expiration time of formula, position of baby, position of nipple and bottle while feeding, baby led feeding and fullness cues.  Pt verbalized understanding and verbalized appropriate recall.

## 2024-09-24 NOTE — PROGRESS NOTES
Mother Baby Care Guide reviewed. Pt verbalized understanding that she will check her BP at least once a day for the next three days following discharge using her personal BP cuff. Pt stated that she will call/come in through the [facility] if she experiences SS of HTN or for any severe range BP. Pt also verbalized understanding that she will follow up with her OB in two weeks for a mood check and then again in six weeks.

## 2024-09-25 ENCOUNTER — PATIENT MESSAGE (OUTPATIENT)
Dept: OBSTETRICS AND GYNECOLOGY | Facility: CLINIC | Age: 29
End: 2024-09-25
Payer: MEDICAID

## 2024-09-25 LAB
BLD PROD TYP BPU: NORMAL
BLOOD UNIT EXPIRATION DATE: NORMAL
BLOOD UNIT TYPE CODE: 1700
BLOOD UNIT TYPE: NORMAL
CODING SYSTEM: NORMAL
CROSSMATCH INTERPRETATION: NORMAL
DISPENSE STATUS: NORMAL
TRANS ERYTHROCYTES VOL PATIENT: NORMAL ML

## 2024-09-27 ENCOUNTER — OFFICE VISIT (OUTPATIENT)
Dept: CARDIOLOGY | Facility: CLINIC | Age: 29
End: 2024-09-27
Payer: MEDICAID

## 2024-09-27 VITALS
DIASTOLIC BLOOD PRESSURE: 78 MMHG | RESPIRATION RATE: 15 BRPM | SYSTOLIC BLOOD PRESSURE: 119 MMHG | OXYGEN SATURATION: 95 % | HEART RATE: 78 BPM | BODY MASS INDEX: 21.87 KG/M2 | HEIGHT: 63 IN | WEIGHT: 123.44 LBS

## 2024-09-27 DIAGNOSIS — D57.1 SICKLE CELL DISEASE WITHOUT CRISIS: ICD-10-CM

## 2024-09-27 DIAGNOSIS — R00.2 PALPITATIONS: ICD-10-CM

## 2024-09-27 DIAGNOSIS — O13.3 GESTATIONAL HYPERTENSION, THIRD TRIMESTER: ICD-10-CM

## 2024-09-27 DIAGNOSIS — O09.93 HIGH-RISK PREGNANCY IN THIRD TRIMESTER: ICD-10-CM

## 2024-09-27 DIAGNOSIS — R94.31 ABNORMAL EKG: Primary | ICD-10-CM

## 2024-09-27 DIAGNOSIS — I27.20 PULMONARY HYPERTENSION: ICD-10-CM

## 2024-09-27 PROCEDURE — 99214 OFFICE O/P EST MOD 30 MIN: CPT | Mod: PBBFAC,PO | Performed by: INTERNAL MEDICINE

## 2024-09-27 PROCEDURE — 99999 PR PBB SHADOW E&M-EST. PATIENT-LVL IV: CPT | Mod: PBBFAC,,, | Performed by: INTERNAL MEDICINE

## 2024-09-27 RX ORDER — NAPROXEN SODIUM 220 MG/1
81 TABLET, FILM COATED ORAL DAILY
COMMUNITY

## 2024-09-27 NOTE — PROGRESS NOTES
CARDIOLOGY CLINIC VISIT        HISTORY OF PRESENT ILLNESS:     2024: Elizabeth Franco presents for cardiovascular evaluation.  Recently discharged on 2024.  Recent  without complications.  Last month she had an echocardiogram that showed an ejection fraction of 55-60%.  Mildly elevated pulmonary pressure.  EKG from 2024 showed normal sinus rhythm, nonspecific T-wave abnormality.  EKG on 1924 showed normal sinus rhythm, nonspecific T-wave abnormality.  Prolonged QT. in  echocardiogram showed ejection fraction of 60%.  No pulmonary hypertension.  EKG at that time showed sinus tachycardia.  Review of other EKGs does not show any QT prolongation.  EKG today shows normal sinus rhythm.  During her 3rd trimester she had also noted some palpitations.  Not daily.  Have since resolved.  She has been monitoring her blood pressure.  Normotensive today.  No family history of sudden cardiac death.  She denies dizziness.  No chest pain.  No shortness of breath.    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 If you have any questions, ask your nurse or doctor.                CONTINUE taking these medications      acetaminophen 650 MG Tbsr  Commonly known as: TYLENOL  Take 1 tablet (650 mg total) by  mouth every 6 (six) hours. Alternate between ibuprofen and tylenol every 3 hours. For example: @0800: ibuprofen 600mg @1100: tylenol 650mg @1400: ibuprofen 600mg @1700: tylenol 650 mg @2000: ibuprofen 600mg     * albuterol sulfate 90 mcg/actuation inhaler  Commonly known as: PROAIR RESPICLICK  Inhale 1-2 puffs into the lungs every 4 (four) hours as needed for Wheezing. Rescue     * albuterol 90 mcg/actuation inhaler  Commonly known as: PROVENTIL/VENTOLIN HFA  Inhale 1-2 puffs into the lungs every 6 (six) hours as needed for Wheezing or Shortness of Breath. Rescue     aspirin 81 MG Chew     docusate sodium 100 MG capsule  Commonly known as: COLACE  Take 1 capsule (100 mg total) by mouth 2 (two) times daily.     enoxaparin 40 mg/0.4 mL Syrg  Commonly known as: LOVENOX     folic acid 1 MG tablet  Commonly known as: FOLVITE  Take 4 tablets (4 mg total) by mouth once daily.     ibuprofen 600 MG tablet  Commonly known as: ADVIL,MOTRIN  Take 1 tablet (600 mg total) by mouth every 6 (six) hours. Alternate between ibuprofen and tylenol every 3 hours. For example: @0800: ibuprofen 600mg @1100: tylenol 650mg @1400: ibuprofen 600mg @1700: tylenol 650 mg @2000: ibuprofen 600mg     oxyCODONE 5 MG immediate release tablet  Commonly known as: ROXICODONE  Take 2 tablets (10 mg total) by mouth every 6 (six) hours as needed.     prenatal vit 87-iron-folic-dha 18-1-350 mg Cap  Commonly known as: PRENATE MINI (FERR ASP GLYCIN)  Take 1 tablet by mouth once daily. for 270 doses     PULMICORT FLEXHALER 90 mcg/actuation Aepb  Generic drug: budesonide  INHALE 2 PUFFS INTO THE LUNGS ONCE DAILY. CONTROLLER     traMADoL 50 mg tablet  Commonly known as: ULTRAM  Take 1 tablet (50 mg total) by mouth every 6 (six) hours as needed for Pain.           * This list has 2 medication(s) that are the same as other medications prescribed for you. Read the directions carefully, and ask your doctor or other care provider to review them with you.                   SOCIAL HISTORY:     Social History     Socioeconomic History    Marital status: Single   Tobacco Use    Smoking status: Never    Smokeless tobacco: Never   Substance and Sexual Activity    Alcohol use: No     Alcohol/week: 0.0 standard drinks of alcohol    Drug use: Never    Sexual activity: Not Currently     Social Determinants of Health     Financial Resource Strain: Medium Risk (5/1/2024)    Received from [facility]    Overall Financial Resource Strain (CARDIA)     Difficulty of Paying Living Expenses: Somewhat hard   Food Insecurity: No Food Insecurity (5/1/2024)    Received from [facility]    Hunger Vital Sign     Worried About Running Out of Food in the Last Year: Never true     Ran Out of Food in the Last Year: Never true   Transportation Needs: No Transportation Needs (5/1/2024)    Received from [facility]    PRAPARE - Transportation     Lack of Transportation (Medical): No     Lack of Transportation (Non-Medical): No   Physical Activity: Insufficiently Active (5/1/2024)    Received from [facility]    Exercise Vital Sign     Days of Exercise per Week: 3 days     Minutes of Exercise per Session: 20 min   Stress: Stress Concern Present (5/1/2024)    Received from [facility]    [employer] - Occupational Stress Questionnaire     Feeling of Stress : To some extent   Housing Stability: Low Risk  (4/2/2024)    [facility]    Housing Stability Vital Sign     In the last 12 months, was there a time when you were not able to pay the mortgage or rent on time?: No     In the past 12 months, how many times have you moved where you were living?: 0     At any time in the past 12 months, were you homeless or living in a shelter (including now)?: No       FAMILY HISTORY:     Family History   Problem Relation Name Age of Onset    No Known Problems Mother      No Known Problems Father         REVIEW OF SYSTEMS:   Review of Systems   Constitutional:   "Negative for chills, diaphoresis, fever, malaise/fatigue and weight loss.   HENT:  Negative for congestion, hearing loss, sinus pain, sore throat and tinnitus.    Eyes:  Negative for blurred vision, double vision, photophobia and pain.   Respiratory:  Negative for cough, hemoptysis, sputum production, shortness of breath, wheezing and stridor.    Cardiovascular:  Positive for palpitations. Negative for chest pain, orthopnea, claudication, leg swelling and PND.   Gastrointestinal:  Negative for abdominal pain, blood in stool, heartburn, melena, nausea and vomiting.   Musculoskeletal:  Negative for back pain, falls, joint pain, myalgias and neck pain.   Neurological:  Negative for dizziness, tingling, tremors, sensory change, speech change, focal weakness, seizures, loss of consciousness, weakness and headaches.   Endo/Heme/Allergies:  Does not bruise/bleed easily.   Psychiatric/Behavioral:  Negative for depression, memory loss and substance abuse. The patient is not nervous/anxious.        PHYSICAL EXAM:     Vitals:    09/27/24 1412   BP: 119/78   Pulse: 78   Resp: 15    Body mass index is 21.87 kg/m².  Weight: 56 kg (123 lb 7.3 oz)   Height: 5' 3" (160 cm)     Physical Exam  Vitals reviewed.   Constitutional:       General: She is not in acute distress.     Appearance: Normal appearance. She is well-developed. She is not diaphoretic.   HENT:      Head: Normocephalic.   Neck:      Vascular: No carotid bruit or JVD.   Cardiovascular:      Rate and Rhythm: Normal rate and regular rhythm.      Pulses: Normal pulses.      Heart sounds: Normal heart sounds.      Comments: LSB 2/6 holosystolic murmur  RSB 2/6 crescendo  Pulmonary:      Effort: Pulmonary effort is normal.      Breath sounds: Normal breath sounds.   Abdominal:      General: Bowel sounds are normal.      Palpations: Abdomen is soft.      Tenderness: There is no abdominal tenderness.   Skin:     General: Skin is warm and dry.   Neurological:      Mental " "Status: She is alert and oriented to person, place, and time.   Psychiatric:         Speech: Speech normal.         Behavior: Behavior normal.         Thought Content: Thought content normal.         DATA:   EKG: (personally reviewed tracing)  09/27/2024-normal sinus rhythm  Results for orders placed or performed during the hospital encounter of 07/23/24   EKG 12-lead    Collection Time: 07/23/24 12:00 AM    Narrative    Ordered by an unspecified provider.        Laboratory:  CBC:  Recent Labs   Lab 08/23/24  1422 09/22/24  1700 09/24/24  0609   WBC 10.59 12.98 H 17.68 H   Hemoglobin 10.5 L 10.1 L 7.3 L   Hematocrit 31.3 L 28.8 L 21.1 L   Platelets 382 427 345       CHEMISTRIES:  Recent Labs   Lab 06/16/22  0559 06/17/22  0434 06/18/22  0646 06/19/22  0543 06/20/22  0507 05/10/23  1308 07/26/24  0555 07/27/24  0412 09/22/24  1700   Glucose 99   < > 116 H 96 139 H   < > 95 78 81   Sodium 129 L   < > 133 L 138 140   < > 138 136 137   Potassium 3.8   < > 3.5 3.2 L 3.8   < > 3.7 4.5 4.0   BUN 9   < > 4 L 3 L 8   < > 4 L 6 6   Creatinine 0.6   < > 0.5 0.5 0.5   < > 0.5 0.5 0.6   eGFR if African American >60   < > >60 >60 >60  --   --   --   --    eGFR if non African American >60   < > >60 >60 >60  --   --   --   --    Calcium 8.4 L   < > 7.5 L 8.2 L 8.8   < > 8.5 L 8.4 L 9.3   Magnesium 2.2  --  1.9 2.1  --   --   --   --   --     < > = values in this interval not displayed.       CARDIAC BIOMARKERS:  Recent Labs   Lab 06/15/22  1748 06/15/22  2210 07/23/24  1716   Troponin I 0.022 0.013 <0.006       COAGS:  Recent Labs   Lab 06/16/22  0559   INR 1.5 H       LIPIDS/LFTS:  Recent Labs   Lab 07/26/24  0555 07/27/24  0412 09/22/24  1700   AST 42 H 48 H 61 H   ALT 27 24 40       No results found for: "HGBA1C"     The ASCVD Risk score (Manny GURROLA, et al., 2019) failed to calculate for the following reasons:    The 2019 ASCVD risk score is only valid for ages 40 to 79      Cardiovascular Testing:    Echo    Result Date: " 2024    Left Ventricle: The left ventricle is normal in size. Ventricular mass   is normal. Normal wall thickness. Normal wall motion. There is normal   systolic function with a visually estimated ejection fraction of 55 - 60%.   Ejection fraction by visual approximation is 60%. There is normal   diastolic function.    Right Ventricle: Normal right ventricular cavity size. Wall thickness   is normal. Systolic function is normal.    Left Atrium: Left atrium is moderately dilated.    Right Atrium: Right atrium is mildly dilated.    Tricuspid Valve: There is trace regurgitation.    Pulmonary Artery: The estimated pulmonary artery systolic pressure is   32 mmHg.    IVC/SVC: Normal venous pressure at 3 mmHg.    Pericardium: There is a very trivial posterior effusion and very   trivial under the RA.        Echocardiogram 06/15/2022:    The left ventricle is normal in size with concentric remodeling and normal systolic function.  The estimated ejection fraction is 60%.  Normal left ventricular diastolic function.  Normal right ventricular size with normal right ventricular systolic function.  Normal central venous pressure (3 mmHg).  The estimated PA systolic pressure is 22 mmHg.  Trivial posterior pericardial effusion.            ASSESSMENT:     Abnormal EKG  Palpitations  Gestational hypertension  Pulmonary hypertension  Sickle cell anemia    PLAN:     Abnormal EK EKG with QT prolongation.  Rest appear within normal limits.  EKG today normal.  Monitor.  Palpitations:  Resolved.  Monitor.  Gestational hypertension:  Normotensive.  Monitor.  Pulmonary hypertension:  History of sickle cell.  Echocardiogram on return.  Return to clinic 3 months.           Peng Kim MD, MPH, FACC, [facility]

## 2024-10-07 ENCOUNTER — POSTPARTUM VISIT (OUTPATIENT)
Dept: OBSTETRICS AND GYNECOLOGY | Facility: CLINIC | Age: 29
End: 2024-10-07
Payer: MEDICAID

## 2024-10-07 VITALS
WEIGHT: 123.44 LBS | BODY MASS INDEX: 21.87 KG/M2 | DIASTOLIC BLOOD PRESSURE: 69 MMHG | SYSTOLIC BLOOD PRESSURE: 121 MMHG | HEIGHT: 63 IN

## 2024-10-07 DIAGNOSIS — Z01.30 BLOOD PRESSURE CHECK: ICD-10-CM

## 2024-10-07 PROCEDURE — 99999 PR PBB SHADOW E&M-EST. PATIENT-LVL II: CPT | Mod: PBBFAC,,, | Performed by: OBSTETRICS & GYNECOLOGY

## 2024-10-07 PROCEDURE — 99212 OFFICE O/P EST SF 10 MIN: CPT | Mod: S$PBB,,, | Performed by: OBSTETRICS & GYNECOLOGY

## 2024-10-07 PROCEDURE — 1111F DSCHRG MED/CURRENT MED MERGE: CPT | Mod: CPTII,,, | Performed by: OBSTETRICS & GYNECOLOGY

## 2024-10-07 PROCEDURE — 99212 OFFICE O/P EST SF 10 MIN: CPT | Mod: PBBFAC | Performed by: OBSTETRICS & GYNECOLOGY

## 2024-10-07 NOTE — PROGRESS NOTES
Elizabeth Franco is a 29 y.o. female  presents for a postpartum BP check.  She is status post  2 weeks ago.  Her hospitalization was complicated by GHTN and SS dx. Denies pain.  She is breastfeeding.  She desires no method for contraception.  She denies postpartum depression. EPDS score is 7. She ha snot resumed menses since delivery. She has not resumed intercourse since delivery. Since delivery has seen hematology and cards.       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 Alternate between ibuprofen and tylenol every 3 hours. For example: @0800: ibuprofen 600mg @1100: tylenol 650mg @1400: ibuprofen 600mg @1700: tylenol 650 mg @2000: ibuprofen 600mg, Disp: 60 tablet, Rfl: 0    albuterol sulfate (PROAIR RESPICLICK) 90 mcg/actuation inhaler, Inhale 1-2 puffs into the lungs every 4 (four) hours as needed for Wheezing. Rescue, Disp: 1 each, Rfl: 0    aspirin 81 MG Chew, Take 81 mg by mouth once daily., Disp: , Rfl:     docusate sodium (COLACE) 100 MG capsule, Take 1 capsule (100 mg total) by mouth 2 (two) times daily., Disp: 60 capsule, Rfl: 0    enoxaparin (LOVENOX) 40 mg/0.4 mL Syrg, , Disp: , Rfl:     folic acid (FOLVITE) 1 MG tablet, Take 4 tablets (4 mg total) by mouth once daily., Disp: 360 tablet, Rfl: 3    ibuprofen (ADVIL,MOTRIN) 600 MG tablet, Take 1  tablet (600 mg total) by mouth every 6 (six) hours. Alternate between ibuprofen and tylenol every 3 hours. For example: @0800: ibuprofen 600mg @1100: tylenol 650mg @1400: ibuprofen 600mg @1700: tylenol 650 mg @2000: ibuprofen 600mg, Disp: 60 tablet, Rfl: 0    oxyCODONE (ROXICODONE) 5 MG immediate release tablet, Take 2 tablets (10 mg total) by mouth every 6 (six) hours as needed., Disp: 20 tablet, Rfl: 0    prenatal vit 87-iron-folic-dha (PRENATE MINI, FERR ASP GLYCIN,) 18-1-350 mg Cap, Take 1 tablet by mouth once daily. for 270 doses, Disp: 90 capsule, Rfl: 3    traMADoL (ULTRAM) 50 mg tablet, Take 1 tablet (50 mg total) by mouth every 6 (six) hours as needed for Pain., Disp: 20 tablet, Rfl: 0      Vitals:    10/07/24 1603   BP: 121/69       GENERAL: healthy, alert, no distress, cooperative, smiling  RESP: nl effort  PSYCH: nl affect        Assessment:      1) PP  2) BP check  -normal today. Feeling good. Given precautions. Declines BC.         Plan:  Routine follow up 1 month for PPV.

## 2024-10-21 ENCOUNTER — PATIENT MESSAGE (OUTPATIENT)
Dept: OBSTETRICS AND GYNECOLOGY | Facility: CLINIC | Age: 29
End: 2024-10-21
Payer: MEDICAID

## 2024-11-05 ENCOUNTER — POSTPARTUM VISIT (OUTPATIENT)
Dept: OBSTETRICS AND GYNECOLOGY | Facility: CLINIC | Age: 29
End: 2024-11-05
Payer: MEDICAID

## 2024-11-05 VITALS
HEIGHT: 63 IN | DIASTOLIC BLOOD PRESSURE: 73 MMHG | WEIGHT: 122.56 LBS | SYSTOLIC BLOOD PRESSURE: 111 MMHG | BODY MASS INDEX: 21.71 KG/M2

## 2024-11-05 DIAGNOSIS — R32 URINARY INCONTINENCE, UNSPECIFIED TYPE: ICD-10-CM

## 2024-11-05 PROCEDURE — 99999 PR PBB SHADOW E&M-EST. PATIENT-LVL III: CPT | Mod: PBBFAC,,, | Performed by: OBSTETRICS & GYNECOLOGY

## 2024-11-05 PROCEDURE — 99213 OFFICE O/P EST LOW 20 MIN: CPT | Mod: PBBFAC | Performed by: OBSTETRICS & GYNECOLOGY

## 2024-11-05 NOTE — PROGRESS NOTES
Elizabeth Franco is a 29 y.o. female  presents for a postpartum visit.  She is status post  6 weeks ago.  Her hospitalization was complicated by GHTN and ss dx.  She is breastfeeding.  She desires unsure for contraception, had issues with depo in the past, considering POP.  She denies postpartum depression.  She has not resumed menses since delivery. She has resumed intercourse since delivery.    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 Alternate between ibuprofen and tylenol every 3 hours. For example: @0800: ibuprofen 600mg @1100: tylenol 650mg @1400: ibuprofen 600mg @1700: tylenol 650 mg @2000: ibuprofen 600mg, Disp: 60 tablet, Rfl: 0    albuterol sulfate (PROAIR RESPICLICK) 90 mcg/actuation inhaler, Inhale 1-2 puffs into the lungs every 4 (four) hours as needed for Wheezing. Rescue, Disp: 1 each, Rfl: 0    aspirin 81 MG Chew, Take 81 mg by mouth once daily., Disp: , Rfl:     docusate sodium (COLACE) 100 MG capsule, Take 1 capsule (100 mg total) by mouth 2 (two) times daily., Disp: 60 capsule, Rfl: 0    enoxaparin (LOVENOX) 40 mg/0.4 mL Syrg, , Disp: , Rfl:     folic acid (FOLVITE) 1 MG tablet, Take 4 tablets (4 mg total) by mouth once daily., Disp: 360 tablet, Rfl: 3    fondaparinux (ARIXTRA) 2.5 mg/0.5 mL Syrg, Inject into the  skin once daily as directed ., Disp: 15 mL, Rfl: 0    ibuprofen (ADVIL,MOTRIN) 600 MG tablet, Take 1 tablet (600 mg total) by mouth every 6 (six) hours. Alternate between ibuprofen and tylenol every 3 hours. For example: @0800: ibuprofen 600mg @1100: tylenol 650mg @1400: ibuprofen 600mg @1700: tylenol 650 mg @2000: ibuprofen 600mg, Disp: 60 tablet, Rfl: 0    oxyCODONE (ROXICODONE) 5 MG immediate release tablet, Take 2 tablets (10 mg total) by mouth every 6 (six) hours as needed., Disp: 20 tablet, Rfl: 0    prenatal vit 87-iron-folic-dha (PRENATE MINI, FERR ASP GLYCIN,) 18-1-350 mg Cap, Take 1 tablet by mouth once daily. for 270 doses, Disp: 90 capsule, Rfl: 3    traMADoL (ULTRAM) 50 mg tablet, Take 1 tablet (50 mg total) by mouth every 6 (six) hours as needed for Pain., Disp: 20 tablet, Rfl: 0      Vitals:    24 1337   BP: 111/73       GENERAL: healthy, alert, no distress, cooperative, smiling  RESP: nl effort  ABDOMEN: Normal, benign. and no masses, hepatosplenomegaly, no hernias  EXTERNAL GENITALIA POSTPARTUM: normal, well-healed, without lesions or masses   VAGINA POSTPARTUM: normal, well-healed, physiologic discharge, without lesions, small fissure noted introitus  PSYCH: nl affect    Patient consented to pelvic exam. Female chaperone present for exam.     Assessment:  Normal postpartum exam    -advised about recommendation to wait 18 months between delivery and conceiving another pregnancy if <35 and 12 months if greater than 35 years old.  -Pt cleared to return to normal activity, bathing, intercourse once 6 weeks postpartum.   -counseled on birth control options.   -continue any hypertensive medications until 6 weeks.     If  scar present:  Recommend OTC silicone gel sheets, such as ScarAway, or OTC silicone tape, such as CicaTape or Mepitac. Sheets/tape can be cut to size and should be worn for 12-24 hours per day.  Minimum treatment time is 2-3 months. Larger and older scars may take longer,  therefore continued use is recommended if improvement is still seen after the initial 3 months.   Scar massage (apply firm pressure and rock finger side-to-side) for 5 minutes 5 times per day can also help.    Plan:  Routine follow up 3 months for annual exam.

## 2024-11-19 ENCOUNTER — CLINICAL SUPPORT (OUTPATIENT)
Dept: REHABILITATION | Facility: HOSPITAL | Age: 29
End: 2024-11-19
Payer: MEDICAID

## 2024-11-19 DIAGNOSIS — M62.89 PELVIC FLOOR DYSFUNCTION: Primary | ICD-10-CM

## 2024-11-19 DIAGNOSIS — N94.10 DYSPAREUNIA IN FEMALE: ICD-10-CM

## 2024-11-19 DIAGNOSIS — R32 URINARY INCONTINENCE, UNSPECIFIED TYPE: ICD-10-CM

## 2024-11-19 DIAGNOSIS — R53.1 WEAKNESS: ICD-10-CM

## 2024-11-19 PROCEDURE — 97161 PT EVAL LOW COMPLEX 20 MIN: CPT

## 2024-11-19 NOTE — PLAN OF CARE
[facility]   Pelvic Health Physical Therapy Initial Evaluation      Date: 2024   Name: Elizabeth Miles [place]  Clinic Number: [id number]    Therapy Diagnosis:   Encounter Diagnoses   Name Primary?    Postpartum state     Urinary incontinence, unspecified type     Pelvic floor dysfunction Yes    Dyspareunia in female     Weakness      Referring Provider: Jeansonne, Julie R., MD    Referring Provider Orders: PT Eval and Treat  Medical Diagnosis from Referral: Postpartum state [Z39.2], Urinary incontinence, unspecified type [R32]   Evaluation Date: 2024  Authorization Period Expiration: 2024  Plan of Care Expiration: 2025  Progress Note Due: 2024  Visit # / Visits authorized:    FOTO: 1/3    Precautions: Standard     Time In: 10:15  Time Out: 11:05   Total Appointment Time (timed & untimed codes): 50 minutes    SUBJECTIVE     Date of onset: lifelong bladder issue  History of current condition: Elizabeth reports Reports wanting to get the pelvic floor stronger. Reports history of incontinence, where she was wetting the bed until she was 20. Reports that she still has urinary problems where it feels super urgent and she has to go. Symptoms worsened during pregnancy and haven't gotten better since.    Patient had baby on 2024.    Reports diagnose of interstitial cystitis. Used to take medication for it, but has since been managing with dietary change.  Reports when she felt it, it was more bloating and discomfort across the abdomin  Limits fried foods, red sauce, and cheese have been irritations.     [facility]  Reports having sickle cell, but has good understanding of management     OB/GYN History:    vaginal delivery, pushing phase under 30 minutes, and with epidural patient reports that she was induced do to pain and high blood pressure.  Hysterectomy? No  Oophorectomy? No  Sexually active? Yes  Breastfeeding? Yes  Menstruating? Has not returned since  "having baby   Birth Control? No   Are symptom(s) worse around ovulation or period? Yes interstitial cystitis symptoms specifically       SEXUAL/PELVIC PAIN History  Pelvic Pain with: with intercourse and with vaginal exam   Pelvic Pain Duration Occurs only during provocation  Location of Pelvic Pain: Deep pelvic floor muscles  reports change of positions can alleviate symptoms.  Description: Aching, Sharp, and especially when hitting tender spot  Pelvic pressure? No  Feeling of pelvic heaviness? No    PAIN:  Location: none reported    BLADDER History  Frequency of urination:   Day: 5           Night: 3x  Difficulty initiating urine stream: No  Hover over toilet seats: No  Do you use the bathroom "just in case"? Yes  Urine stream: strong  Complete emptying: No  Post-void dribbling: Yes  Urinary Urgency: Yes.  Able to delay the urge for at least 5-10 minute(s).  Pain with delaying the urge to urinate: No   Bladder leakage: Yes  Activities that cause leakage: urgency, had night time leakage when she was pregnant but not since then  Frequency of incidents: daily multiple times  Amount leaked (urine): few drops, teaspoon(s), small squirt , and full emptying not often but occasionally    BOWEL History  Frequency of bowel movements: once a day  Difficulty initiating BM: No  Quality/shape of BM:  soft well formed & easy to pass  Does Patient Feel Empty after BM? Yes  Bowel Urgency: No.   Fiber supplements, probiotics or laxative Use? No     Leakage Protection  Form of protection:  changes underwear when needed  1x per week    Types of fluid intake: Water: 4x 16oz/day  Diet: Standard but limits pork & shell fish, limits processed foods & fried foods as well as red sauce  Habitus: well developed, well nourished  Abuse/Neglect: Pt denies a history of physical or emotional abuse at this visit.       Prior Therapy/Previous treatment included: no prior pelvic floor therapy   Social History/Living Arrangements: lives with their " "family  Current exercise: not currently, but preivcarlay was doing yoga and stretching would like to get back to doing this  Occupation: no  Prior Level of Function: no pelvic floor dysfunction  Current Level of Function: some pelvic floor dysfunction    Constitutional Symptoms Review: Diarrhea 1x    Patients goals: "Wants to be aware, and be able to feel kegels cause she couldn't feel them previously, would like     Imaging: None reported for this condition     Medical History: Elizabeth  has a past medical history of 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     Surgical History: Elizabeth Franco  has a past surgical history that includes Splenectomy (1999) and Appendectomy (1999).    Medications: Elizabeth has a current medication list which includes the following prescription(s): acetaminophen, albuterol sulfate, aspirin, docusate sodium, enoxaparin, folic acid, fondaparinux, ibuprofen, oxycodone, and tramadol.    Allergies:   Review of patient's allergies indicates:   Allergen Reactions    Cephalosporins Hives        OBJECTIVE     See Electronic Medical Record under MEDIA for written consent provided 11/19/2024  Chaperone: declined    Abdominal Wall: No Areas of tenderness observed, patient has healed scar LUQ from spleen removal at age 4, mildly restricted     Special Tests for Load Transfer Assessment Between the Trunk and Lower Extremities:    Active Straight Leg Test:    Right: increased effort & upper abdominal use shaking observed more challenging than left   Left: increased effort & upper abdominal use shaking observed   Both sides improve with transverse abdominis cue    Linea alba at rest: WNL   Curl-up test for linea alba integrity:  "   Without transverse abdominus: [place] and generates tension     Curl-up test for Inter-rectus distance (IRD):    Without transverse abdominus: none width at the umbilicus, 1 finger width 2 inches above umbilicus, none width 2 inches below umbilicus      BREATHING MECHANICS ASSESSMENT: Deferred due to time constraints   Thorax Assessment During Quiet Respiration: WNL excursion of ribcage  Thorax Assessment During Deep Respiration: Decreased excursion bilaterally of lateral ribs  and Decreased excursion of abdominal wall   Improves with cueing    VAGINAL PELVIC FLOOR EXAM: Deferred due to time constraints    Limitation/Restriction for FOTO Pelvic Floor Surveys    Therapist reviewed FOTO scores for Elizabeth Ana Franco on 11/19/2024  FOTO documents entered into [email or web address] - see Media section.    Limitation Score:   % impairment     TREATMENT     Total Treatment time (time-based codes) separate from the evaluation: 15 minutes     Neuromuscular Re-education to develop Coordination, Control, Down training, Proprioception, Kinesthetic, and Sense for 5 minutes including:   [x]360 breathing  and diaphragmatic breathing supine & in chelo pose      Therapeutic Activity Patient participated in dynamic functional therapeutic activities to improve functional performance for 10 minutes. Including: Education as described below.   [x] Education on pelvic floor anatomy, function, and assessment and impact on current level of function   [x] Patient prognosis and Physical therapy plan of care  [x] HEP building/HEP review  [x] intercourse positions and diaphragmatic breathing   [x] urge suppression: Roll for control technique  []hip-opening stretches for pelvic floor relaxation      PATIENT EDUCATION AND HOME EXERCISES     Education provided: general anatomy/physiology of urinary & bowel system, benefits of treatment, and alternative methods of treatment were discussed with the patient. Additionally, Elizabeth was provided education on pt  prognosis and physical therapy plan of care.     Written Home Exercises provided: yes  Exercises were reviewed, and Elizabeth was able to demonstrate them prior to the end of the session. Elizabeth demonstrated good understanding of the education provided. See EMR under 'Patient Instructions' for exercises and education provided during session.    Elizabeth demonstrated good  understanding of the education & exercises provided.    ASSESSMENT     Elizabeth is a 29 y.o. female referred to outpatient physical therapy with a medical diagnosis of Postpartum state [Z39.2], Urinary incontinence, unspecified type [R32] and additional complaints of urinary urgency, pain with intercourse, and decreased strength post-partum. Pt presents with increased nocturia, incomplete urination, and dysfunctional voiding. Symptoms impair the patient's ability to perform ADLs and functional mobility, as well as remain continent. During objective exam patient performed straight leg raise, with patient having increased difficulty supporting load during lift which improves slightly with transverse abdominis cueing. During diastasis recti assessment patient with good tension transfer, across abdominals. Patient has good breathing mechanics, but slightly restricted with Diaphragmatic Breathing which improved with tactile cueing. Patient has slightly adhered abdominal scar, which is non-painful in LUQ. No direct pelvic floor examination performed today due to time constraints, but pt presentation and subjective report suggest pelvic floor dysfunction.  Pt will benefit from pelvic floor muscle training, bladder habit retraining, core/hip strengthening, patient education, manual therapy interventions, and functional retraining    Pt prognosis is Good.   Elizabeth will benefit from skilled outpatient physical therapy to address the deficits stated above and in the chart below, provide patient/family education, and to maximize the patient's level of independence.      Plan of care discussed with patient: yes  Patient's spiritual, cultural and educational needs considered, and the patient is agreeable to the plan of care and goals as stated below:     Anticipated Barriers for therapy: none    Medical Necessity is demonstrated by the following -  History  Co-morbidities and personal factors that may impact the plan of care [x] LOW: no personal factors / co-morbidities  [] MODERATE: 1-2 personal factors / co-morbidities  [] HIGH: 3+ personal factors / co-morbidities    Moderate / High Support Documentation:   Co-morbidities affecting plan of care: none    Personal Factors:   no deficits     Examination  Body Structures and Functions, activity limitations and participation restrictions that may impact the plan of care [x] LOW: addressing 1-2 elements  [] MODERATE: 3+ elements  [] HIGH: 4+ elements (please support below)    Moderate / High Support Documentation: none     Clinical Presentation [x] LOW: stable  [] MODERATE: Evolving  [] HIGH: Unstable     Decision Making/ Complexity Score: low     Goals:  Short Term Goals: 4 weeks   Patient to demonstrate proper use of urge delay strategies to help reduce urge urinary incontinence with activities of daily living.   Pt to be able to perform a 5 second kegel x 10 reps to demonstrate improving strength and endurance needed for continence.  Pt to demonstrate being able to correctly and consistently perform a kegel which is needed  to increase pelvic floor muscle coordination and strength needed for continence.  Pt to be able to delay the urge to urinate at least 10 minutes with a strong urge to urinate in order to make it to the bathroom without leaking.  Pt to report >50% improvement in urine leakage   Pt to verbalize double-void technique for improved bladder emptying  Pt to report >50% improvement in discomfort with vaginal penetration to increase tolerance for intercourse and gynecological exams  Pt able to demonstrate good pelvic  girdle stability with supine straight leg raise to demonstrate improved functional core strength/pelvic girdle stability  Patient to be independent with introductory home exercise program.     Long Term Goals: 12 weeks ,   Pt to be able to perform a 10 second kegel x 10 reps to demonstrate improving strength and endurance needed for continence.  Pt to report elimination of incontinence with ADLs to demonstrate improved pelvic floor muscle strength and coordination.  Pt to be able to delay the urge to urinate at least 15 minutes with a strong urge to urinate in order to make it to the bathroom without leaking.  Pt to report no longer feeling the need to urinate just in case when shopping or participating in social activities to demonstrate improving pelvic floor and bladder control.  Pt to report a decrease in need to change underwear to <1x per week to demonstrate improving pelvic floor muscle controls as evidenced by decreased episodes of incontinence needed to improve confidence in social situations.  Pt to demonstrate diaphragmatic breathing and pelvic floor lengthening independently for improved pelvic floor tone   Pt to report >75% improvement in discomfort with vaginal penetration to increase tolerance for intercourse and gynecological exams  Patient to be independent with advanced home exercise program for carryover post discharge.    PLAN   Plan of Care certification: 11/19/2024 to  2/11/2025    Outpatient Physical Therapy 1-2 times weekly for 12 weeks to include the following interventions: therapeutic exercises, therapeutic activity, neuromuscular re-education, gait training, manual therapy, modalities PRN, patient/family education, and self care/home management, and dry needling.     Pamela Martínez, PT, DPT

## 2024-11-22 ENCOUNTER — PATIENT MESSAGE (OUTPATIENT)
Dept: RESEARCH | Facility: HOSPITAL | Age: 29
End: 2024-11-22
Payer: MEDICAID

## 2024-11-27 ENCOUNTER — CLINICAL SUPPORT (OUTPATIENT)
Dept: REHABILITATION | Facility: HOSPITAL | Age: 29
End: 2024-11-27
Payer: MEDICAID

## 2024-11-27 DIAGNOSIS — R53.1 WEAKNESS: ICD-10-CM

## 2024-11-27 DIAGNOSIS — M62.89 PELVIC FLOOR DYSFUNCTION: Primary | ICD-10-CM

## 2024-11-27 DIAGNOSIS — N94.10 DYSPAREUNIA IN FEMALE: ICD-10-CM

## 2024-11-27 PROCEDURE — 97110 THERAPEUTIC EXERCISES: CPT

## 2024-11-27 NOTE — PATIENT INSTRUCTIONS
Please bring your wand next visit  Bring your perifit next visit    Urge Suppression  1) Stop what you're doing and put both feet on the ground  2) Perform 5-10 quick squeezes of the pelvic floor, focusing on full relaxation between reps   - Imagine closing and opening the doors to the vagina and lifting up & in  3) Take a deep breath and relax your pelvic floor   - Focusing on relaxing the jaw or belly might work better  4) Assess if the urge was real   - How long has it been since your last void?   - Does the urgency go away or not?  5) Proceed to the bathroom, if needed    *If applicable, gently squeeze the pelvic floor while coming to stand to help manage increased urgency with the transfer  *You may need to repeat the steps if urgency arises again on the way to the bathroom  *Try to suppress urgency when it's of a medium intensity as it's harder to suppress when urgency is severe  *Avoid performing a long squeeze of the pelvic floor to hold back urine, as it will likely not be effective  *Identify your triggers (putting key into the front door, seeing the toilet, arranging clothing for voiding)    1) Squeeze Toes can do quick squeeze & release as well as longer hold

## 2024-11-27 NOTE — PROGRESS NOTES
[facility]   Physical Therapy Treatment Note      Name: Elizabeth [place]  Clinic Number: [id number]    Therapy Diagnosis:   Encounter Diagnoses   Name Primary?    Pelvic floor dysfunction Yes    Dyspareunia in female     Weakness      Physician: Jeansonne, Julie R., MD    Visit Date: 11/27/2024    Referring Provider Orders: PT Eval and Treat  Medical Diagnosis from Referral: Postpartum state [Z39.2], Urinary incontinence, unspecified type [R32]   Evaluation Date: 11/19/2024  Authorization Period Expiration: 12/31/2024  Plan of Care Expiration: 2/11/2025  Progress Note Due: 12/19/2024  Visit # / Visits authorized: 2/20   FOTO: 1/3     Precautions: Standard     Time In: 1:40  Time Out: 2:19  Total Billable Time: 39 minutes    Subjective     Pt reports: She tried the roll for control & it did not work reports that she completely wet her pants. Child's pose felt good and relaxing for her.      She was compliant with home exercise program.    Response to previous treatment: fair  Functional change: urge suppression strategy did not work    Pain: none reported    Objective      VAGINAL PELVIC FLOOR EXAM  Written consent obtained: 11/27/2024   for assessment  Exam updated: 11/27/2024    External Assessment  Introitus: WNL  Skin condition: WNL  Scarring: none   Sensation: WNL   Pain: none    Internal Assessment  Pain: Tension noted within pelvic floor muscles   Pelvic floor muscle Activation: able to perform  Prolapse check: Descent of anterior wall observed close to opening    Treatment     Elizabeth received the treatments listed below:    Pt verbally consents to intravaginal treatment today.  Signed consent form already on file.  Chaperone declined today.      Manual Therapy to develop flexibility, extensibility, and desensitization for 15 minutes including:   [x] soft tissue mobilization of pelvic floor muscles    Neuromuscular Re-education to develop Coordination, Control, Down training,  Proprioception, Kinesthetic, and Sense for 15 minutes including:   [x] Kegel education and practice. Increased time spent on edu on proper technique. Pt improves with practice and verbal cues.  [x] Education on visual cues/mental imagery for pelvic floor relaxation  [x] Education on visual cues/mental imagery for pelvic floor activation    Therapeutic Activity to improve functional performance for 15 minutes, including: Education as described below.   [x] Plan of care review  [x] Anatomy review and discussion of the anatomy on current level of function   [x] Home exercise program issued and reviewed   [x] pelvic wand use  [x] urge suppression    Home Exercises Provided and Patient Education Provided     Education provided: See above  Discussed progression of plan of care with patient; educated pt in activity modification; reviewed HEP with pt. Pt demonstrated and verbalized understanding of all instruction and was provided with a handout of HEP (see Patient Instructions).    Written Home Exercises Provided: yes.  Exercises were reviewed and Elizabeth was able to demonstrate them prior to the end of the session.  Elizabeth demonstrated good  understanding of the education provided.     See EMR under Patient Instructions for exercises provided 12/2/2024     Assessment     Todays session included initial pelvic floor muscles assessment with tension noted that may be contributing to patient's symptoms. Patient shared that she owns a perifit and a pelvic wand which she was advised to bring next visit to incorporate in treatment. Patient had difficulty with initial urge supression strategy so provided patient with additional options to incorporate.  Pt will continue to benefit from skilled outpatient physical therapy to address the deficits listed in the problem list box on initial evaluation, provide pt/family education and to maximize pt's level of independence in the home and community environment.     Elizabeth Is progressing  well towards her goals.   Pt prognosis is Good.     Pt will continue to benefit from skilled outpatient physical therapy to address the deficits listed in the problem list box on initial evaluation, provide pt/family education and to maximize pt's level of independence in the home and community environment.     Pt's spiritual, cultural and educational needs considered and pt agreeable to plan of care and goals.     Anticipated barriers to physical therapy: none    Goals:   Goals:  Short Term Goals: 4 weeks   Patient to demonstrate proper use of urge delay strategies to help reduce urge urinary incontinence with activities of daily living.   Pt to be able to perform a 5 second kegel x 10 reps to demonstrate improving strength and endurance needed for continence.  Pt to demonstrate being able to correctly and consistently perform a kegel which is needed  to increase pelvic floor muscle coordination and strength needed for continence.  Pt to be able to delay the urge to urinate at least 10 minutes with a strong urge to urinate in order to make it to the bathroom without leaking.  Pt to report >50% improvement in urine leakage   Pt to verbalize double-void technique for improved bladder emptying  Pt to report >50% improvement in discomfort with vaginal penetration to increase tolerance for intercourse and gynecological exams  Pt able to demonstrate good pelvic girdle stability with supine straight leg raise to demonstrate improved functional core strength/pelvic girdle stability  Patient to be independent with introductory home exercise program.      Long Term Goals: 12 weeks ,   Pt to be able to perform a 10 second kegel x 10 reps to demonstrate improving strength and endurance needed for continence.  Pt to report elimination of incontinence with ADLs to demonstrate improved pelvic floor muscle strength and coordination.  Pt to be able to delay the urge to urinate at least 15 minutes with a strong urge to urinate in  order to make it to the bathroom without leaking.  Pt to report no longer feeling the need to urinate just in case when shopping or participating in social activities to demonstrate improving pelvic floor and bladder control.  Pt to report a decrease in need to change underwear to <1x per week to demonstrate improving pelvic floor muscle controls as evidenced by decreased episodes of incontinence needed to improve confidence in social situations.  Pt to demonstrate diaphragmatic breathing and pelvic floor lengthening independently for improved pelvic floor tone   Pt to report >75% improvement in discomfort with vaginal penetration to increase tolerance for intercourse and gynecological exams  Patient to be independent with advanced home exercise program for carryover post discharge.    Plan     Continue with established Plan of Care, working toward established PT goals.    At next visit: continue plan of care     Pamela Martínez, PT, DPT

## 2024-12-04 ENCOUNTER — CLINICAL SUPPORT (OUTPATIENT)
Dept: REHABILITATION | Facility: HOSPITAL | Age: 29
End: 2024-12-04
Payer: MEDICAID

## 2024-12-04 DIAGNOSIS — R53.1 WEAKNESS: ICD-10-CM

## 2024-12-04 DIAGNOSIS — N94.10 DYSPAREUNIA IN FEMALE: ICD-10-CM

## 2024-12-04 DIAGNOSIS — M62.89 PELVIC FLOOR DYSFUNCTION: Primary | ICD-10-CM

## 2024-12-04 PROCEDURE — 97110 THERAPEUTIC EXERCISES: CPT

## 2024-12-04 NOTE — PROGRESS NOTES
[facility]   Physical Therapy Treatment Note      Name: Elizabeth Franco  Clinic Number: [id number]    Therapy Diagnosis:   Encounter Diagnoses   Name Primary?    Pelvic floor dysfunction Yes    Dyspareunia in female     Weakness        Physician: Jeansonne, Julie R., MD    Visit Date: 12/4/2024    Referring Provider Orders: PT Eval and Treat  Medical Diagnosis from Referral: Postpartum state [Z39.2], Urinary incontinence, unspecified type [R32]   Evaluation Date: 11/19/2024  Authorization Period Expiration: 12/31/2024  Plan of Care Expiration: 2/11/2025  Progress Note Due: 12/19/2024  Visit # / Visits authorized: 3/20   FOTO: 1/3     Precautions: Standard     Time In: 12:45  Time Out: 1:30  Total Billable Time: 45 minutes    Subjective     Pt reports: Reports that toe squeezing technique helped with urge suppression and she used it a lot this week. She has also been performing her relaxation. Reports that intercourse went well after last visit and that she didn't experience any pain. Report that she's noticed her libido was a little bit better this week and curious if exercise has had a benefit.     She was compliant with home exercise program.    Response to previous treatment: fair  Functional change: urge suppression strategy did not work    Pain: none reported    Objective      VAGINAL PELVIC FLOOR EXAM  Written consent obtained: 11/27/2024   for assessment  Exam updated: 12/4/2024    External Assessment  Introitus: WNL  Skin condition: WNL  Scarring: none   Sensation: WNL   Pain: none    Internal Assessment  Pain: tender areas noted as follows: levator ani bilaterally, left side ischiocavernosus & bulbocavernous   Sensation: able to localized pressure appropriately   Vaginal vault: WNL   Muscle Bulk: hypertonus   Muscle Power: 3/5  Muscle Endurance: 4 seconds  # Reps To Fatigue: 10      Quality of contraction: slow rise, decreased hold, slow relaxation, and incomplete  relaxation   Coordination: tends to hold breath during PFM contration   Able to improve with verbal cues and practice  Prolapse check: Descent of anterior wall observed close to opening      Treatment     Elizabeth received the treatments listed below:     Pt verbally consents to intravaginal treatment today.  Signed consent form already on file.  Chaperone declined today.      Manual Therapy to develop flexibility, extensibility, and desensitization for 15 minutes including:   [x] soft tissue mobilization of levator ani bilaterally, left side ichiocavernosis & bulbocavernosis    Neuromuscular Re-education to develop Coordination, Control, Down training, Proprioception, Kinesthetic, and Sense for 15 minutes including:   [x] Kegel education and practice. Increased time spent on edu on proper technique. Pt improves with practice and verbal cues.   [x] Education on visual cues/mental imagery for pelvic floor relaxation  [x] Education on visual cues/mental imagery for pelvic floor activation    Therapeutic Activity to improve functional performance for 15 minutes, including: Education as described below.   [x] Plan of care review  [x] Anatomy review and discussion of the anatomy on current level of function   [x] Home exercise program issued and reviewed   [x] pelvic wand use & perifit prep  [x] urge suppression    Home Exercises Provided and Patient Education Provided     Education provided: See above  Discussed progression of plan of care with patient; educated pt in activity modification; reviewed HEP with pt. Pt demonstrated and verbalized understanding of all instruction and was provided with a handout of HEP (see Patient Instructions).    Written Home Exercises Provided: yes.  Exercises were reviewed and Elizabeth was able to demonstrate them prior to the end of the session.  Elizabeth demonstrated good  understanding of the education provided.     See EMR under Patient Instructions for exercises provided 12/4/2024      Assessment     Patient reported good response to toe curl urge suppression strategy which she has been using with success this week. Todays session included pelvic floor muscles internal treatment of levator ani bilaterally, left side ichiocavernosis & bulbocavernosiswith using soft tissue mobilization. Patient also refined pelvic floor muscle contraction to improve recruitment, strength and endurance for better holds.  Pt will continue to benefit from skilled outpatient physical therapy to address the deficits listed in the problem list box on initial evaluation, provide pt/family education and to maximize pt's level of independence in the home and community environment.     Elizabeth Is progressing well towards her goals.   Pt prognosis is Good.     Pt will continue to benefit from skilled outpatient physical therapy to address the deficits listed in the problem list box on initial evaluation, provide pt/family education and to maximize pt's level of independence in the home and community environment.     Pt's spiritual, cultural and educational needs considered and pt agreeable to plan of care and goals.     Anticipated barriers to physical therapy: none    Goals:   Goals:  Short Term Goals: 4 weeks   Patient to demonstrate proper use of urge delay strategies to help reduce urge urinary incontinence with activities of daily living.   Pt to be able to perform a 5 second kegel x 10 reps to demonstrate improving strength and endurance needed for continence.  Pt to demonstrate being able to correctly and consistently perform a kegel which is needed  to increase pelvic floor muscle coordination and strength needed for continence.  Pt to be able to delay the urge to urinate at least 10 minutes with a strong urge to urinate in order to make it to the bathroom without leaking.  Pt to report >50% improvement in urine leakage   Pt to verbalize double-void technique for improved bladder emptying  Pt to report >50%  improvement in discomfort with vaginal penetration to increase tolerance for intercourse and gynecological exams  Pt able to demonstrate good pelvic girdle stability with supine straight leg raise to demonstrate improved functional core strength/pelvic girdle stability  Patient to be independent with introductory home exercise program.      Long Term Goals: 12 weeks ,   Pt to be able to perform a 10 second kegel x 10 reps to demonstrate improving strength and endurance needed for continence.  Pt to report elimination of incontinence with ADLs to demonstrate improved pelvic floor muscle strength and coordination.  Pt to be able to delay the urge to urinate at least 15 minutes with a strong urge to urinate in order to make it to the bathroom without leaking.  Pt to report no longer feeling the need to urinate just in case when shopping or participating in social activities to demonstrate improving pelvic floor and bladder control.  Pt to report a decrease in need to change underwear to <1x per week to demonstrate improving pelvic floor muscle controls as evidenced by decreased episodes of incontinence needed to improve confidence in social situations.  Pt to demonstrate diaphragmatic breathing and pelvic floor lengthening independently for improved pelvic floor tone   Pt to report >75% improvement in discomfort with vaginal penetration to increase tolerance for intercourse and gynecological exams  Patient to be independent with advanced home exercise program for carryover post discharge.    Plan     Continue with established Plan of Care, working toward established PT goals.    At next visit: continue plan of care     Pamela Martínez, PT, DPT

## 2024-12-04 NOTE — PATIENT INSTRUCTIONS
"Home Exercise Program: 12/04/2024    Kegels: Perform kegels on the exhale     Endurance Holds (Long holds)  Set yourself up laying down in a comfy position  Perform a long kegel (contract and LIFT the pelvic floor muscles as if you're trying to stop the stream of urine and passage of gas).    Make sure you're just using the internal muscles without holding your breath.  Let go of the kegel and relax your pelvic floor for 3-4 count  Hold 3-4 seconds. Repeat 10 times, 3 sets per day if all in a row give your self a min between sets (Goal is 10 seconds x10 reps)  Think "Exhale Squeeze 234 relax 234" repeat    Perfit: Set up the mihaela for next time  "

## 2024-12-11 ENCOUNTER — CLINICAL SUPPORT (OUTPATIENT)
Dept: REHABILITATION | Facility: HOSPITAL | Age: 29
End: 2024-12-11
Payer: MEDICAID

## 2024-12-11 DIAGNOSIS — N94.10 DYSPAREUNIA IN FEMALE: ICD-10-CM

## 2024-12-11 DIAGNOSIS — R53.1 WEAKNESS: ICD-10-CM

## 2024-12-11 DIAGNOSIS — M62.89 PELVIC FLOOR DYSFUNCTION: Primary | ICD-10-CM

## 2024-12-11 PROCEDURE — 97110 THERAPEUTIC EXERCISES: CPT

## 2024-12-11 NOTE — PROGRESS NOTES
[facility]   Physical Therapy Treatment Note      Name: Elizabeth Franco  Clinic Number: [id number]    Therapy Diagnosis:   No diagnosis found.      Physician: Jeansonne, Julie R., MD    Visit Date: 12/11/2024    Referring Provider Orders: PT Eval and Treat  Medical Diagnosis from Referral: Postpartum state [Z39.2], Urinary incontinence, unspecified type [R32]   Evaluation Date: 11/19/2024  Authorization Period Expiration: 12/31/2024  Plan of Care Expiration: 2/11/2025  Progress Note Due: 12/19/2024  Visit # / Visits authorized: 4/20   FOTO: 1/3     Precautions: Standard     Time In: 12:45  Time Out: 1:30  Total Billable Time: 45 minutes    Subjective     Pt reports: Reports everything has been about the same since last visit, still using toe curling technique. Reports having urgency that she couldn't control when she was out at the movies tried to use urge techniques, but wasn't able to control and leaked through clothing while trying to rush and make it to the bathroom. It was about an hour since she had last gone, feels like she hasn't been completely empty because after that she had to return ~30mins later.    She was compliant with home exercise program.    Response to previous treatment: fair  Functional change: urge suppression strategy did not work    Pain: none reported    Objective      VAGINAL PELVIC FLOOR EXAM  Written consent obtained: 11/27/2024   for assessment  Exam updated: 12/4/2024    External Assessment  Introitus: WNL  Skin condition: WNL  Scarring: none   Sensation: WNL   Pain: none    Internal Assessment  Pain: tender areas noted as follows: levator ani bilaterally, left side ischiocavernosus & bulbocavernous   Sensation: able to localized pressure appropriately   Vaginal vault: WNL   Muscle Bulk: hypertonus   Muscle Power: 3/5  Muscle Endurance: 4 seconds  # Reps To Fatigue: 10      Quality of contraction: slow rise, decreased hold, slow relaxation, and  incomplete relaxation   Coordination: tends to hold breath during PFM contration   Able to improve with verbal cues and practice  Prolapse check: Descent of anterior wall observed close to opening      Treatment     Elizabeth received the treatments listed below:     Pt verbally consents to intravaginal treatment today.  Signed consent form already on file.  Chaperone declined today.      Manual Therapy to develop flexibility, extensibility, and desensitization for 15 minutes including:   [x] soft tissue mobilization of levator ani bilaterally, left side ichiocavernosis & bulbocavernosis  also using wand    Neuromuscular Re-education to develop Coordination, Control, Down training, Proprioception, Kinesthetic, and Sense for 15 minutes including:   [x] Kegel education and practice. Increased time spent on edu on proper technique. Pt improves with practice and verbal cues.   [x] Education on visual cues/mental imagery for pelvic floor relaxation  [x] Education on visual cues/mental imagery for pelvic floor activation    Therapeutic Activity to improve functional performance for 15 minutes, including: Education as described below.   [x] Plan of care review  [x] Anatomy review and discussion of the anatomy on current level of function   [x] Home exercise program issued and reviewed   [x] pelvic wand use & perifit prep  [x] urge suppression    Home Exercises Provided and Patient Education Provided     Education provided: See above  Discussed progression of plan of care with patient; educated pt in activity modification; reviewed HEP with pt. Pt demonstrated and verbalized understanding of all instruction and was provided with a handout of HEP (see Patient Instructions).    Written Home Exercises Provided: yes.  Exercises were reviewed and Elizabeth was able to demonstrate them prior to the end of the session.  Elizabeth demonstrated good  understanding of the education provided.     See EMR under Patient Instructions for  exercises provided 12/11/2024     Assessment     Todays session included pelvic floor muscles internal treatment of  ichiocavernosis & bulbocavernosiswith using soft tissue mobilization and education and patient practice using wand for home use. Also patient practiced pelvic floor muscle contractions and was able to improve recruitment with cueing to wrap and lift inwards. Pt will continue to benefit from skilled outpatient physical therapy to address the deficits listed in the problem list box on initial evaluation, provide pt/family education and to maximize pt's level of independence in the home and community environment.     Elizabeth Is progressing well towards her goals.   Pt prognosis is Good.     Pt will continue to benefit from skilled outpatient physical therapy to address the deficits listed in the problem list box on initial evaluation, provide pt/family education and to maximize pt's level of independence in the home and community environment.     Pt's spiritual, cultural and educational needs considered and pt agreeable to plan of care and goals.     Anticipated barriers to physical therapy: none    Goals:   Goals:  Short Term Goals: 4 weeks   Patient to demonstrate proper use of urge delay strategies to help reduce urge urinary incontinence with activities of daily living.   Pt to be able to perform a 5 second kegel x 10 reps to demonstrate improving strength and endurance needed for continence.  Pt to demonstrate being able to correctly and consistently perform a kegel which is needed  to increase pelvic floor muscle coordination and strength needed for continence.  Pt to be able to delay the urge to urinate at least 10 minutes with a strong urge to urinate in order to make it to the bathroom without leaking.  Pt to report >50% improvement in urine leakage   Pt to verbalize double-void technique for improved bladder emptying  Pt to report >50% improvement in discomfort with vaginal penetration to  increase tolerance for intercourse and gynecological exams  Pt able to demonstrate good pelvic girdle stability with supine straight leg raise to demonstrate improved functional core strength/pelvic girdle stability  Patient to be independent with introductory home exercise program.      Long Term Goals: 12 weeks ,   Pt to be able to perform a 10 second kegel x 10 reps to demonstrate improving strength and endurance needed for continence.  Pt to report elimination of incontinence with ADLs to demonstrate improved pelvic floor muscle strength and coordination.  Pt to be able to delay the urge to urinate at least 15 minutes with a strong urge to urinate in order to make it to the bathroom without leaking.  Pt to report no longer feeling the need to urinate just in case when shopping or participating in social activities to demonstrate improving pelvic floor and bladder control.  Pt to report a decrease in need to change underwear to <1x per week to demonstrate improving pelvic floor muscle controls as evidenced by decreased episodes of incontinence needed to improve confidence in social situations.  Pt to demonstrate diaphragmatic breathing and pelvic floor lengthening independently for improved pelvic floor tone   Pt to report >75% improvement in discomfort with vaginal penetration to increase tolerance for intercourse and gynecological exams  Patient to be independent with advanced home exercise program for carryover post discharge.    Plan     Continue with established Plan of Care, working toward established PT goals.    At next visit: Ultrasound bladder    Pamela Martínez, PT, DPT

## 2024-12-11 NOTE — PATIENT INSTRUCTIONS
Home Exercise Program: 12/11/2024    DOUBLE VOIDING - Double voiding is a technique that may assist the bladder to empty more effectively when  urine is left in the bladder. It involves passing  urine more than once each time that you go to  the toilet. This makes sure that the bladder is  completely empty.    Here are 3 strategies you can try to fully empty your bladder.  You do not have to do all of these things every single time. Find which ones work best for you.     Check to make sure your pelvic floor is relaxed   Do a body scan - make sure your legs, buttocks, and abdominals are relaxed.  Take a couple deep, slow breaths to encourage your pelvic floor muscles to DROP (ie. Try Diaphragmatic Breathing).  Gently apply pressure over your bladder.  Change your pelvic position: lean forward, rock your pelvis forward and backward, stand up then sit back down.     Wait at least 30 seconds to 1 minute to see if a second urine stream begins.     Do not stop your urine stream or push/strain!

## 2024-12-17 ENCOUNTER — CLINICAL SUPPORT (OUTPATIENT)
Dept: REHABILITATION | Facility: HOSPITAL | Age: 29
End: 2024-12-17
Payer: MEDICAID

## 2024-12-17 DIAGNOSIS — N94.10 DYSPAREUNIA IN FEMALE: ICD-10-CM

## 2024-12-17 DIAGNOSIS — R53.1 WEAKNESS: ICD-10-CM

## 2024-12-17 DIAGNOSIS — M62.89 PELVIC FLOOR DYSFUNCTION: Primary | ICD-10-CM

## 2024-12-17 PROCEDURE — 97110 THERAPEUTIC EXERCISES: CPT

## 2024-12-17 NOTE — PATIENT INSTRUCTIONS
Pelvic Tilt  Exhale as you pull the low back toward the table inhale as you relax  You should feel your lower belly/deep core activate

## 2024-12-17 NOTE — PROGRESS NOTES
[facility]   Physical Therapy Treatment Note      Name: Elizabeth Evansmings  Clinic Number: [id number]    Therapy Diagnosis:   Encounter Diagnoses   Name Primary?    Pelvic floor dysfunction Yes    Dyspareunia in female     Weakness          Physician: Jeansonne, Julie R., MD    Visit Date: 12/17/2024    Referring Provider Orders: PT Eval and Treat  Medical Diagnosis from Referral: Postpartum state [Z39.2], Urinary incontinence, unspecified type [R32]   Evaluation Date: 11/19/2024  Authorization Period Expiration: 12/31/2024  Plan of Care Expiration: 2/11/2025  Progress Note Due: 12/19/2024  Visit # / Visits authorized: 5/20   FOTO: 1/3     Precautions: Standard     Time In: 2:35  Time Out: 3:15  Total Billable Time: 40 minutes    Subjective     Pt reports:  Reports that rocking back and forth helped with the urge to continue going, and that the cueing for more pelvic floor muscles activation helped her with recruiting better when attempting to squeeze for urge suppression. Feels like she is managing her urgency a little bit better, leakage is about the same. Reports that she still had had some pain deeper in her pelvic floor during sex but that using it after sex helped her get the pain to calm back down again.    She hasn't had an interstitial cystitis flare-up since her last period but she thinks there is a correlation with her pain symptoms.    PMH: interstitial cystitis     She was compliant with home exercise program.    Response to previous treatment: fair  Functional change: urge suppression strategy did not work    Pain: none reported    Objective      VAGINAL PELVIC FLOOR EXAM  Written consent obtained: 11/27/2024   for assessment  Exam updated: 12/4/2024    External Assessment  Introitus: WNL  Skin condition: WNL  Scarring: none   Sensation: WNL   Pain: none    Internal Assessment  Pain: tender areas noted as follows: levator ani bilaterally, left side ischiocavernosus &  bulbocavernous   Sensation: able to localized pressure appropriately   Vaginal vault: WNL   Muscle Bulk: hypertonus   Muscle Power: 3/5  Muscle Endurance: 4 seconds  # Reps To Fatigue: 10      Quality of contraction: slow rise, decreased hold, slow relaxation, and incomplete relaxation   Coordination: tends to hold breath during PFM contration   Able to improve with verbal cues and practice  Prolapse check: Descent of anterior wall observed close to opening    Ultrasound bladder volume 103 pre-void unable to visualize post    Diastasis recti: Mild 1 finger separation 2 inchs above umbilicus none below       Treatment     Elizabeth received the treatments listed below:     Pt verbally consents to intravaginal treatment today.  Signed consent form already on file.  Chaperone declined today.      Manual Therapy to develop flexibility, extensibility, and desensitization for 10 minutes including:   [] soft tissue mobilization of levator ani bilaterally, left side ichiocavernosis & bulbocavernosis  also using wand  [x]Soft tissue mobilization to abdominal wall & bladder mobilization    Neuromuscular Re-education to develop Coordination, Control, Down training, Proprioception, Kinesthetic, and Sense for 20 minutes including:   [x] Kegel education and practice. Increased time spent on edu on proper technique. Pt improves with practice and verbal cues.   [x] Education on visual cues/mental imagery for pelvic floor relaxation  [x] Education on visual cues/mental imagery for pelvic floor activation  [x]RUSI to visualize bladder voiding  []Pelvic tilt with transverse abdominis activation    Therapeutic Activity to improve functional performance for 10 minutes, including: Education as described below.   [x] Plan of care review  [x] Anatomy review and discussion of the anatomy on current level of function   [x] Home exercise program issued and reviewed   [x] pelvic wand use & perifit prep  [x] urge suppression  [x]Education for  bladder & voiding norms    Home Exercises Provided and Patient Education Provided     Education provided: See above  Discussed progression of plan of care with patient; educated pt in activity modification; reviewed HEP with pt. Pt demonstrated and verbalized understanding of all instruction and was provided with a handout of HEP (see Patient Instructions).    Written Home Exercises Provided: yes.  Exercises were reviewed and Elizabeth was able to demonstrate them prior to the end of the session.  Elizabeth demonstrated good  understanding of the education provided.     See EMR under Patient Instructions for exercises provided 12/17/2024     Assessment     Assessed patient's ability to fully empty bladder when using the bathroom using RUSI, with bladder initial visualized and urine present, after emptying, unable to visualize bladder indicating patient achieving full emptying. Initiated mobilization of bladder and abdominal wall with good reported patient response. Assessed patient's diastasis recti with Mild 1 finger separation 2 inchs above umbilicus & none below. Educated patient on transverse abdominis activation using pelvic tilt which patient was able to perform well during session.  Pt will continue to benefit from skilled outpatient physical therapy to address the deficits listed in the problem list box on initial evaluation, provide pt/family education and to maximize pt's level of independence in the home and community environment.     Elizabeth Is progressing well towards her goals.   Pt prognosis is Good.     Pt will continue to benefit from skilled outpatient physical therapy to address the deficits listed in the problem list box on initial evaluation, provide pt/family education and to maximize pt's level of independence in the home and community environment.     Pt's spiritual, cultural and educational needs considered and pt agreeable to plan of care and goals.     Anticipated barriers to physical therapy:  none    Goals:   Goals:  Short Term Goals: 4 weeks   Patient to demonstrate proper use of urge delay strategies to help reduce urge urinary incontinence with activities of daily living. MET 12/17  Pt to be able to perform a 5 second kegel x 10 reps to demonstrate improving strength and endurance needed for continence.  Pt to demonstrate being able to correctly and consistently perform a kegel which is needed  to increase pelvic floor muscle coordination and strength needed for continence.  Pt to be able to delay the urge to urinate at least 10 minutes with a strong urge to urinate in order to make it to the bathroom without leaking.  Pt to report >50% improvement in urine leakage   Pt to verbalize double-void technique for improved bladder emptying MET 12/17  Pt to report >50% improvement in discomfort with vaginal penetration to increase tolerance for intercourse and gynecological exams  Pt able to demonstrate good pelvic girdle stability with supine straight leg raise to demonstrate improved functional core strength/pelvic girdle stability  Patient to be independent with introductory home exercise program.      Long Term Goals: 12 weeks ,   Pt to be able to perform a 10 second kegel x 10 reps to demonstrate improving strength and endurance needed for continence.  Pt to report elimination of incontinence with ADLs to demonstrate improved pelvic floor muscle strength and coordination.  Pt to be able to delay the urge to urinate at least 15 minutes with a strong urge to urinate in order to make it to the bathroom without leaking.  Pt to report no longer feeling the need to urinate just in case when shopping or participating in social activities to demonstrate improving pelvic floor and bladder control.  Pt to report a decrease in need to change underwear to <1x per week to demonstrate improving pelvic floor muscle controls as evidenced by decreased episodes of incontinence needed to improve confidence in social  situations.  Pt to demonstrate diaphragmatic breathing and pelvic floor lengthening independently for improved pelvic floor tone   Pt to report >75% improvement in discomfort with vaginal penetration to increase tolerance for intercourse and gynecological exams  Patient to be independent with advanced home exercise program for carryover post discharge.    Plan     Continue with established Plan of Care, working toward established PT goals.    At next visit: Ultrasound bladder    Pamela Martínez, PT, DPT

## 2024-12-27 ENCOUNTER — CLINICAL SUPPORT (OUTPATIENT)
Dept: REHABILITATION | Facility: HOSPITAL | Age: 29
End: 2024-12-27
Payer: MEDICAID

## 2024-12-27 DIAGNOSIS — M62.89 PELVIC FLOOR DYSFUNCTION: Primary | ICD-10-CM

## 2024-12-27 DIAGNOSIS — R53.1 WEAKNESS: ICD-10-CM

## 2024-12-27 DIAGNOSIS — N94.10 DYSPAREUNIA IN FEMALE: ICD-10-CM

## 2024-12-27 NOTE — PROGRESS NOTES
[facility]   Physical Therapy Treatment Note      Name: Elizabeth Evansmings  Clinic Number: [id number]    Therapy Diagnosis:   Encounter Diagnoses   Name Primary?    Pelvic floor dysfunction Yes    Dyspareunia in female     Weakness          Physician: Jeansonne, Julie R., MD    Visit Date: 12/27/2024    Referring Provider Orders: PT Eval and Treat  Medical Diagnosis from Referral: Postpartum state [Z39.2], Urinary incontinence, unspecified type [R32]   Evaluation Date: 11/19/2024  Authorization Period Expiration: 12/31/2024  Plan of Care Expiration: 2/11/2025  Progress Note Due: 12/19/2024  Visit # / Visits authorized: 6/20   FOTO: 2/3     Precautions: Standard     Time In: 1:35  Time Out: 2:15  Total Billable Time: 40 minutes    Subjective     Pt reports:  Things have been pretty good, been more challenging to get them in between holidays and baby. Reports that she tried the meditation and that felt good. [place] this week wasn't great. Notices she getting urgency signals a little sooner, so she has a bit more time to make it, and that the strategies of elevator door closing & of Toe curling have been very effective.    Feels like things are going pretty well and that the techniques are helpful.     Primary goals is to improve the urinary leakage, wants to get to a point of being able to recognize when she needs to     PMH: interstitial cystitis     She was compliant with home exercise program.    Response to previous treatment: fair  Functional change: urge suppression strategy did not work    Pain: none reported    Objective      VAGINAL PELVIC FLOOR EXAM  Written consent obtained: 11/27/2024   for assessment  Exam updated: 12/4/2024    External Assessment  Introitus: WNL  Skin condition: WNL  Scarring: none   Sensation: WNL   Pain: none    Internal Assessment  Pain: tender areas noted as follows: levator ani bilaterally, bilateral side ischiocavernosus & bulbocavernous    Tender areas release within 5-10 seconds  Sensation: able to localized pressure appropriately   Vaginal vault: WNL   Muscle Bulk: hypertonus   Muscle Power: 4/5  Muscle Endurance: 10 seconds  # Reps To Fatigue: 10      Quality of contraction: slow rise,   Improved hold, & relaxation  Coordination: Excellent coordination of breath during contractions & relaxations  Prolapse check: Descent of anterior wall observed close to opening      Diastasis recti: Mild 1 finger separation 2 inchs above umbilicus none below       Treatment     Elizabeth received the treatments listed below:     Pt verbally consents to intravaginal treatment today.  Signed consent form already on file.  Chaperone declined today.      Manual Therapy to develop flexibility, extensibility, and desensitization for 0 minutes including:   [] soft tissue mobilization of levator ani bilaterally, left side ichiocavernosis & bulbocavernosis  also using wand  []Soft tissue mobilization to abdominal wall & bladder mobilization    Neuromuscular Re-education to develop Coordination, Control, Down training, Proprioception, Kinesthetic, and Sense for 15 minutes including:   [x] Kegel education and practice. Increased time spent on edu on proper technique. Pt improves with practice and verbal cues.   [x] Education on visual cues/mental imagery for pelvic floor relaxation  [x] Education on visual cues/mental imagery for pelvic floor activation  []RUSI to visualize bladder voiding  []Pelvic tilt with transverse abdominis activation    Therapeutic Activity to improve functional performance for 25 minutes, including: Education as described below.   [x] Plan of care review  [x] Anatomy review and discussion of the anatomy on current level of function   [x] Home exercise program issued and reviewed   [x] pelvic wand use & perifit prep  [x] urge suppression  [x]Education for bladder & voiding norms  [x]Literature & strategies to support sexual function    Home Exercises  Provided and Patient Education Provided     Education provided: See above  Discussed progression of plan of care with patient; educated pt in activity modification; reviewed HEP with pt. Pt demonstrated and verbalized understanding of all instruction and was provided with a handout of HEP (see Patient Instructions).    Written Home Exercises Provided: yes.  Exercises were reviewed and Elizabeth was able to demonstrate them prior to the end of the session.  Elizabeth demonstrated good  understanding of the education provided.     See EMR under Patient Instructions for exercises provided 1/27/2025     Assessment     Today's session focused on education based interventions to help patient with sexual function and intercourse related difficulties. Educated patient on transverse abdominis activation using pelvic tilt which patient was able to perform well during session.  Pt will continue to benefit from skilled outpatient physical therapy to address the deficits listed in the problem list box on initial evaluation, provide pt/family education and to maximize pt's level of independence in the home and community environment.     Elizabeth Is progressing well towards her goals.   Pt prognosis is Good.     Pt will continue to benefit from skilled outpatient physical therapy to address the deficits listed in the problem list box on initial evaluation, provide pt/family education and to maximize pt's level of independence in the home and community environment.     Pt's spiritual, cultural and educational needs considered and pt agreeable to plan of care and goals.     Anticipated barriers to physical therapy: none    Goals:   Goals:  Short Term Goals: 4 weeks   Patient to demonstrate proper use of urge delay strategies to help reduce urge urinary incontinence with activities of daily living. MET 12/17  Pt to be able to perform a 5 second kegel x 10 reps to demonstrate improving strength and endurance needed for continence.  Pt to  demonstrate being able to correctly and consistently perform a kegel which is needed  to increase pelvic floor muscle coordination and strength needed for continence.  Pt to be able to delay the urge to urinate at least 10 minutes with a strong urge to urinate in order to make it to the bathroom without leaking.  Pt to report >50% improvement in urine leakage   Pt to verbalize double-void technique for improved bladder emptying MET 12/17  Pt to report >50% improvement in discomfort with vaginal penetration to increase tolerance for intercourse and gynecological exams  Pt able to demonstrate good pelvic girdle stability with supine straight leg raise to demonstrate improved functional core strength/pelvic girdle stability  Patient to be independent with introductory home exercise program.      Long Term Goals: 12 weeks ,   Pt to be able to perform a 10 second kegel x 10 reps to demonstrate improving strength and endurance needed for continence.  Pt to report elimination of incontinence with ADLs to demonstrate improved pelvic floor muscle strength and coordination.  Pt to be able to delay the urge to urinate at least 15 minutes with a strong urge to urinate in order to make it to the bathroom without leaking.  Pt to report no longer feeling the need to urinate just in case when shopping or participating in social activities to demonstrate improving pelvic floor and bladder control.  Pt to report a decrease in need to change underwear to <1x per week to demonstrate improving pelvic floor muscle controls as evidenced by decreased episodes of incontinence needed to improve confidence in social situations.  Pt to demonstrate diaphragmatic breathing and pelvic floor lengthening independently for improved pelvic floor tone   Pt to report >75% improvement in discomfort with vaginal penetration to increase tolerance for intercourse and gynecological exams  Patient to be independent with advanced home exercise program for  carryover post discharge.    Plan     Continue with established Plan of Care, working toward established PT goals.    At next visit: Continue plan of care.    Pamela Martínez, PT, DPT

## 2024-12-27 NOTE — PATIENT INSTRUCTIONS
Try incorporating the meditation at least 1x before intercourse   Start using lubricant.    Still incorporate wand 3x per week for 5-10mins  Pick 1 activity to use before wand sessions   Gentle stretch: happy baby, chelo pose, deep squat, low lunges with any of these work through and move through you don't need to be static  On days where your not doing wand pick a few of these and spend ~5mins  Deep breathing for 1-3mins  The Pelvic Relaxation Meditation    Keep doing your pelvic floor exercises as exactly as you have been.      \

## 2025-01-30 ENCOUNTER — CLINICAL SUPPORT (OUTPATIENT)
Dept: REHABILITATION | Facility: OTHER | Age: 30
End: 2025-01-30
Payer: MEDICAID

## 2025-01-30 DIAGNOSIS — R53.1 WEAKNESS: ICD-10-CM

## 2025-01-30 DIAGNOSIS — M62.89 PELVIC FLOOR DYSFUNCTION: Primary | ICD-10-CM

## 2025-01-30 DIAGNOSIS — N94.10 DYSPAREUNIA IN FEMALE: ICD-10-CM

## 2025-01-30 PROCEDURE — 97110 THERAPEUTIC EXERCISES: CPT | Mod: PN

## 2025-01-30 NOTE — PROGRESS NOTES
[facility]   Physical Therapy Treatment Note      Name: Eilzabeth Franco  Clinic Number: [id number]    Therapy Diagnosis:   Encounter Diagnoses   Name Primary?    Pelvic floor dysfunction Yes    Dyspareunia in female     Weakness          Physician: Jeansonne, Julie R., MD    Visit Date: 1/30/2025    Referring Provider Orders: PT Eval and Treat  Medical Diagnosis from Referral: Postpartum state [Z39.2], Urinary incontinence, unspecified type [R32]   Evaluation Date: 11/19/2024  Authorization Period Expiration: 12/31/2024  Plan of Care Expiration: 2/11/2025  Progress Note Due: 12/19/2024  Visit # / Visits authorized: 7/20   FOTO: 2/3     Precautions: Standard     Time In: 3:45  Time Out: 4:15  Total Billable Time: 30 minutes    Subjective     Pt reports:  Things have been pretty good she started using her perfit which has been beneficial, and it's helped with her consistency. Notices that she's reduced her urgency and leakage with that improved consistency. Still having issues with intercourse but part of this is a relational issue.    PMH: interstitial cystitis     She was compliant with home exercise program.    Response to previous treatment: fair  Functional change: urge suppression strategy did not work    Pain: none reported    Objective      VAGINAL PELVIC FLOOR EXAM  Written consent obtained: 11/27/2024   for assessment  Exam updated: 12/4/2024    External Assessment  Introitus: WNL  Skin condition: WNL  Scarring: none   Sensation: WNL   Pain: none    Internal Assessment  Pain: tender areas noted as follows: levator ani bilaterally, bilateral side ischiocavernosus & bulbocavernous   Tender areas release within 5-10 seconds  Sensation: able to localized pressure appropriately   Vaginal vault: WNL   Muscle Bulk: hypertonus   Muscle Power: 4/5  Muscle Endurance: 10 seconds  # Reps To Fatigue: 10      Quality of contraction: slow rise,   Improved hold, &  relaxation  Coordination: Excellent coordination of breath during contractions & relaxations  Prolapse check: Descent of anterior wall observed close to opening      Diastasis recti: Mild 1 finger separation 2 inchs above umbilicus none below       Treatment     Elizabeth received the treatments listed below:     Pt verbally consents to intravaginal treatment today.  Signed consent form already on file.  Chaperone declined today.      Manual Therapy to develop flexibility, extensibility, and desensitization for 0 minutes including:   [] soft tissue mobilization of levator ani bilaterally, left side ichiocavernosis & bulbocavernosis  also using wand  []Soft tissue mobilization to abdominal wall & bladder mobilization    Neuromuscular Re-education to develop Coordination, Control, Down training, Proprioception, Kinesthetic, and Sense for 20 minutes including:   [x] Kegel education and practice. Increased time spent on edu on proper technique. Pt improves with practice and verbal cues.   [x] Education on visual cues/mental imagery for pelvic floor relaxation  [x] Education on visual cues/mental imagery for pelvic floor activation  [x]Biofeedback training with perifit  []RUSI to visualize bladder voiding  []Pelvic tilt with transverse abdominis activation    Therapeutic Activity to improve functional performance for 10 minutes, including: Education as described below.   [x] Plan of care review  [x] Anatomy review and discussion of the anatomy on current level of function   [x] Home exercise program issued and reviewed   [] pelvic wand use & perifit prep  [x] urge suppression  [x]Education for bladder & voiding norms  []Literature & strategies to support sexual function  [x]Perefit use for management of symptoms    Home Exercises Provided and Patient Education Provided     Education provided: See above  Discussed progression of plan of care with patient; educated pt in activity modification; reviewed HEP with pt. Pt  demonstrated and verbalized understanding of all instruction and was provided with a handout of HEP (see Patient Instructions).    Written Home Exercises Provided: yes.  Exercises were reviewed and Elizabeth was able to demonstrate them prior to the end of the session.  Elizabeth demonstrated good  understanding of the education provided.     See EMR under Patient Instructions for exercises provided 1/30/2025     Assessment     Today's session included review of current status and updates to patient's current progress with patient noticing significant improvements to urinary function including leakage, urgency & frequency after using perifit. Patient's primary concerns currently are about improving sexual function and discomfort. Today's session focused on Perifit training to ensure patient is using properly to help with both pelvic floor muscles strength and downtraining. Pt will continue to benefit from skilled outpatient physical therapy to address the deficits listed in the problem list box on initial evaluation, provide pt/family education and to maximize pt's level of independence in the home and community environment.     Elizabeth Is progressing well towards her goals.   Pt prognosis is Good.     Pt will continue to benefit from skilled outpatient physical therapy to address the deficits listed in the problem list box on initial evaluation, provide pt/family education and to maximize pt's level of independence in the home and community environment.     Pt's spiritual, cultural and educational needs considered and pt agreeable to plan of care and goals.     Anticipated barriers to physical therapy: none    Goals:   Goals:  Short Term Goals: 4 weeks   Patient to demonstrate proper use of urge delay strategies to help reduce urge urinary incontinence with activities of daily living. MET 12/17  Pt to be able to perform a 5 second kegel x 10 reps to demonstrate improving strength and endurance needed for continence.  Pt  to demonstrate being able to correctly and consistently perform a kegel which is needed  to increase pelvic floor muscle coordination and strength needed for continence.  Pt to be able to delay the urge to urinate at least 10 minutes with a strong urge to urinate in order to make it to the bathroom without leaking.  Pt to report >50% improvement in urine leakage   Pt to verbalize double-void technique for improved bladder emptying MET 12/17  Pt to report >50% improvement in discomfort with vaginal penetration to increase tolerance for intercourse and gynecological exams  Pt able to demonstrate good pelvic girdle stability with supine straight leg raise to demonstrate improved functional core strength/pelvic girdle stability  Patient to be independent with introductory home exercise program.      Long Term Goals: 12 weeks ,   Pt to be able to perform a 10 second kegel x 10 reps to demonstrate improving strength and endurance needed for continence.  Pt to report elimination of incontinence with ADLs to demonstrate improved pelvic floor muscle strength and coordination.  Pt to be able to delay the urge to urinate at least 15 minutes with a strong urge to urinate in order to make it to the bathroom without leaking.  Pt to report no longer feeling the need to urinate just in case when shopping or participating in social activities to demonstrate improving pelvic floor and bladder control.  Pt to report a decrease in need to change underwear to <1x per week to demonstrate improving pelvic floor muscle controls as evidenced by decreased episodes of incontinence needed to improve confidence in social situations.  Pt to demonstrate diaphragmatic breathing and pelvic floor lengthening independently for improved pelvic floor tone   Pt to report >75% improvement in discomfort with vaginal penetration to increase tolerance for intercourse and gynecological exams  Patient to be independent with advanced home exercise program for  carryover post discharge.    Plan     Continue with established Plan of Care, working toward established PT goals.    At next visit: Continue plan of care.    Pamela Martínez, PT, DPT

## 2025-02-04 ENCOUNTER — OFFICE VISIT (OUTPATIENT)
Dept: OBSTETRICS AND GYNECOLOGY | Facility: CLINIC | Age: 30
End: 2025-02-04
Payer: MEDICAID

## 2025-02-04 VITALS
WEIGHT: 117.94 LBS | HEIGHT: 63 IN | DIASTOLIC BLOOD PRESSURE: 66 MMHG | BODY MASS INDEX: 20.9 KG/M2 | SYSTOLIC BLOOD PRESSURE: 105 MMHG

## 2025-02-04 DIAGNOSIS — Z01.419 ENCOUNTER FOR ANNUAL ROUTINE GYNECOLOGICAL EXAMINATION: Primary | ICD-10-CM

## 2025-02-04 PROCEDURE — 3078F DIAST BP <80 MM HG: CPT | Mod: CPTII,,, | Performed by: OBSTETRICS & GYNECOLOGY

## 2025-02-04 PROCEDURE — 99395 PREV VISIT EST AGE 18-39: CPT | Mod: S$PBB,,, | Performed by: OBSTETRICS & GYNECOLOGY

## 2025-02-04 PROCEDURE — 99213 OFFICE O/P EST LOW 20 MIN: CPT | Mod: PBBFAC | Performed by: OBSTETRICS & GYNECOLOGY

## 2025-02-04 PROCEDURE — 3074F SYST BP LT 130 MM HG: CPT | Mod: CPTII,,, | Performed by: OBSTETRICS & GYNECOLOGY

## 2025-02-04 PROCEDURE — 99999 PR PBB SHADOW E&M-EST. PATIENT-LVL III: CPT | Mod: PBBFAC,,, | Performed by: OBSTETRICS & GYNECOLOGY

## 2025-02-04 PROCEDURE — 3044F HG A1C LEVEL LT 7.0%: CPT | Mod: CPTII,,, | Performed by: OBSTETRICS & GYNECOLOGY

## 2025-02-04 PROCEDURE — 1160F RVW MEDS BY RX/DR IN RCRD: CPT | Mod: CPTII,,, | Performed by: OBSTETRICS & GYNECOLOGY

## 2025-02-04 PROCEDURE — 3008F BODY MASS INDEX DOCD: CPT | Mod: CPTII,,, | Performed by: OBSTETRICS & GYNECOLOGY

## 2025-02-04 PROCEDURE — 1159F MED LIST DOCD IN RCRD: CPT | Mod: CPTII,,, | Performed by: OBSTETRICS & GYNECOLOGY

## 2025-02-04 NOTE — PROGRESS NOTES
SUBJECTIVE:     Chief Complaint: Well Woman       History of Present Illness:  Annual Exam  Patient presents for annual exam.   She c/o nothing today.  LMP: 25  She denies any vd, vb, dyspareunia, dysuria, depression, anxiety.  Last pap was in  and was neg. HPV na.  Birth Control: none  Declines STD testing.   Has SS Dx.    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    Acute chest syndrome due to sickle cell crisis 06/15/2022    Hepatomegaly 06/15/2022    Hidradenitis suppurativa     Nocturnal enuresis     Sickle cell anemia     Sickle cell crisis 2024    Warm antibody Autoimmune hemolytic anemia 2022     Past Surgical History:   Procedure Laterality Date    APPENDECTOMY      SPLENECTOMY       OB History          1    Para   1    Term   1            AB        Living   1         SAB        IAB        Ectopic        Multiple   0    Live Births   1               Family History   Problem Relation Name Age of Onset    No Known Problems Mother      No Known Problems Father       Social History     Tobacco Use    Smoking status: Never    Smokeless tobacco: Never   Substance Use Topics    Alcohol use: No     Alcohol/week: 0.0 standard drinks of alcohol    Drug use: Never       Current Outpatient Medications   Medication Sig    acetaminophen (TYLENOL) 650 MG TbSR Take 1 tablet (650 mg total) by mouth every 6 (six) hours. Alternate between ibuprofen and tylenol every 3 hours. For example: @0800: ibuprofen 600mg @1100: tylenol 650mg @1400: ibuprofen 600mg @1700: tylenol 650 mg @2000: ibuprofen 600mg    albuterol sulfate (PROAIR RESPICLICK) 90 mcg/actuation  inhaler Inhale 1-2 puffs into the lungs every 4 (four) hours as needed for Wheezing. Rescue    aspirin 81 MG Chew Take 81 mg by mouth once daily.    docusate sodium (COLACE) 100 MG capsule Take 1 capsule (100 mg total) by mouth 2 (two) times daily.    enoxaparin (LOVENOX) 40 mg/0.4 mL Syrg     folic acid (FOLVITE) 1 MG tablet Take 4 tablets (4 mg total) by mouth once daily.    fondaparinux (ARIXTRA) 2.5 mg/0.5 mL Syrg Inject into the skin once daily as directed .    ibuprofen (ADVIL,MOTRIN) 600 MG tablet Take 1 tablet (600 mg total) by mouth every 6 (six) hours. Alternate between ibuprofen and tylenol every 3 hours. For example: @0800: ibuprofen 600mg @1100: tylenol 650mg @1400: ibuprofen 600mg @1700: tylenol 650 mg @2000: ibuprofen 600mg    oxyCODONE (ROXICODONE) 5 MG immediate release tablet Take 2 tablets (10 mg total) by mouth every 6 (six) hours as needed.    traMADoL (ULTRAM) 50 mg tablet Take 1 tablet (50 mg total) by mouth every 6 (six) hours as needed for Pain.     No current facility-administered medications for this visit.       Review of Systems:  GENERAL: No fever, chills, fatigability or weight loss.  CARDIOVASCULAR: No chest pain. No palpitations.  RESPIRATORY: No SOB, no wheezing.  BREAST: Denies pain. No lumps. No discharge.  VULVAR: No pain, no lesions and no itching.  VAGINAL: No relaxation, no itching, no discharge, no abnormal bleeding and no lesions.  ABDOMEN: No abdominal pain. Denies nausea. Denies vomiting. No diarrhea. No constipation  URINARY: No incontinence, no nocturia, no frequency and no dysuria.  NEUROLOGICAL: No headaches. No vision changes.       OBJECTIVE:     Vitals:    02/04/25 1353   BP: 105/66       Patient verbally consents to pelvic and breast exams. Female chaperone present for exams.   Physical Exam:  Gen: NAD, well developed, well-nourished  HEENT: Normocephalic, atraumatic  Eyes: EOM nl, conjuntivae normal  Neck: ROM normal, no thyromegaly  Respiratory: Effort normal    Abd: soft, nontender, no masses palpated  Breast: Normal bilaterally, no masses, lesions or tenderness. No nipple discharge on expression, no lymphadenopathy bilaterally.  SSE:  Vulva: no lesions or rashes  Cervix: No lesions noted, nonfriable, no vaginal discharge or vaginal bleeding noted  BME:   Cervix: No CMT  Adnexa: nl bilaterally, no masses or fullness palpated  Uterus: normal, nonenlarged  Musculoskeletal: normal ROM  Neuro: alert, AAOx3  Skin: warm and dry  Psych: mood/affect nl, behavior normal, judgement normal, thought content normal        ASSESSMENT:       ICD-10-CM ICD-9-CM    1. Encounter for annual routine gynecological examination  Z01.419 V72.31              Plan:      Elizabeth was seen today for well woman.    Diagnoses and all orders for this visit:    Encounter for annual routine gynecological examination        No orders of the defined types were placed in this encounter.      Patient was counseled today on ACS guidelines and recommendations for yearly pelvic exams, mammograms and monthly self breast exams; to see her PCP for other health maintenance.      Follow up in one year for annual, or prn.    Julie R Jeansonne

## 2025-02-18 ENCOUNTER — CLINICAL SUPPORT (OUTPATIENT)
Dept: REHABILITATION | Facility: OTHER | Age: 30
End: 2025-02-18
Payer: MEDICAID

## 2025-02-18 DIAGNOSIS — N94.10 DYSPAREUNIA IN FEMALE: ICD-10-CM

## 2025-02-18 DIAGNOSIS — M62.89 PELVIC FLOOR DYSFUNCTION: Primary | ICD-10-CM

## 2025-02-18 DIAGNOSIS — R53.1 WEAKNESS: ICD-10-CM

## 2025-02-18 PROCEDURE — 97110 THERAPEUTIC EXERCISES: CPT | Mod: PN

## 2025-02-18 NOTE — PROGRESS NOTES
[facility]   Physical Therapy Progress & Treatment Note      Name: Elizabeth Franco  Clinic Number: [id number]    Therapy Diagnosis:   Encounter Diagnoses   Name Primary?    Pelvic floor dysfunction Yes    Dyspareunia in female     Weakness        Physician: Jeansonne, Julie R., MD    Visit Date: 2/18/2025    Referring Provider Orders: PT Eval and Treat  Medical Diagnosis from Referral: Postpartum state [Z39.2], Urinary incontinence, unspecified type [R32]   Evaluation Date: 11/19/2024  Authorization Period Expiration: 12/31/2024  Plan of Care Expiration: 2/11/2025  Progress Note Due: 12/19/2024  Visit # / Visits authorized: 8/20   FOTO: 2/3     Precautions: Standard     Time In: 3:18  Time Out: 4:08  Total Billable Time: 50 minutes    Subjective     Pt reports:  Reports that she has been doing similar things, but misplaced her perifit. She's able to hold her bladder longer.    Reports that the pain with intercourse feels very different than the wand use. Reverse girl on top & doggy style tend to be the most painful positions, and the sexual pain symptoms popped up after the interstitial cystitis diagnosis. Reports that this pain is happening more frequently now. Feels very pinpoint accuracy with the pain with penetration.     PMH: interstitial cystitis     She was compliant with home exercise program.    Response to previous treatment: good  Functional change: improved function    Pain: none reported    Objective      VAGINAL PELVIC FLOOR EXAM  Written consent obtained: 11/27/2024   for assessment  Exam updated: 2/18/2025      External Assessment  Introitus: WNL  Skin condition: WNL  Scarring: none   Sensation: WNL   Pain: none    Internal Assessment  Pain: tender areas noted as follows: levator ani bilaterally, bilateral side ischiocavernosus & bulbocavernous   More tension noted during palpation today with some areas requiring >30seconds to release  Sensation: able to  localized pressure appropriately   Vaginal vault: WNL   Muscle Bulk: hypertonus   Muscle Power: 4/5  Muscle Endurance: 10 seconds  # Reps To Fatigue: 10      Quality of contraction: slow rise,   Improved hold, & relaxation  Coordination: Excellent coordination of breath during contractions & relaxations  Prolapse check: Descent of anterior wall observed close to opening      Diastasis recti: Mild 1 finger separation 2 inches above umbilicus none below       Treatment     Elizabeth received the treatments listed below:     Pt verbally consents to intravaginal treatment today.  Signed consent form already on file.  Chaperone declined today.       Manual Therapy   [x] soft tissue mobilization of levator ani bilaterally, left side ichiocavernosis & bulbocavernosis  also using wand  []Soft tissue mobilization to abdominal wall & bladder mobilization    Neuromuscular Re-education   [] Kegel education and practice. Increased time spent on edu on proper technique. Pt improves with practice and verbal cues.   [x] Education on visual cues/mental imagery for pelvic floor relaxation  [] Hip IR & ER stretch supine with DB  [] Education on visual cues/mental imagery for pelvic floor activation  []Biofeedback training with perifit  []RUSI to visualize bladder voiding  []Pelvic tilt with transverse abdominis activation    Therapeutic Activity   [x] Plan of care review  [x] Anatomy review and discussion of the anatomy on current level of function   [x] Home exercise program issued and reviewed   [x] pelvic wand use & perifit prep  [] urge suppression  []Education for bladder & voiding norms  []Literature & strategies to support sexual function  []Perefit use for management of symptoms    Home Exercises Provided and Patient Education Provided     Education provided: See above  Discussed progression of plan of care with patient; educated pt in activity modification; reviewed HEP with pt. Pt demonstrated and verbalized understanding of  all instruction and was provided with a handout of HEP (see Patient Instructions).    Written Home Exercises Provided: yes.  Exercises were reviewed and Elizabeth was able to demonstrate them prior to the end of the session.  Elizabeth demonstrated good  understanding of the education provided.     See EMR under Patient Instructions for exercises provided 2/18/2025     Assessment     Today's session included review of current status and updates to patient's current progress with patient noticing significant improvements to urinary function including leakage, urgency & frequency. Patient's primary concerns continue to remain about sexual function and discomfort. Performed check of pelvic floor muscles with increased tension noted today compared to prior checks. Patient advised to reduce pelvic floor muscles strengthening exercises and resume pelvic floor muscles relaxation emphasis and wand use. Pt will continue to benefit from skilled outpatient physical therapy to address the deficits listed in the problem list box on initial evaluation, provide pt/family education and to maximize pt's level of independence in the home and community environment.     Elizabeth Is progressing well towards her goals.   Pt prognosis is Good.     Pt will continue to benefit from skilled outpatient physical therapy to address the deficits listed in the problem list box on initial evaluation, provide pt/family education and to maximize pt's level of independence in the home and community environment.     Pt's spiritual, cultural and educational needs considered and pt agreeable to plan of care and goals.     Anticipated barriers to physical therapy: none    Goals:  Short Term Goals: 4 weeks   Patient to demonstrate proper use of urge delay strategies to help reduce urge urinary incontinence with activities of daily living. MET 12/17  Pt to be able to perform a 5 second kegel x 10 reps to demonstrate improving strength and endurance needed for  continence.  Pt to demonstrate being able to correctly and consistently perform a kegel which is needed  to increase pelvic floor muscle coordination and strength needed for continence. MET  Pt to be able to delay the urge to urinate at least 10 minutes with a strong urge to urinate in order to make it to the bathroom without leaking. MET  Pt to report >50% improvement in urine leakage MET  Pt to verbalize double-void technique for improved bladder emptying MET 12/17  Pt to report >50% improvement in discomfort with vaginal penetration to increase tolerance for intercourse and gynecological exams Progressing  Pt able to demonstrate good pelvic girdle stability with supine straight leg raise to demonstrate improved functional core strength/pelvic girdle stability  Patient to be independent with introductory home exercise program.      Long Term Goals: 12 weeks ,   Pt to be able to perform a 10 second kegel x 10 reps to demonstrate improving strength and endurance needed for continence.  Pt to report elimination of incontinence with ADLs to demonstrate improved pelvic floor muscle strength and coordination.  Pt to be able to delay the urge to urinate at least 15 minutes with a strong urge to urinate in order to make it to the bathroom without leaking.  Pt to report no longer feeling the need to urinate just in case when shopping or participating in social activities to demonstrate improving pelvic floor and bladder control.   Pt to report a decrease in need to change underwear to <1x per week to demonstrate improving pelvic floor muscle controls as evidenced by decreased episodes of incontinence needed to improve confidence in social situations. MET  Pt to demonstrate diaphragmatic breathing and pelvic floor lengthening independently for improved pelvic floor tone. MET   Pt to report >75% improvement in discomfort with vaginal penetration to increase tolerance for intercourse and gynecological exams  Patient to be  independent with advanced home exercise program for carryover post discharge.    Plan     Continue with established Plan of Care, working toward established PT goals.    At next visit: Continue plan of care.    Pamela Martínez, PT, DPT

## 2025-02-18 NOTE — PATIENT INSTRUCTIONS
Alternate side to side hold 5 breaths 2x and then switch the legs and repeat.    Resume wand based pelvic floor muscles release techniques

## 2025-02-26 ENCOUNTER — PATIENT MESSAGE (OUTPATIENT)
Dept: REHABILITATION | Facility: OTHER | Age: 30
End: 2025-02-26
Payer: MEDICAID

## 2025-03-06 ENCOUNTER — PATIENT MESSAGE (OUTPATIENT)
Dept: REHABILITATION | Facility: OTHER | Age: 30
End: 2025-03-06
Payer: MEDICAID

## 2025-03-17 ENCOUNTER — CLINICAL SUPPORT (OUTPATIENT)
Dept: REHABILITATION | Facility: OTHER | Age: 30
End: 2025-03-17
Payer: MEDICAID

## 2025-03-17 DIAGNOSIS — M62.89 PELVIC FLOOR DYSFUNCTION: Primary | ICD-10-CM

## 2025-03-17 DIAGNOSIS — R53.1 WEAKNESS: ICD-10-CM

## 2025-03-17 DIAGNOSIS — N94.10 DYSPAREUNIA IN FEMALE: ICD-10-CM

## 2025-03-17 PROCEDURE — 97110 THERAPEUTIC EXERCISES: CPT | Mod: PN

## 2025-03-17 NOTE — PATIENT INSTRUCTIONS
"Kegels on the Go program:  Perform a 50% contraction (submax contraction) while walking from point A to point B in your home or yard.  For example, if you up to walk from the living room to the kitchen perform a 50% kegel while walking to help improve pelvic floor endurance with functional activities.      Try to do this on a task you complete a few times per day. This should lead right into a check-in with your Pelvic Floor >>>      "CHECK IN" WITH YOUR PELVIC FLOOR    Every hour, "check in" with your pelvic floor.  Is it tight and contracted?  Is it relaxed and dropped?    Take 10 seconds and try to release any tension in the pelvic floor muscles and external anal sphincter.   Diaphragmatic Breathing  One quick flick (a squeeze and release of the pelvic muscles)  Mindfulness  Full body relaxation  Gently pushing out/stretching your pelvic floor muscles (slight budge like having bowel movement)    Then return to your regular tasks.    Do this every hour throughout the day in any position.       You can get a wand from [employer] and decrease your current frequency to 1-2x per week    "

## 2025-03-17 NOTE — PROGRESS NOTES
[facility]   Physical Therapy Progress & Treatment Note      Name: Elizabeth Franco  Clinic Number: [id number]    Therapy Diagnosis:   Encounter Diagnoses   Name Primary?    Pelvic floor dysfunction Yes    Dyspareunia in female     Weakness          Physician: Jeansonne, Julie R., MD    Visit Date: 3/17/2025    Referring Provider Orders: PT Eval and Treat  Medical Diagnosis from Referral: Postpartum state [Z39.2], Urinary incontinence, unspecified type [R32]   Evaluation Date: 11/19/2024, Last Reassessment 2/18/2025  Authorization Period Expiration: 12/31/2024  Plan of Care Expiration: 5/18/2025  Visit # / Visits authorized: 9/20   FOTO: 2/3     Precautions: Standard     Time In: 2:50  Time Out: 330  Total Billable Time: 40 minutes    Subjective     Pt reports:  Has noticed that things are a little better since last session. She thinks her desire still isn't back to normal, but that she feel more receptive to intercourse and it has been less uncomfortable. Has noticed that she's had a little bit more urgency and leakage since she's stopped doing the kegels with as much frequency which is happening ~3x per week.    She has found her perifit again. She's been consistent with her wand release work, and hasn't been doing kegels.    Feels more confident about being in tune with her body    PMH: interstitial cystitis     She was compliant with home exercise program.    Response to previous treatment: good  Functional change: improved function    Pain: none reported    Objective      VAGINAL PELVIC FLOOR EXAM  Written consent obtained: 11/27/2024   for assessment  Exam updated: 3/17/2025    External Assessment  Introitus: WNL  Skin condition: WNL  Scarring: none   Sensation: WNL   Pain: none    Internal Assessment  Pain: tender areas noted as follows: right periurethral increased urgency  More tension noted during palpation today with some areas requiring >30seconds to  release  Sensation: able to localized pressure appropriately   Vaginal vault: WNL   Muscle Bulk: hypertonus   Muscle Power: 4/5  Muscle Endurance: 10 seconds  # Reps To Fatigue: 10      Quality of contraction: slow rise  Improved hold, & relaxation  Coordination: Excellent coordination of breath during contractions & relaxations  Prolapse check: Descent of anterior wall observed close to opening      Diastasis recti: Mild 1 finger separation 2 inches above umbilicus none below       Treatment     Elizabeth received the treatments listed below:     Pt verbally consents to intravaginal treatment today.  Signed consent form already on file.  Chaperone declined today.       Manual Therapy   [] soft tissue mobilization of levator ani bilaterally, left side ichiocavernosis & bulbocavernosis  also using wand  []Soft tissue mobilization to abdominal wall & bladder mobilization  [x]Soft tissue mobilization periurethral muscles    Neuromuscular Re-education   [x] Kegel education and practice. Increased time spent on edu on proper technique. Pt improves with practice and verbal cues.   [x] Education on visual cues/mental imagery for pelvic floor relaxation  [] Hip IR & ER stretch supine with DB  [] Education on visual cues/mental imagery for pelvic floor activation  []Biofeedback training with perifit  []RUSI to visualize bladder voiding  []Pelvic tilt with transverse abdominis activation    Therapeutic Activity   [x] Plan of care review & progress towards DC along with nature of current symptoms  [x] Anatomy review and discussion of the anatomy on current level of function   [x] Home exercise program issued and reviewed   [x] pelvic wand use & perifit prep  [] urge suppression  []Education for bladder & voiding norms  []Literature & strategies to support sexual function  []Perefit use for management of symptoms    Home Exercises Provided and Patient Education Provided     Education provided: See above  Discussed progression of  plan of care with patient; educated pt in activity modification; reviewed HEP with pt. Pt demonstrated and verbalized understanding of all instruction and was provided with a handout of HEP (see Patient Instructions).    Written Home Exercises Provided: yes.  Exercises were reviewed and Elizabeth was able to demonstrate them prior to the end of the session.  Elizabeth demonstrated good  understanding of the education provided.     See EMR under Patient Instructions for exercises provided 3/17/2025     Assessment     Patient reporting improved pain symptoms with intercourse but a slightly worsen of bladder urgency & leakage. On pelvic floor muscle assessment patient presenting with mild urgency when palpation periurethral muscles which releases with sustained pressure. Patient maintains strong pelvic floor muscles strength 10x 10 second holds, and ability to fully relax after reps indicated good pelvic floor muscles strength. Pt will continue to benefit from skilled outpatient physical therapy to address the deficits listed in the problem list box on initial evaluation, provide pt/family education and to maximize pt's level of independence in the home and community environment.     Elizabeth Is progressing well towards her goals.   Pt prognosis is Good.     Pt will continue to benefit from skilled outpatient physical therapy to address the deficits listed in the problem list box on initial evaluation, provide pt/family education and to maximize pt's level of independence in the home and community environment.     Pt's spiritual, cultural and educational needs considered and pt agreeable to plan of care and goals.     Anticipated barriers to physical therapy: none    Goals:  Short Term Goals: 4 weeks   Patient to demonstrate proper use of urge delay strategies to help reduce urge urinary incontinence with activities of daily living. MET 12/17  Pt to be able to perform a 5 second kegel x 10 reps to demonstrate improving  strength and endurance needed for continence.  Pt to demonstrate being able to correctly and consistently perform a kegel which is needed  to increase pelvic floor muscle coordination and strength needed for continence. MET  Pt to be able to delay the urge to urinate at least 10 minutes with a strong urge to urinate in order to make it to the bathroom without leaking. MET  Pt to report >50% improvement in urine leakage MET  Pt to verbalize double-void technique for improved bladder emptying MET 12/17  Pt to report >50% improvement in discomfort with vaginal penetration to increase tolerance for intercourse and gynecological exams Progressing  Pt able to demonstrate good pelvic girdle stability with supine straight leg raise to demonstrate improved functional core strength/pelvic girdle stability  Patient to be independent with introductory home exercise program.      Long Term Goals: 12 weeks ,   Pt to be able to perform a 10 second kegel x 10 reps to demonstrate improving strength and endurance needed for continence.  Pt to report elimination of incontinence with ADLs to demonstrate improved pelvic floor muscle strength and coordination.  Pt to be able to delay the urge to urinate at least 15 minutes with a strong urge to urinate in order to make it to the bathroom without leaking.  Pt to report no longer feeling the need to urinate just in case when shopping or participating in social activities to demonstrate improving pelvic floor and bladder control.   Pt to report a decrease in need to change underwear to <1x per week to demonstrate improving pelvic floor muscle controls as evidenced by decreased episodes of incontinence needed to improve confidence in social situations. MET  Pt to demonstrate diaphragmatic breathing and pelvic floor lengthening independently for improved pelvic floor tone. MET   Pt to report >75% improvement in discomfort with vaginal penetration to increase tolerance for intercourse and  gynecological exams  Patient to be independent with advanced home exercise program for carryover post discharge.    Plan     Continue with established Plan of Care, working toward established PT goals.    At next visit: Continue plan of care.    Pamela Martínez, PT, DPT

## 2025-04-03 ENCOUNTER — PATIENT MESSAGE (OUTPATIENT)
Dept: REHABILITATION | Facility: OTHER | Age: 30
End: 2025-04-03
Payer: MEDICAID

## 2025-04-10 ENCOUNTER — CLINICAL SUPPORT (OUTPATIENT)
Dept: REHABILITATION | Facility: OTHER | Age: 30
End: 2025-04-10
Payer: MEDICAID

## 2025-04-10 DIAGNOSIS — M62.89 PELVIC FLOOR DYSFUNCTION: Primary | ICD-10-CM

## 2025-04-10 DIAGNOSIS — N94.10 DYSPAREUNIA IN FEMALE: ICD-10-CM

## 2025-04-10 PROCEDURE — 97110 THERAPEUTIC EXERCISES: CPT | Mod: PN

## 2025-04-10 NOTE — PROGRESS NOTES
[facility]   Physical Therapy Progress & Treatment Note      Name: Elizabeth Franco  Clinic Number: [id number]    Therapy Diagnosis:   Encounter Diagnoses   Name Primary?    Pelvic floor dysfunction Yes    Dyspareunia in female          Physician: Jeansonne, Julie R., MD    Visit Date: 4/10/2025    Referring Provider Orders: PT Eval and Treat  Medical Diagnosis from Referral: Postpartum state [Z39.2], Urinary incontinence, unspecified type [R32]   Evaluation Date: 11/19/2024, Last Reassessment 2/18/2025  Authorization Period Expiration: 12/31/2024  Plan of Care Expiration: 5/18/2025  Visit # / Visits authorized: 9/20   FOTO: 2/3     Precautions: Standard     Time In: 303  Time Out: 333  Total Billable Time: 30 minutes    Subjective     Pt reports:  Hard finding a time to do her walking Kegels because she couldn't find something to use trigger. Pain with sex has improved, and leakage has improved but not where she was a few months ago.     Currently doing wand 1-2x per week, stretches 1-2x per week, attempts at walking kegels 1-2x per day.     PMH: interstitial cystitis     She was compliant with home exercise program.    Response to previous treatment: good  Functional change: improved function    Pain: none reported    Objective      VAGINAL PELVIC FLOOR EXAM  Written consent obtained: 11/27/2024   for assessment  Exam updated: 4/10/2025    External Assessment  Introitus: WNL  Skin condition: WNL  Scarring: none   Sensation: WNL   Pain: none    Internal Assessment  Pain: tender areas noted as follows: right periurethral increased urgency  More tension noted during palpation today with some areas requiring >30seconds to release  Sensation: able to localized pressure appropriately   Vaginal vault: WNL   Muscle Bulk: hypertonus   Muscle Power: 4/5  Muscle Endurance: 10 seconds  # Reps To Fatigue: 10      Quality of contraction: slow rise  Improved hold, &  relaxation  Coordination: Excellent coordination of breath during contractions & relaxations  Prolapse check: Descent of anterior wall observed close to opening      Diastasis recti: Mild 1 finger separation 2 inches above umbilicus none below       Treatment     Elizabeth received the treatments listed below:     Pt verbally consents to intravaginal treatment today.  Signed consent form already on file.  Chaperone declined today.       Manual Therapy   [] soft tissue mobilization of levator ani bilaterally, left side ichiocavernosis & bulbocavernosis  also using wand  []Soft tissue mobilization to abdominal wall & bladder mobilization  [x]Soft tissue mobilization periurethral muscles    Neuromuscular Re-education   [x] Kegel education and practice. Increased time spent on edu on proper technique. Pt improves with practice and verbal cues.   [x] Education on visual cues/mental imagery for pelvic floor relaxation   [] Hip IR & ER stretch supine with DB  [] Education on visual cues/mental imagery for pelvic floor activation  []Biofeedback training with perifit  []RUSI to visualize bladder voiding  [x]Pelvic tilt with transverse abdominis activation    Therapeutic Activity   [x] Plan of care review & progress towards DC along with nature of current symptoms  [x] Anatomy review and discussion of the anatomy on current level of function   [x] Home exercise program issued and reviewed   [] pelvic wand use & perifit prep  [x] urge suppression  []Education for bladder & voiding norms  []Literature & strategies to support sexual function  []Perefit use for management of symptoms  [x]Pelvic floor muscles assessment    Home Exercises Provided and Patient Education Provided     Education provided: See above  Discussed progression of plan of care with patient; educated pt in activity modification; reviewed HEP with pt. Pt demonstrated and verbalized understanding of all instruction and was provided with a handout of HEP (see  Patient Instructions).    Written Home Exercises Provided: yes.  Exercises were reviewed and Elizabeth was able to demonstrate them prior to the end of the session.  Elizabeth demonstrated good  understanding of the education provided.     See EMR under Patient Instructions for exercises provided 4/21/2025     Assessment     Patient  continues to report difficulty with urinary symptoms, so interventions today focused on addressing these issues. Educated patient that shift in care towards strength may need to resume, and reduce focus on mobility work, with good patient response. Pt will continue to benefit from skilled outpatient physical therapy to address the deficits listed in the problem list box on initial evaluation, provide pt/family education and to maximize pt's level of independence in the home and community environment.     Elizabeth Is progressing well towards her goals.   Pt prognosis is Good.     Pt will continue to benefit from skilled outpatient physical therapy to address the deficits listed in the problem list box on initial evaluation, provide pt/family education and to maximize pt's level of independence in the home and community environment.     Pt's spiritual, cultural and educational needs considered and pt agreeable to plan of care and goals.     Anticipated barriers to physical therapy: none    Goals:  Short Term Goals: 4 weeks   Patient to demonstrate proper use of urge delay strategies to help reduce urge urinary incontinence with activities of daily living. MET 12/17  Pt to be able to perform a 5 second kegel x 10 reps to demonstrate improving strength and endurance needed for continence.  Pt to demonstrate being able to correctly and consistently perform a kegel which is needed  to increase pelvic floor muscle coordination and strength needed for continence. MET  Pt to be able to delay the urge to urinate at least 10 minutes with a strong urge to urinate in order to make it to the bathroom without  leaking. MET  Pt to report >50% improvement in urine leakage MET  Pt to verbalize double-void technique for improved bladder emptying MET 12/17  Pt to report >50% improvement in discomfort with vaginal penetration to increase tolerance for intercourse and gynecological exams Progressing  Pt able to demonstrate good pelvic girdle stability with supine straight leg raise to demonstrate improved functional core strength/pelvic girdle stability  Patient to be independent with introductory home exercise program.      Long Term Goals: 12 weeks ,   Pt to be able to perform a 10 second kegel x 10 reps to demonstrate improving strength and endurance needed for continence.  Pt to report elimination of incontinence with ADLs to demonstrate improved pelvic floor muscle strength and coordination.  Pt to be able to delay the urge to urinate at least 15 minutes with a strong urge to urinate in order to make it to the bathroom without leaking.  Pt to report no longer feeling the need to urinate just in case when shopping or participating in social activities to demonstrate improving pelvic floor and bladder control.   Pt to report a decrease in need to change underwear to <1x per week to demonstrate improving pelvic floor muscle controls as evidenced by decreased episodes of incontinence needed to improve confidence in social situations. MET  Pt to demonstrate diaphragmatic breathing and pelvic floor lengthening independently for improved pelvic floor tone. MET   Pt to report >75% improvement in discomfort with vaginal penetration to increase tolerance for intercourse and gynecological exams  Patient to be independent with advanced home exercise program for carryover post discharge.    Plan     Continue with established Plan of Care, working toward established PT goals.    At next visit: Continue plan of care.    Pamela Martínez, PT, DPT

## 2025-04-27 ENCOUNTER — PATIENT MESSAGE (OUTPATIENT)
Dept: REHABILITATION | Facility: OTHER | Age: 30
End: 2025-04-27
Payer: MEDICAID

## 2025-05-06 ENCOUNTER — PATIENT MESSAGE (OUTPATIENT)
Dept: OBSTETRICS AND GYNECOLOGY | Facility: CLINIC | Age: 30
End: 2025-05-06
Payer: MEDICAID

## 2025-05-08 DIAGNOSIS — R10.2 PELVIC PAIN: Primary | ICD-10-CM

## 2025-05-14 ENCOUNTER — CLINICAL SUPPORT (OUTPATIENT)
Dept: REHABILITATION | Facility: OTHER | Age: 30
End: 2025-05-14
Payer: MEDICAID

## 2025-05-14 DIAGNOSIS — M62.89 PELVIC FLOOR DYSFUNCTION: Primary | ICD-10-CM

## 2025-05-14 DIAGNOSIS — N94.10 DYSPAREUNIA IN FEMALE: ICD-10-CM

## 2025-05-14 DIAGNOSIS — R53.1 WEAKNESS: ICD-10-CM

## 2025-05-14 PROCEDURE — 97110 THERAPEUTIC EXERCISES: CPT | Mod: PN

## 2025-05-14 NOTE — PROGRESS NOTES
[facility]   Physical Therapy Progress & Progress Note     Name: Elizabeth Franco  Clinic Number: [id number]    Therapy Diagnosis:   Encounter Diagnoses   Name Primary?    Pelvic floor dysfunction Yes    Dyspareunia in female     Weakness          Physician: Jeansonne, Julie R., MD    Visit Date: 5/14/2025    Referring Provider Orders: PT Eval and Treat  Medical Diagnosis from Referral: Postpartum state [Z39.2], Urinary incontinence, unspecified type [R32]   Evaluation Date: 11/19/2024, Last Reassessment 2/18/2025  Authorization Period Expiration: 12/31/2024  Plan of Care Expiration: 5/18/2025  Visit # / Visits authorized: 10/20   FOTO: 4/3 (5/14)     Precautions: Standard     Time In: 2:02  Time Out: 245  Total Billable Time: 43 minutes    Subjective     Pt reports:  Feels like she's doing pretty well and feels like the exercises have been helpful to target the deeper muscles. Notices that the sexual pain has been doing a lot better as well. Notices that both urgency and leakage have been improving.     She's doing exercises 3-4x per week. She's been most focused on the bridge, but she's been incorporating the other ones as well, likes hip lift the least. She's been incorporate the yoga poses.    Notices that the ability to suppress the urge has been better, but still has a bit or urgency there.     Feels like almost everything she has come in for has been addressed. Part of why she wanted to come in was also to think about future pregnancy prep.      PMH: interstitial cystitis     She was compliant with home exercise program.    Response to previous treatment: good  Functional change: improved function    Pain: none reported    Objective      VAGINAL PELVIC FLOOR EXAM  Written consent obtained: 11/27/2024   for assessment  Exam updated: 4/10/2025    External Assessment  Introitus: WNL  Skin condition: WNL  Scarring: none   Sensation: WNL   Pain: none    Internal  Assessment  Pain: tender areas noted as follows: right periurethral increased urgency  More tension noted during palpation today with some areas requiring >30seconds to release  Sensation: able to localized pressure appropriately   Vaginal vault: WNL   Muscle Bulk: hypertonus   Muscle Power: 4/5  Muscle Endurance: 10 seconds  # Reps To Fatigue: 10      Quality of contraction: slow rise  Improved hold, & relaxation  Coordination: Excellent coordination of breath during contractions & relaxations  Prolapse check: Descent of anterior wall observed close to opening      Diastasis recti: Mild 1 finger separation 2 inches above umbilicus none below       Treatment     Elizabeth received the treatments listed below:     Pt verbally consents to intravaginal treatment today.  Signed consent form already on file.  Chaperone declined today.     Therapeutic Exercise  [x]Glute bridge  [x]Glute bridge with red theraband abduction  [x]Glute bridge with red theraband over hips  [x]Side-lying hip IR  [x]Quad hip lift with red theraband     Manual Therapy   [] soft tissue mobilization of levator ani bilaterally, left side ichiocavernosis & bulbocavernosis also using wand  []Soft tissue mobilization to abdominal wall & bladder mobilization  []Soft tissue mobilization periurethral muscles    Neuromuscular Re-education   [] Kegel education and practice. Increased time spent on edu on proper technique. Pt improves with practice and verbal cues.   [] Education on visual cues/mental imagery for pelvic floor relaxation   [] Hip IR & ER stretch supine with DB  [] Education on visual cues/mental imagery for pelvic floor activation  []Biofeedback training with perifit  []RUSI to visualize bladder voiding  []Pelvic tilt with transverse abdominis activation    Therapeutic Activity   [x] Plan of care review & progress towards DC along with nature of current symptoms  [x] Anatomy review and discussion of the anatomy on current level of function   [x]  Home exercise program issued and reviewed   [] pelvic wand use & perifit prep  [x] urge suppression  []Education for bladder & voiding norms  []Literature & strategies to support sexual function  [x]Perefit use for management of symptoms  [x]FOTO update  [x]Role of PFPT during pregnancy  []Pelvic floor muscles assessment      Home Exercises Provided and Patient Education Provided     Education provided: See above  Discussed progression of plan of care with patient; educated pt in activity modification; reviewed HEP with pt. Pt demonstrated and verbalized understanding of all instruction and was provided with a handout of HEP (see Patient Instructions).    Written Home Exercises Provided: yes.  Exercises were reviewed and Elizabeth was able to demonstrate them prior to the end of the session.  Elizabeth demonstrated good  understanding of the education provided.     See EMR under Patient Instructions for exercises provided 5/14/2025     Assessment     Patient reporting significant improvements to symptoms of both urinary and sexual function. Patient consistent with home exercise program, and is approaching DC, however would benefit from a couple additional visits to ensure symptoms continue to be well managed and ensure sufficient home care for patient. Today's session focused on educational strategies for ongoing symptoms maintenance, as well as updates and progressions to exercises. Pt will continue to benefit from skilled outpatient physical therapy to address the deficits listed in the problem list box on initial evaluation, provide pt/family education and to maximize pt's level of independence in the home and community environment.     Elizabeth Is progressing well towards her goals.   Pt prognosis is Good.     Pt will continue to benefit from skilled outpatient physical therapy to address the deficits listed in the problem list box on initial evaluation, provide pt/family education and to maximize pt's level of  independence in the home and community environment.     Pt's spiritual, cultural and educational needs considered and pt agreeable to plan of care and goals.     Anticipated barriers to physical therapy: none    Goals:  Short Term Goals: 4 weeks   Patient to demonstrate proper use of urge delay strategies to help reduce urge urinary incontinence with activities of daily living. MET 12/17  Pt to be able to perform a 5 second kegel x 10 reps to demonstrate improving strength and endurance needed for continence.  Pt to demonstrate being able to correctly and consistently perform a kegel which is needed  to increase pelvic floor muscle coordination and strength needed for continence. MET  Pt to be able to delay the urge to urinate at least 10 minutes with a strong urge to urinate in order to make it to the bathroom without leaking. MET  Pt to report >50% improvement in urine leakage MET  Pt to verbalize double-void technique for improved bladder emptying MET 12/17  Pt to report >50% improvement in discomfort with vaginal penetration to increase tolerance for intercourse and gynecological exams Progressing  Pt able to demonstrate good pelvic girdle stability with supine straight leg raise to demonstrate improved functional core strength/pelvic girdle stability  Patient to be independent with introductory home exercise program.      Long Term Goals: 12 weeks ,   Pt to be able to perform a 10 second kegel x 10 reps to demonstrate improving strength and endurance needed for continence.  Pt to report elimination of incontinence with ADLs to demonstrate improved pelvic floor muscle strength and coordination. MET  Pt to be able to delay the urge to urinate at least 15 minutes with a strong urge to urinate in order to make it to the bathroom without leaking. MET  Pt to report no longer feeling the need to urinate just in case when shopping or participating in social activities to demonstrate improving pelvic floor and bladder  control. MET  Pt to report a decrease in need to change underwear to <1x per week to demonstrate improving pelvic floor muscle controls as evidenced by decreased episodes of incontinence needed to improve confidence in social situations. MET  Pt to demonstrate diaphragmatic breathing and pelvic floor lengthening independently for improved pelvic floor tone. MET   Pt to report >75% improvement in discomfort with vaginal penetration to increase tolerance for intercourse and gynecological exams  Patient to be independent with advanced home exercise program for carryover post discharge.    Plan     Continue with established Plan of Care, working toward established PT goals.    At next visit: Continue plan of care.    Pamela Martínez, PT, DPT

## 2025-06-02 ENCOUNTER — PATIENT MESSAGE (OUTPATIENT)
Dept: REHABILITATION | Facility: HOSPITAL | Age: 30
End: 2025-06-02
Payer: MEDICAID

## 2025-06-05 ENCOUNTER — CLINICAL SUPPORT (OUTPATIENT)
Dept: REHABILITATION | Facility: HOSPITAL | Age: 30
End: 2025-06-05
Payer: MEDICAID

## 2025-06-05 DIAGNOSIS — M62.89 PELVIC FLOOR DYSFUNCTION: Primary | ICD-10-CM

## 2025-06-05 DIAGNOSIS — N94.10 DYSPAREUNIA IN FEMALE: ICD-10-CM

## 2025-06-05 DIAGNOSIS — R53.1 WEAKNESS: ICD-10-CM

## 2025-06-05 PROCEDURE — 97110 THERAPEUTIC EXERCISES: CPT | Mod: PO
